# Patient Record
Sex: FEMALE | Race: WHITE | NOT HISPANIC OR LATINO | Employment: OTHER | RURAL
[De-identification: names, ages, dates, MRNs, and addresses within clinical notes are randomized per-mention and may not be internally consistent; named-entity substitution may affect disease eponyms.]

---

## 2017-06-16 ENCOUNTER — HISTORICAL (OUTPATIENT)
Dept: ADMINISTRATIVE | Facility: HOSPITAL | Age: 62
End: 2017-06-16

## 2017-06-20 LAB
LAB AP CLINICAL INFORMATION: NORMAL
LAB AP COMMENTS: NORMAL
LAB AP GENERAL CAT - HISTORICAL: NORMAL
LAB AP INTERPRETATION/RESULT - HISTORICAL: NEGATIVE
LAB AP SPECIMEN ADEQUACY - HISTORICAL: NORMAL
LAB AP SPECIMEN SUBMITTED - HISTORICAL: NORMAL

## 2020-05-19 ENCOUNTER — HISTORICAL (OUTPATIENT)
Dept: ADMINISTRATIVE | Facility: HOSPITAL | Age: 65
End: 2020-05-19

## 2020-08-17 ENCOUNTER — HISTORICAL (OUTPATIENT)
Dept: ADMINISTRATIVE | Facility: HOSPITAL | Age: 65
End: 2020-08-17

## 2020-09-02 ENCOUNTER — HISTORICAL (OUTPATIENT)
Dept: ADMINISTRATIVE | Facility: HOSPITAL | Age: 65
End: 2020-09-02

## 2020-10-01 ENCOUNTER — HISTORICAL (OUTPATIENT)
Dept: ADMINISTRATIVE | Facility: HOSPITAL | Age: 65
End: 2020-10-01

## 2020-10-04 LAB
REPORT: NORMAL

## 2020-10-05 ENCOUNTER — HISTORICAL (OUTPATIENT)
Dept: ADMINISTRATIVE | Facility: HOSPITAL | Age: 65
End: 2020-10-05

## 2020-10-05 LAB
BACTERIA #/AREA URNS HPF: ABNORMAL /HPF
BILIRUB UR QL STRIP: NEGATIVE MG/DL
CLARITY UR: CLEAR
COLOR UR: YELLOW
GLUCOSE UR STRIP-MCNC: NEGATIVE MG/DL
KETONES UR STRIP-SCNC: NEGATIVE MG/DL
LEUKOCYTE ESTERASE UR QL STRIP: ABNORMAL LEU/UL
NITRITE UR QL STRIP: NEGATIVE
PH UR STRIP: 5.5 PH UNITS (ref 5–8)
PROT UR QL STRIP: NEGATIVE MG/DL
RBC # UR STRIP: NEGATIVE ERY/UL
RBC #/AREA URNS HPF: ABNORMAL /HPF (ref 0–3)
SP GR UR STRIP: 1.02 (ref 1–1.03)
SQUAMOUS #/AREA URNS LPF: ABNORMAL /LPF
UROBILINOGEN UR STRIP-ACNC: 0.2 MG/DL
WBC #/AREA URNS HPF: ABNORMAL /HPF (ref 0–5)

## 2020-11-19 ENCOUNTER — HISTORICAL (OUTPATIENT)
Dept: ADMINISTRATIVE | Facility: HOSPITAL | Age: 65
End: 2020-11-19

## 2021-05-21 VITALS — HEIGHT: 69 IN | WEIGHT: 188 LBS | BODY MASS INDEX: 27.85 KG/M2

## 2021-05-21 RX ORDER — PROMETHAZINE HYDROCHLORIDE 25 MG/1
25 TABLET ORAL EVERY 6 HOURS PRN
COMMUNITY
End: 2021-07-07

## 2021-05-21 RX ORDER — LEVOTHYROXINE SODIUM 50 UG/1
50 TABLET ORAL
COMMUNITY
End: 2021-10-05 | Stop reason: SDUPTHER

## 2021-05-21 RX ORDER — HYDROCODONE BITARTRATE AND ACETAMINOPHEN 7.5; 325 MG/1; MG/1
1 TABLET ORAL 2 TIMES DAILY PRN
COMMUNITY

## 2021-05-21 RX ORDER — ATORVASTATIN CALCIUM 40 MG/1
40 TABLET, FILM COATED ORAL DAILY
COMMUNITY
End: 2021-05-25 | Stop reason: SDUPTHER

## 2021-05-21 RX ORDER — FLUTICASONE PROPIONATE 50 MCG
2 SPRAY, SUSPENSION (ML) NASAL DAILY
COMMUNITY
End: 2022-03-15 | Stop reason: SDUPTHER

## 2021-05-21 RX ORDER — TRIAMTERENE/HYDROCHLOROTHIAZID 37.5-25 MG
1 TABLET ORAL DAILY
COMMUNITY
End: 2021-05-25 | Stop reason: SDUPTHER

## 2021-05-21 RX ORDER — CITALOPRAM 40 MG/1
40 TABLET, FILM COATED ORAL DAILY
COMMUNITY
End: 2021-10-05

## 2021-05-21 RX ORDER — PREDNISONE 20 MG/1
20 TABLET ORAL 2 TIMES DAILY
COMMUNITY
End: 2021-07-07

## 2021-05-21 RX ORDER — GABAPENTIN 300 MG/1
600 CAPSULE ORAL 2 TIMES DAILY
COMMUNITY
End: 2021-05-25 | Stop reason: SDUPTHER

## 2021-05-21 RX ORDER — BUTALBITAL, ACETAMINOPHEN AND CAFFEINE 50; 325; 40 MG/1; MG/1; MG/1
1 TABLET ORAL EVERY 4 HOURS PRN
COMMUNITY
End: 2022-02-21

## 2021-05-21 RX ORDER — PANTOPRAZOLE SODIUM 40 MG/1
40 TABLET, DELAYED RELEASE ORAL 2 TIMES DAILY
COMMUNITY
End: 2022-06-07 | Stop reason: SDUPTHER

## 2021-05-21 RX ORDER — BUPROPION HYDROCHLORIDE 150 MG/1
150 TABLET ORAL DAILY
COMMUNITY
End: 2021-05-25 | Stop reason: SDUPTHER

## 2021-05-21 RX ORDER — METHOCARBAMOL 500 MG/1
500 TABLET, FILM COATED ORAL 3 TIMES DAILY
COMMUNITY
End: 2021-05-25

## 2021-05-21 RX ORDER — TRIAMCINOLONE ACETONIDE 1 MG/ML
LOTION TOPICAL 2 TIMES DAILY
COMMUNITY
End: 2021-10-05

## 2021-05-21 RX ORDER — CHOLECALCIFEROL (VITAMIN D3) 25 MCG
5000 TABLET ORAL 2 TIMES DAILY
COMMUNITY

## 2021-05-21 RX ORDER — METHENAMINE HIPPURATE 1000 MG/1
1 TABLET ORAL 2 TIMES DAILY
COMMUNITY
End: 2022-03-15 | Stop reason: SDUPTHER

## 2021-05-21 RX ORDER — TOPIRAMATE 25 MG/1
50 TABLET ORAL DAILY
COMMUNITY
End: 2021-05-25

## 2021-05-21 RX ORDER — HYDROXYZINE HYDROCHLORIDE 25 MG/1
25 TABLET, FILM COATED ORAL EVERY 8 HOURS PRN
COMMUNITY
End: 2021-07-07

## 2021-05-21 RX ORDER — FAMOTIDINE 40 MG/1
40 TABLET, FILM COATED ORAL DAILY
COMMUNITY
End: 2021-07-07

## 2021-05-21 RX ORDER — BUPROPION HYDROCHLORIDE 300 MG/1
300 TABLET ORAL DAILY
COMMUNITY
End: 2021-05-25 | Stop reason: SDUPTHER

## 2021-05-21 RX ORDER — RALOXIFENE HYDROCHLORIDE 60 MG/1
60 TABLET, FILM COATED ORAL DAILY
COMMUNITY
End: 2021-05-25 | Stop reason: SDUPTHER

## 2021-05-21 RX ORDER — EPINEPHRINE 0.3 MG/.3ML
INJECTION SUBCUTANEOUS ONCE
COMMUNITY
End: 2023-05-30 | Stop reason: SDUPTHER

## 2021-05-21 RX ORDER — POLYETHYLENE GLYCOL 3350 17 G/17G
POWDER, FOR SOLUTION ORAL
COMMUNITY

## 2021-05-25 ENCOUNTER — OFFICE VISIT (OUTPATIENT)
Dept: FAMILY MEDICINE | Facility: CLINIC | Age: 66
End: 2021-05-25
Payer: MEDICARE

## 2021-05-25 VITALS
OXYGEN SATURATION: 98 % | WEIGHT: 174 LBS | HEART RATE: 84 BPM | BODY MASS INDEX: 25.77 KG/M2 | HEIGHT: 69 IN | RESPIRATION RATE: 18 BRPM | TEMPERATURE: 98 F | DIASTOLIC BLOOD PRESSURE: 78 MMHG | SYSTOLIC BLOOD PRESSURE: 132 MMHG

## 2021-05-25 DIAGNOSIS — G89.4 CHRONIC PAIN SYNDROME: ICD-10-CM

## 2021-05-25 DIAGNOSIS — I10 ESSENTIAL HYPERTENSION: Primary | ICD-10-CM

## 2021-05-25 DIAGNOSIS — F32.A DEPRESSIVE DISORDER: ICD-10-CM

## 2021-05-25 DIAGNOSIS — E78.5 HYPERLIPIDEMIA, UNSPECIFIED HYPERLIPIDEMIA TYPE: ICD-10-CM

## 2021-05-25 DIAGNOSIS — M81.0 AGE-RELATED OSTEOPOROSIS WITHOUT CURRENT PATHOLOGICAL FRACTURE: ICD-10-CM

## 2021-05-25 DIAGNOSIS — L56.3 SOLAR URTICARIA: ICD-10-CM

## 2021-05-25 DIAGNOSIS — N30.20 CHRONIC CYSTITIS: ICD-10-CM

## 2021-05-25 PROCEDURE — 99213 OFFICE O/P EST LOW 20 MIN: CPT | Mod: ,,, | Performed by: NURSE PRACTITIONER

## 2021-05-25 PROCEDURE — 99213 PR OFFICE/OUTPT VISIT, EST, LEVL III, 20-29 MIN: ICD-10-PCS | Mod: ,,, | Performed by: NURSE PRACTITIONER

## 2021-05-25 RX ORDER — METHOCARBAMOL 500 MG/1
500 TABLET, FILM COATED ORAL 3 TIMES DAILY
COMMUNITY
End: 2021-05-25

## 2021-05-25 RX ORDER — MONTELUKAST SODIUM 10 MG/1
10 TABLET ORAL NIGHTLY
COMMUNITY
End: 2021-07-07 | Stop reason: SDUPTHER

## 2021-05-25 RX ORDER — METHOCARBAMOL 500 MG/1
500 TABLET, FILM COATED ORAL 3 TIMES DAILY
Qty: 90 TABLET | Refills: 2 | Status: SHIPPED | OUTPATIENT
Start: 2021-05-25 | End: 2021-10-05 | Stop reason: SDUPTHER

## 2021-05-25 RX ORDER — BUPROPION HYDROCHLORIDE 300 MG/1
300 TABLET ORAL DAILY
Qty: 30 TABLET | Refills: 2 | Status: SHIPPED | OUTPATIENT
Start: 2021-05-25 | End: 2022-02-03

## 2021-05-25 RX ORDER — RALOXIFENE HYDROCHLORIDE 60 MG/1
60 TABLET, FILM COATED ORAL DAILY
Qty: 30 TABLET | Refills: 2 | Status: SHIPPED | OUTPATIENT
Start: 2021-05-25 | End: 2022-02-03 | Stop reason: SDUPTHER

## 2021-05-25 RX ORDER — TOPIRAMATE 100 MG/1
100 TABLET, FILM COATED ORAL 2 TIMES DAILY
COMMUNITY
End: 2022-07-25 | Stop reason: SDUPTHER

## 2021-05-25 RX ORDER — ATORVASTATIN CALCIUM 40 MG/1
40 TABLET, FILM COATED ORAL DAILY
Qty: 30 TABLET | Refills: 2 | Status: SHIPPED | OUTPATIENT
Start: 2021-05-25 | End: 2021-10-05 | Stop reason: SDUPTHER

## 2021-05-25 RX ORDER — GABAPENTIN 300 MG/1
600 CAPSULE ORAL 2 TIMES DAILY
Qty: 60 CAPSULE | Refills: 2 | Status: SHIPPED | OUTPATIENT
Start: 2021-05-25 | End: 2021-07-07

## 2021-05-25 RX ORDER — TRIAMTERENE/HYDROCHLOROTHIAZID 37.5-25 MG
1 TABLET ORAL DAILY
Qty: 30 TABLET | Refills: 2 | Status: SHIPPED | OUTPATIENT
Start: 2021-05-25 | End: 2021-07-07

## 2021-05-25 RX ORDER — BUPROPION HYDROCHLORIDE 150 MG/1
150 TABLET ORAL DAILY
Qty: 30 TABLET | Refills: 2 | Status: SHIPPED | OUTPATIENT
Start: 2021-05-25 | End: 2021-11-24 | Stop reason: SDUPTHER

## 2021-05-25 RX ORDER — CETIRIZINE HYDROCHLORIDE 10 MG/1
10 TABLET ORAL DAILY
COMMUNITY
End: 2023-06-08 | Stop reason: SDUPTHER

## 2021-05-28 ENCOUNTER — IMMUNIZATION (OUTPATIENT)
Dept: FAMILY MEDICINE | Facility: CLINIC | Age: 66
End: 2021-05-28
Payer: MEDICARE

## 2021-05-28 DIAGNOSIS — Z23 NEED FOR VACCINATION: Primary | ICD-10-CM

## 2021-05-28 PROCEDURE — 0011A COVID-19, MRNA, LNP-S, PF, 100 MCG/0.5 ML DOSE VACCINE: ICD-10-PCS | Mod: ,,, | Performed by: NURSE PRACTITIONER

## 2021-05-28 PROCEDURE — 0011A COVID-19, MRNA, LNP-S, PF, 100 MCG/0.5 ML DOSE VACCINE: CPT | Mod: ,,, | Performed by: NURSE PRACTITIONER

## 2021-05-28 PROCEDURE — 91301 COVID-19, MRNA, LNP-S, PF, 100 MCG/0.5 ML DOSE VACCINE: ICD-10-PCS | Mod: ,,, | Performed by: NURSE PRACTITIONER

## 2021-05-28 PROCEDURE — 91301 COVID-19, MRNA, LNP-S, PF, 100 MCG/0.5 ML DOSE VACCINE: CPT | Mod: ,,, | Performed by: NURSE PRACTITIONER

## 2021-06-25 ENCOUNTER — IMMUNIZATION (OUTPATIENT)
Dept: FAMILY MEDICINE | Facility: CLINIC | Age: 66
End: 2021-06-25
Payer: MEDICARE

## 2021-06-25 DIAGNOSIS — Z23 NEED FOR VACCINATION: Primary | ICD-10-CM

## 2021-06-25 PROCEDURE — 0012A COVID-19, MRNA, LNP-S, PF, 100 MCG/0.5 ML DOSE VACCINE: CPT | Mod: ,,, | Performed by: NURSE PRACTITIONER

## 2021-06-25 PROCEDURE — 91301 COVID-19, MRNA, LNP-S, PF, 100 MCG/0.5 ML DOSE VACCINE: ICD-10-PCS | Mod: ,,, | Performed by: NURSE PRACTITIONER

## 2021-06-25 PROCEDURE — 0012A COVID-19, MRNA, LNP-S, PF, 100 MCG/0.5 ML DOSE VACCINE: ICD-10-PCS | Mod: ,,, | Performed by: NURSE PRACTITIONER

## 2021-06-25 PROCEDURE — 91301 COVID-19, MRNA, LNP-S, PF, 100 MCG/0.5 ML DOSE VACCINE: CPT | Mod: ,,, | Performed by: NURSE PRACTITIONER

## 2021-06-28 ENCOUNTER — PATIENT MESSAGE (OUTPATIENT)
Dept: FAMILY MEDICINE | Facility: CLINIC | Age: 66
End: 2021-06-28

## 2021-06-29 ENCOUNTER — OFFICE VISIT (OUTPATIENT)
Dept: UROLOGY | Facility: CLINIC | Age: 66
End: 2021-06-29
Payer: MEDICARE

## 2021-06-29 VITALS
BODY MASS INDEX: 25.33 KG/M2 | SYSTOLIC BLOOD PRESSURE: 118 MMHG | WEIGHT: 171 LBS | HEART RATE: 83 BPM | DIASTOLIC BLOOD PRESSURE: 71 MMHG | HEIGHT: 69 IN

## 2021-06-29 DIAGNOSIS — M54.40 CHRONIC LOW BACK PAIN WITH SCIATICA, SCIATICA LATERALITY UNSPECIFIED, UNSPECIFIED BACK PAIN LATERALITY: ICD-10-CM

## 2021-06-29 DIAGNOSIS — G89.29 CHRONIC LOW BACK PAIN WITH SCIATICA, SCIATICA LATERALITY UNSPECIFIED, UNSPECIFIED BACK PAIN LATERALITY: ICD-10-CM

## 2021-06-29 DIAGNOSIS — N20.0 NEPHROLITHIASIS: ICD-10-CM

## 2021-06-29 DIAGNOSIS — N39.0 URINARY TRACT INFECTION WITHOUT HEMATURIA, SITE UNSPECIFIED: ICD-10-CM

## 2021-06-29 DIAGNOSIS — N30.20 CHRONIC CYSTITIS: Primary | ICD-10-CM

## 2021-06-29 PROCEDURE — 87086 URINE CULTURE/COLONY COUNT: CPT | Mod: ,,, | Performed by: CLINICAL MEDICAL LABORATORY

## 2021-06-29 PROCEDURE — 87086 CULTURE, URINE: ICD-10-PCS | Mod: ,,, | Performed by: CLINICAL MEDICAL LABORATORY

## 2021-06-29 PROCEDURE — 87186 SC STD MICRODIL/AGAR DIL: CPT | Mod: ,,, | Performed by: CLINICAL MEDICAL LABORATORY

## 2021-06-29 PROCEDURE — 87186 CULTURE, URINE: ICD-10-PCS | Mod: ,,, | Performed by: CLINICAL MEDICAL LABORATORY

## 2021-06-29 PROCEDURE — 99215 OFFICE O/P EST HI 40 MIN: CPT | Mod: PBBFAC | Performed by: UROLOGY

## 2021-06-29 PROCEDURE — 99213 PR OFFICE/OUTPT VISIT, EST, LEVL III, 20-29 MIN: ICD-10-PCS | Mod: S$PBB,,, | Performed by: UROLOGY

## 2021-06-29 PROCEDURE — 99213 OFFICE O/P EST LOW 20 MIN: CPT | Mod: S$PBB,,, | Performed by: UROLOGY

## 2021-07-01 DIAGNOSIS — N39.0 URINARY TRACT INFECTION WITHOUT HEMATURIA, SITE UNSPECIFIED: Primary | ICD-10-CM

## 2021-07-01 LAB — UA COMPLETE W REFLEX CULTURE PNL UR: ABNORMAL

## 2021-07-01 RX ORDER — CIPROFLOXACIN 500 MG/1
500 TABLET ORAL 2 TIMES DAILY
Qty: 20 TABLET | Refills: 0 | Status: SHIPPED | OUTPATIENT
Start: 2021-07-01 | End: 2021-07-11

## 2021-07-07 ENCOUNTER — OFFICE VISIT (OUTPATIENT)
Dept: FAMILY MEDICINE | Facility: CLINIC | Age: 66
End: 2021-07-07
Payer: MEDICARE

## 2021-07-07 VITALS
BODY MASS INDEX: 25.18 KG/M2 | WEIGHT: 170 LBS | DIASTOLIC BLOOD PRESSURE: 70 MMHG | HEART RATE: 94 BPM | OXYGEN SATURATION: 96 % | HEIGHT: 69 IN | SYSTOLIC BLOOD PRESSURE: 100 MMHG | RESPIRATION RATE: 18 BRPM | TEMPERATURE: 98 F

## 2021-07-07 DIAGNOSIS — G89.4 CHRONIC PAIN SYNDROME: ICD-10-CM

## 2021-07-07 DIAGNOSIS — J31.0 CHRONIC RHINITIS: ICD-10-CM

## 2021-07-07 DIAGNOSIS — R42 DIZZINESS: Primary | ICD-10-CM

## 2021-07-07 DIAGNOSIS — K21.9 GASTROESOPHAGEAL REFLUX DISEASE WITHOUT ESOPHAGITIS: ICD-10-CM

## 2021-07-07 PROCEDURE — 99213 OFFICE O/P EST LOW 20 MIN: CPT | Mod: ,,, | Performed by: NURSE PRACTITIONER

## 2021-07-07 PROCEDURE — 99213 PR OFFICE/OUTPT VISIT, EST, LEVL III, 20-29 MIN: ICD-10-PCS | Mod: ,,, | Performed by: NURSE PRACTITIONER

## 2021-07-07 RX ORDER — MONTELUKAST SODIUM 10 MG/1
10 TABLET ORAL NIGHTLY
Qty: 30 TABLET | Refills: 5 | Status: SHIPPED | OUTPATIENT
Start: 2021-07-07 | End: 2021-10-05 | Stop reason: SDUPTHER

## 2021-07-07 RX ORDER — GABAPENTIN 300 MG/1
600 CAPSULE ORAL 2 TIMES DAILY
Qty: 60 CAPSULE | Refills: 2 | Status: SHIPPED | OUTPATIENT
Start: 2021-07-07 | End: 2021-09-07

## 2021-07-26 ENCOUNTER — OFFICE VISIT (OUTPATIENT)
Dept: FAMILY MEDICINE | Facility: CLINIC | Age: 66
End: 2021-07-26
Payer: MEDICARE

## 2021-07-26 VITALS
OXYGEN SATURATION: 99 % | RESPIRATION RATE: 18 BRPM | BODY MASS INDEX: 25.62 KG/M2 | HEIGHT: 69 IN | SYSTOLIC BLOOD PRESSURE: 130 MMHG | HEART RATE: 68 BPM | TEMPERATURE: 98 F | DIASTOLIC BLOOD PRESSURE: 72 MMHG | WEIGHT: 173 LBS

## 2021-07-26 DIAGNOSIS — L25.5 CONTACT DERMATITIS DUE TO PLANTS, EXCEPT FOOD, UNSPECIFIED CONTACT DERMATITIS TYPE: Primary | ICD-10-CM

## 2021-07-26 DIAGNOSIS — G89.4 CHRONIC PAIN SYNDROME: ICD-10-CM

## 2021-07-26 DIAGNOSIS — M17.12 ARTHRITIS OF LEFT KNEE: ICD-10-CM

## 2021-07-26 DIAGNOSIS — R21 RASH: ICD-10-CM

## 2021-07-26 DIAGNOSIS — M96.1 LUMBAR POST-LAMINECTOMY SYNDROME: ICD-10-CM

## 2021-07-26 LAB
ALBUMIN SERPL BCP-MCNC: 3.7 G/DL (ref 3.5–5)
ALBUMIN/GLOB SERPL: 0.9 {RATIO}
ALP SERPL-CCNC: 87 U/L (ref 55–142)
ALT SERPL W P-5'-P-CCNC: 30 U/L (ref 13–56)
ANION GAP SERPL CALCULATED.3IONS-SCNC: 11 MMOL/L (ref 7–16)
AST SERPL W P-5'-P-CCNC: 22 U/L (ref 15–37)
BASOPHILS # BLD AUTO: 0.06 K/UL (ref 0–0.2)
BASOPHILS NFR BLD AUTO: 0.9 % (ref 0–1)
BILIRUB SERPL-MCNC: 0.2 MG/DL (ref 0–1.2)
BUN SERPL-MCNC: 13 MG/DL (ref 7–18)
BUN/CREAT SERPL: 9 (ref 6–20)
CALCIUM SERPL-MCNC: 9 MG/DL (ref 8.5–10.1)
CHLORIDE SERPL-SCNC: 105 MMOL/L (ref 98–107)
CO2 SERPL-SCNC: 28 MMOL/L (ref 21–32)
CREAT SERPL-MCNC: 1.46 MG/DL (ref 0.55–1.02)
DIFFERENTIAL METHOD BLD: ABNORMAL
EOSINOPHIL # BLD AUTO: 0.23 K/UL (ref 0–0.5)
EOSINOPHIL NFR BLD AUTO: 3.3 % (ref 1–4)
ERYTHROCYTE [DISTWIDTH] IN BLOOD BY AUTOMATED COUNT: 13.2 % (ref 11.5–14.5)
GLOBULIN SER-MCNC: 4.1 G/DL (ref 2–4)
GLUCOSE SERPL-MCNC: 82 MG/DL (ref 74–106)
HCT VFR BLD AUTO: 42.5 % (ref 38–47)
HGB BLD-MCNC: 13.5 G/DL (ref 12–16)
IMM GRANULOCYTES # BLD AUTO: 0.02 K/UL (ref 0–0.04)
IMM GRANULOCYTES NFR BLD: 0.3 % (ref 0–0.4)
LYMPHOCYTES # BLD AUTO: 2.2 K/UL (ref 1–4.8)
LYMPHOCYTES NFR BLD AUTO: 32 % (ref 27–41)
MCH RBC QN AUTO: 28.7 PG (ref 27–31)
MCHC RBC AUTO-ENTMCNC: 31.8 G/DL (ref 32–36)
MCV RBC AUTO: 90.2 FL (ref 80–96)
MONOCYTES # BLD AUTO: 0.66 K/UL (ref 0–0.8)
MONOCYTES NFR BLD AUTO: 9.6 % (ref 2–6)
MPC BLD CALC-MCNC: 10.6 FL (ref 9.4–12.4)
NEUTROPHILS # BLD AUTO: 3.7 K/UL (ref 1.8–7.7)
NEUTROPHILS NFR BLD AUTO: 53.9 % (ref 53–65)
NRBC # BLD AUTO: 0 X10E3/UL
NRBC, AUTO (.00): 0 %
PLATELET # BLD AUTO: 363 K/UL (ref 150–400)
POTASSIUM SERPL-SCNC: 3.5 MMOL/L (ref 3.5–5.1)
PROT SERPL-MCNC: 7.8 G/DL (ref 6.4–8.2)
RBC # BLD AUTO: 4.71 M/UL (ref 4.2–5.4)
SODIUM SERPL-SCNC: 140 MMOL/L (ref 136–145)
URATE SERPL-MCNC: 4.6 MG/DL (ref 2.6–6)
WBC # BLD AUTO: 6.87 K/UL (ref 4.5–11)

## 2021-07-26 PROCEDURE — 84550 URIC ACID: ICD-10-PCS | Mod: ,,, | Performed by: CLINICAL MEDICAL LABORATORY

## 2021-07-26 PROCEDURE — 86038 ANA EIA W/REFLEX DSDNA/ENA: ICD-10-PCS | Mod: ,,, | Performed by: CLINICAL MEDICAL LABORATORY

## 2021-07-26 PROCEDURE — 80053 COMPREHENSIVE METABOLIC PANEL: ICD-10-PCS | Mod: ,,, | Performed by: CLINICAL MEDICAL LABORATORY

## 2021-07-26 PROCEDURE — 85025 COMPLETE CBC W/AUTO DIFF WBC: CPT | Mod: ,,, | Performed by: CLINICAL MEDICAL LABORATORY

## 2021-07-26 PROCEDURE — 80053 COMPREHEN METABOLIC PANEL: CPT | Mod: ,,, | Performed by: CLINICAL MEDICAL LABORATORY

## 2021-07-26 PROCEDURE — 84550 ASSAY OF BLOOD/URIC ACID: CPT | Mod: ,,, | Performed by: CLINICAL MEDICAL LABORATORY

## 2021-07-26 PROCEDURE — 85025 CBC WITH DIFFERENTIAL: ICD-10-PCS | Mod: ,,, | Performed by: CLINICAL MEDICAL LABORATORY

## 2021-07-26 PROCEDURE — 86038 ANTINUCLEAR ANTIBODIES: CPT | Mod: ,,, | Performed by: CLINICAL MEDICAL LABORATORY

## 2021-07-26 PROCEDURE — 99214 OFFICE O/P EST MOD 30 MIN: CPT | Mod: 25,,, | Performed by: NURSE PRACTITIONER

## 2021-07-26 PROCEDURE — 96372 PR INJECTION,THERAP/PROPH/DIAG2ST, IM OR SUBCUT: ICD-10-PCS | Mod: ,,, | Performed by: NURSE PRACTITIONER

## 2021-07-26 PROCEDURE — 99214 PR OFFICE/OUTPT VISIT, EST, LEVL IV, 30-39 MIN: ICD-10-PCS | Mod: 25,,, | Performed by: NURSE PRACTITIONER

## 2021-07-26 PROCEDURE — 96372 THER/PROPH/DIAG INJ SC/IM: CPT | Mod: ,,, | Performed by: NURSE PRACTITIONER

## 2021-07-26 RX ORDER — DEXAMETHASONE SODIUM PHOSPHATE 4 MG/ML
4 INJECTION, SOLUTION INTRA-ARTICULAR; INTRALESIONAL; INTRAMUSCULAR; INTRAVENOUS; SOFT TISSUE
Status: COMPLETED | OUTPATIENT
Start: 2021-07-26 | End: 2021-07-26

## 2021-07-26 RX ORDER — TRIAMCINOLONE ACETONIDE 1 MG/G
OINTMENT TOPICAL 2 TIMES DAILY
Qty: 30 G | Refills: 0 | Status: SHIPPED | OUTPATIENT
Start: 2021-07-26 | End: 2022-02-21

## 2021-07-26 RX ORDER — TRIAMTERENE/HYDROCHLOROTHIAZID 37.5-25 MG
1 TABLET ORAL DAILY
COMMUNITY
End: 2021-10-05 | Stop reason: SDUPTHER

## 2021-07-26 RX ADMIN — DEXAMETHASONE SODIUM PHOSPHATE 4 MG: 4 INJECTION, SOLUTION INTRA-ARTICULAR; INTRALESIONAL; INTRAMUSCULAR; INTRAVENOUS; SOFT TISSUE at 03:07

## 2021-07-27 ENCOUNTER — PATIENT MESSAGE (OUTPATIENT)
Dept: FAMILY MEDICINE | Facility: CLINIC | Age: 66
End: 2021-07-27

## 2021-07-28 LAB — ANA SER QL: NEGATIVE

## 2021-09-01 DIAGNOSIS — Z01.818 PRE-PROCEDURAL EXAMINATION: Primary | ICD-10-CM

## 2021-09-01 LAB
CTP QC/QA: YES
SARS-COV-2 AG RESP QL IA.RAPID: NEGATIVE

## 2021-09-13 ENCOUNTER — OFFICE VISIT (OUTPATIENT)
Dept: FAMILY MEDICINE | Facility: CLINIC | Age: 66
End: 2021-09-13
Payer: MEDICARE

## 2021-09-13 ENCOUNTER — APPOINTMENT (OUTPATIENT)
Dept: RADIOLOGY | Facility: CLINIC | Age: 66
End: 2021-09-13
Attending: NURSE PRACTITIONER
Payer: MEDICARE

## 2021-09-13 ENCOUNTER — OFFICE VISIT (OUTPATIENT)
Dept: SLEEP MEDICINE | Facility: CLINIC | Age: 66
End: 2021-09-13
Attending: FAMILY MEDICINE
Payer: MEDICARE

## 2021-09-13 VITALS
OXYGEN SATURATION: 97 % | DIASTOLIC BLOOD PRESSURE: 76 MMHG | HEART RATE: 74 BPM | WEIGHT: 175 LBS | SYSTOLIC BLOOD PRESSURE: 134 MMHG | BODY MASS INDEX: 25.92 KG/M2 | HEIGHT: 69 IN

## 2021-09-13 VITALS
WEIGHT: 175 LBS | DIASTOLIC BLOOD PRESSURE: 80 MMHG | RESPIRATION RATE: 18 BRPM | HEIGHT: 69 IN | BODY MASS INDEX: 25.92 KG/M2 | HEART RATE: 70 BPM | TEMPERATURE: 98 F | SYSTOLIC BLOOD PRESSURE: 118 MMHG

## 2021-09-13 DIAGNOSIS — G47.33 OSA ON CPAP: Primary | ICD-10-CM

## 2021-09-13 DIAGNOSIS — M25.552 PAIN IN JOINT OF LEFT HIP: ICD-10-CM

## 2021-09-13 DIAGNOSIS — M25.552 PAIN IN JOINT OF LEFT HIP: Primary | ICD-10-CM

## 2021-09-13 PROCEDURE — 99999 PR STA SHADOW: ICD-10-PCS | Mod: PBBFAC,,, | Performed by: FAMILY MEDICINE

## 2021-09-13 PROCEDURE — 99215 OFFICE O/P EST HI 40 MIN: CPT | Mod: PBBFAC | Performed by: FAMILY MEDICINE

## 2021-09-13 PROCEDURE — 73502 X-RAY EXAM HIP UNI 2-3 VIEWS: CPT | Mod: 26,LT,, | Performed by: RADIOLOGY

## 2021-09-13 PROCEDURE — 96372 PR INJECTION,THERAP/PROPH/DIAG2ST, IM OR SUBCUT: ICD-10-PCS | Mod: ,,, | Performed by: NURSE PRACTITIONER

## 2021-09-13 PROCEDURE — 96372 THER/PROPH/DIAG INJ SC/IM: CPT | Mod: ,,, | Performed by: NURSE PRACTITIONER

## 2021-09-13 PROCEDURE — 73502 X-RAY EXAM HIP UNI 2-3 VIEWS: CPT | Mod: TC,RHCUB,LT | Performed by: NURSE PRACTITIONER

## 2021-09-13 PROCEDURE — 99999 PR STA SHADOW: CPT | Mod: PBBFAC,,, | Performed by: FAMILY MEDICINE

## 2021-09-13 PROCEDURE — 73502 XR HIP WITH PELVIS WHEN PERFORMED, 2 OR 3 VIEWS LEFT: ICD-10-PCS | Mod: 26,LT,, | Performed by: RADIOLOGY

## 2021-09-13 PROCEDURE — 99214 PR OFFICE/OUTPT VISIT, EST, LEVL IV, 30-39 MIN: ICD-10-PCS | Mod: 25,,, | Performed by: NURSE PRACTITIONER

## 2021-09-13 PROCEDURE — 99214 OFFICE O/P EST MOD 30 MIN: CPT | Mod: 25,,, | Performed by: NURSE PRACTITIONER

## 2021-09-13 PROCEDURE — 99213 OFFICE O/P EST LOW 20 MIN: CPT | Mod: S$PBB | Performed by: FAMILY MEDICINE

## 2021-09-13 RX ORDER — PREDNISONE 10 MG/1
TABLET ORAL
COMMUNITY
Start: 2021-08-03 | End: 2021-10-05

## 2021-09-13 RX ORDER — KETOROLAC TROMETHAMINE 30 MG/ML
60 INJECTION, SOLUTION INTRAMUSCULAR; INTRAVENOUS
Status: COMPLETED | OUTPATIENT
Start: 2021-09-13 | End: 2021-09-13

## 2021-09-13 RX ORDER — DICYCLOMINE HYDROCHLORIDE 10 MG/1
10 CAPSULE ORAL
COMMUNITY
End: 2022-02-21

## 2021-09-13 RX ADMIN — KETOROLAC TROMETHAMINE 60 MG: 30 INJECTION, SOLUTION INTRAMUSCULAR; INTRAVENOUS at 03:09

## 2021-10-05 ENCOUNTER — APPOINTMENT (OUTPATIENT)
Dept: RADIOLOGY | Facility: CLINIC | Age: 66
End: 2021-10-05
Attending: NURSE PRACTITIONER
Payer: MEDICARE

## 2021-10-05 ENCOUNTER — OFFICE VISIT (OUTPATIENT)
Dept: FAMILY MEDICINE | Facility: CLINIC | Age: 66
End: 2021-10-05
Payer: MEDICARE

## 2021-10-05 VITALS
DIASTOLIC BLOOD PRESSURE: 80 MMHG | TEMPERATURE: 99 F | BODY MASS INDEX: 26.07 KG/M2 | HEIGHT: 69 IN | OXYGEN SATURATION: 98 % | SYSTOLIC BLOOD PRESSURE: 118 MMHG | WEIGHT: 176 LBS | HEART RATE: 72 BPM | RESPIRATION RATE: 18 BRPM

## 2021-10-05 DIAGNOSIS — R07.81 RIB PAIN ON RIGHT SIDE: ICD-10-CM

## 2021-10-05 DIAGNOSIS — I10 HYPERTENSION, UNSPECIFIED TYPE: ICD-10-CM

## 2021-10-05 DIAGNOSIS — E78.5 HYPERLIPIDEMIA, UNSPECIFIED HYPERLIPIDEMIA TYPE: ICD-10-CM
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
## 2021-10-05 DIAGNOSIS — G89.4 CHRONIC PAIN SYNDROME: ICD-10-CM

## 2021-10-05 DIAGNOSIS — S22.31XA CLOSED FRACTURE OF ONE RIB OF RIGHT SIDE, INITIAL ENCOUNTER: ICD-10-CM

## 2021-10-05 DIAGNOSIS — T78.40XS ALLERGY, SEQUELA: ICD-10-CM

## 2021-10-05 DIAGNOSIS — M25.551 RIGHT HIP PAIN: ICD-10-CM
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
## 2021-10-05 DIAGNOSIS — E03.9 HYPOTHYROIDISM, UNSPECIFIED TYPE: ICD-10-CM

## 2021-10-05 LAB
ALBUMIN SERPL BCP-MCNC: 3.7 G/DL (ref 3.5–5)
ALBUMIN/GLOB SERPL: 0.9 {RATIO}
ALP SERPL-CCNC: 84 U/L (ref 55–142)
ALT SERPL W P-5'-P-CCNC: 17 U/L (ref 13–56)
ANION GAP SERPL CALCULATED.3IONS-SCNC: 9 MMOL/L (ref 7–16)
AST SERPL W P-5'-P-CCNC: 11 U/L (ref 15–37)
BASOPHILS # BLD AUTO: 0.08 K/UL (ref 0–0.2)
BASOPHILS NFR BLD AUTO: 1.2 % (ref 0–1)
BILIRUB SERPL-MCNC: 0.7 MG/DL (ref 0–1.2)
BUN SERPL-MCNC: 19 MG/DL (ref 7–18)
BUN/CREAT SERPL: 12 (ref 6–20)
CALCIUM SERPL-MCNC: 9.9 MG/DL (ref 8.5–10.1)
CHLORIDE SERPL-SCNC: 106 MMOL/L (ref 98–107)
CHOLEST SERPL-MCNC: 162 MG/DL (ref 0–200)
CHOLEST/HDLC SERPL: 2.3 {RATIO}
CK SERPL-CCNC: 64 U/L (ref 26–192)
CO2 SERPL-SCNC: 27 MMOL/L (ref 21–32)
CREAT SERPL-MCNC: 1.61 MG/DL (ref 0.55–1.02)
DIFFERENTIAL METHOD BLD: ABNORMAL
EOSINOPHIL # BLD AUTO: 0.16 K/UL (ref 0–0.5)
EOSINOPHIL NFR BLD AUTO: 2.4 % (ref 1–4)
ERYTHROCYTE [DISTWIDTH] IN BLOOD BY AUTOMATED COUNT: 13.3 % (ref 11.5–14.5)
GLOBULIN SER-MCNC: 4 G/DL (ref 2–4)
GLUCOSE SERPL-MCNC: 111 MG/DL (ref 74–106)
HCT VFR BLD AUTO: 44.1 % (ref 38–47)
HDLC SERPL-MCNC: 72 MG/DL (ref 40–60)
HGB BLD-MCNC: 14.3 G/DL (ref 12–16)
IMM GRANULOCYTES # BLD AUTO: 0.03 K/UL (ref 0–0.04)
IMM GRANULOCYTES NFR BLD: 0.5 % (ref 0–0.4)
LDLC SERPL CALC-MCNC: 60 MG/DL
LDLC/HDLC SERPL: 0.8 {RATIO}
LYMPHOCYTES # BLD AUTO: 2.26 K/UL (ref 1–4.8)
LYMPHOCYTES NFR BLD AUTO: 34 % (ref 27–41)
MCH RBC QN AUTO: 29.2 PG (ref 27–31)
MCHC RBC AUTO-ENTMCNC: 32.4 G/DL (ref 32–36)
MCV RBC AUTO: 90 FL (ref 80–96)
MONOCYTES # BLD AUTO: 0.55 K/UL (ref 0–0.8)
MONOCYTES NFR BLD AUTO: 8.3 % (ref 2–6)
MPC BLD CALC-MCNC: 9.7 FL (ref 9.4–12.4)
NEUTROPHILS # BLD AUTO: 3.57 K/UL (ref 1.8–7.7)
NEUTROPHILS NFR BLD AUTO: 53.6 % (ref 53–65)
NONHDLC SERPL-MCNC: 90 MG/DL
NRBC # BLD AUTO: 0 X10E3/UL
NRBC, AUTO (.00): 0 %
PLATELET # BLD AUTO: 352 K/UL (ref 150–400)
POTASSIUM SERPL-SCNC: 3.9 MMOL/L (ref 3.5–5.1)
PROT SERPL-MCNC: 7.7 G/DL (ref 6.4–8.2)
RBC # BLD AUTO: 4.9 M/UL (ref 4.2–5.4)
SODIUM SERPL-SCNC: 138 MMOL/L (ref 136–145)
TRIGL SERPL-MCNC: 150 MG/DL (ref 35–150)
TSH SERPL DL<=0.005 MIU/L-ACNC: 2.69 UIU/ML (ref 0.36–3.74)
VLDLC SERPL-MCNC: 30 MG/DL
WBC # BLD AUTO: 6.65 K/UL (ref 4.5–11)

## 2021-10-05 PROCEDURE — 82550 CK: ICD-10-PCS | Mod: ,,, | Performed by: CLINICAL MEDICAL LABORATORY

## 2021-10-05 PROCEDURE — 71100 XR RIBS 2 VIEW RIGHT: ICD-10-PCS | Mod: 26,RT,, | Performed by: RADIOLOGY

## 2021-10-05 PROCEDURE — 85025 COMPLETE CBC W/AUTO DIFF WBC: CPT | Mod: ,,, | Performed by: CLINICAL MEDICAL LABORATORY

## 2021-10-05 PROCEDURE — 99214 PR OFFICE/OUTPT VISIT, EST, LEVL IV, 30-39 MIN: ICD-10-PCS | Mod: ,,, | Performed by: NURSE PRACTITIONER

## 2021-10-05 PROCEDURE — 80061 LIPID PANEL: CPT | Mod: ,,, | Performed by: CLINICAL MEDICAL LABORATORY

## 2021-10-05 PROCEDURE — 82550 ASSAY OF CK (CPK): CPT | Mod: ,,, | Performed by: CLINICAL MEDICAL LABORATORY

## 2021-10-05 PROCEDURE — 80053 COMPREHENSIVE METABOLIC PANEL: ICD-10-PCS | Mod: ,,, | Performed by: CLINICAL MEDICAL LABORATORY

## 2021-10-05 PROCEDURE — 71100 X-RAY EXAM RIBS UNI 2 VIEWS: CPT | Mod: TC,RHCUB,RT | Performed by: NURSE PRACTITIONER

## 2021-10-05 PROCEDURE — 84443 TSH: ICD-10-PCS | Mod: ,,, | Performed by: CLINICAL MEDICAL LABORATORY

## 2021-10-05 PROCEDURE — 85025 CBC WITH DIFFERENTIAL: ICD-10-PCS | Mod: ,,, | Performed by: CLINICAL MEDICAL LABORATORY

## 2021-10-05 PROCEDURE — 80061 LIPID PANEL: ICD-10-PCS | Mod: ,,, | Performed by: CLINICAL MEDICAL LABORATORY

## 2021-10-05 PROCEDURE — 71100 X-RAY EXAM RIBS UNI 2 VIEWS: CPT | Mod: 26,RT,, | Performed by: RADIOLOGY

## 2021-10-05 PROCEDURE — 84443 ASSAY THYROID STIM HORMONE: CPT | Mod: ,,, | Performed by: CLINICAL MEDICAL LABORATORY

## 2021-10-05 PROCEDURE — 99214 OFFICE O/P EST MOD 30 MIN: CPT | Mod: ,,, | Performed by: NURSE PRACTITIONER

## 2021-10-05 PROCEDURE — 80053 COMPREHEN METABOLIC PANEL: CPT | Mod: ,,, | Performed by: CLINICAL MEDICAL LABORATORY

## 2021-10-05 RX ORDER — LEVOTHYROXINE SODIUM 50 UG/1
50 TABLET ORAL
Qty: 90 TABLET | Refills: 1 | Status: SHIPPED | OUTPATIENT
Start: 2021-10-05 | End: 2022-03-15 | Stop reason: SDUPTHER

## 2021-10-05 RX ORDER — MONTELUKAST SODIUM 10 MG/1
10 TABLET ORAL NIGHTLY
Qty: 90 TABLET | Refills: 1 | Status: SHIPPED | OUTPATIENT
Start: 2021-10-05 | End: 2022-05-13

## 2021-10-05 RX ORDER — GABAPENTIN 300 MG/1
300 CAPSULE ORAL 3 TIMES DAILY
Qty: 90 CAPSULE | Refills: 2 | Status: SHIPPED | OUTPATIENT
Start: 2021-10-05 | End: 2021-12-01

## 2021-10-05 RX ORDER — TRIAMTERENE/HYDROCHLOROTHIAZID 37.5-25 MG
1 TABLET ORAL DAILY
Qty: 90 TABLET | Refills: 1 | Status: SHIPPED | OUTPATIENT
Start: 2021-10-05 | End: 2022-05-16

## 2021-10-05 RX ORDER — ATORVASTATIN CALCIUM 40 MG/1
40 TABLET, FILM COATED ORAL DAILY
Qty: 90 TABLET | Refills: 1 | Status: SHIPPED | OUTPATIENT
Start: 2021-10-05 | End: 2022-03-15 | Stop reason: SDUPTHER

## 2021-10-05 RX ORDER — METHOCARBAMOL 500 MG/1
500 TABLET, FILM COATED ORAL 3 TIMES DAILY
Qty: 270 TABLET | Refills: 1 | Status: SHIPPED | OUTPATIENT
Start: 2021-10-05 | End: 2022-03-15 | Stop reason: SDUPTHER

## 2021-10-05 RX ORDER — GABAPENTIN 300 MG/1
600 CAPSULE ORAL 2 TIMES DAILY
Qty: 180 CAPSULE | Refills: 1 | Status: SHIPPED | OUTPATIENT
Start: 2021-10-05 | End: 2021-10-05 | Stop reason: DRUGHIGH

## 2021-10-06 ENCOUNTER — HOSPITAL ENCOUNTER (OUTPATIENT)
Dept: RADIOLOGY | Facility: HOSPITAL | Age: 66
Discharge: HOME | End: 2021-10-06
Payer: MEDICARE

## 2021-10-06 VITALS — HEIGHT: 69 IN | BODY MASS INDEX: 26.07 KG/M2 | WEIGHT: 176 LBS

## 2021-10-06 DIAGNOSIS — Z12.31 VISIT FOR SCREENING MAMMOGRAM: ICD-10-CM

## 2021-10-06 PROCEDURE — 77067 SCR MAMMO BI INCL CAD: CPT | Mod: TC

## 2021-10-06 PROCEDURE — 77067 SCR MAMMO BI INCL CAD: CPT | Mod: 26,,, | Performed by: RADIOLOGY

## 2021-10-06 PROCEDURE — 77067 MAMMO DIGITAL SCREENING BILAT: ICD-10-PCS | Mod: 26,,, | Performed by: RADIOLOGY

## 2021-10-18 DIAGNOSIS — G89.4 CHRONIC PAIN SYNDROME: Primary | ICD-10-CM

## 2021-10-18 RX ORDER — GABAPENTIN 600 MG/1
600 TABLET ORAL 3 TIMES DAILY
Qty: 90 TABLET | Refills: 2 | Status: SHIPPED | OUTPATIENT
Start: 2021-10-18 | End: 2022-03-15 | Stop reason: SDUPTHER

## 2021-10-18 RX ORDER — GABAPENTIN 600 MG/1
600 TABLET ORAL 3 TIMES DAILY
COMMUNITY
End: 2021-10-18 | Stop reason: SDUPTHER

## 2021-11-03 DIAGNOSIS — M54.2 NECK PAIN: Primary | ICD-10-CM

## 2021-11-05 ENCOUNTER — PATIENT OUTREACH (OUTPATIENT)
Dept: FAMILY MEDICINE | Facility: CLINIC | Age: 66
End: 2021-11-05
Payer: MEDICARE

## 2021-11-09 ENCOUNTER — CLINICAL SUPPORT (OUTPATIENT)
Dept: REHABILITATION | Facility: HOSPITAL | Age: 66
End: 2021-11-09
Payer: MEDICARE

## 2021-11-09 DIAGNOSIS — M54.2 NECK PAIN: ICD-10-CM

## 2021-11-24 DIAGNOSIS — F32.A DEPRESSIVE DISORDER: ICD-10-CM

## 2021-11-24 RX ORDER — BUPROPION HYDROCHLORIDE 150 MG/1
150 TABLET ORAL DAILY
Qty: 30 TABLET | Refills: 2 | Status: SHIPPED | OUTPATIENT
Start: 2021-11-24 | End: 2022-02-03

## 2021-12-01 ENCOUNTER — OFFICE VISIT (OUTPATIENT)
Dept: FAMILY MEDICINE | Facility: CLINIC | Age: 66
End: 2021-12-01
Payer: MEDICARE

## 2021-12-01 VITALS
HEART RATE: 82 BPM | BODY MASS INDEX: 26.66 KG/M2 | TEMPERATURE: 98 F | RESPIRATION RATE: 18 BRPM | OXYGEN SATURATION: 97 % | HEIGHT: 69 IN | DIASTOLIC BLOOD PRESSURE: 78 MMHG | WEIGHT: 180 LBS | SYSTOLIC BLOOD PRESSURE: 128 MMHG

## 2021-12-01 DIAGNOSIS — M70.61 TROCHANTERIC BURSITIS OF RIGHT HIP: Primary | ICD-10-CM

## 2021-12-01 DIAGNOSIS — F41.8 ANXIOUS DEPRESSION: ICD-10-CM

## 2021-12-01 PROCEDURE — 99213 OFFICE O/P EST LOW 20 MIN: CPT | Mod: 25,,, | Performed by: NURSE PRACTITIONER

## 2021-12-01 PROCEDURE — 96372 THER/PROPH/DIAG INJ SC/IM: CPT | Mod: ,,, | Performed by: NURSE PRACTITIONER

## 2021-12-01 PROCEDURE — 96372 PR INJECTION,THERAP/PROPH/DIAG2ST, IM OR SUBCUT: ICD-10-PCS | Mod: ,,, | Performed by: NURSE PRACTITIONER

## 2021-12-01 PROCEDURE — 99213 PR OFFICE/OUTPT VISIT, EST, LEVL III, 20-29 MIN: ICD-10-PCS | Mod: 25,,, | Performed by: NURSE PRACTITIONER

## 2021-12-01 RX ORDER — DEXAMETHASONE SODIUM PHOSPHATE 4 MG/ML
4 INJECTION, SOLUTION INTRA-ARTICULAR; INTRALESIONAL; INTRAMUSCULAR; INTRAVENOUS; SOFT TISSUE
Status: DISCONTINUED | OUTPATIENT
Start: 2021-12-01 | End: 2021-12-01

## 2021-12-01 RX ORDER — DEXAMETHASONE SODIUM PHOSPHATE 4 MG/ML
8 INJECTION, SOLUTION INTRA-ARTICULAR; INTRALESIONAL; INTRAMUSCULAR; INTRAVENOUS; SOFT TISSUE
Status: COMPLETED | OUTPATIENT
Start: 2021-12-01 | End: 2021-12-01

## 2021-12-01 RX ORDER — TRAZODONE HYDROCHLORIDE 50 MG/1
50 TABLET ORAL NIGHTLY
Qty: 30 TABLET | Refills: 11 | Status: SHIPPED | OUTPATIENT
Start: 2021-12-01 | End: 2022-05-16

## 2021-12-01 RX ADMIN — DEXAMETHASONE SODIUM PHOSPHATE 8 MG: 4 INJECTION, SOLUTION INTRA-ARTICULAR; INTRALESIONAL; INTRAMUSCULAR; INTRAVENOUS; SOFT TISSUE at 03:12

## 2022-01-28 DIAGNOSIS — M70.61 TROCHANTERIC BURSITIS OF RIGHT HIP: ICD-10-CM

## 2022-01-28 DIAGNOSIS — M25.551 RIGHT HIP PAIN: Primary | ICD-10-CM

## 2022-02-03 DIAGNOSIS — F32.A DEPRESSIVE DISORDER: ICD-10-CM

## 2022-02-03 DIAGNOSIS — M81.0 AGE-RELATED OSTEOPOROSIS WITHOUT CURRENT PATHOLOGICAL FRACTURE: ICD-10-CM

## 2022-02-03 RX ORDER — BUPROPION HYDROCHLORIDE 300 MG/1
TABLET ORAL
Qty: 90 TABLET | Refills: 0 | Status: SHIPPED | OUTPATIENT
Start: 2022-02-03 | End: 2022-03-15 | Stop reason: SDUPTHER

## 2022-02-03 RX ORDER — RALOXIFENE HYDROCHLORIDE 60 MG/1
60 TABLET, FILM COATED ORAL DAILY
Qty: 90 TABLET | Refills: 0 | Status: SHIPPED | OUTPATIENT
Start: 2022-02-03 | End: 2022-05-02

## 2022-02-03 RX ORDER — BUPROPION HYDROCHLORIDE 150 MG/1
TABLET ORAL
Qty: 30 TABLET | Refills: 2 | Status: SHIPPED | OUTPATIENT
Start: 2022-02-03 | End: 2022-04-13

## 2022-02-07 ENCOUNTER — APPOINTMENT (OUTPATIENT)
Dept: RADIOLOGY | Facility: CLINIC | Age: 67
End: 2022-02-07
Attending: NURSE PRACTITIONER
Payer: MEDICARE

## 2022-02-07 ENCOUNTER — OFFICE VISIT (OUTPATIENT)
Dept: FAMILY MEDICINE | Facility: CLINIC | Age: 67
End: 2022-02-07
Payer: MEDICARE

## 2022-02-07 VITALS
OXYGEN SATURATION: 97 % | TEMPERATURE: 98 F | BODY MASS INDEX: 26.81 KG/M2 | HEIGHT: 69 IN | WEIGHT: 181 LBS | DIASTOLIC BLOOD PRESSURE: 70 MMHG | RESPIRATION RATE: 18 BRPM | SYSTOLIC BLOOD PRESSURE: 130 MMHG | HEART RATE: 105 BPM

## 2022-02-07 DIAGNOSIS — M25.521 ELBOW PAIN, RIGHT: ICD-10-CM

## 2022-02-07 DIAGNOSIS — M15.9 OSTEOARTHRITIS OF MULTIPLE JOINTS, UNSPECIFIED OSTEOARTHRITIS TYPE: ICD-10-CM

## 2022-02-07 DIAGNOSIS — M25.511 RIGHT SHOULDER PAIN, UNSPECIFIED CHRONICITY: ICD-10-CM

## 2022-02-07 DIAGNOSIS — M25.511 RIGHT SHOULDER PAIN, UNSPECIFIED CHRONICITY: Primary | ICD-10-CM

## 2022-02-07 PROCEDURE — 73030 XR SHOULDER COMPLETE 2 OR MORE VIEWS RIGHT: ICD-10-PCS | Mod: 26,RT,, | Performed by: RADIOLOGY

## 2022-02-07 PROCEDURE — 96372 PR INJECTION,THERAP/PROPH/DIAG2ST, IM OR SUBCUT: ICD-10-PCS | Mod: ,,, | Performed by: NURSE PRACTITIONER

## 2022-02-07 PROCEDURE — 96372 THER/PROPH/DIAG INJ SC/IM: CPT | Mod: ,,, | Performed by: NURSE PRACTITIONER

## 2022-02-07 PROCEDURE — 73030 X-RAY EXAM OF SHOULDER: CPT | Mod: 26,RT,, | Performed by: RADIOLOGY

## 2022-02-07 PROCEDURE — 99213 OFFICE O/P EST LOW 20 MIN: CPT | Mod: ,,, | Performed by: NURSE PRACTITIONER

## 2022-02-07 PROCEDURE — 73030 X-RAY EXAM OF SHOULDER: CPT | Mod: TC,RHCUB,RT | Performed by: NURSE PRACTITIONER

## 2022-02-07 PROCEDURE — 99213 PR OFFICE/OUTPT VISIT, EST, LEVL III, 20-29 MIN: ICD-10-PCS | Mod: ,,, | Performed by: NURSE PRACTITIONER

## 2022-02-07 RX ORDER — METHYLPREDNISOLONE ACETATE 40 MG/ML
40 INJECTION, SUSPENSION INTRA-ARTICULAR; INTRALESIONAL; INTRAMUSCULAR; SOFT TISSUE
Status: COMPLETED | OUTPATIENT
Start: 2022-02-07 | End: 2022-02-07

## 2022-02-07 RX ORDER — AMITRIPTYLINE HYDROCHLORIDE 10 MG/1
1 TABLET, FILM COATED ORAL NIGHTLY
COMMUNITY
End: 2022-07-25

## 2022-02-07 RX ORDER — DEXAMETHASONE SODIUM PHOSPHATE 4 MG/ML
4 INJECTION, SOLUTION INTRA-ARTICULAR; INTRALESIONAL; INTRAMUSCULAR; INTRAVENOUS; SOFT TISSUE
Status: COMPLETED | OUTPATIENT
Start: 2022-02-07 | End: 2022-02-07

## 2022-02-07 RX ADMIN — DEXAMETHASONE SODIUM PHOSPHATE 4 MG: 4 INJECTION, SOLUTION INTRA-ARTICULAR; INTRALESIONAL; INTRAMUSCULAR; INTRAVENOUS; SOFT TISSUE at 09:02

## 2022-02-07 RX ADMIN — METHYLPREDNISOLONE ACETATE 40 MG: 40 INJECTION, SUSPENSION INTRA-ARTICULAR; INTRALESIONAL; INTRAMUSCULAR; SOFT TISSUE at 09:02

## 2022-02-07 NOTE — PROGRESS NOTES
2022     Hanna CHLOE Ga   [facility]  [address]  [place] [address]     PATIENT NAME: Kizzy Schofield  : 1955  DATE: 22  MRN: [id number]      Billing Provider: CHLOE Boswell  Level of Service:   Patient PCP Information     Provider PCP Type    Hanna CHLOE Ga General          Reason for Visit / Chief Complaint: Shoulder Pain (C/o right shoulder pain and tennis elbow)       Update PCP  Update Chief Complaint         History of Present Illness / Problem Focused Workflow     Kizzy Schofield presents to the clinic right shoulder pain right and elbow pain hurt when repositioning spouse. Hurts to raise and tender to touch. She has been using heat. She also has pain in  right elbow and hand is weaker- she is right hand dominant. Her spouse is back at [facility].       Review of Systems     Review of Systems   Constitutional: Negative for activity change and unexpected weight change.   HENT: Negative for hearing loss, rhinorrhea and trouble swallowing.    Eyes: Negative for discharge and visual disturbance.   Respiratory: Negative for chest tightness and wheezing.    Cardiovascular: Negative for chest pain and palpitations.   Gastrointestinal: Negative for blood in stool, constipation, diarrhea and vomiting.   Endocrine: Negative for polydipsia and polyuria.   Genitourinary: Negative for difficulty urinating, dysuria, hematuria and menstrual problem.   Musculoskeletal: Positive for arthralgias, myalgias and neck pain. Negative for joint swelling.   Neurological: Negative for weakness and headaches.   Psychiatric/Behavioral: Positive for dysphoric mood. Negative for confusion.        Medical / Social / Family History     Past Medical History:   Diagnosis Date    Arthritis of left hip     Arthritis of left knee     Cervical radiculopathy     Chronic cystitis     Chronic pain syndrome     Degeneration of lumbar intervertebral disc      Depressive disorder     GERD (gastroesophageal reflux disease)     Hyperlipidemia     Hypertension     Lumbar post-laminectomy syndrome     Meniere disease     Osteoarthritis of cervical and lumbar spine     Osteoporosis        Past Surgical History:   Procedure Laterality Date    bladder tack      CHOLECYSTECTOMY      HYSTERECTOMY      OOPHORECTOMY         Social History  Ms.  reports that she has quit smoking. She has never used smokeless tobacco. She reports previous alcohol use. She reports that she does not use drugs.    Family History  Ms.'s family history includes Cancer in her father; Glaucoma in her daughter; Heart disease in her mother; Hypertension in her father and mother; Lung cancer in her father; Prostate cancer in her father; Stroke in her father and mother; Thyroid disease in her daughter and mother.    Medications and Allergies     Medications  Outpatient Medications Marked as Taking for the 2/7/22 encounter (Office Visit) with CHLOE Boswell   Medication Sig Dispense Refill    amitriptyline (ELAVIL) 10 MG tablet Take 1 tablet by mouth nightly.      atorvastatin (LIPITOR) 40 MG tablet Take 1 tablet (40 mg total) by mouth once daily. 90 tablet 1    buPROPion (WELLBUTRIN XL) 150 MG TB24 tablet TAKE ONE TABLET BY MOUTH ONCE DAILY 30 tablet 2    buPROPion (WELLBUTRIN XL) 300 MG 24 hr tablet TAKE ONE TABLET BY MOUTH EVERY DAY 90 tablet 0    butalbital-acetaminophen-caffeine -40 mg (FIORICET, ESGIC) -40 mg per tablet Take 1 tablet by mouth every 4 (four) hours as needed for Pain.      cetirizine (ZYRTEC) 10 MG tablet Take 10 mg by mouth once daily.      dicyclomine (BENTYL) 10 MG capsule Take 10 mg by mouth 4 (four) times daily before meals and nightly.      EPINEPHrine (EPIPEN) 0.3 mg/0.3 mL AtIn Inject into the muscle once.      fluticasone propionate (FLONASE) 50 mcg/actuation nasal spray 2 sprays by Each Nostril route once daily.      gabapentin  (NEURONTIN) 600 MG tablet Take 1 tablet (600 mg total) by mouth 3 (three) times daily. 90 tablet 2    HYDROcodone-acetaminophen (NORCO) 7.5-325 mg per tablet Take 1 tablet by mouth nightly as needed for Pain.      levothyroxine (SYNTHROID) 50 MCG tablet Take 1 tablet (50 mcg total) by mouth before breakfast. 90 tablet 1    methenamine (HIPREX) 1 gram Tab Take 1 g by mouth 2 (two) times daily.      methocarbamoL (ROBAXIN) 500 MG Tab Take 1 tablet (500 mg total) by mouth 3 (three) times daily. 270 tablet 1    montelukast (SINGULAIR) 10 mg tablet Take 1 tablet (10 mg total) by mouth every evening. 90 tablet 1    pantoprazole (PROTONIX) 40 MG tablet Take 40 mg by mouth 2 (two) times daily.      polyethylene glycol (GLYCOLAX) 17 gram PwPk Take by mouth. Take 17 grams by mouth 2 times daily mixed with 8 ounces of water, juice, soda, tea, or coffee      raloxifene (EVISTA) 60 mg tablet Take 1 tablet (60 mg total) by mouth once daily. 90 tablet 0    topiramate (TOPAMAX) 100 MG tablet Take 100 mg by mouth 2 (two) times a day.      traZODone (DESYREL) 50 MG tablet Take 1 tablet (50 mg total) by mouth every evening. 30 tablet 11    triamcinolone acetonide 0.1% (KENALOG) 0.1 % ointment Apply topically 2 (two) times daily. 30 g 0    triamterene-hydrochlorothiazide 37.5-25 mg (MAXZIDE-25) 37.5-25 mg per tablet Take 1 tablet by mouth once daily. 90 tablet 1    vitamin D (VITAMIN D3) 1000 units Tab Take 1,000 Units by mouth 2 (two) times a day.       Current Facility-Administered Medications for the 2/7/22 encounter (Office Visit) with CHLOE Boswell   Medication Dose Route Frequency Provider Last Rate Last Admin    [COMPLETED] dexamethasone injection 4 mg  4 mg Intramuscular 1 time in Clinic/HOD CHLOE Boswell   4 mg at 02/07/22 0933    [COMPLETED] methylPREDNISolone acetate injection 40 mg  40 mg Intramuscular 1 time in Clinic/HOD CHLOE Boswell   40 mg at 02/07/22 0948  "      Allergies  Review of patient's allergies indicates:   Allergen Reactions    Adhesive tape-silicones     Aspirin     Celebrex [celecoxib]     Eliquis [apixaban]     Opioids - morphine analogues     Pcn [penicillins]     Shrimp     Silicone Itching and Rash       Physical Examination     Vitals:    02/07/22 0826   BP: 130/70   BP Location: Left arm   Patient Position: Sitting   Pulse: 105   Resp: 18   Temp: 98.4 °F (36.9 °C)   TempSrc: Oral   SpO2: 97%   Weight: 82.1 kg (181 lb)   Height: 5' 9" (1.753 m)      Physical Exam  Vitals and nursing note reviewed.   Constitutional:       General: She is not in acute distress.     Appearance: Normal appearance. She is not ill-appearing or toxic-appearing.   HENT:      Head: Normocephalic.      Right Ear: External ear normal.      Left Ear: External ear normal.      Nose: Nose normal.      Mouth/Throat:      Mouth: Mucous membranes are moist.   Eyes:      Conjunctiva/sclera: Conjunctivae normal.      Pupils: Pupils are equal, round, and reactive to light.   Cardiovascular:      Rate and Rhythm: Normal rate and regular rhythm.      Heart sounds: Normal heart sounds.   Pulmonary:      Effort: Pulmonary effort is normal.      Breath sounds: Normal breath sounds. No wheezing, rhonchi or rales.   Musculoskeletal:      Right shoulder: Tenderness present. No bony tenderness. Decreased range of motion. Normal pulse.      Right upper arm: No tenderness.      Right elbow: Tenderness present in lateral epicondyle.      Cervical back: Neck supple. Tenderness present.      Comments: Positive Eugenio   Skin:     General: Skin is warm and dry.      Capillary Refill: Capillary refill takes less than 2 seconds.   Neurological:      Mental Status: She is alert and oriented to person, place, and time.   Psychiatric:         Behavior: Behavior normal.      X-Ray Elbow 2 Views Right  Narrative: EXAMINATION:  XR ELBOW 2 VIEWS RIGHT    CLINICAL HISTORY:  Pain in right " elbow    COMPARISON:  05/19/2020    FINDINGS:  There is osteoarthritis involving the humeral-ulnar articulation.  This has progressed since the 2020 examination.  There is no evidence of an elbow effusion or acute fracture.  Ossification is present at the insertion site of the triceps tendon.  Impression: There is osteoarthritis at the humeral-ulnar articulation.    Place of service: [facility]    Electronically signed by: Ginette Alfonso  Date:    02/07/2022  Time:    11:32  X-ray Shoulder 2 or More Views Right  Narrative: EXAMINATION:  XR SHOULDER COMPLETE 2 OR MORE VIEWS RIGHT    CLINICAL HISTORY:  Pain in right shoulder    COMPARISON:  None.    FINDINGS:  There is a 5 mm calcification at the insertion site of the infraspinatus tendon.  There is a 3 mm calcification adjacent to the inferior glenoid.  Mild osteoarthritis is present involving the AC joint.  The glenohumeral joint is normal.  Impression: There is osteoarthritis involving the AC joint and calcific tendinitis.    Place of service: [facility]    Electronically signed by: Ginette Alfonso  Date:    02/07/2022  Time:    11:30      Assessment and Plan (including Health Maintenance)      Problem List  Smart Sets  Document Outside HM   :    Plan:   Ice/cooling and try to avoid pushing pulling and heavy lifting.       Health Maintenance Due   Topic Date Due    Hepatitis C Screening  Never done    Shingles Vaccine (1 of 2) Never done    TETANUS VACCINE  10/11/2021    COVID-19 Vaccine (3 - Booster for Moderna series) 12/25/2021    Colorectal Cancer Screening  04/02/2022       Problem List Items Addressed This Visit    None     Visit Diagnoses     Right shoulder pain, unspecified chronicity    -  Primary    Relevant Medications    dexamethasone injection 4 mg (Completed)    methylPREDNISolone acetate injection 40 mg (Completed)    Other Relevant Orders    X-ray Shoulder 2 or More Views Right (Completed)    Elbow pain, right         Relevant Medications    dexamethasone injection 4 mg (Completed)    methylPREDNISolone acetate injection 40 mg (Completed)    Other Relevant Orders    X-Ray Elbow 2 Views Right (Completed)    Osteoarthritis of multiple joints, unspecified osteoarthritis type            Right shoulder pain, unspecified chronicity  -     X-ray Shoulder 2 or More Views Right; Future; Expected date: 02/07/2022  -     dexamethasone injection 4 mg  -     methylPREDNISolone acetate injection 40 mg    Elbow pain, right  -     X-Ray Elbow 2 Views Right; Future; Expected date: 02/07/2022  -     dexamethasone injection 4 mg  -     methylPREDNISolone acetate injection 40 mg    Osteoarthritis of multiple joints, unspecified osteoarthritis type       Health Maintenance Topics with due status: Not Due       Topic Last Completion Date    DEXA SCAN 09/02/2020    Lipid Panel 10/05/2021    Mammogram 10/06/2021       Procedures     Future Appointments   Date Time Provider Department Center   2/21/2022  1:30 PM Omari Holt DO [facility] SLEEP [facility]    3/15/2022  2:00 PM Evaristo Roman MD [facility]   [facility]20[facility]  [facility]        No follow-ups on file.     Signature:  CHLOE Boswell    Date of encounter: 2/7/22

## 2022-02-21 ENCOUNTER — OFFICE VISIT (OUTPATIENT)
Dept: SLEEP MEDICINE | Facility: CLINIC | Age: 67
End: 2022-02-21
Attending: FAMILY MEDICINE
Payer: MEDICARE

## 2022-02-21 VITALS
BODY MASS INDEX: 26.31 KG/M2 | HEART RATE: 75 BPM | OXYGEN SATURATION: 97 % | DIASTOLIC BLOOD PRESSURE: 60 MMHG | WEIGHT: 177.63 LBS | SYSTOLIC BLOOD PRESSURE: 129 MMHG | HEIGHT: 69 IN

## 2022-02-21 DIAGNOSIS — G47.33 OSA ON CPAP: Primary | ICD-10-CM

## 2022-02-21 PROCEDURE — 99213 OFFICE O/P EST LOW 20 MIN: CPT | Mod: PBBFAC | Performed by: FAMILY MEDICINE

## 2022-02-21 PROCEDURE — 99999 PR STA SHADOW: ICD-10-PCS | Mod: PBBFAC,,, | Performed by: FAMILY MEDICINE

## 2022-02-21 PROCEDURE — 99212 OFFICE O/P EST SF 10 MIN: CPT | Mod: S$PBB | Performed by: FAMILY MEDICINE

## 2022-02-21 PROCEDURE — 99999 PR STA SHADOW: CPT | Mod: PBBFAC,,, | Performed by: FAMILY MEDICINE

## 2022-02-21 NOTE — PROCEDURES
Procedures  Patient presents to clinic for CPAP management.   ESS is 0.  Patient has a new CPAP.  Patient uses FFM with non-heated tubing.  .

## 2022-02-21 NOTE — PROGRESS NOTES
Subjective:       Patient ID: Kizzy Schofield is a 66 y.o. female.    Chief Complaint: Sleep Apnea (CPAP management)    Patient is seen in sleep clinic for CPAP management and has recently received her new CPAP machine which she is having no problems with and also no problems with her mask.  Compliance is 73.3% with an average AHI of 2.3 at a pressure of 13 cm H2O.  [place] score is 0 and the patient is using and benefitting from CPAP.  Initial AHI was 6.2.    Review of Systems   Constitutional: Negative for fatigue.        Negative EDS   Respiratory: Negative for apnea.    Psychiatric/Behavioral: Negative for sleep disturbance.         Objective:      Physical Exam  Cardiovascular:      Rate and Rhythm: Normal rate and regular rhythm.      Heart sounds: No murmur heard.  Pulmonary:      Breath sounds: Normal breath sounds.   Skin:     General: Skin is warm and dry.   Neurological:      Mental Status: She is alert and oriented to person, place, and time.         Assessment:       Problem List Items Addressed This Visit        Other    TITO on CPAP - Primary          Plan:       1. Continue CPAP @ 13 cm H20  2. Caution while operating a motor vehicle  3. Prescription for new supplies  4. Follow up sleep clinic in 1 year.

## 2022-03-09 DIAGNOSIS — M25.551 RIGHT HIP PAIN: Primary | ICD-10-CM

## 2022-03-11 DIAGNOSIS — Z71.89 COMPLEX CARE COORDINATION: ICD-10-CM

## 2022-03-15 ENCOUNTER — OFFICE VISIT (OUTPATIENT)
Dept: ORTHOPEDICS | Facility: CLINIC | Age: 67
End: 2022-03-15
Payer: MEDICARE

## 2022-03-15 ENCOUNTER — HOSPITAL ENCOUNTER (OUTPATIENT)
Dept: RADIOLOGY | Facility: HOSPITAL | Age: 67
Discharge: HOME | End: 2022-03-15
Attending: ORTHOPAEDIC SURGERY
Payer: MEDICARE

## 2022-03-15 ENCOUNTER — OFFICE VISIT (OUTPATIENT)
Dept: FAMILY MEDICINE | Facility: CLINIC | Age: 67
End: 2022-03-15
Payer: MEDICARE

## 2022-03-15 VITALS
HEIGHT: 69 IN | DIASTOLIC BLOOD PRESSURE: 70 MMHG | RESPIRATION RATE: 18 BRPM | BODY MASS INDEX: 25.33 KG/M2 | WEIGHT: 171 LBS | HEART RATE: 81 BPM | TEMPERATURE: 99 F | OXYGEN SATURATION: 99 % | SYSTOLIC BLOOD PRESSURE: 128 MMHG

## 2022-03-15 DIAGNOSIS — M25.551 RIGHT HIP PAIN: ICD-10-CM

## 2022-03-15 DIAGNOSIS — T78.40XS ALLERGY, SEQUELA: ICD-10-CM

## 2022-03-15 DIAGNOSIS — E78.5 HYPERLIPIDEMIA, UNSPECIFIED HYPERLIPIDEMIA TYPE: ICD-10-CM

## 2022-03-15 DIAGNOSIS — F32.A DEPRESSIVE DISORDER: ICD-10-CM

## 2022-03-15 DIAGNOSIS — G89.4 CHRONIC PAIN SYNDROME: ICD-10-CM

## 2022-03-15 DIAGNOSIS — G89.29 CHRONIC RIGHT-SIDED LOW BACK PAIN WITH RIGHT-SIDED SCIATICA: Primary | ICD-10-CM

## 2022-03-15 DIAGNOSIS — M54.41 CHRONIC RIGHT-SIDED LOW BACK PAIN WITH RIGHT-SIDED SCIATICA: Primary | ICD-10-CM

## 2022-03-15 DIAGNOSIS — J06.9 URI, ACUTE: ICD-10-CM

## 2022-03-15 DIAGNOSIS — E03.9 HYPOTHYROIDISM, UNSPECIFIED TYPE: ICD-10-CM

## 2022-03-15 DIAGNOSIS — R05.9 COUGH: ICD-10-CM

## 2022-03-15 DIAGNOSIS — I10 HYPERTENSION, UNSPECIFIED TYPE: ICD-10-CM

## 2022-03-15 DIAGNOSIS — N39.0 RECURRENT UTI: Primary | ICD-10-CM

## 2022-03-15 LAB
BASOPHILS # BLD AUTO: 0.06 K/UL (ref 0–0.2)
BASOPHILS NFR BLD AUTO: 0.6 % (ref 0–1)
DIFFERENTIAL METHOD BLD: ABNORMAL
EOSINOPHIL # BLD AUTO: 0.09 K/UL (ref 0–0.5)
EOSINOPHIL NFR BLD AUTO: 0.8 % (ref 1–4)
ERYTHROCYTE [DISTWIDTH] IN BLOOD BY AUTOMATED COUNT: 13.1 % (ref 11.5–14.5)
HCT VFR BLD AUTO: 46.5 % (ref 38–47)
HGB BLD-MCNC: 14.8 G/DL (ref 12–16)
IMM GRANULOCYTES # BLD AUTO: 0.04 K/UL (ref 0–0.04)
IMM GRANULOCYTES NFR BLD: 0.4 % (ref 0–0.4)
LYMPHOCYTES # BLD AUTO: 2.44 K/UL (ref 1–4.8)
LYMPHOCYTES NFR BLD AUTO: 22.6 % (ref 27–41)
MCH RBC QN AUTO: 29.7 PG (ref 27–31)
MCHC RBC AUTO-ENTMCNC: 31.8 G/DL (ref 32–36)
MCV RBC AUTO: 93.4 FL (ref 80–96)
MONOCYTES # BLD AUTO: 0.64 K/UL (ref 0–0.8)
MONOCYTES NFR BLD AUTO: 5.9 % (ref 2–6)
MPC BLD CALC-MCNC: 10.1 FL (ref 9.4–12.4)
NEUTROPHILS # BLD AUTO: 7.51 K/UL (ref 1.8–7.7)
NEUTROPHILS NFR BLD AUTO: 69.7 % (ref 53–65)
NRBC # BLD AUTO: 0 X10E3/UL
NRBC, AUTO (.00): 0 %
PLATELET # BLD AUTO: 394 K/UL (ref 150–400)
RBC # BLD AUTO: 4.98 M/UL (ref 4.2–5.4)
WBC # BLD AUTO: 10.78 K/UL (ref 4.5–11)

## 2022-03-15 PROCEDURE — 99203 OFFICE O/P NEW LOW 30 MIN: CPT | Mod: ,,, | Performed by: ORTHOPAEDIC SURGERY

## 2022-03-15 PROCEDURE — 80061 LIPID PANEL: CPT | Mod: ,,, | Performed by: CLINICAL MEDICAL LABORATORY

## 2022-03-15 PROCEDURE — 73502 X-RAY EXAM HIP UNI 2-3 VIEWS: CPT | Mod: 26,RT,, | Performed by: ORTHOPAEDIC SURGERY

## 2022-03-15 PROCEDURE — 84443 ASSAY THYROID STIM HORMONE: CPT | Mod: ,,, | Performed by: CLINICAL MEDICAL LABORATORY

## 2022-03-15 PROCEDURE — 84443 TSH: ICD-10-PCS | Mod: ,,, | Performed by: CLINICAL MEDICAL LABORATORY

## 2022-03-15 PROCEDURE — 99214 OFFICE O/P EST MOD 30 MIN: CPT | Mod: ,,, | Performed by: NURSE PRACTITIONER

## 2022-03-15 PROCEDURE — 85025 COMPLETE CBC W/AUTO DIFF WBC: CPT | Mod: ,,, | Performed by: CLINICAL MEDICAL LABORATORY

## 2022-03-15 PROCEDURE — 73502 XR HIP WITH PELVIS WHEN PERFORMED, 2 OR 3  VIEWS RIGHT: ICD-10-PCS | Mod: 26,RT,, | Performed by: ORTHOPAEDIC SURGERY

## 2022-03-15 PROCEDURE — 80053 COMPREHENSIVE METABOLIC PANEL: ICD-10-PCS | Mod: ,,, | Performed by: CLINICAL MEDICAL LABORATORY

## 2022-03-15 PROCEDURE — 80053 COMPREHEN METABOLIC PANEL: CPT | Mod: ,,, | Performed by: CLINICAL MEDICAL LABORATORY

## 2022-03-15 PROCEDURE — 73502 X-RAY EXAM HIP UNI 2-3 VIEWS: CPT | Mod: TC,RT

## 2022-03-15 PROCEDURE — 99203 PR OFFICE/OUTPT VISIT, NEW, LEVL III, 30-44 MIN: ICD-10-PCS | Mod: ,,, | Performed by: ORTHOPAEDIC SURGERY

## 2022-03-15 PROCEDURE — 99214 PR OFFICE/OUTPT VISIT, EST, LEVL IV, 30-39 MIN: ICD-10-PCS | Mod: ,,, | Performed by: NURSE PRACTITIONER

## 2022-03-15 PROCEDURE — 85025 CBC WITH DIFFERENTIAL: ICD-10-PCS | Mod: ,,, | Performed by: CLINICAL MEDICAL LABORATORY

## 2022-03-15 PROCEDURE — 80061 LIPID PANEL: ICD-10-PCS | Mod: ,,, | Performed by: CLINICAL MEDICAL LABORATORY

## 2022-03-15 RX ORDER — ATORVASTATIN CALCIUM 40 MG/1
40 TABLET, FILM COATED ORAL DAILY
Qty: 90 TABLET | Refills: 1 | Status: SHIPPED | OUTPATIENT
Start: 2022-03-15 | End: 2022-04-14

## 2022-03-15 RX ORDER — FLUTICASONE PROPIONATE 50 MCG
2 SPRAY, SUSPENSION (ML) NASAL DAILY
Qty: 16 G | Refills: 2 | Status: SHIPPED | OUTPATIENT
Start: 2022-03-15 | End: 2022-08-29 | Stop reason: SDUPTHER

## 2022-03-15 RX ORDER — ALBUTEROL SULFATE 90 UG/1
2 AEROSOL, METERED RESPIRATORY (INHALATION) EVERY 6 HOURS PRN
Qty: 18 G | Refills: 1 | Status: SHIPPED | OUTPATIENT
Start: 2022-03-15 | End: 2022-06-07

## 2022-03-15 RX ORDER — LEVOTHYROXINE SODIUM 50 UG/1
50 TABLET ORAL
Qty: 90 TABLET | Refills: 1 | Status: SHIPPED | OUTPATIENT
Start: 2022-03-15 | End: 2022-05-16

## 2022-03-15 RX ORDER — CIPROFLOXACIN 500 MG/1
500 TABLET ORAL 2 TIMES DAILY
Qty: 20 TABLET | Refills: 0 | Status: SHIPPED | OUTPATIENT
Start: 2022-03-15 | End: 2022-04-04 | Stop reason: ALTCHOICE

## 2022-03-15 RX ORDER — DICYCLOMINE HYDROCHLORIDE 10 MG/1
10 CAPSULE ORAL
COMMUNITY
End: 2022-06-07 | Stop reason: SDUPTHER

## 2022-03-15 RX ORDER — GABAPENTIN 600 MG/1
600 TABLET ORAL 3 TIMES DAILY
Qty: 270 TABLET | Refills: 1 | Status: SHIPPED | OUTPATIENT
Start: 2022-03-15 | End: 2022-05-16

## 2022-03-15 RX ORDER — METHENAMINE HIPPURATE 1000 MG/1
1 TABLET ORAL 2 TIMES DAILY
Qty: 180 TABLET | Refills: 1 | Status: SHIPPED | OUTPATIENT
Start: 2022-03-15 | End: 2022-06-09

## 2022-03-15 RX ORDER — METHOCARBAMOL 500 MG/1
500 TABLET, FILM COATED ORAL 3 TIMES DAILY
Qty: 270 TABLET | Refills: 1 | Status: SHIPPED | OUTPATIENT
Start: 2022-03-15 | End: 2022-05-16

## 2022-03-15 RX ORDER — ACETAMINOPHEN 500 MG
TABLET ORAL
COMMUNITY
End: 2022-06-07 | Stop reason: SDUPTHER

## 2022-03-15 RX ORDER — ACETAMINOPHEN 500 MG
500 TABLET ORAL EVERY 6 HOURS PRN
COMMUNITY

## 2022-03-15 RX ORDER — BUPROPION HYDROCHLORIDE 300 MG/1
300 TABLET ORAL DAILY
Qty: 90 TABLET | Refills: 1 | Status: SHIPPED | OUTPATIENT
Start: 2022-03-15 | End: 2022-05-16

## 2022-03-15 NOTE — PROGRESS NOTES
ASSESSMENT:      ICD-10-CM ICD-9-CM   1. Chronic right-sided low back pain with right-sided sciatica  M54.41 724.2    G89.29 724.3     338.29   2. Right hip pain  M25.551 719.45       PLAN:     -Findings and treatment options were discussed with the patient  -All questions answered  Natural history and expected course discussed. Questions answered.  Educational materials distributed.  Home exercises discussed.  Orthopedics referral.  I believe most her pain is coming from her low back and degenerative disc disease and not from her right hip.  I am going to refer her to Orthopedic Spine surgery for further evaluation.  Patient Instructions   Refer to Dr Carrington Covington for LBP, SI joint pain with sciatica      IMAGING:  X-Ray Hip 2 or 3 views Right (with Pelvis when performed)    Result Date: 3/15/2022  See Procedure Notes for results. IMPRESSION: Please see Ortho procedure notes for report.  This procedure was auto-finalized by: Virtual Radiologist   Previous left hip arthroplasty demonstrated with sacroiliac screws in place on the left side.  Right hip demonstrates minimal degenerative changes.  There is evidence of lumbar degenerative disc disease present        CC: Hip pain  66 y.o. Female who presents as a new patient to me for evaluation of right hip pain.    Pain has been present for 6 months.  Mechanism of injury:   Location of the pain: buttock  Occupation: disability  Mechanical symptoms, such as catching and popping of the hip are not present.    Symptoms are worsened with activity.  Better with rest. Treatment thus far has included rest, activity modifications, and oral medications.    she has not  had formal physical therapy  she has had previous advanced imaging such as MRI.   she has  had previous hip injections.   she has  had previous hip or back surgery.   Here today to discuss diagnosis and treatment options.           REVIEW OF SYSTEMS:   Constitution: Negative. Negative for chills, fever and night  sweats.    Hematologic/Lymphatic: Negative for bleeding problem. Does not bruise/bleed easily.   Skin: Negative for dry skin, itching and rash.   Musculoskeletal: Negative for falls. Positive for knee pain and muscle weakness.     All other review of symptoms were reviewed and found to be noncontributory.     PAST MEDICAL HISTORY:   Past Medical History:   Diagnosis Date    Arthritis of left hip     Arthritis of left knee     Cervical radiculopathy     Chronic cystitis     Chronic pain syndrome     Degeneration of lumbar intervertebral disc     Depressive disorder     GERD (gastroesophageal reflux disease)     Hyperlipidemia     Hypertension     Lumbar post-laminectomy syndrome     Meniere disease     Osteoarthritis of cervical and lumbar spine     Osteoporosis        PAST SURGICAL HISTORY:   Past Surgical History:   Procedure Laterality Date    bladder tack      CHOLECYSTECTOMY      HYSTERECTOMY      OOPHORECTOMY         FAMILY HISTORY:   Family History   Problem Relation Age of Onset    Heart disease Mother     Hypertension Mother     Stroke Mother     Thyroid disease Mother     Hypertension Father     Lung cancer Father     Prostate cancer Father     Stroke Father     Cancer Father     Glaucoma Daughter     Thyroid disease Daughter        SOCIAL HISTORY:   Social History     Socioeconomic History    Marital status:    Tobacco Use    Smoking status: Former Smoker    Smokeless tobacco: Never Used   Substance and Sexual Activity    Alcohol use: Not Currently    Drug use: Never    Sexual activity: Yes       MEDICATIONS:     Current Outpatient Medications:     acetaminophen (ACETAMINOPHEN EXTRA STRENGTH) 500 MG tablet, 1 tablet as needed, Disp: , Rfl:     albuterol (VENTOLIN HFA) 90 mcg/actuation inhaler, Inhale 2 puffs into the lungs every 6 (six) hours as needed for Wheezing or Shortness of Breath. Rescue, Disp: 18 g, Rfl: 1    amitriptyline (ELAVIL) 10 MG tablet, Take 1  tablet by mouth nightly., Disp: , Rfl:     atorvastatin (LIPITOR) 40 MG tablet, Take 1 tablet (40 mg total) by mouth once daily., Disp: 90 tablet, Rfl: 1    buPROPion (WELLBUTRIN XL) 150 MG TB24 tablet, TAKE ONE TABLET BY MOUTH ONCE DAILY, Disp: 30 tablet, Rfl: 2    buPROPion (WELLBUTRIN XL) 300 MG 24 hr tablet, Take 1 tablet (300 mg total) by mouth once daily., Disp: 90 tablet, Rfl: 1    cetirizine (ZYRTEC) 10 MG tablet, Take 10 mg by mouth once daily., Disp: , Rfl:     cholecalciferol, vitamin D3, (VITAMIN D3) 50 mcg (2,000 unit) Cap, 1 capsule, Disp: , Rfl:     ciprofloxacin HCl (CIPRO) 500 MG tablet, Take 1 tablet (500 mg total) by mouth 2 (two) times daily., Disp: 20 tablet, Rfl: 0    dicyclomine (BENTYL) 10 MG capsule, Take 10 mg by mouth 4 (four) times daily before meals and nightly. As needed, Disp: , Rfl:     EPINEPHrine (EPIPEN) 0.3 mg/0.3 mL AtIn, Inject into the muscle once., Disp: , Rfl:     fluticasone propionate (FLONASE) 50 mcg/actuation nasal spray, 2 sprays (100 mcg total) by Each Nostril route once daily., Disp: 16 g, Rfl: 2    gabapentin (NEURONTIN) 600 MG tablet, Take 1 tablet (600 mg total) by mouth 3 (three) times daily., Disp: 270 tablet, Rfl: 1    HYDROcodone-acetaminophen (NORCO) 7.5-325 mg per tablet, Take 1 tablet by mouth 2 (two) times daily as needed for Pain., Disp: , Rfl:     levothyroxine (SYNTHROID) 50 MCG tablet, Take 1 tablet (50 mcg total) by mouth before breakfast., Disp: 90 tablet, Rfl: 1    methenamine (HIPREX) 1 gram Tab, Take 1 tablet (1 g total) by mouth 2 (two) times daily., Disp: 180 tablet, Rfl: 1    methocarbamoL (ROBAXIN) 500 MG Tab, Take 1 tablet (500 mg total) by mouth 3 (three) times daily., Disp: 270 tablet, Rfl: 1    montelukast (SINGULAIR) 10 mg tablet, Take 1 tablet (10 mg total) by mouth every evening., Disp: 90 tablet, Rfl: 1    pantoprazole (PROTONIX) 40 MG tablet, Take 40 mg by mouth 2 (two) times daily., Disp: , Rfl:     polyethylene glycol  (GLYCOLAX) 17 gram PwPk, Take by mouth. Take 17 grams by mouth 2 times daily mixed with 8 ounces of water, juice, soda, tea, or coffee, Disp: , Rfl:     potassium 99 mg Tab, Take by mouth once daily at 6am., Disp: , Rfl:     raloxifene (EVISTA) 60 mg tablet, Take 1 tablet (60 mg total) by mouth once daily., Disp: 90 tablet, Rfl: 0    topiramate (TOPAMAX) 100 MG tablet, Take 100 mg by mouth 2 (two) times a day., Disp: , Rfl:     traZODone (DESYREL) 50 MG tablet, Take 1 tablet (50 mg total) by mouth every evening., Disp: 30 tablet, Rfl: 11    triamterene-hydrochlorothiazide 37.5-25 mg (MAXZIDE-25) 37.5-25 mg per tablet, Take 1 tablet by mouth once daily., Disp: 90 tablet, Rfl: 1    vitamin D (VITAMIN D3) 1000 units Tab, Take 5,000 Units by mouth 2 (two) times a day., Disp: , Rfl:     ALLERGIES:   Review of patient's allergies indicates:   Allergen Reactions    Adhesive tape-silicones     Aspirin     Celebrex [celecoxib]     Eliquis [apixaban]     Opioids - morphine analogues     Pcn [penicillins]     Shrimp     Silicone Itching and Rash        PHYSICAL EXAMINATION:  There were no vitals taken for this visit.  General    Nursing note and vitals reviewed.  Constitutional: She is oriented to person, place, and time. She appears well-developed and well-nourished.   HENT:   Head: Normocephalic and atraumatic.   Nose: Nose normal.   Eyes: EOM are normal. Pupils are equal, round, and reactive to light.   Neck: Neck supple.   Cardiovascular: Normal rate and intact distal pulses.    Pulmonary/Chest: Effort normal. No respiratory distress. She exhibits no tenderness.   Abdominal: Soft. She exhibits no distension. There is no abdominal tenderness.   Neurological: She is alert and oriented to person, place, and time. She has normal reflexes.   Psychiatric: She has a normal mood and affect. Her behavior is normal. Judgment and thought content normal.         Right Ankle/Foot Exam     Tests   Tiptoe Walk: unable to  perform    Left Ankle/Foot Exam     Tests   Tiptoe Walk: unable to perform      Right Hip Exam     Inspection   Swelling: absent  Bruising: absent  No deformity of hip.  Quadriceps Atrophy:  Negative    Range of Motion   Abduction: normal   Adduction: normal   Extension: normal   Flexion: normal   External rotation: normal   Internal rotation: normal     Tests   Pain w/ forced internal rotation (DARLINE): present  Pain w/ forced external rotation (FADIR): present  Trendelenburg Test: negative  Circumduction test: positive    Other   Sensation: normal  Left Hip Exam     Inspection   Swelling: absent  No deformity of hip.  Quadriceps Atrophy:  negative  Bruising: absent      Back (L-Spine & T-Spine) / Neck (C-Spine) Exam     Tenderness Right paramedian tenderness of the Lower L-Spine. Left paramedian tenderness of the Lower L-Spine.     Back (L-Spine & T-Spine) Range of Motion   Extension: abnormal   Flexion: abnormal     Back (L-Spine & T-Spine) Tests   Right Side Tests  Straight leg raise:        Supine SLR: 30 degrees      Muscle Strength   Right Lower Extremity   Hip Abduction: 5/5   Hip Adduction: 5/5   Hip Flexion: 4/5   Hamstrin/5   Ankle Dorsiflexion:  5/5   Gastrocsoleus:  4/5   EHL:  4/5    Reflexes     Right Side   Achilles:  2+  Quadriceps:  2+    Vascular Exam     Right Pulses  Dorsalis Pedis:      2+  Posterior Tibial:      2+              Orders Placed This Encounter   Procedures    Ambulatory referral/consult to [facility]     Standing Status:   Future     Standing Expiration Date:   4/15/2023     Referral Priority:   Routine     Referral Type:   Consultation     Referral Reason:   Specialty Services Required     Number of Visits Requested:   1     Procedures

## 2022-03-15 NOTE — PROGRESS NOTES
3/15/2022     HannaCHLOE Romero   [facility]  [address]  [place] [address]     PATIENT NAME: Kizzy Schofield  : 1955  DATE: 3/15/22  MRN: [id number]      Billing Provider: CHLOE Boswell  Level of Service:   Patient PCP Information     Provider PCP Type    HannaCHLOE Hoffman [facility]          Reason for Visit / Chief Complaint: Nasal Congestion, Cough, and Urinary Tract Infection (Low back pain and increased frequency)       Update PCP  Update Chief Complaint         History of Present Illness / Problem Focused Workflow     Kizzy Schofield presents to the clinic having dysuria frequency also with nasal congestion cough feeling unwell   she is taking care of spouse now on hospice  , now running low grade fever at night. She is very emotional with discussion      Review of Systems     Review of Systems   Constitutional: Positive for chills, fatigue and fever.   HENT: Positive for nasal congestion, postnasal drip, rhinorrhea and sinus pressure/congestion. Negative for ear discharge, sore throat and trouble swallowing.    Eyes: Negative for visual disturbance.   Respiratory: Positive for cough. Negative for chest tightness, shortness of breath and wheezing.    Cardiovascular: Negative for chest pain and palpitations.   Gastrointestinal: Negative for abdominal pain, change in bowel habit, nausea, vomiting and change in bowel habit.   Genitourinary: Positive for dysuria, frequency and urgency.   Musculoskeletal: Positive for arthralgias, back pain, myalgias and neck pain. Negative for neck stiffness.   Integumentary:  Negative for pallor and rash.   Neurological: Positive for weakness and headaches.   Psychiatric/Behavioral: Positive for dysphoric mood and sleep disturbance. Negative for self-injury and suicidal ideas. The patient is nervous/anxious.         Medical / Social / Family History     Past Medical History:   Diagnosis Date    Arthritis of  left hip     Arthritis of left knee     Cervical radiculopathy     Chronic cystitis     Chronic pain syndrome     Degeneration of lumbar intervertebral disc     Depressive disorder     GERD (gastroesophageal reflux disease)     Hyperlipidemia     Hypertension     Lumbar post-laminectomy syndrome     Meniere disease     Osteoarthritis of cervical and lumbar spine     Osteoporosis        Past Surgical History:   Procedure Laterality Date    bladder tack      CHOLECYSTECTOMY      HYSTERECTOMY      OOPHORECTOMY         Social History  Ms.  reports that she has quit smoking. She has never used smokeless tobacco. She reports previous alcohol use. She reports that she does not use drugs.    Family History  Ms.'s family history includes Cancer in her father; Glaucoma in her daughter; Heart disease in her mother; Hypertension in her father and mother; Lung cancer in her father; Prostate cancer in her father; Stroke in her father and mother; Thyroid disease in her daughter and mother.    Medications and Allergies     Medications  Outpatient Medications Marked as Taking for the 3/15/22 encounter (Office Visit) with CHLOE Boswell   Medication Sig Dispense Refill    amitriptyline (ELAVIL) 10 MG tablet Take 1 tablet by mouth nightly.      buPROPion (WELLBUTRIN XL) 150 MG TB24 tablet TAKE ONE TABLET BY MOUTH ONCE DAILY 30 tablet 2    cetirizine (ZYRTEC) 10 MG tablet Take 10 mg by mouth once daily.      dicyclomine (BENTYL) 10 MG capsule Take 10 mg by mouth 4 (four) times daily before meals and nightly. As needed      EPINEPHrine (EPIPEN) 0.3 mg/0.3 mL AtIn Inject into the muscle once.      HYDROcodone-acetaminophen (NORCO) 7.5-325 mg per tablet Take 1 tablet by mouth 2 (two) times daily as needed for Pain.      montelukast (SINGULAIR) 10 mg tablet Take 1 tablet (10 mg total) by mouth every evening. 90 tablet 1    pantoprazole (PROTONIX) 40 MG tablet Take 40 mg by mouth 2 (two) times daily.       polyethylene glycol (GLYCOLAX) 17 gram PwPk Take by mouth. Take 17 grams by mouth 2 times daily mixed with 8 ounces of water, juice, soda, tea, or coffee      potassium 99 mg Tab Take by mouth once daily at 6am.      raloxifene (EVISTA) 60 mg tablet Take 1 tablet (60 mg total) by mouth once daily. 90 tablet 0    topiramate (TOPAMAX) 100 MG tablet Take 100 mg by mouth 2 (two) times a day.      traZODone (DESYREL) 50 MG tablet Take 1 tablet (50 mg total) by mouth every evening. 30 tablet 11    triamterene-hydrochlorothiazide 37.5-25 mg (MAXZIDE-25) 37.5-25 mg per tablet Take 1 tablet by mouth once daily. 90 tablet 1    vitamin D (VITAMIN D3) 1000 units Tab Take 5,000 Units by mouth 2 (two) times a day.      [DISCONTINUED] atorvastatin (LIPITOR) 40 MG tablet Take 1 tablet (40 mg total) by mouth once daily. 90 tablet 1    [DISCONTINUED] buPROPion (WELLBUTRIN XL) 300 MG 24 hr tablet TAKE ONE TABLET BY MOUTH EVERY DAY 90 tablet 0    [DISCONTINUED] fluticasone propionate (FLONASE) 50 mcg/actuation nasal spray 2 sprays by Each Nostril route once daily.      [DISCONTINUED] gabapentin (NEURONTIN) 600 MG tablet Take 1 tablet (600 mg total) by mouth 3 (three) times daily. 90 tablet 2    [DISCONTINUED] levothyroxine (SYNTHROID) 50 MCG tablet Take 1 tablet (50 mcg total) by mouth before breakfast. 90 tablet 1    [DISCONTINUED] methenamine (HIPREX) 1 gram Tab Take 1 g by mouth 2 (two) times daily.      [DISCONTINUED] methocarbamoL (ROBAXIN) 500 MG Tab Take 1 tablet (500 mg total) by mouth 3 (three) times daily. 270 tablet 1       Allergies  Review of patient's allergies indicates:   Allergen Reactions    Adhesive tape-silicones     Aspirin     Celebrex [celecoxib]     Eliquis [apixaban]     Opioids - morphine analogues     Pcn [penicillins]     Shrimp     Silicone Itching and Rash       Physical Examination     Vitals:    03/15/22 0952   BP: 128/70   BP Location: Left arm   Patient Position: Sitting   BP  "Method: Medium (Manual)   Pulse: 81   Resp: 18   Temp: 98.9 °F (37.2 °C)   TempSrc: Oral   SpO2: 99%   Weight: 77.6 kg (171 lb)   Height: 5' 9" (1.753 m)      Physical Exam  Vitals and nursing note reviewed.   Constitutional:       General: She is not in acute distress.     Appearance: Normal appearance. She is not ill-appearing or toxic-appearing.   HENT:      Head: Raccoon eyes, right periorbital erythema and left periorbital erythema present.      Right Ear: Tympanic membrane, ear canal and external ear normal.      Left Ear: Tympanic membrane, ear canal and external ear normal.      Nose: Congestion present.      Mouth/Throat:      Mouth: Mucous membranes are dry.      Pharynx: Posterior oropharyngeal erythema present.      Comments: pnd noted  Eyes:      Conjunctiva/sclera: Conjunctivae normal.      Pupils: Pupils are equal, round, and reactive to light.   Cardiovascular:      Rate and Rhythm: Normal rate and regular rhythm.   Pulmonary:      Effort: Pulmonary effort is normal.      Breath sounds: Wheezing present. No rhonchi or rales.   Musculoskeletal:      Cervical back: Neck supple. No rigidity or tenderness.   Lymphadenopathy:      Cervical: No cervical adenopathy.   Skin:     General: Skin is warm.      Capillary Refill: Capillary refill takes less than 2 seconds.   Neurological:      Mental Status: She is alert and oriented to person, place, and time.   Psychiatric:         Behavior: Behavior normal.          Assessment and Plan (including Health Maintenance)      Problem List  Smart Sets  Document Outside HM   :    Plan:     Increase fluid intake will start cipro for uti and uri, stop hipprex while in cipro encouraged her to consider respite care for spouse.      Health Maintenance Due   Topic Date Due    Hepatitis C Screening  Never done    Shingles Vaccine (1 of 2) Never done    TETANUS VACCINE  10/11/2021    COVID-19 Vaccine (3 - Booster for Moderna series) 11/25/2021    Colorectal Cancer " Screening  04/02/2022       Problem List Items Addressed This Visit        Neuro    Chronic pain syndrome    Relevant Medications    gabapentin (NEURONTIN) 600 MG tablet    methocarbamoL (ROBAXIN) 500 MG Tab      Other Visit Diagnoses     Recurrent UTI    -  Primary    Relevant Medications    methenamine (HIPREX) 1 gram Tab    ciprofloxacin HCl (CIPRO) 500 MG tablet    Hyperlipidemia, unspecified hyperlipidemia type        Relevant Medications    atorvastatin (LIPITOR) 40 MG tablet    Other Relevant Orders    Lipid Panel    Depressive disorder        Relevant Medications    buPROPion (WELLBUTRIN XL) 300 MG 24 hr tablet    Hypothyroidism, unspecified type        Relevant Medications    levothyroxine (SYNTHROID) 50 MCG tablet    Other Relevant Orders    TSH    Allergy, sequela        Relevant Medications    fluticasone propionate (FLONASE) 50 mcg/actuation nasal spray    Other Relevant Orders    POCT Urinalysis, Dipstick, Automated, W/O Scope    Hypertension, unspecified type        Relevant Orders    Comprehensive Metabolic Panel    CBC Auto Differential    Cough        Relevant Medications    albuterol (VENTOLIN HFA) 90 mcg/actuation inhaler        Recurrent UTI  -     methenamine (HIPREX) 1 gram Tab; Take 1 tablet (1 g total) by mouth 2 (two) times daily.  Dispense: 180 tablet; Refill: 1  -     ciprofloxacin HCl (CIPRO) 500 MG tablet; Take 1 tablet (500 mg total) by mouth 2 (two) times daily.  Dispense: 20 tablet; Refill: 0    Hyperlipidemia, unspecified hyperlipidemia type  -     atorvastatin (LIPITOR) 40 MG tablet; Take 1 tablet (40 mg total) by mouth once daily.  Dispense: 90 tablet; Refill: 1  -     Lipid Panel; Future; Expected date: 03/15/2022    Depressive disorder  -     buPROPion (WELLBUTRIN XL) 300 MG 24 hr tablet; Take 1 tablet (300 mg total) by mouth once daily.  Dispense: 90 tablet; Refill: 1    Chronic pain syndrome  -     gabapentin (NEURONTIN) 600 MG tablet; Take 1 tablet (600 mg total) by mouth 3  (three) times daily.  Dispense: 270 tablet; Refill: 1  -     methocarbamoL (ROBAXIN) 500 MG Tab; Take 1 tablet (500 mg total) by mouth 3 (three) times daily.  Dispense: 270 tablet; Refill: 1    Hypothyroidism, unspecified type  -     levothyroxine (SYNTHROID) 50 MCG tablet; Take 1 tablet (50 mcg total) by mouth before breakfast.  Dispense: 90 tablet; Refill: 1  -     TSH; Future; Expected date: 03/15/2022    Allergy, sequela  -     fluticasone propionate (FLONASE) 50 mcg/actuation nasal spray; 2 sprays (100 mcg total) by Each Nostril route once daily.  Dispense: 16 g; Refill: 2  -     POCT Urinalysis, Dipstick, Automated, W/O Scope    Hypertension, unspecified type  -     Comprehensive Metabolic Panel; Future; Expected date: 03/15/2022  -     CBC Auto Differential; Future; Expected date: 03/15/2022    Cough  -     albuterol (VENTOLIN HFA) 90 mcg/actuation inhaler; Inhale 2 puffs into the lungs every 6 (six) hours as needed for Wheezing or Shortness of Breath. Rescue  Dispense: 18 g; Refill: 1       Health Maintenance Topics with due status: Not Due       Topic Last Completion Date    DEXA Scan 09/02/2020    Lipid Panel 10/05/2021    Mammogram 10/06/2021       Procedures     Future Appointments   Date Time Provider Department Center   3/15/2022  2:00 PM Evaristo Roman MD [facility]   [facility]  [facility]   2/20/2023  1:00 PM Omari Holt DO [facility]        No follow-ups on file.     Signature:  CHLOE Boswell    Date of encounter: 3/15/22

## 2022-03-15 NOTE — PATIENT INSTRUCTIONS
Will start cipro stop hipprex while taking this, needs to increase fluid intake, discussed respite care for spouse  Albuterol for cough and wheezing

## 2022-03-16 LAB
ALBUMIN SERPL BCP-MCNC: 4.1 G/DL (ref 3.5–5)
ALBUMIN/GLOB SERPL: 1 {RATIO}
ALP SERPL-CCNC: 88 U/L (ref 55–142)
ALT SERPL W P-5'-P-CCNC: 19 U/L (ref 13–56)
ANION GAP SERPL CALCULATED.3IONS-SCNC: 14 MMOL/L (ref 7–16)
AST SERPL W P-5'-P-CCNC: 12 U/L (ref 15–37)
BILIRUB SERPL-MCNC: 0.5 MG/DL (ref 0–1.2)
BUN SERPL-MCNC: 23 MG/DL (ref 7–18)
BUN/CREAT SERPL: 15 (ref 6–20)
CALCIUM SERPL-MCNC: 9.3 MG/DL (ref 8.5–10.1)
CHLORIDE SERPL-SCNC: 101 MMOL/L (ref 98–107)
CHOLEST SERPL-MCNC: 147 MG/DL (ref 0–200)
CHOLEST/HDLC SERPL: 2 {RATIO}
CO2 SERPL-SCNC: 24 MMOL/L (ref 21–32)
CREAT SERPL-MCNC: 1.52 MG/DL (ref 0.55–1.02)
GLOBULIN SER-MCNC: 4.1 G/DL (ref 2–4)
GLUCOSE SERPL-MCNC: 89 MG/DL (ref 74–106)
HDLC SERPL-MCNC: 72 MG/DL (ref 40–60)
LDLC SERPL CALC-MCNC: 49 MG/DL
LDLC/HDLC SERPL: 0.7 {RATIO}
NONHDLC SERPL-MCNC: 75 MG/DL
POTASSIUM SERPL-SCNC: 3.4 MMOL/L (ref 3.5–5.1)
PROT SERPL-MCNC: 8.2 G/DL (ref 6.4–8.2)
SODIUM SERPL-SCNC: 136 MMOL/L (ref 136–145)
TRIGL SERPL-MCNC: 130 MG/DL (ref 35–150)
TSH SERPL DL<=0.005 MIU/L-ACNC: 3.21 UIU/ML (ref 0.36–3.74)
VLDLC SERPL-MCNC: 26 MG/DL

## 2022-04-04 ENCOUNTER — OFFICE VISIT (OUTPATIENT)
Dept: FAMILY MEDICINE | Facility: CLINIC | Age: 67
End: 2022-04-04
Payer: MEDICARE

## 2022-04-04 VITALS
OXYGEN SATURATION: 97 % | HEIGHT: 69 IN | HEART RATE: 77 BPM | TEMPERATURE: 98 F | RESPIRATION RATE: 18 BRPM | WEIGHT: 167 LBS | DIASTOLIC BLOOD PRESSURE: 60 MMHG | BODY MASS INDEX: 24.73 KG/M2 | SYSTOLIC BLOOD PRESSURE: 112 MMHG

## 2022-04-04 DIAGNOSIS — N28.9 RENAL FUNCTION IMPAIRMENT: ICD-10-CM

## 2022-04-04 DIAGNOSIS — R50.9 FEVER, UNSPECIFIED FEVER CAUSE: Primary | ICD-10-CM

## 2022-04-04 DIAGNOSIS — B37.9 ANTIBIOTIC-INDUCED YEAST INFECTION: Primary | ICD-10-CM

## 2022-04-04 DIAGNOSIS — N39.0 RECURRENT UTI: ICD-10-CM

## 2022-04-04 DIAGNOSIS — T36.95XA ANTIBIOTIC-INDUCED YEAST INFECTION: Primary | ICD-10-CM

## 2022-04-04 LAB
BILIRUB SERPL-MCNC: NORMAL MG/DL
BLOOD URINE, POC: NORMAL
COLOR, POC UA: YELLOW
GLUCOSE UR QL STRIP: NORMAL
KETONES UR QL STRIP: NORMAL
LEUKOCYTE ESTERASE URINE, POC: NORMAL
NITRITE, POC UA: NORMAL
PH, POC UA: 5.5
PROTEIN, POC: NORMAL
SPECIFIC GRAVITY, POC UA: 1.01
UROBILINOGEN, POC UA: 0.2

## 2022-04-04 PROCEDURE — 87086 CULTURE, URINE: ICD-10-PCS | Mod: ,,, | Performed by: CLINICAL MEDICAL LABORATORY

## 2022-04-04 PROCEDURE — 99213 PR OFFICE/OUTPT VISIT, EST, LEVL III, 20-29 MIN: ICD-10-PCS | Mod: ,,, | Performed by: NURSE PRACTITIONER

## 2022-04-04 PROCEDURE — 81003 URINALYSIS AUTO W/O SCOPE: CPT | Mod: RHCUB | Performed by: NURSE PRACTITIONER

## 2022-04-04 PROCEDURE — 99213 OFFICE O/P EST LOW 20 MIN: CPT | Mod: ,,, | Performed by: NURSE PRACTITIONER

## 2022-04-04 PROCEDURE — 87086 URINE CULTURE/COLONY COUNT: CPT | Mod: ,,, | Performed by: CLINICAL MEDICAL LABORATORY

## 2022-04-04 RX ORDER — FLUCONAZOLE 150 MG/1
150 TABLET ORAL DAILY
Qty: 3 TABLET | Refills: 0 | Status: SHIPPED | OUTPATIENT
Start: 2022-04-04 | End: 2022-04-07

## 2022-04-04 NOTE — PROGRESS NOTES
2022     CHLOE Boswell   [facility]  [address]  [place] [address]     PATIENT NAME: Kizzy Schofield  : 1955  DATE: 22  MRN: [id number]      Billing Provider: CHLOE Boswell  Level of Service:   Patient PCP Information     Provider PCP Type    CHLOE Boswell [facility]          Reason for Visit / Chief Complaint: Fever (Patient has been running fever off and on since Thursday, doesn't know if she is having another UTI or Sinus) and Vaginitis       Update PCP  Update Chief Complaint         History of Present Illness / Problem Focused Workflow     Kizzy Schofield presents to the clinic reports fever since Thursday eyes burn feels draging,  Has  Urgency worried about her renal function discussed       Review of Systems     Review of Systems   Constitutional: Positive for chills, fatigue and fever.   HENT: Positive for nasal congestion, postnasal drip, rhinorrhea and sinus pressure/congestion. Negative for ear discharge, sore throat and trouble swallowing.    Eyes: Negative for visual disturbance.   Respiratory: Positive for cough. Negative for chest tightness, shortness of breath and wheezing.    Cardiovascular: Negative for chest pain and palpitations.   Gastrointestinal: Negative for abdominal pain, change in bowel habit, nausea, vomiting and change in bowel habit.   Genitourinary: Positive for dysuria, frequency and urgency.   Musculoskeletal: Positive for arthralgias, back pain, myalgias and neck pain. Negative for neck stiffness.   Integumentary:  Negative for pallor and rash.   Neurological: Positive for weakness and headaches.   Psychiatric/Behavioral: Positive for dysphoric mood and sleep disturbance. Negative for self-injury and suicidal ideas. The patient is nervous/anxious.         Medical / Social / Family History     Past Medical History:   Diagnosis Date    Arthritis of left hip     Arthritis of left knee     Cervical  radiculopathy     Chronic cystitis     Chronic pain syndrome     Degeneration of lumbar intervertebral disc     Depressive disorder     GERD (gastroesophageal reflux disease)     Hyperlipidemia     Hypertension     Lumbar post-laminectomy syndrome     Meniere disease     Osteoarthritis of cervical and lumbar spine     Osteoporosis        Past Surgical History:   Procedure Laterality Date    bladder tack      CHOLECYSTECTOMY      HYSTERECTOMY      OOPHORECTOMY         Social History  Ms.  reports that she has quit smoking. She has never used smokeless tobacco. She reports previous alcohol use. She reports that she does not use drugs.    Family History  Ms.'s family history includes Cancer in her father; Glaucoma in her daughter; Heart disease in her mother; Hypertension in her father and mother; Lung cancer in her father; Prostate cancer in her father; Stroke in her father and mother; Thyroid disease in her daughter and mother.    Medications and Allergies     Medications  Outpatient Medications Marked as Taking for the 4/4/22 encounter (Office Visit) with CHLOE Boswell   Medication Sig Dispense Refill    acetaminophen (TYLENOL) 500 MG tablet 1 tablet as needed      albuterol (VENTOLIN HFA) 90 mcg/actuation inhaler Inhale 2 puffs into the lungs every 6 (six) hours as needed for Wheezing or Shortness of Breath. Rescue 18 g 1    amitriptyline (ELAVIL) 10 MG tablet Take 1 tablet by mouth nightly.      atorvastatin (LIPITOR) 40 MG tablet Take 1 tablet (40 mg total) by mouth once daily. 90 tablet 1    buPROPion (WELLBUTRIN XL) 150 MG TB24 tablet TAKE ONE TABLET BY MOUTH ONCE DAILY 30 tablet 2    buPROPion (WELLBUTRIN XL) 300 MG 24 hr tablet Take 1 tablet (300 mg total) by mouth once daily. 90 tablet 1    cetirizine (ZYRTEC) 10 MG tablet Take 10 mg by mouth once daily.      cholecalciferol, vitamin D3, (VITAMIN D3) 50 mcg (2,000 unit) Cap 1 capsule      dicyclomine (BENTYL) 10 MG  capsule Take 10 mg by mouth 4 (four) times daily before meals and nightly. As needed      EPINEPHrine (EPIPEN) 0.3 mg/0.3 mL AtIn Inject into the muscle once.      fluticasone propionate (FLONASE) 50 mcg/actuation nasal spray 2 sprays (100 mcg total) by Each Nostril route once daily. 16 g 2    gabapentin (NEURONTIN) 600 MG tablet Take 1 tablet (600 mg total) by mouth 3 (three) times daily. 270 tablet 1    HYDROcodone-acetaminophen (NORCO) 7.5-325 mg per tablet Take 1 tablet by mouth 2 (two) times daily as needed for Pain.      levothyroxine (SYNTHROID) 50 MCG tablet Take 1 tablet (50 mcg total) by mouth before breakfast. 90 tablet 1    methenamine (HIPREX) 1 gram Tab Take 1 tablet (1 g total) by mouth 2 (two) times daily. 180 tablet 1    methocarbamoL (ROBAXIN) 500 MG Tab Take 1 tablet (500 mg total) by mouth 3 (three) times daily. 270 tablet 1    montelukast (SINGULAIR) 10 mg tablet Take 1 tablet (10 mg total) by mouth every evening. 90 tablet 1    pantoprazole (PROTONIX) 40 MG tablet Take 40 mg by mouth 2 (two) times daily.      polyethylene glycol (GLYCOLAX) 17 gram PwPk Take by mouth. Take 17 grams by mouth 2 times daily mixed with 8 ounces of water, juice, soda, tea, or coffee      potassium 99 mg Tab Take by mouth once daily at 6am.      raloxifene (EVISTA) 60 mg tablet Take 1 tablet (60 mg total) by mouth once daily. 90 tablet 0    topiramate (TOPAMAX) 100 MG tablet Take 100 mg by mouth 2 (two) times a day.      traZODone (DESYREL) 50 MG tablet Take 1 tablet (50 mg total) by mouth every evening. 30 tablet 11    triamterene-hydrochlorothiazide 37.5-25 mg (MAXZIDE-25) 37.5-25 mg per tablet Take 1 tablet by mouth once daily. 90 tablet 1    vitamin D (VITAMIN D3) 1000 units Tab Take 5,000 Units by mouth 2 (two) times a day.         Allergies  Review of patient's allergies indicates:   Allergen Reactions    Adhesive tape-silicones     Aspirin     Celebrex [celecoxib]     Eliquis [apixaban]      "Opioids - morphine analogues     Pcn [penicillins]     Shrimp     Silicone Itching and Rash       Physical Examination     Vitals:    04/04/22 0923   BP: 112/60   BP Location: Right arm   Patient Position: Sitting   Pulse: 77   Resp: 18   Temp: 98.2 °F (36.8 °C)   TempSrc: Oral   SpO2: 97%   Weight: 75.8 kg (167 lb)   Height: 5' 9" (1.753 m)      Physical Exam  Vitals and nursing note reviewed.   Constitutional:       General: She is not in acute distress.     Appearance: Normal appearance. She is not ill-appearing or toxic-appearing.   HENT:      Head: Raccoon eyes, right periorbital erythema and left periorbital erythema present.      Right Ear: Tympanic membrane, ear canal and external ear normal.      Left Ear: Tympanic membrane, ear canal and external ear normal.      Nose: Congestion present.      Mouth/Throat:      Mouth: Mucous membranes are dry.      Pharynx: Posterior oropharyngeal erythema present.      Comments: pnd noted  Eyes:      Conjunctiva/sclera: Conjunctivae normal.      Pupils: Pupils are equal, round, and reactive to light.   Cardiovascular:      Rate and Rhythm: Normal rate and regular rhythm.   Pulmonary:      Effort: Pulmonary effort is normal.      Breath sounds: Wheezing present. No rhonchi or rales.   Abdominal:      Tenderness: There is no right CVA tenderness or left CVA tenderness.   Musculoskeletal:      Cervical back: Neck supple. No rigidity or tenderness.   Lymphadenopathy:      Cervical: No cervical adenopathy.   Skin:     General: Skin is warm.      Capillary Refill: Capillary refill takes less than 2 seconds.   Neurological:      Mental Status: She is alert and oriented to person, place, and time.   Psychiatric:         Behavior: Behavior normal.          Assessment and Plan (including Health Maintenance)      Problem List  Smart Sets  Document Outside HM   :    Plan:     Urine with wbc no nitrites will wait on culture stay well hydrated    Health Maintenance Due   Topic Date " Due    Hepatitis C Screening  Never done    Shingles Vaccine (1 of 2) Never done    TETANUS VACCINE  10/11/2021    COVID-19 Vaccine (3 - Booster for Moderna series) 11/25/2021    Colorectal Cancer Screening  04/02/2022       Problem List Items Addressed This Visit    None     Visit Diagnoses     Fever, unspecified fever cause    -  Primary    Relevant Orders    POCT URINALYSIS W/O SCOPE        Fever, unspecified fever cause  -     POCT URINALYSIS W/O SCOPE       Health Maintenance Topics with due status: Not Due       Topic Last Completion Date    DEXA Scan 09/02/2020    Mammogram 10/06/2021    Lipid Panel 03/15/2022       Procedures     Future Appointments   Date Time Provider Department Center   4/13/2022  1:30 PM Juan Luis Covington MD [facility]   11/15/2022  1:00 PM [facility]MO[facility]   2/20/2023  1:00 PM Omari Holt DO [facility]         No follow-ups on file.     Signature:  CHLOE Boswell    Date of encounter: 4/4/22

## 2022-04-04 NOTE — PATIENT INSTRUCTIONS
Renal ultrasound  and 24 hour urine nephrology refer.  Discussed medications can affect renal function such as fluid pill, NSAIDS   She has been unable to tolerate being off fluid pill for her vertigo.

## 2022-04-06 ENCOUNTER — HOSPITAL ENCOUNTER (OUTPATIENT)
Dept: RADIOLOGY | Facility: HOSPITAL | Age: 67
Discharge: HOME | End: 2022-04-06
Attending: NURSE PRACTITIONER
Payer: MEDICARE

## 2022-04-06 DIAGNOSIS — N28.9 RENAL FUNCTION IMPAIRMENT: ICD-10-CM

## 2022-04-06 LAB — UA COMPLETE W REFLEX CULTURE PNL UR: NORMAL

## 2022-04-06 PROCEDURE — 76770 US EXAM ABDO BACK WALL COMP: CPT | Mod: TC

## 2022-04-11 DIAGNOSIS — M54.16 LUMBAR RADICULOPATHY: Primary | ICD-10-CM

## 2022-04-12 ENCOUNTER — PATIENT MESSAGE (OUTPATIENT)
Dept: ADMINISTRATIVE | Facility: OTHER | Age: 67
End: 2022-04-12

## 2022-04-13 ENCOUNTER — OFFICE VISIT (OUTPATIENT)
Dept: SPINE | Facility: CLINIC | Age: 67
End: 2022-04-13
Payer: MEDICARE

## 2022-04-13 ENCOUNTER — HOSPITAL ENCOUNTER (OUTPATIENT)
Dept: RADIOLOGY | Facility: HOSPITAL | Age: 67
Discharge: HOME | End: 2022-04-13
Attending: ORTHOPAEDIC SURGERY
Payer: MEDICARE

## 2022-04-13 DIAGNOSIS — G89.29 CHRONIC RIGHT-SIDED LOW BACK PAIN WITH RIGHT-SIDED SCIATICA: ICD-10-CM

## 2022-04-13 DIAGNOSIS — M54.41 CHRONIC RIGHT-SIDED LOW BACK PAIN WITH RIGHT-SIDED SCIATICA: ICD-10-CM

## 2022-04-13 DIAGNOSIS — M54.16 LUMBAR RADICULOPATHY: ICD-10-CM

## 2022-04-13 PROCEDURE — 72110 X-RAY EXAM L-2 SPINE 4/>VWS: CPT | Mod: 26,,, | Performed by: ORTHOPAEDIC SURGERY

## 2022-04-13 PROCEDURE — 72110 X-RAY EXAM L-2 SPINE 4/>VWS: CPT | Mod: TC

## 2022-04-13 PROCEDURE — 72110 XR LUMBAR SPINE 4-5 VIEW WITH BENDING VIEWS: ICD-10-PCS | Mod: 26,,, | Performed by: ORTHOPAEDIC SURGERY

## 2022-04-13 PROCEDURE — 99214 OFFICE O/P EST MOD 30 MIN: CPT | Mod: S$PBB,,, | Performed by: ORTHOPAEDIC SURGERY

## 2022-04-13 PROCEDURE — 99212 OFFICE O/P EST SF 10 MIN: CPT | Mod: PBBFAC | Performed by: ORTHOPAEDIC SURGERY

## 2022-04-13 PROCEDURE — 99214 PR OFFICE/OUTPT VISIT, EST, LEVL IV, 30-39 MIN: ICD-10-PCS | Mod: S$PBB,,, | Performed by: ORTHOPAEDIC SURGERY

## 2022-04-13 NOTE — PROGRESS NOTES
MDM/time:  Greater than 45 minutes spent on this encounter including 15 minutes reviewing imaging and notes, 20 minutes with the patient, 10 minutes documentation    ASSESSMENT:  66 y.o. female with degenerative lumbar scoliosis and lumbar spondylosis with radiculopathy    PLAN:  Continue current medications  Follow-up with Dr. Rivera for possible lumbar epidural injections at L4-5  Physical therapy lumbar spine and right hip-patient would like to use stay home for home physical therapy  Follow-up in 4 months    HPI:  66 y.o. female here for evaluation of low back pain that radiates into right hip down the side of the right leg.  Patient reports a longstanding history of back pain with multiple over 10+ surgeries on her lumbar spine.  Has been seen at [facility] with Dr. Alexandra who has retired at this time.  Patient reports 2021 she fell on 2 separate occasions onto her right hip soon after these 2 falls she drove many hours to [place] and noted increased pain in the right hip.  Patient reports decreased  strength in bilateral hands.  Currently uses a roller walker for ambulation due to balance issues and multiple falls.  Long history of bladder incontinence and relates this to her many lumbar spine surgeries.  Difficulty walking more than 25-50 feet due to back pain and weakness.  Currently takes Norco, Robaxin, Neurontin, and Tylenol for pain.  No recent physical therapy.  Currently sees Dr. Rivera pain management has had proximally 3 injections last year in her lumbar spine with little to no pain relief.  Recent MRI 2022.  Multiple surgeries as discussed above.  Patient is not a smoker.    IMAGIN2022 lumbar spine x-ray reviewed showed:  On the AP there is lumbar curvature to the left.  There are 5 non-rib-bearing lumbar vertebrae.  On the lateral there is decreased lumbar lordosis.  There is spondylotic disease with decreased disc height and osteophyte formation noted.  There has been  prior left-sided sacroiliac fixation.    3/7/2022 MRI lumbar [facility] at [facility] reviewed showed:  L4-5 severe left greater than right lateral recess stenosis and moderate right foraminal stenosis  Past Medical History:   Diagnosis Date    Arthritis of left hip     Arthritis of left knee     Cervical radiculopathy     Chronic cystitis     Chronic pain syndrome     Degeneration of lumbar intervertebral disc     Depressive disorder     GERD (gastroesophageal reflux disease)     Hyperlipidemia     Hypertension     Lumbar post-laminectomy syndrome     Meniere disease     Osteoarthritis of cervical and lumbar spine     Osteoporosis      Past Surgical History:   Procedure Laterality Date    bladder tack      CHOLECYSTECTOMY      HYSTERECTOMY      OOPHORECTOMY       Social History     Tobacco Use    Smoking status: Former Smoker    Smokeless tobacco: Never Used   Substance Use Topics    Alcohol use: Not Currently    Drug use: Never      Current Outpatient Medications   Medication Instructions    acetaminophen (TYLENOL) 500 MG tablet 1 tablet as needed    albuterol (VENTOLIN HFA) 90 mcg/actuation inhaler 2 puffs, Inhalation, Every 6 hours PRN, Rescue    amitriptyline (ELAVIL) 10 MG tablet 1 tablet, Oral, Nightly    atorvastatin (LIPITOR) 40 mg, Oral, Daily    buPROPion (WELLBUTRIN XL) 150 MG TB24 tablet TAKE ONE TABLET BY MOUTH ONCE DAILY    buPROPion (WELLBUTRIN XL) 300 mg, Oral, Daily    cetirizine (ZYRTEC) 10 mg, Oral, Daily    cholecalciferol, vitamin D3, (VITAMIN D3) 50 mcg (2,000 unit) Cap 1 capsule    dicyclomine (BENTYL) 10 mg, Oral, Before meals & nightly, As needed    EPINEPHrine (EPIPEN) 0.3 mg/0.3 mL AtIn Intramuscular, Once    fluticasone propionate (FLONASE) 100 mcg, Each Nostril, Daily    gabapentin (NEURONTIN) 600 mg, Oral, 3 times daily    HYDROcodone-acetaminophen (NORCO) 7.5-325 mg per tablet 1 tablet, Oral, 2 times daily PRN    levothyroxine (SYNTHROID) 50 mcg, Oral, Before  breakfast    methenamine (HIPREX) 1 g, Oral, 2 times daily    methocarbamoL (ROBAXIN) 500 mg, Oral, 3 times daily    montelukast (SINGULAIR) 10 mg, Oral, Nightly    pantoprazole (PROTONIX) 40 mg, Oral, 2 times daily    polyethylene glycol (GLYCOLAX) 17 gram PwPk Oral, Take 17 grams by mouth 2 times daily mixed with 8 ounces of water, juice, soda, tea, or coffee     potassium 99 mg Tab Oral, Daily    raloxifene (EVISTA) 60 mg, Oral, Daily    topiramate (TOPAMAX) 100 mg, Oral, 2 times daily    traZODone (DESYREL) 50 mg, Oral, Nightly    triamterene-hydrochlorothiazide 37.5-25 mg (MAXZIDE-25) 37.5-25 mg per tablet 1 tablet, Oral, Daily    vitamin D (VITAMIN D3) 5,000 Units, Oral, 2 times daily        EXAM:  Constitutional  General Appearance:  There is no height or weight on file to calculate BMI., NAD  Psychiatric   Orientation: Oriented to time, oriented to place, oriented to person  Mood and Affect: Active and alert, normal mood, normal affect  Gait and Station   Appearance:  Antalgic gait to the right walks with a roller walker, unable to tandem gait, unable to walk on toes, unable to walk on heels    LUMBAR  Musculoskeletal System   Hips: Normal appearance, no leg length discrepancy, decreased motion; left, decreased motion; right    Lumbar Spine                   Inspection:  Normal alignment, normal sagittal balance                  Range of motion:  Decreased flexion, extension, lateral bending, rotation.Pain with range of motion                  Bony Palpation of the Lumbar Spine:  No tenderness of the spinous process, no tenderness of the sacrum, no tenderness of the coccyx                  Bony Palpation of the Right Hip:  No tenderness of the iliac crest, no tenderness of the sciatic notch, no tenderness of the SI joint                  Bony Palpation of the Left Hip:  No tenderness of the iliac crest, no tenderness of the sciatic notch, no tenderness of the SI joint                  Soft Tissue  Palpation on the Right:  No tenderness of the paraspinal region, no tenderness of the iliolumbar region                  Soft Tissue Palpation on the Left:  No tenderness of the paraspinal region, no tenderness of the iliolumbar region    Motor Strength   L1 Right:  Hip flexion iliopsoas 4/5    L1 Left:  Hip flexion iliopsoas 4/5              L2-L4 Right:  Knee extension quadriceps 4/5, tibialis anterior 5/5              L2-L4 Left:  Knee extension quadriceps 4/5, tibialis anterior 5/5   L5 Right:  Extensor hallucis llongus 5/5,    L5 Left:  Extensor hallucis longus 5/5,    S1 Right:  Plantar flexion gastrocnemius 5/5   S1 Left:  Plantar flexion gastrocnemius 5/5    Neurological System   Ankle Reflex Right:  normal   Ankle Reflex Left: normal   Knee Reflex Right:  normal   Knee Reflex Left:  normal   Sensation on the Right:  L2 normal, L3 normal, L4 normal, L5 normal, S1 normal   Sensation on the Left:  L2 normal, L3 normal, L4 normal, L5 normal, S1 normal              Special Test on the Right:  Seated straight leg raising test negative, no clonus of the ankle              Special Test on the Left:  Seated straight leg raising test negative, no clonus of the ankle    Skin   Lumbosacral Spine:  Normal skin    Cardiovascular System   Arterial Pulses Right:  Posterior tibialis normal, dorsalis pedis normal   Arterial Pulses Left:  Posterior tibialis normal, dorsalis pedis normal   Edema Right: None   Edema Left:  None

## 2022-04-13 NOTE — PROGRESS NOTES
AP, lateral, flexion/extension views of the lumbar spine reviewed    On the AP there is lumbar curvature to the left.  There are 5 non-rib-bearing lumbar vertebrae.  On the lateral there is decreased lumbar lordosis.  There is spondylotic disease with decreased disc height and osteophyte formation noted.  There has been prior left-sided sacroiliac fixation.    Impression:  Spondylotic changes of the lumbar spine as noted above

## 2022-05-31 ENCOUNTER — EXTERNAL CHRONIC CARE MANAGEMENT (OUTPATIENT)
Dept: FAMILY MEDICINE | Facility: CLINIC | Age: 67
End: 2022-05-31
Payer: MEDICARE

## 2022-05-31 PROCEDURE — G0511 CCM/BHI BY RHC/FQHC 20MIN MO: HCPCS | Mod: ,,, | Performed by: NURSE PRACTITIONER

## 2022-05-31 PROCEDURE — G0511 PR CHRONIC CARE MGMT, RHC OR FQHC ONLY, 20 MINS OR MORE: ICD-10-PCS | Mod: ,,, | Performed by: NURSE PRACTITIONER

## 2022-06-01 ENCOUNTER — OFFICE VISIT (OUTPATIENT)
Dept: SPINE | Facility: CLINIC | Age: 67
End: 2022-06-01
Payer: MEDICARE

## 2022-06-01 DIAGNOSIS — Z79.899 LONG TERM USE OF DRUG: ICD-10-CM

## 2022-06-01 DIAGNOSIS — Z79.82 ENCOUNTER FOR MONITORING OF CHRONIC ASPIRIN THERAPY: ICD-10-CM

## 2022-06-01 DIAGNOSIS — M54.16 LUMBAR RADICULOPATHY: Primary | ICD-10-CM

## 2022-06-01 DIAGNOSIS — Z51.81 ENCOUNTER FOR MONITORING OF CHRONIC ASPIRIN THERAPY: ICD-10-CM

## 2022-06-01 DIAGNOSIS — Z01.818 PRE-OPERATIVE EXAMINATION: ICD-10-CM

## 2022-06-01 PROCEDURE — 99214 OFFICE O/P EST MOD 30 MIN: CPT | Mod: S$PBB,,, | Performed by: ORTHOPAEDIC SURGERY

## 2022-06-01 PROCEDURE — 99213 OFFICE O/P EST LOW 20 MIN: CPT | Mod: PBBFAC | Performed by: ORTHOPAEDIC SURGERY

## 2022-06-01 PROCEDURE — 99214 PR OFFICE/OUTPT VISIT, EST, LEVL IV, 30-39 MIN: ICD-10-PCS | Mod: S$PBB,,, | Performed by: ORTHOPAEDIC SURGERY

## 2022-06-02 RX ORDER — SODIUM CHLORIDE 9 MG/ML
INJECTION, SOLUTION INTRAVENOUS CONTINUOUS
Status: CANCELLED | OUTPATIENT
Start: 2022-06-02

## 2022-06-02 RX ORDER — MUPIROCIN 20 MG/G
OINTMENT TOPICAL
Status: CANCELLED | OUTPATIENT
Start: 2022-06-02

## 2022-06-03 NOTE — PROGRESS NOTES
MDM/time:  Greater than 30 minutes spent on this encounter including 10 minutes reviewing imaging and notes, 15 minutes with the patient, 5 minutes documentation    ASSESSMENT:  67 y.o. female with degenerative lumbar scoliosis and lumbar spondylosis with radiculopathy    PLAN:  She has exhausted nonoperative measures and elected to proceed with surgery.  This will be an L4-5 laminectomy    HPI:  67 y.o. female here for evaluation of low back pain that radiates into right hip down the side of the right leg.  Patient reports a longstanding history of back pain with multiple over 10+ surgeries on her lumbar spine.  Has been seen at [facility] with Dr. Alexandra who has retired at this time.  Patient reports 2021 she fell on 2 separate occasions onto her right hip soon after these 2 falls she drove many hours to [place] and noted increased pain in the right hip.  Patient reports decreased  strength in bilateral hands.  Currently uses a roller walker for ambulation due to balance issues and multiple falls.  Long history of bladder incontinence and relates this to her many lumbar spine surgeries.  Difficulty walking more than 25-50 feet due to back pain and weakness.  Currently takes Norco, Robaxin, Neurontin, and Tylenol for pain.  No recent physical therapy.  Currently sees Dr. Rivera pain management has had proximally 3 injections last year in her lumbar spine with little to no pain relief.  Recent MRI 2022.  Multiple surgeries as discussed above.  Patient is not a smoker.    IMAGIN2022 lumbar spine x-ray reviewed showed:  On the AP there is lumbar curvature to the left.  There are 5 non-rib-bearing lumbar vertebrae.  On the lateral there is decreased lumbar lordosis.  There is spondylotic disease with decreased disc height and osteophyte formation noted.  There has been prior left-sided sacroiliac fixation.    3/7/2022 MRI lumbar spine at [facility] reviewed showed:  L4-5 severe left greater than  right lateral recess stenosis and moderate right foraminal stenosis  Past Medical History:   Diagnosis Date    Arthritis of left hip     Arthritis of left knee     Cervical radiculopathy     Chronic cystitis     Chronic pain syndrome     Degeneration of lumbar intervertebral disc     Depressive disorder     GERD (gastroesophageal reflux disease)     Hyperlipidemia     Hypertension     Lumbar post-laminectomy syndrome     Meniere disease     Osteoarthritis of cervical and lumbar spine     Osteoporosis      Past Surgical History:   Procedure Laterality Date    bladder tack      CHOLECYSTECTOMY      HYSTERECTOMY      OOPHORECTOMY       Social History     Tobacco Use    Smoking status: Former Smoker    Smokeless tobacco: Never Used   Substance Use Topics    Alcohol use: Not Currently    Drug use: Never      Current Outpatient Medications   Medication Instructions    acetaminophen (TYLENOL) 500 MG tablet 1 tablet as needed    albuterol (VENTOLIN HFA) 90 mcg/actuation inhaler 2 puffs, Inhalation, Every 6 hours PRN, Rescue    amitriptyline (ELAVIL) 10 MG tablet 1 tablet, Oral, Nightly    atorvastatin (LIPITOR) 40 mg, Oral, Daily    buPROPion (WELLBUTRIN XL) 150 mg, Oral, Daily    buPROPion (WELLBUTRIN XL) 300 mg, Oral, Daily    cetirizine (ZYRTEC) 10 mg, Oral, Daily    cholecalciferol, vitamin D3, (VITAMIN D3) 50 mcg (2,000 unit) Cap 1 capsule    dicyclomine (BENTYL) 10 mg, Oral, Before meals & nightly, As needed    EPINEPHrine (EPIPEN) 0.3 mg/0.3 mL AtIn Intramuscular, Once    fluticasone propionate (FLONASE) 100 mcg, Each Nostril, Daily    gabapentin (NEURONTIN) 600 mg, Oral, 3 times daily    HYDROcodone-acetaminophen (NORCO) 7.5-325 mg per tablet 1 tablet, Oral, 2 times daily PRN    levothyroxine (SYNTHROID) 50 mcg, Oral, Before breakfast    methenamine (HIPREX) 1 g, Oral, 2 times daily    methocarbamoL (ROBAXIN) 500 mg, Oral, 3 times daily    montelukast (SINGULAIR) 10 mg, Oral,  Daily    pantoprazole (PROTONIX) 40 mg, Oral, 2 times daily    polyethylene glycol (GLYCOLAX) 17 gram PwPk Oral, Take 17 grams by mouth 2 times daily mixed with 8 ounces of water, juice, soda, tea, or coffee     raloxifene (EVISTA) 60 mg, Oral, Daily    topiramate (TOPAMAX) 100 mg, Oral, 2 times daily    traZODone (DESYREL) 50 mg, Oral, Nightly PRN    triamterene-hydrochlorothiazide 37.5-25 mg (MAXZIDE-25) 37.5-25 mg per tablet 1 tablet, Oral, Daily    vitamin D (VITAMIN D3) 5,000 Units, Oral, 2 times daily        EXAM:  Constitutional  General Appearance:  There is no height or weight on file to calculate BMI., NAD  Psychiatric   Orientation: Oriented to time, oriented to place, oriented to person  Mood and Affect: Active and alert, normal mood, normal affect  Gait and Station   Appearance:  Antalgic gait to the right walks with a roller walker, unable to tandem gait, unable to walk on toes, unable to walk on heels    LUMBAR  Musculoskeletal System   Hips: Normal appearance, no leg length discrepancy, decreased motion; left, decreased motion; right    Lumbar Spine                   Inspection:  Normal alignment, normal sagittal balance                  Range of motion:  Decreased flexion, extension, lateral bending, rotation.Pain with range of motion                  Bony Palpation of the Lumbar Spine:  No tenderness of the spinous process, no tenderness of the sacrum, no tenderness of the coccyx                  Bony Palpation of the Right Hip:  No tenderness of the iliac crest, no tenderness of the sciatic notch, no tenderness of the SI joint                  Bony Palpation of the Left Hip:  No tenderness of the iliac crest, no tenderness of the sciatic notch, no tenderness of the SI joint                  Soft Tissue Palpation on the Right:  No tenderness of the paraspinal region, no tenderness of the iliolumbar region                  Soft Tissue Palpation on the Left:  No tenderness of the paraspinal  region, no tenderness of the iliolumbar region    Motor Strength   L1 Right:  Hip flexion iliopsoas 4/5    L1 Left:  Hip flexion iliopsoas 4/5              L2-L4 Right:  Knee extension quadriceps 4/5, tibialis anterior 5/5              L2-L4 Left:  Knee extension quadriceps 4/5, tibialis anterior 5/5   L5 Right:  Extensor hallucis llongus 5/5,    L5 Left:  Extensor hallucis longus 5/5,    S1 Right:  Plantar flexion gastrocnemius 5/5   S1 Left:  Plantar flexion gastrocnemius 5/5    Neurological System   Ankle Reflex Right:  normal   Ankle Reflex Left: normal   Knee Reflex Right:  normal   Knee Reflex Left:  normal   Sensation on the Right:  L2 normal, L3 normal, L4 normal, L5 normal, S1 normal   Sensation on the Left:  L2 normal, L3 normal, L4 normal, L5 normal, S1 normal              Special Test on the Right:  Seated straight leg raising test negative, no clonus of the ankle              Special Test on the Left:  Seated straight leg raising test negative, no clonus of the ankle    Skin   Lumbosacral Spine:  Normal skin    Cardiovascular System   Arterial Pulses Right:  Posterior tibialis normal, dorsalis pedis normal   Arterial Pulses Left:  Posterior tibialis normal, dorsalis pedis normal   Edema Right: None   Edema Left:  None

## 2022-06-06 ENCOUNTER — TELEPHONE (OUTPATIENT)
Dept: FAMILY MEDICINE | Facility: CLINIC | Age: 67
End: 2022-06-06
Payer: MEDICARE

## 2022-06-06 NOTE — TELEPHONE ENCOUNTER
"Patient called stating she had stopped taking her nerve medication "Wellbutrin" because she figured it was causing some side effects. Patient is scheduled for pre-op on 6/7/2022, will discuss at visit.  "

## 2022-06-07 ENCOUNTER — APPOINTMENT (OUTPATIENT)
Dept: RADIOLOGY | Facility: CLINIC | Age: 67
End: 2022-06-07
Attending: NURSE PRACTITIONER
Payer: MEDICARE

## 2022-06-07 ENCOUNTER — OFFICE VISIT (OUTPATIENT)
Dept: FAMILY MEDICINE | Facility: CLINIC | Age: 67
End: 2022-06-07
Payer: MEDICARE

## 2022-06-07 VITALS
WEIGHT: 167 LBS | HEIGHT: 69 IN | BODY MASS INDEX: 24.73 KG/M2 | SYSTOLIC BLOOD PRESSURE: 124 MMHG | TEMPERATURE: 98 F | DIASTOLIC BLOOD PRESSURE: 72 MMHG | OXYGEN SATURATION: 97 % | HEART RATE: 87 BPM | RESPIRATION RATE: 18 BRPM

## 2022-06-07 DIAGNOSIS — N39.0 URINARY TRACT INFECTION WITHOUT HEMATURIA, SITE UNSPECIFIED: ICD-10-CM

## 2022-06-07 DIAGNOSIS — Z01.818 PRE-OPERATIVE EXAMINATION: ICD-10-CM

## 2022-06-07 DIAGNOSIS — K58.9 IRRITABLE BOWEL SYNDROME, UNSPECIFIED TYPE: ICD-10-CM

## 2022-06-07 DIAGNOSIS — Z79.899 LONG TERM USE OF DRUG: ICD-10-CM

## 2022-06-07 DIAGNOSIS — Z01.810 PRE-OPERATIVE CARDIOVASCULAR EXAMINATION: Primary | ICD-10-CM

## 2022-06-07 DIAGNOSIS — Z01.812 PRE-OPERATIVE LABORATORY EXAMINATION: ICD-10-CM

## 2022-06-07 DIAGNOSIS — Z79.82 ENCOUNTER FOR MONITORING OF CHRONIC ASPIRIN THERAPY: ICD-10-CM

## 2022-06-07 DIAGNOSIS — Z51.81 ENCOUNTER FOR MONITORING OF CHRONIC ASPIRIN THERAPY: ICD-10-CM

## 2022-06-07 DIAGNOSIS — K21.9 GASTROESOPHAGEAL REFLUX DISEASE WITHOUT ESOPHAGITIS: ICD-10-CM

## 2022-06-07 LAB
BACTERIA #/AREA URNS HPF: ABNORMAL /HPF
BASOPHILS # BLD AUTO: 0.05 K/UL (ref 0–0.2)
BASOPHILS NFR BLD AUTO: 0.6 % (ref 0–1)
BILIRUB UR QL STRIP: NEGATIVE
CLARITY UR: ABNORMAL
COLOR UR: YELLOW
DIFFERENTIAL METHOD BLD: ABNORMAL
EKG 12-LEAD: NORMAL
EOSINOPHIL # BLD AUTO: 0.07 K/UL (ref 0–0.5)
EOSINOPHIL NFR BLD AUTO: 0.9 % (ref 1–4)
ERYTHROCYTE [DISTWIDTH] IN BLOOD BY AUTOMATED COUNT: 13.2 % (ref 11.5–14.5)
EST. AVERAGE GLUCOSE BLD GHB EST-MCNC: 94 MG/DL
GLUCOSE UR STRIP-MCNC: NEGATIVE MG/DL
HBA1C MFR BLD HPLC: 5.4 % (ref 4.5–6.6)
HCT VFR BLD AUTO: 39.7 % (ref 38–47)
HGB BLD-MCNC: 13 G/DL (ref 12–16)
IMM GRANULOCYTES # BLD AUTO: 0.03 K/UL (ref 0–0.04)
IMM GRANULOCYTES NFR BLD: 0.4 % (ref 0–0.4)
KETONES UR STRIP-SCNC: NEGATIVE MG/DL
LEUKOCYTE ESTERASE UR QL STRIP: ABNORMAL
LYMPHOCYTES # BLD AUTO: 1.91 K/UL (ref 1–4.8)
LYMPHOCYTES NFR BLD AUTO: 23.7 % (ref 27–41)
MCH RBC QN AUTO: 30.4 PG (ref 27–31)
MCHC RBC AUTO-ENTMCNC: 32.7 G/DL (ref 32–36)
MCV RBC AUTO: 92.8 FL (ref 80–96)
MONOCYTES # BLD AUTO: 0.48 K/UL (ref 0–0.8)
MONOCYTES NFR BLD AUTO: 6 % (ref 2–6)
MPC BLD CALC-MCNC: 10.8 FL (ref 9.4–12.4)
NEUTROPHILS # BLD AUTO: 5.51 K/UL (ref 1.8–7.7)
NEUTROPHILS NFR BLD AUTO: 68.4 % (ref 53–65)
NITRITE UR QL STRIP: NEGATIVE
NRBC # BLD AUTO: 0 X10E3/UL
NRBC, AUTO (.00): 0 %
PH UR STRIP: 6 PH UNITS
PLATELET # BLD AUTO: 330 K/UL (ref 150–400)
PR INTERVAL: NORMAL
PROT UR QL STRIP: NEGATIVE
PRT AXES: NORMAL
QRS DURATION: NORMAL
QT/QTC: NORMAL
RBC # BLD AUTO: 4.28 M/UL (ref 4.2–5.4)
RBC # UR STRIP: NEGATIVE /UL
RBC #/AREA URNS HPF: ABNORMAL /HPF
SP GR UR STRIP: 1.01
SQUAMOUS #/AREA URNS LPF: ABNORMAL /LPF
UROBILINOGEN UR STRIP-ACNC: 0.2 MG/DL
VENTRICULAR RATE: NORMAL
WBC # BLD AUTO: 8.05 K/UL (ref 4.5–11)
WBC #/AREA URNS HPF: ABNORMAL /HPF
WBC CLUMPS, UA: ABNORMAL /HPF

## 2022-06-07 PROCEDURE — 93010 ELECTROCARDIOGRAM REPORT: CPT | Mod: ,,, | Performed by: NURSE PRACTITIONER

## 2022-06-07 PROCEDURE — 99214 OFFICE O/P EST MOD 30 MIN: CPT | Mod: ,,, | Performed by: NURSE PRACTITIONER

## 2022-06-07 PROCEDURE — 87086 CULTURE, URINE: ICD-10-PCS | Mod: ,,, | Performed by: CLINICAL MEDICAL LABORATORY

## 2022-06-07 PROCEDURE — 87077 CULTURE, URINE: ICD-10-PCS | Mod: ,,, | Performed by: CLINICAL MEDICAL LABORATORY

## 2022-06-07 PROCEDURE — 85025 COMPLETE CBC W/AUTO DIFF WBC: CPT | Mod: ,,, | Performed by: CLINICAL MEDICAL LABORATORY

## 2022-06-07 PROCEDURE — 87086 URINE CULTURE/COLONY COUNT: CPT | Mod: ,,, | Performed by: CLINICAL MEDICAL LABORATORY

## 2022-06-07 PROCEDURE — 81001 URINALYSIS, REFLEX TO URINE CULTURE: ICD-10-PCS | Mod: ,,, | Performed by: CLINICAL MEDICAL LABORATORY

## 2022-06-07 PROCEDURE — 83036 HEMOGLOBIN GLYCOSYLATED A1C: CPT | Mod: ,,, | Performed by: CLINICAL MEDICAL LABORATORY

## 2022-06-07 PROCEDURE — 86803 HEPATITIS C ANTIBODY: ICD-10-PCS | Mod: ,,, | Performed by: CLINICAL MEDICAL LABORATORY

## 2022-06-07 PROCEDURE — 71046 XR CHEST PA AND LATERAL: ICD-10-PCS | Mod: 26,,, | Performed by: RADIOLOGY

## 2022-06-07 PROCEDURE — 87389 HIV 1 / 2 ANTIBODY: ICD-10-PCS | Mod: ,,, | Performed by: CLINICAL MEDICAL LABORATORY

## 2022-06-07 PROCEDURE — 83036 HEMOGLOBIN A1C: ICD-10-PCS | Mod: ,,, | Performed by: CLINICAL MEDICAL LABORATORY

## 2022-06-07 PROCEDURE — 99214 PR OFFICE/OUTPT VISIT, EST, LEVL IV, 30-39 MIN: ICD-10-PCS | Mod: ,,, | Performed by: NURSE PRACTITIONER

## 2022-06-07 PROCEDURE — 87077 CULTURE AEROBIC IDENTIFY: CPT | Mod: ,,, | Performed by: CLINICAL MEDICAL LABORATORY

## 2022-06-07 PROCEDURE — 93005 ELECTROCARDIOGRAM TRACING: CPT | Mod: RHCUB | Performed by: NURSE PRACTITIONER

## 2022-06-07 PROCEDURE — 71046 X-RAY EXAM CHEST 2 VIEWS: CPT | Mod: 26,,, | Performed by: RADIOLOGY

## 2022-06-07 PROCEDURE — 81001 URINALYSIS AUTO W/SCOPE: CPT | Mod: ,,, | Performed by: CLINICAL MEDICAL LABORATORY

## 2022-06-07 PROCEDURE — 71046 X-RAY EXAM CHEST 2 VIEWS: CPT | Mod: TC,RHCUB | Performed by: NURSE PRACTITIONER

## 2022-06-07 PROCEDURE — 80053 COMPREHENSIVE METABOLIC PANEL: ICD-10-PCS | Mod: ,,, | Performed by: CLINICAL MEDICAL LABORATORY

## 2022-06-07 PROCEDURE — 87389 HIV-1 AG W/HIV-1&-2 AB AG IA: CPT | Mod: ,,, | Performed by: CLINICAL MEDICAL LABORATORY

## 2022-06-07 PROCEDURE — 86803 HEPATITIS C AB TEST: CPT | Mod: ,,, | Performed by: CLINICAL MEDICAL LABORATORY

## 2022-06-07 PROCEDURE — 87186 CULTURE, URINE: ICD-10-PCS | Mod: ,,, | Performed by: CLINICAL MEDICAL LABORATORY

## 2022-06-07 PROCEDURE — 80053 COMPREHEN METABOLIC PANEL: CPT | Mod: ,,, | Performed by: CLINICAL MEDICAL LABORATORY

## 2022-06-07 PROCEDURE — 93010 PR ELECTROCARDIOGRAM REPORT: ICD-10-PCS | Mod: ,,, | Performed by: NURSE PRACTITIONER

## 2022-06-07 PROCEDURE — 85025 CBC WITH DIFFERENTIAL: ICD-10-PCS | Mod: ,,, | Performed by: CLINICAL MEDICAL LABORATORY

## 2022-06-07 PROCEDURE — 87186 SC STD MICRODIL/AGAR DIL: CPT | Mod: ,,, | Performed by: CLINICAL MEDICAL LABORATORY

## 2022-06-07 RX ORDER — DICYCLOMINE HYDROCHLORIDE 10 MG/1
10 CAPSULE ORAL
Qty: 120 CAPSULE | Refills: 0 | Status: SHIPPED | OUTPATIENT
Start: 2022-06-07 | End: 2022-07-26

## 2022-06-07 RX ORDER — PROMETHAZINE HYDROCHLORIDE 25 MG/1
25 TABLET ORAL EVERY 6 HOURS PRN
Status: ON HOLD | COMMUNITY
Start: 2022-03-18 | End: 2022-06-16 | Stop reason: SDUPTHER

## 2022-06-07 RX ORDER — PANTOPRAZOLE SODIUM 40 MG/1
40 TABLET, DELAYED RELEASE ORAL 2 TIMES DAILY
Qty: 180 TABLET | Refills: 1 | Status: SHIPPED | OUTPATIENT
Start: 2022-06-07 | End: 2022-10-31 | Stop reason: SDUPTHER

## 2022-06-07 NOTE — PROGRESS NOTES
2022     Hanna CHLOE Ga   [facility]  [address]  [place] [address]     PATIENT NAME: Kizzy Schofield  : 1955  DATE: 22  MRN: [id number]      Billing Provider: CHLOE Boswell  Level of Service:   Patient PCP Information     Provider PCP Type    [facility]LO[facility]          Reason for Visit / Chief Complaint: surgery clearance (Clearance for surgery with Dr. Juan Luis Covington on 2022.)       Update PCP  Update Chief Complaint         History of Present Illness / Problem Focused Workflow     Kizzy Schofield presents to the clinic here for preop 6/15/2022 to have nerve pain relieved, she is trying to get help to care for her  with moving and repositioning, she decided wellbutrin was making her dizzy she stopped about a week prior and reports she feels better with her dizziness.      Review of Systems     Review of Systems   Constitutional: Negative for appetite change, chills and fatigue.   HENT: Negative for ear pain, mouth sores and trouble swallowing.    Eyes: Negative for visual disturbance.   Respiratory: Negative for apnea, cough, shortness of breath and wheezing.    Cardiovascular: Negative for chest pain, palpitations and claudication.   Gastrointestinal: Negative for abdominal pain, change in bowel habit and change in bowel habit.   Endocrine: Negative for polydipsia, polyphagia and polyuria.   Genitourinary: Negative for difficulty urinating.   Musculoskeletal: Positive for arthralgias, myalgias and neck pain.   Integumentary:  Positive for rash.   Neurological: Negative for dizziness, syncope, speech difficulty, weakness, disturbances in coordination, memory loss and coordination difficulties.        Medical / Social / Family History     Past Medical History:   Diagnosis Date    Arthritis of left hip     Arthritis of left knee     Cervical radiculopathy     Chronic cystitis     Chronic pain syndrome      Degeneration of lumbar intervertebral disc     Depressive disorder     GERD (gastroesophageal reflux disease)     Hyperlipidemia     Hypertension     Lumbar post-laminectomy syndrome     Meniere disease     Osteoarthritis of cervical and lumbar spine     Osteoporosis        Past Surgical History:   Procedure Laterality Date    bladder tack      CHOLECYSTECTOMY      HYSTERECTOMY      OOPHORECTOMY         Social History  Ms.  reports that she has quit smoking. She has never used smokeless tobacco. She reports previous alcohol use. She reports that she does not use drugs.    Family History  Ms.'s family history includes Cancer in her father; Glaucoma in her daughter; Heart disease in her mother; Hypertension in her father and mother; Lung cancer in her father; Prostate cancer in her father; Stroke in her father and mother; Thyroid disease in her daughter and mother.    Medications and Allergies     Medications  Outpatient Medications Marked as Taking for the 6/7/22 encounter (Office Visit) with CHLOE Boswell   Medication Sig Dispense Refill    acetaminophen (TYLENOL) 500 MG tablet 1 tablet as needed      amitriptyline (ELAVIL) 10 MG tablet Take 1 tablet by mouth nightly.      atorvastatin (LIPITOR) 40 MG tablet Take 1 tablet (40 mg total) by mouth once daily. 90 tablet 0    cetirizine (ZYRTEC) 10 MG tablet Take 10 mg by mouth once daily.      EPINEPHrine (EPIPEN) 0.3 mg/0.3 mL AtIn Inject into the muscle once.      fluticasone propionate (FLONASE) 50 mcg/actuation nasal spray 2 sprays (100 mcg total) by Each Nostril route once daily. 16 g 2    gabapentin (NEURONTIN) 600 MG tablet Take 1 tablet (600 mg total) by mouth 3 (three) times daily. 270 tablet 0    HYDROcodone-acetaminophen (NORCO) 7.5-325 mg per tablet Take 1 tablet by mouth 2 (two) times daily as needed for Pain.      levothyroxine (SYNTHROID) 50 MCG tablet Take 1 tablet (50 mcg total) by mouth before breakfast. 90 tablet 0  "   methocarbamoL (ROBAXIN) 500 MG Tab Take 1 tablet (500 mg total) by mouth 3 (three) times daily. 270 tablet 0    montelukast (SINGULAIR) 10 mg tablet Take 1 tablet (10 mg total) by mouth once daily. 90 tablet 0    polyethylene glycol (GLYCOLAX) 17 gram PwPk Take by mouth. Take 17 grams by mouth 2 times daily mixed with 8 ounces of water, juice, soda, tea, or coffee      promethazine (PHENERGAN) 25 MG tablet Take 25 mg by mouth every 6 (six) hours as needed.      raloxifene (EVISTA) 60 mg tablet Take 1 tablet (60 mg total) by mouth once daily. 90 tablet 0    topiramate (TOPAMAX) 100 MG tablet Take 100 mg by mouth 2 (two) times a day.      traZODone (DESYREL) 50 MG tablet Take 1 tablet (50 mg total) by mouth nightly as needed for Insomnia. 90 tablet 0    triamterene-hydrochlorothiazide 37.5-25 mg (MAXZIDE-25) 37.5-25 mg per tablet Take 1 tablet by mouth once daily. (Patient taking differently: Take 0.5 tablets by mouth once daily.) 90 tablet 0    [DISCONTINUED] cholecalciferol, vitamin D3, (VITAMIN D3) 50 mcg (2,000 unit) Cap 1 capsule      [DISCONTINUED] dicyclomine (BENTYL) 10 MG capsule Take 10 mg by mouth 4 (four) times daily before meals and nightly. As needed      [DISCONTINUED] methenamine (HIPREX) 1 gram Tab Take 1 tablet (1 g total) by mouth 2 (two) times daily. 180 tablet 1    [DISCONTINUED] pantoprazole (PROTONIX) 40 MG tablet Take 40 mg by mouth 2 (two) times daily.         Allergies  Review of patient's allergies indicates:   Allergen Reactions    Adhesive tape-silicones     Aspirin     Celebrex [celecoxib]     Eliquis [apixaban]     Opioids - morphine analogues     Pcn [penicillins]     Shrimp     Silicone Itching and Rash       Physical Examination     Vitals:    06/07/22 1332   BP: 124/72   BP Location: Right arm   Patient Position: Sitting   Pulse: 87   Resp: 18   Temp: 98.2 °F (36.8 °C)   TempSrc: Oral   SpO2: 97%   Weight: 75.8 kg (167 lb)   Height: 5' 9" (1.753 m)    "   Physical Exam  Vitals and nursing note reviewed.   Constitutional:       General: She is not in acute distress.     Appearance: Normal appearance. She is not ill-appearing or toxic-appearing.   HENT:      Right Ear: External ear normal.      Left Ear: External ear normal.      Nose: Nose normal.      Mouth/Throat:      Mouth: Mucous membranes are moist.      Pharynx: Oropharynx is clear.   Eyes:      Conjunctiva/sclera: Conjunctivae normal.      Pupils: Pupils are equal, round, and reactive to light.   Cardiovascular:      Rate and Rhythm: Normal rate and regular rhythm.      Heart sounds: Normal heart sounds.   Pulmonary:      Effort: Pulmonary effort is normal.      Breath sounds: Normal breath sounds.   Musculoskeletal:      Cervical back: Normal range of motion and neck supple.      Comments: Using walker   Skin:     General: Skin is warm.      Capillary Refill: Capillary refill takes less than 2 seconds.   Neurological:      Mental Status: She is alert.      X-Ray Chest PA And Lateral  Narrative: EXAMINATION:  XR CHEST PA AND LATERAL    CLINICAL HISTORY:  Encounter for other preprocedural examination    COMPARISON:  12/18/2017, 04/05/2018, and 10/05/2021.    FINDINGS:  The cardiac size is normal.  No infiltrates or effusions are seen.  There is dextroscoliosis with degenerative changes in the thoracic spine.  Postsurgical changes are partially visualized in the cervical spine.  Impression: No evidence of acute disease.    [facility]    Electronically signed by: Ginette Alfonso  Date:    06/07/2022  Time:    14:21    CMP  Sodium   Date Value Ref Range Status   06/07/2022 140 136 - 145 mmol/L Final     Potassium   Date Value Ref Range Status   06/07/2022 3.6 3.5 - 5.1 mmol/L Final     Chloride   Date Value Ref Range Status   06/07/2022 108 (H) 98 - 107 mmol/L Final     CO2   Date Value Ref Range Status   06/07/2022 25 21 - 32 mmol/L Final     Glucose   Date Value Ref Range Status    06/07/2022 104 74 - 106 mg/dL Final     BUN   Date Value Ref Range Status   06/07/2022 16 7 - 18 mg/dL Final     Creatinine   Date Value Ref Range Status   06/07/2022 1.35 (H) 0.55 - 1.02 mg/dL Final     Calcium   Date Value Ref Range Status   06/07/2022 9.0 8.5 - 10.1 mg/dL Final     Total Protein   Date Value Ref Range Status   06/07/2022 6.8 6.4 - 8.2 g/dL Final     Albumin   Date Value Ref Range Status   06/07/2022 3.6 3.5 - 5.0 g/dL Final     Bilirubin, Total   Date Value Ref Range Status   06/07/2022 0.4 0.0 - 1.2 mg/dL Final     Alk Phos   Date Value Ref Range Status   06/07/2022 70 55 - 142 U/L Final     AST   Date Value Ref Range Status   06/07/2022 11 (L) 15 - 37 U/L Final     ALT   Date Value Ref Range Status   06/07/2022 14 13 - 56 U/L Final     Anion Gap   Date Value Ref Range Status   06/07/2022 11 7 - 16 mmol/L Final     eGFR   Date Value Ref Range Status   06/07/2022 41 (L) >=60 mL/min/1.73m² Final     Lab Results   Component Value Date    WBC 8.05 06/07/2022    RBC 4.28 06/07/2022    HGB 13.0 06/07/2022    HCT 39.7 06/07/2022    MCV 92.8 06/07/2022    MCH 30.4 06/07/2022    MCHC 32.7 06/07/2022    RDW 13.2 06/07/2022     06/07/2022    MPV 10.8 06/07/2022    LYMPH 23.7 (L) 06/07/2022    LYMPH 1.91 06/07/2022    MONO 6.0 06/07/2022    EOS 0.07 06/07/2022    BASO 0.05 06/07/2022    EOSINOPHIL 0.9 (L) 06/07/2022    BASOPHIL 0.6 06/07/2022     Lab Results   Component Value Date    INR 0.93 06/07/2022   urine culture with ecoli    Assessment and Plan (including Health Maintenance)      Problem List  Smart Sets  Document Outside HM   :    Plan:   Labs pending urine pending cxr normal ekg nsr  Will send macrobid for urine culture,  Labs EKG, CXR wnl except uti start macrobid twice daily for 7 days  Kizzy Jodeonfrancois is cleared for surgery  Forward to Dr Covington      Health Maintenance Due   Topic Date Due    Shingles Vaccine (1 of 2) Never done    TETANUS VACCINE  10/11/2021    COVID-19 Vaccine (3  - Booster for Moderna series) 11/25/2021    Colorectal Cancer Screening  04/02/2022       Problem List Items Addressed This Visit        Renal/    Urinary tract infection without hematuria    Relevant Medications    nitrofurantoin, macrocrystal-monohydrate, (MACROBID) 100 MG capsule       GI    GERD (gastroesophageal reflux disease)    Relevant Medications    pantoprazole (PROTONIX) 40 MG tablet      Other Visit Diagnoses     Pre-operative cardiovascular examination    -  Primary    Relevant Orders    POCT EKG 12-LEAD (NOT FOR [facility] USE) (Completed)    Pre-operative laboratory examination        Relevant Orders    Comprehensive Metabolic Panel (Completed)    Pre-operative examination        Relevant Orders    X-Ray Chest PA And Lateral (Completed)    Urinalysis, Microscopic (Completed)    Urine culture (Completed)    Irritable bowel syndrome, unspecified type        Relevant Medications    dicyclomine (BENTYL) 10 MG capsule    Encounter for monitoring of chronic aspirin therapy         Long term use of drug             Pre-operative cardiovascular examination  -     POCT EKG 12-LEAD (NOT FOR [facility] USE)    Pre-operative laboratory examination  -     Comprehensive Metabolic Panel; Future; Expected date: 06/07/2022    Pre-operative examination  -     X-Ray Chest PA And Lateral; Future; Expected date: 06/07/2022  -     CBC Auto Differential  -     PT and PTT  -     HIV 1/2 Ag/Ab (4th Gen)  -     Hepatitis C Antibody  -     Hemoglobin A1C  -     Urinalysis, Reflex to Urine Culture  -     Urinalysis, Microscopic  -     Urine culture    Gastroesophageal reflux disease without esophagitis  -     pantoprazole (PROTONIX) 40 MG tablet; Take 1 tablet (40 mg total) by mouth 2 (two) times daily.  Dispense: 180 tablet; Refill: 1    Irritable bowel syndrome, unspecified type  -     dicyclomine (BENTYL) 10 MG capsule; Take 1 capsule (10 mg total) by mouth 4 (four) times daily before meals and nightly. As needed  Dispense: 120  capsule; Refill: 0    Encounter for monitoring of chronic aspirin therapy   -     PT and PTT    Long term use of drug   -     Hemoglobin A1C    Urinary tract infection without hematuria, site unspecified  -     nitrofurantoin, macrocrystal-monohydrate, (MACROBID) 100 MG capsule; Take 1 capsule (100 mg total) by mouth 2 (two) times daily. for 7 days  Dispense: 14 capsule; Refill: 0       Health Maintenance Topics with due status: Not Due       Topic Last Completion Date    DEXA Scan 09/02/2020    Mammogram 10/06/2021    Lipid Panel 03/15/2022       Procedures     Future Appointments   Date Time Provider Department Center   6/15/2022  9:45 AM Juan Luis Covington MD [facility]   8/15/2022  1:00 PM Juan Luis Covington MD [facility]   11/15/2022  1:00 PM [facility]   2/20/2023  1:00 PM Omari Holt DO [facility]        No follow-ups on file.     Signature:  CHLOE Boswell    Date of encounter: 6/7/22

## 2022-06-08 LAB
ALBUMIN SERPL BCP-MCNC: 3.6 G/DL (ref 3.5–5)
ALBUMIN/GLOB SERPL: 1.1 {RATIO}
ALP SERPL-CCNC: 70 U/L (ref 55–142)
ALT SERPL W P-5'-P-CCNC: 14 U/L (ref 13–56)
ANION GAP SERPL CALCULATED.3IONS-SCNC: 11 MMOL/L (ref 7–16)
APTT PPP: 26.4 SECONDS (ref 25.2–37.3)
AST SERPL W P-5'-P-CCNC: 11 U/L (ref 15–37)
BILIRUB SERPL-MCNC: 0.4 MG/DL (ref 0–1.2)
BUN SERPL-MCNC: 16 MG/DL (ref 7–18)
BUN/CREAT SERPL: 12 (ref 6–20)
CALCIUM SERPL-MCNC: 9 MG/DL (ref 8.5–10.1)
CHLORIDE SERPL-SCNC: 108 MMOL/L (ref 98–107)
CO2 SERPL-SCNC: 25 MMOL/L (ref 21–32)
CREAT SERPL-MCNC: 1.35 MG/DL (ref 0.55–1.02)
GLOBULIN SER-MCNC: 3.2 G/DL (ref 2–4)
GLUCOSE SERPL-MCNC: 104 MG/DL (ref 74–106)
HCV AB SER QL: NORMAL
HIV 1+O+2 AB SERPL QL: NORMAL
INR BLD: 0.93 (ref 0.9–1.1)
POTASSIUM SERPL-SCNC: 3.6 MMOL/L (ref 3.5–5.1)
PROT SERPL-MCNC: 6.8 G/DL (ref 6.4–8.2)
PROTHROMBIN TIME: 12.1 SECONDS (ref 11.7–14.7)
SODIUM SERPL-SCNC: 140 MMOL/L (ref 136–145)

## 2022-06-09 LAB — UA COMPLETE W REFLEX CULTURE PNL UR: ABNORMAL

## 2022-06-09 RX ORDER — NITROFURANTOIN 25; 75 MG/1; MG/1
100 CAPSULE ORAL 2 TIMES DAILY
Qty: 14 CAPSULE | Refills: 0 | Status: SHIPPED | OUTPATIENT
Start: 2022-06-09 | End: 2022-06-16

## 2022-06-15 ENCOUNTER — OFFICE VISIT (OUTPATIENT)
Dept: SPINE | Facility: CLINIC | Age: 67
End: 2022-06-15
Payer: MEDICARE

## 2022-06-15 DIAGNOSIS — M54.16 LUMBAR RADICULOPATHY: Primary | ICD-10-CM

## 2022-06-15 DIAGNOSIS — M51.36 DDD (DEGENERATIVE DISC DISEASE), LUMBAR: ICD-10-CM

## 2022-06-15 DIAGNOSIS — Z01.818 PRE-OPERATIVE EXAMINATION: ICD-10-CM

## 2022-06-15 PROCEDURE — 99214 OFFICE O/P EST MOD 30 MIN: CPT | Mod: PBBFAC | Performed by: ORTHOPAEDIC SURGERY

## 2022-06-15 PROCEDURE — 99215 PR OFFICE/OUTPT VISIT, EST, LEVL V, 40-54 MIN: ICD-10-PCS | Mod: 57,S$PBB,, | Performed by: ORTHOPAEDIC SURGERY

## 2022-06-15 PROCEDURE — 99215 OFFICE O/P EST HI 40 MIN: CPT | Mod: 57,S$PBB,, | Performed by: ORTHOPAEDIC SURGERY

## 2022-06-15 RX ORDER — CYCLOBENZAPRINE HCL 5 MG
5 TABLET ORAL 3 TIMES DAILY PRN
Qty: 45 TABLET | Refills: 0 | Status: SHIPPED | OUTPATIENT
Start: 2022-06-15 | End: 2022-06-25

## 2022-06-15 RX ORDER — OXYCODONE AND ACETAMINOPHEN 5; 325 MG/1; MG/1
1 TABLET ORAL EVERY 4 HOURS PRN
Qty: 45 TABLET | Refills: 0 | Status: SHIPPED | OUTPATIENT
Start: 2022-06-15 | End: 2022-10-31 | Stop reason: ALTCHOICE

## 2022-06-15 RX ORDER — PROMETHAZINE HYDROCHLORIDE 25 MG/1
25 TABLET ORAL EVERY 4 HOURS
Qty: 45 TABLET | Refills: 0 | Status: SHIPPED | OUTPATIENT
Start: 2022-06-15

## 2022-06-15 NOTE — PROGRESS NOTES
MDM/time:  Greater than 40 minutes spent on this encounter including 10 minutes reviewing imaging and notes, 25 minutes with the patient, 5 minutes documentation    ASSESSMENT:  67 y.o. female with degenerative lumbar scoliosis and lumbar spondylosis with radiculopathy    PLAN:  She has exhausted nonoperative measures and elected to proceed with surgery.  This will be an L4-5 laminectomy    The final decision for surgery was made today during the office visit. The rationale, technique, recovery process, non-operative alternatives and potential complications associated with posterior lumbar decompression (e.g. laminectomy or discectomy) were discussed with the patient in detail. The specific risks discussed included: medical/anesthetic complications, positioning palsy, infection, dural tear, epidural hematoma, wound hematoma, major vascular injury with life threatening hemorrhage, thromboembolism, paraplegia due to spinal cord or cauda equina injury, bowel/bladder/sexual dysfunction, lower extremity weakness/sensory loss due to nerve root injury, recurrent stenosis or disc herniation, progressive spondylosis/disc degeneration, chronic back pain/stiffness, chronic lower extremity pain, incomplete relief of preoperative symptoms and possible need for further surgery. All questions posed by the patient were answered. The surgical consent form was signed.     HPI:  67 y.o. female here for evaluation of low back pain that radiates into right hip down the side of the right leg.  Patient reports a longstanding history of back pain with multiple over 10+ surgeries on her lumbar spine.  Has been seen at [facility] with Dr. Alexandra who has retired at this time.  Patient reports September 2021 she fell on 2 separate occasions onto her right hip soon after these 2 falls she drove many hours to [place] and noted increased pain in the right hip.  Patient reports decreased  strength in bilateral hands.  Currently uses a roller  walker for ambulation due to balance issues and multiple falls.  Long history of bladder incontinence and relates this to her many lumbar spine surgeries.  Difficulty walking more than 25-50 feet due to back pain and weakness.  Currently takes Norco, Robaxin, Neurontin, and Tylenol for pain.  No recent physical therapy.  Currently sees Dr. Rivera pain management has had proximally 3 injections last year in her lumbar spine with little to no pain relief.  Recent MRI 2022.  Multiple surgeries as discussed above.  Patient is not a smoker.    IMAGIN2022 lumbar spine x-ray reviewed showed:  On the AP there is lumbar curvature to the left.  There are 5 non-rib-bearing lumbar vertebrae.  On the lateral there is decreased lumbar lordosis.  There is spondylotic disease with decreased disc height and osteophyte formation noted.  There has been prior left-sided sacroiliac fixation.    3/7/2022 MRI lumbar spine at [facility] reviewed showed:  L4-5 severe left greater than right lateral recess stenosis and moderate right foraminal stenosis  Past Medical History:   Diagnosis Date    Arthritis of left hip     Arthritis of left knee     Cervical radiculopathy     Chronic cystitis     Chronic pain syndrome     Degeneration of lumbar intervertebral disc     Depressive disorder     GERD (gastroesophageal reflux disease)     Hyperlipidemia     Hypertension     Lumbar post-laminectomy syndrome     Meniere disease     Osteoarthritis of cervical and lumbar spine     Osteoporosis      Past Surgical History:   Procedure Laterality Date    bladder tack      CHOLECYSTECTOMY      HYSTERECTOMY      OOPHORECTOMY       Social History     Tobacco Use    Smoking status: Former Smoker    Smokeless tobacco: Never Used   Substance Use Topics    Alcohol use: Not Currently    Drug use: Never      Current Outpatient Medications   Medication Instructions    acetaminophen (TYLENOL) 500 MG tablet 1 tablet as needed     amitriptyline (ELAVIL) 10 MG tablet 1 tablet, Oral, Nightly    atorvastatin (LIPITOR) 40 mg, Oral, Daily    cetirizine (ZYRTEC) 10 mg, Oral, Daily    cyclobenzaprine (FLEXERIL) 5 mg, Oral, 3 times daily PRN    dicyclomine (BENTYL) 10 mg, Oral, Before meals & nightly, As needed    EPINEPHrine (EPIPEN) 0.3 mg/0.3 mL AtIn Intramuscular, Once    fluticasone propionate (FLONASE) 100 mcg, Each Nostril, Daily    gabapentin (NEURONTIN) 600 mg, Oral, 3 times daily    HYDROcodone-acetaminophen (NORCO) 7.5-325 mg per tablet 1 tablet, Oral, 2 times daily PRN    levothyroxine (SYNTHROID) 50 mcg, Oral, Before breakfast    methocarbamoL (ROBAXIN) 500 mg, Oral, 3 times daily    montelukast (SINGULAIR) 10 mg, Oral, Daily    nitrofurantoin, macrocrystal-monohydrate, (MACROBID) 100 MG capsule 100 mg, Oral, 2 times daily    oxyCODONE-acetaminophen (PERCOCET) 5-325 mg per tablet 1 tablet, Oral, Every 4 hours PRN    pantoprazole (PROTONIX) 40 mg, Oral, 2 times daily    polyethylene glycol (GLYCOLAX) 17 gram PwPk Oral, Take 17 grams by mouth 2 times daily mixed with 8 ounces of water, juice, soda, tea, or coffee     promethazine (PHENERGAN) 25 mg, Oral, Every 6 hours PRN    promethazine (PHENERGAN) 25 mg, Oral, Every 4 hours    raloxifene (EVISTA) 60 mg, Oral, Daily    topiramate (TOPAMAX) 100 mg, Oral, 2 times daily    traZODone (DESYREL) 50 mg, Oral, Nightly PRN    triamterene-hydrochlorothiazide 37.5-25 mg (MAXZIDE-25) 37.5-25 mg per tablet 1 tablet, Oral, Daily    vitamin D (VITAMIN D3) 5,000 Units, Oral, 2 times daily        EXAM:  Constitutional  General Appearance:  There is no height or weight on file to calculate BMI., NAD  Psychiatric   Orientation: Oriented to time, oriented to place, oriented to person  Mood and Affect: Active and alert, normal mood, normal affect  Gait and Station   Appearance:  Antalgic gait to the right walks with a roller walker, unable to tandem gait, unable to walk on toes, unable  to walk on heels    LUMBAR  Musculoskeletal System   Hips: Normal appearance, no leg length discrepancy, decreased motion; left, decreased motion; right    Lumbar Spine                   Inspection:  Normal alignment, normal sagittal balance                  Range of motion:  Decreased flexion, extension, lateral bending, rotation.Pain with range of motion                  Bony Palpation of the Lumbar Spine:  No tenderness of the spinous process, no tenderness of the sacrum, no tenderness of the coccyx                  Bony Palpation of the Right Hip:  No tenderness of the iliac crest, no tenderness of the sciatic notch, no tenderness of the SI joint                  Bony Palpation of the Left Hip:  No tenderness of the iliac crest, no tenderness of the sciatic notch, no tenderness of the SI joint                  Soft Tissue Palpation on the Right:  No tenderness of the paraspinal region, no tenderness of the iliolumbar region                  Soft Tissue Palpation on the Left:  No tenderness of the paraspinal region, no tenderness of the iliolumbar region    Motor Strength   L1 Right:  Hip flexion iliopsoas 4/5    L1 Left:  Hip flexion iliopsoas 4/5              L2-L4 Right:  Knee extension quadriceps 4/5, tibialis anterior 5/5              L2-L4 Left:  Knee extension quadriceps 4/5, tibialis anterior 5/5   L5 Right:  Extensor hallucis llongus 5/5,    L5 Left:  Extensor hallucis longus 5/5,    S1 Right:  Plantar flexion gastrocnemius 5/5   S1 Left:  Plantar flexion gastrocnemius 5/5    Neurological System   Ankle Reflex Right:  normal   Ankle Reflex Left: normal   Knee Reflex Right:  normal   Knee Reflex Left:  normal   Sensation on the Right:  L2 normal, L3 normal, L4 normal, L5 normal, S1 normal   Sensation on the Left:  L2 normal, L3 normal, L4 normal, L5 normal, S1 normal              Special Test on the Right:  Seated straight leg raising test negative, no clonus of the ankle              Special Test on the  Left:  Seated straight leg raising test negative, no clonus of the ankle    Skin   Lumbosacral Spine:  Normal skin    Cardiovascular System   Arterial Pulses Right:  Posterior tibialis normal, dorsalis pedis normal   Arterial Pulses Left:  Posterior tibialis normal, dorsalis pedis normal   Edema Right: None   Edema Left:  None

## 2022-06-15 NOTE — PATIENT INSTRUCTIONS
Arrive to the ground floor of the ambulatory surgery building at 5:30  Do not eat or drink after midnight (this includes, gum, candy, chewing tobacco etc.)  Bring all medication in the original bottles.  Bring anything you may need for an overnight stay.  Bathe with Hibiclens the night or morning before your surgery.  The morning of you surgery only take blood pressure medications and thyroid medications (if you are on any, that you take in the AM).  Be sure that you have stopped blood thinners at appropriate time, as instructed.      [email or web address]

## 2022-06-15 NOTE — H&P (VIEW-ONLY)
MDM/time:  Greater than 40 minutes spent on this encounter including 10 minutes reviewing imaging and notes, 25 minutes with the patient, 5 minutes documentation    ASSESSMENT:  67 y.o. female with degenerative lumbar scoliosis and lumbar spondylosis with radiculopathy    PLAN:  She has exhausted nonoperative measures and elected to proceed with surgery.  This will be an L4-5 laminectomy    The final decision for surgery was made today during the office visit. The rationale, technique, recovery process, non-operative alternatives and potential complications associated with posterior lumbar decompression (e.g. laminectomy or discectomy) were discussed with the patient in detail. The specific risks discussed included: medical/anesthetic complications, positioning palsy, infection, dural tear, epidural hematoma, wound hematoma, major vascular injury with life threatening hemorrhage, thromboembolism, paraplegia due to spinal cord or cauda equina injury, bowel/bladder/sexual dysfunction, lower extremity weakness/sensory loss due to nerve root injury, recurrent stenosis or disc herniation, progressive spondylosis/disc degeneration, chronic back pain/stiffness, chronic lower extremity pain, incomplete relief of preoperative symptoms and possible need for further surgery. All questions posed by the patient were answered. The surgical consent form was signed.     HPI:  67 y.o. female here for evaluation of low back pain that radiates into right hip down the side of the right leg.  Patient reports a longstanding history of back pain with multiple over 10+ surgeries on her lumbar spine.  Has been seen at [facility] with Dr. Alexandra who has retired at this time.  Patient reports September 2021 she fell on 2 separate occasions onto her right hip soon after these 2 falls she drove many hours to [place] and noted increased pain in the right hip.  Patient reports decreased  strength in bilateral hands.  Currently uses a roller  walker for ambulation due to balance issues and multiple falls.  Long history of bladder incontinence and relates this to her many lumbar spine surgeries.  Difficulty walking more than 25-50 feet due to back pain and weakness.  Currently takes Norco, Robaxin, Neurontin, and Tylenol for pain.  No recent physical therapy.  Currently sees Dr. Rivera pain management has had proximally 3 injections last year in her lumbar spine with little to no pain relief.  Recent MRI 2022.  Multiple surgeries as discussed above.  Patient is not a smoker.    IMAGIN2022 lumbar spine x-ray reviewed showed:  On the AP there is lumbar curvature to the left.  There are 5 non-rib-bearing lumbar vertebrae.  On the lateral there is decreased lumbar lordosis.  There is spondylotic disease with decreased disc height and osteophyte formation noted.  There has been prior left-sided sacroiliac fixation.    3/7/2022 MRI lumbar spine at [facility] reviewed showed:  L4-5 severe left greater than right lateral recess stenosis and moderate right foraminal stenosis  Past Medical History:   Diagnosis Date    Arthritis of left hip     Arthritis of left knee     Cervical radiculopathy     Chronic cystitis     Chronic pain syndrome     Degeneration of lumbar intervertebral disc     Depressive disorder     GERD (gastroesophageal reflux disease)     Hyperlipidemia     Hypertension     Lumbar post-laminectomy syndrome     Meniere disease     Osteoarthritis of cervical and lumbar spine     Osteoporosis      Past Surgical History:   Procedure Laterality Date    bladder tack      CHOLECYSTECTOMY      HYSTERECTOMY      OOPHORECTOMY       Social History     Tobacco Use    Smoking status: Former Smoker    Smokeless tobacco: Never Used   Substance Use Topics    Alcohol use: Not Currently    Drug use: Never      Current Outpatient Medications   Medication Instructions    acetaminophen (TYLENOL) 500 MG tablet 1 tablet as needed     amitriptyline (ELAVIL) 10 MG tablet 1 tablet, Oral, Nightly    atorvastatin (LIPITOR) 40 mg, Oral, Daily    cetirizine (ZYRTEC) 10 mg, Oral, Daily    cyclobenzaprine (FLEXERIL) 5 mg, Oral, 3 times daily PRN    dicyclomine (BENTYL) 10 mg, Oral, Before meals & nightly, As needed    EPINEPHrine (EPIPEN) 0.3 mg/0.3 mL AtIn Intramuscular, Once    fluticasone propionate (FLONASE) 100 mcg, Each Nostril, Daily    gabapentin (NEURONTIN) 600 mg, Oral, 3 times daily    HYDROcodone-acetaminophen (NORCO) 7.5-325 mg per tablet 1 tablet, Oral, 2 times daily PRN    levothyroxine (SYNTHROID) 50 mcg, Oral, Before breakfast    methocarbamoL (ROBAXIN) 500 mg, Oral, 3 times daily    montelukast (SINGULAIR) 10 mg, Oral, Daily    nitrofurantoin, macrocrystal-monohydrate, (MACROBID) 100 MG capsule 100 mg, Oral, 2 times daily    oxyCODONE-acetaminophen (PERCOCET) 5-325 mg per tablet 1 tablet, Oral, Every 4 hours PRN    pantoprazole (PROTONIX) 40 mg, Oral, 2 times daily    polyethylene glycol (GLYCOLAX) 17 gram PwPk Oral, Take 17 grams by mouth 2 times daily mixed with 8 ounces of water, juice, soda, tea, or coffee     promethazine (PHENERGAN) 25 mg, Oral, Every 6 hours PRN    promethazine (PHENERGAN) 25 mg, Oral, Every 4 hours    raloxifene (EVISTA) 60 mg, Oral, Daily    topiramate (TOPAMAX) 100 mg, Oral, 2 times daily    traZODone (DESYREL) 50 mg, Oral, Nightly PRN    triamterene-hydrochlorothiazide 37.5-25 mg (MAXZIDE-25) 37.5-25 mg per tablet 1 tablet, Oral, Daily    vitamin D (VITAMIN D3) 5,000 Units, Oral, 2 times daily        EXAM:  Constitutional  General Appearance:  There is no height or weight on file to calculate BMI., NAD  Psychiatric   Orientation: Oriented to time, oriented to place, oriented to person  Mood and Affect: Active and alert, normal mood, normal affect  Gait and Station   Appearance:  Antalgic gait to the right walks with a roller walker, unable to tandem gait, unable to walk on toes, unable  to walk on heels    LUMBAR  Musculoskeletal System   Hips: Normal appearance, no leg length discrepancy, decreased motion; left, decreased motion; right    Lumbar Spine                   Inspection:  Normal alignment, normal sagittal balance                  Range of motion:  Decreased flexion, extension, lateral bending, rotation.Pain with range of motion                  Bony Palpation of the Lumbar Spine:  No tenderness of the spinous process, no tenderness of the sacrum, no tenderness of the coccyx                  Bony Palpation of the Right Hip:  No tenderness of the iliac crest, no tenderness of the sciatic notch, no tenderness of the SI joint                  Bony Palpation of the Left Hip:  No tenderness of the iliac crest, no tenderness of the sciatic notch, no tenderness of the SI joint                  Soft Tissue Palpation on the Right:  No tenderness of the paraspinal region, no tenderness of the iliolumbar region                  Soft Tissue Palpation on the Left:  No tenderness of the paraspinal region, no tenderness of the iliolumbar region    Motor Strength   L1 Right:  Hip flexion iliopsoas 4/5    L1 Left:  Hip flexion iliopsoas 4/5              L2-L4 Right:  Knee extension quadriceps 4/5, tibialis anterior 5/5              L2-L4 Left:  Knee extension quadriceps 4/5, tibialis anterior 5/5   L5 Right:  Extensor hallucis llongus 5/5,    L5 Left:  Extensor hallucis longus 5/5,    S1 Right:  Plantar flexion gastrocnemius 5/5   S1 Left:  Plantar flexion gastrocnemius 5/5    Neurological System   Ankle Reflex Right:  normal   Ankle Reflex Left: normal   Knee Reflex Right:  normal   Knee Reflex Left:  normal   Sensation on the Right:  L2 normal, L3 normal, L4 normal, L5 normal, S1 normal   Sensation on the Left:  L2 normal, L3 normal, L4 normal, L5 normal, S1 normal              Special Test on the Right:  Seated straight leg raising test negative, no clonus of the ankle              Special Test on the  Left:  Seated straight leg raising test negative, no clonus of the ankle    Skin   Lumbosacral Spine:  Normal skin    Cardiovascular System   Arterial Pulses Right:  Posterior tibialis normal, dorsalis pedis normal   Arterial Pulses Left:  Posterior tibialis normal, dorsalis pedis normal   Edema Right: None   Edema Left:  None

## 2022-06-16 ENCOUNTER — HOSPITAL ENCOUNTER (OUTPATIENT)
Facility: HOSPITAL | Age: 67
Discharge: HOME | End: 2022-06-16
Attending: ORTHOPAEDIC SURGERY | Admitting: ORTHOPAEDIC SURGERY
Payer: MEDICARE

## 2022-06-16 ENCOUNTER — ANESTHESIA (OUTPATIENT)
Dept: SURGERY | Facility: HOSPITAL | Age: 67
End: 2022-06-16
Payer: MEDICARE

## 2022-06-16 ENCOUNTER — ANESTHESIA EVENT (OUTPATIENT)
Dept: SURGERY | Facility: HOSPITAL | Age: 67
End: 2022-06-16
Payer: MEDICARE

## 2022-06-16 VITALS
HEIGHT: 69 IN | HEART RATE: 66 BPM | OXYGEN SATURATION: 98 % | BODY MASS INDEX: 24.44 KG/M2 | DIASTOLIC BLOOD PRESSURE: 52 MMHG | RESPIRATION RATE: 16 BRPM | WEIGHT: 165 LBS | TEMPERATURE: 98 F | SYSTOLIC BLOOD PRESSURE: 112 MMHG

## 2022-06-16 DIAGNOSIS — M54.16 LUMBAR RADICULOPATHY: ICD-10-CM

## 2022-06-16 PROCEDURE — 36000710: Performed by: ORTHOPAEDIC SURGERY

## 2022-06-16 PROCEDURE — 25000003 PHARM REV CODE 250: Performed by: ORTHOPAEDIC SURGERY

## 2022-06-16 PROCEDURE — 71000033 HC RECOVERY, INTIAL HOUR: Performed by: ORTHOPAEDIC SURGERY

## 2022-06-16 PROCEDURE — D9220A PRA ANESTHESIA: Mod: CRNA,,, | Performed by: NURSE ANESTHETIST, CERTIFIED REGISTERED

## 2022-06-16 PROCEDURE — 37000008 HC ANESTHESIA 1ST 15 MINUTES: Performed by: ORTHOPAEDIC SURGERY

## 2022-06-16 PROCEDURE — 63048 LAM FACETEC &FORAMOT EA ADDL: CPT | Mod: ,,, | Performed by: ORTHOPAEDIC SURGERY

## 2022-06-16 PROCEDURE — 71000015 HC POSTOP RECOV 1ST HR: Performed by: ORTHOPAEDIC SURGERY

## 2022-06-16 PROCEDURE — D9220A PRA ANESTHESIA: ICD-10-PCS | Mod: ANES,,, | Performed by: ANESTHESIOLOGY

## 2022-06-16 PROCEDURE — 37000009 HC ANESTHESIA EA ADD 15 MINS: Performed by: ORTHOPAEDIC SURGERY

## 2022-06-16 PROCEDURE — 25000003 PHARM REV CODE 250: Performed by: NURSE ANESTHETIST, CERTIFIED REGISTERED

## 2022-06-16 PROCEDURE — D9220A PRA ANESTHESIA: Mod: ANES,,, | Performed by: ANESTHESIOLOGY

## 2022-06-16 PROCEDURE — 36000711: Performed by: ORTHOPAEDIC SURGERY

## 2022-06-16 PROCEDURE — D9220A PRA ANESTHESIA: ICD-10-PCS | Mod: CRNA,,, | Performed by: NURSE ANESTHETIST, CERTIFIED REGISTERED

## 2022-06-16 PROCEDURE — 97161 PT EVAL LOW COMPLEX 20 MIN: CPT

## 2022-06-16 PROCEDURE — 71000016 HC POSTOP RECOV ADDL HR: Performed by: ORTHOPAEDIC SURGERY

## 2022-06-16 PROCEDURE — 63600175 PHARM REV CODE 636 W HCPCS: Performed by: ORTHOPAEDIC SURGERY

## 2022-06-16 PROCEDURE — 27201423 OPTIME MED/SURG SUP & DEVICES STERILE SUPPLY: Performed by: ORTHOPAEDIC SURGERY

## 2022-06-16 PROCEDURE — 63047 PR LAMINEC/FACETECT/FORAMIN,LUMBAR 1 SEG: ICD-10-PCS | Mod: ,,, | Performed by: ORTHOPAEDIC SURGERY

## 2022-06-16 PROCEDURE — 63047 LAM FACETEC & FORAMOT LUMBAR: CPT | Mod: ,,, | Performed by: ORTHOPAEDIC SURGERY

## 2022-06-16 PROCEDURE — 63048 PR LAMINECT/FACETECT/FORAMINOT, EA ADDTL VERTEBRAL SEGM: ICD-10-PCS | Mod: ,,, | Performed by: ORTHOPAEDIC SURGERY

## 2022-06-16 PROCEDURE — 63600175 PHARM REV CODE 636 W HCPCS: Performed by: NURSE ANESTHETIST, CERTIFIED REGISTERED

## 2022-06-16 RX ORDER — ONDANSETRON 2 MG/ML
4 INJECTION INTRAMUSCULAR; INTRAVENOUS DAILY PRN
Status: DISCONTINUED | OUTPATIENT
Start: 2022-06-16 | End: 2022-06-16 | Stop reason: HOSPADM

## 2022-06-16 RX ORDER — FAMOTIDINE 20 MG/1
20 TABLET, FILM COATED ORAL 2 TIMES DAILY
Status: DISCONTINUED | OUTPATIENT
Start: 2022-06-16 | End: 2022-06-16 | Stop reason: HOSPADM

## 2022-06-16 RX ORDER — MUPIROCIN 20 MG/G
OINTMENT TOPICAL
Status: DISCONTINUED | OUTPATIENT
Start: 2022-06-16 | End: 2022-06-16 | Stop reason: HOSPADM

## 2022-06-16 RX ORDER — HYDROMORPHONE HYDROCHLORIDE 2 MG/ML
0.5 INJECTION, SOLUTION INTRAMUSCULAR; INTRAVENOUS; SUBCUTANEOUS EVERY 5 MIN PRN
Status: DISCONTINUED | OUTPATIENT
Start: 2022-06-16 | End: 2022-06-16 | Stop reason: HOSPADM

## 2022-06-16 RX ORDER — SODIUM CHLORIDE 9 MG/ML
INJECTION, SOLUTION INTRAVENOUS CONTINUOUS
Status: DISCONTINUED | OUTPATIENT
Start: 2022-06-16 | End: 2022-06-16 | Stop reason: HOSPADM

## 2022-06-16 RX ORDER — ROCURONIUM BROMIDE 10 MG/ML
INJECTION, SOLUTION INTRAVENOUS
Status: DISCONTINUED | OUTPATIENT
Start: 2022-06-16 | End: 2022-06-16

## 2022-06-16 RX ORDER — OXYCODONE HYDROCHLORIDE 5 MG/1
10 TABLET ORAL EVERY 4 HOURS PRN
Status: DISCONTINUED | OUTPATIENT
Start: 2022-06-16 | End: 2022-06-16 | Stop reason: HOSPADM

## 2022-06-16 RX ORDER — ACETAMINOPHEN 325 MG/1
650 TABLET ORAL EVERY 4 HOURS PRN
Status: DISCONTINUED | OUTPATIENT
Start: 2022-06-16 | End: 2022-06-16 | Stop reason: HOSPADM

## 2022-06-16 RX ORDER — DIPHENHYDRAMINE HYDROCHLORIDE 50 MG/ML
25 INJECTION INTRAMUSCULAR; INTRAVENOUS EVERY 6 HOURS PRN
Status: DISCONTINUED | OUTPATIENT
Start: 2022-06-16 | End: 2022-06-16 | Stop reason: HOSPADM

## 2022-06-16 RX ORDER — LIDOCAINE HYDROCHLORIDE 20 MG/ML
INJECTION, SOLUTION EPIDURAL; INFILTRATION; INTRACAUDAL; PERINEURAL
Status: DISCONTINUED | OUTPATIENT
Start: 2022-06-16 | End: 2022-06-16

## 2022-06-16 RX ORDER — PROPOFOL 10 MG/ML
VIAL (ML) INTRAVENOUS
Status: DISCONTINUED | OUTPATIENT
Start: 2022-06-16 | End: 2022-06-16

## 2022-06-16 RX ORDER — VANCOMYCIN HYDROCHLORIDE 1 G/20ML
INJECTION, POWDER, LYOPHILIZED, FOR SOLUTION INTRAVENOUS
Status: DISCONTINUED | OUTPATIENT
Start: 2022-06-16 | End: 2022-06-16 | Stop reason: HOSPADM

## 2022-06-16 RX ORDER — EPINEPHRINE CONVENIENCE KIT 1 MG/ML(1)
KIT INTRAMUSCULAR; SUBCUTANEOUS
Status: DISCONTINUED | OUTPATIENT
Start: 2022-06-16 | End: 2022-06-16 | Stop reason: HOSPADM

## 2022-06-16 RX ORDER — MEPERIDINE HYDROCHLORIDE 25 MG/ML
25 INJECTION INTRAMUSCULAR; INTRAVENOUS; SUBCUTANEOUS EVERY 10 MIN PRN
Status: DISCONTINUED | OUTPATIENT
Start: 2022-06-16 | End: 2022-06-16 | Stop reason: HOSPADM

## 2022-06-16 RX ORDER — OXYCODONE HCL 10 MG/1
10 TABLET, FILM COATED, EXTENDED RELEASE ORAL ONCE
Status: COMPLETED | OUTPATIENT
Start: 2022-06-16 | End: 2022-06-16

## 2022-06-16 RX ORDER — ONDANSETRON 4 MG/1
8 TABLET, ORALLY DISINTEGRATING ORAL EVERY 8 HOURS PRN
Status: DISCONTINUED | OUTPATIENT
Start: 2022-06-16 | End: 2022-06-16 | Stop reason: HOSPADM

## 2022-06-16 RX ORDER — DEXAMETHASONE SODIUM PHOSPHATE 4 MG/ML
INJECTION, SOLUTION INTRA-ARTICULAR; INTRALESIONAL; INTRAMUSCULAR; INTRAVENOUS; SOFT TISSUE
Status: DISCONTINUED | OUTPATIENT
Start: 2022-06-16 | End: 2022-06-16

## 2022-06-16 RX ORDER — FENTANYL CITRATE 50 UG/ML
INJECTION, SOLUTION INTRAMUSCULAR; INTRAVENOUS
Status: DISCONTINUED | OUTPATIENT
Start: 2022-06-16 | End: 2022-06-16

## 2022-06-16 RX ORDER — CEFAZOLIN SODIUM 1 G/3ML
INJECTION, POWDER, FOR SOLUTION INTRAMUSCULAR; INTRAVENOUS
Status: DISCONTINUED | OUTPATIENT
Start: 2022-06-16 | End: 2022-06-16

## 2022-06-16 RX ORDER — SODIUM CHLORIDE 0.9 % (FLUSH) 0.9 %
10 SYRINGE (ML) INJECTION
Status: DISCONTINUED | OUTPATIENT
Start: 2022-06-16 | End: 2022-06-16 | Stop reason: HOSPADM

## 2022-06-16 RX ORDER — ONDANSETRON 2 MG/ML
INJECTION INTRAMUSCULAR; INTRAVENOUS
Status: DISCONTINUED | OUTPATIENT
Start: 2022-06-16 | End: 2022-06-16

## 2022-06-16 RX ORDER — MEPERIDINE HYDROCHLORIDE 25 MG/ML
INJECTION INTRAMUSCULAR; INTRAVENOUS; SUBCUTANEOUS
Status: DISCONTINUED | OUTPATIENT
Start: 2022-06-16 | End: 2022-06-16

## 2022-06-16 RX ORDER — EPHEDRINE SULFATE 50 MG/ML
INJECTION, SOLUTION INTRAVENOUS
Status: DISCONTINUED | OUTPATIENT
Start: 2022-06-16 | End: 2022-06-16

## 2022-06-16 RX ORDER — GABAPENTIN 300 MG/1
600 CAPSULE ORAL ONCE
Status: COMPLETED | OUTPATIENT
Start: 2022-06-16 | End: 2022-06-16

## 2022-06-16 RX ORDER — CEFAZOLIN SODIUM 2 G/50ML
2 SOLUTION INTRAVENOUS
Status: DISCONTINUED | OUTPATIENT
Start: 2022-06-16 | End: 2022-06-16 | Stop reason: HOSPADM

## 2022-06-16 RX ORDER — ONDANSETRON 2 MG/ML
4 INJECTION INTRAMUSCULAR; INTRAVENOUS EVERY 12 HOURS PRN
Status: DISCONTINUED | OUTPATIENT
Start: 2022-06-16 | End: 2022-06-16 | Stop reason: HOSPADM

## 2022-06-16 RX ADMIN — OXYCODONE HYDROCHLORIDE 10 MG: 10 TABLET, FILM COATED, EXTENDED RELEASE ORAL at 06:06

## 2022-06-16 RX ADMIN — ROCURONIUM BROMIDE 50 MG: 10 INJECTION, SOLUTION INTRAVENOUS at 07:06

## 2022-06-16 RX ADMIN — CEFAZOLIN 50 MG: 1 INJECTION, POWDER, FOR SOLUTION INTRAMUSCULAR; INTRAVENOUS; PARENTERAL at 07:06

## 2022-06-16 RX ADMIN — EPHEDRINE SULFATE 10 MG: 50 INJECTION INTRAVENOUS at 08:06

## 2022-06-16 RX ADMIN — SUGAMMADEX 200 MG: 100 INJECTION, SOLUTION INTRAVENOUS at 09:06

## 2022-06-16 RX ADMIN — SODIUM CHLORIDE, POTASSIUM CHLORIDE, SODIUM LACTATE AND CALCIUM CHLORIDE: 600; 310; 30; 20 INJECTION, SOLUTION INTRAVENOUS at 07:06

## 2022-06-16 RX ADMIN — FENTANYL CITRATE 100 MCG: 50 INJECTION INTRAMUSCULAR; INTRAVENOUS at 07:06

## 2022-06-16 RX ADMIN — DEXAMETHASONE SODIUM PHOSPHATE 10 MG: 4 INJECTION, SOLUTION INTRA-ARTICULAR; INTRALESIONAL; INTRAMUSCULAR; INTRAVENOUS; SOFT TISSUE at 08:06

## 2022-06-16 RX ADMIN — DEXMEDETOMIDINE: 100 INJECTION, SOLUTION, CONCENTRATE INTRAVENOUS at 07:06

## 2022-06-16 RX ADMIN — MEPERIDINE HYDROCHLORIDE 25 MG: 25 INJECTION INTRAMUSCULAR; INTRAVENOUS; SUBCUTANEOUS at 09:06

## 2022-06-16 RX ADMIN — TRANEXAMIC ACID 1000 MG: 100 INJECTION, SOLUTION INTRAVENOUS at 07:06

## 2022-06-16 RX ADMIN — LIDOCAINE HYDROCHLORIDE 50 MG: 20 INJECTION, SOLUTION INTRAVENOUS at 07:06

## 2022-06-16 RX ADMIN — EPHEDRINE SULFATE 15 MG: 50 INJECTION INTRAVENOUS at 08:06

## 2022-06-16 RX ADMIN — GABAPENTIN 600 MG: 300 CAPSULE ORAL at 06:06

## 2022-06-16 RX ADMIN — FAMOTIDINE 20 MG: 20 TABLET, FILM COATED ORAL at 11:06

## 2022-06-16 RX ADMIN — PROPOFOL 150 MG: 10 INJECTION, EMULSION INTRAVENOUS at 07:06

## 2022-06-16 RX ADMIN — ONDANSETRON 4 MG: 2 INJECTION INTRAMUSCULAR; INTRAVENOUS at 08:06

## 2022-06-16 RX ADMIN — CEFAZOLIN 1950 MG: 1 INJECTION, POWDER, FOR SOLUTION INTRAMUSCULAR; INTRAVENOUS; PARENTERAL at 07:06

## 2022-06-16 NOTE — PT/OT/SLP EVAL
"Physical Therapy Evaluation and Discharge Note    Patient Name:  Kizzy Schofield   MRN:  [id number]    Recommendations:     Discharge Recommendations:  home with home health, home health PT   Discharge Equipment Recommendations: none   Barriers to discharge: None    Assessment:     Kizzy Schofield is a 67 y.o. female admitted with a medical diagnosis of Lumbar radiculopathy.  At this time, patient demonstrates safe mobility with rollator walker and expresses satisfaction with resolution of prior LE radiculopathy. Patient c/o mild incisional pain but states this pain is a huge improvement from pre-op pain levels. Patient does not require further acute PT services.     Recent Surgery: Procedure(s) (LRB):  L4-L5 Laminectomy (N/A) Day of Surgery    Plan:     During this hospitalization, patient does not require further acute PT services.  Please re-consult if situation changes.      Subjective     Chief Complaint: lumbar radiculopathy  Patient/Family Comments/goals: "I will see how I do when I get up out of the bed"  Pain/Comfort:  · Pain Rating 1: 4/10 (up to 6/10 with certain movements)  · Location 1: back  · Pain Addressed 1: Pre-medicate for activity, Reposition, Distraction, Cessation of Activity  · Pain Rating Post-Intervention 1: 4/10    Patients cultural, spiritual, Baptism conflicts given the current situation: no    Living Environment:  Patient lives with spouse (end stage Parkinson's) in a single level home with ramp to enter. Hospice and friends/family will be available to assist patient through recovery as she is unable to physically assist her  at this time.  Prior to admission, patients level of function was independent with rollator or SPC but severe unrelenting pain and tendency to LE buckling due to pain.  Equipment used at home: rollator, raised toilet, cane, straight.  DME owned (not currently used): none.  Upon discharge, patient will have assistance from friends/family, hospice care " for spouse.    Objective:     Communicated with DES Duffy prior to session.  Patient found left sidelying with blood pressure cuff, pulse ox (continuous), peripheral IV upon PT entry to room.    General Precautions: Standard, fall   Orthopedic Precautions:spinal precautions   Braces: N/A   Respiratory Status: Room air    Exams:  · Cognitive Exam:  Patient is oriented to Person, Place, Time and Situation  · Sensation:    · -       Intact  · RLE ROM: WFL  · RLE Strength: WFL  · LLE ROM: WFL  · LLE Strength: WFL    Functional Mobility:  · Bed Mobility:     · Supine to Sit: stand by assistance and increased time/effort  · Transfers:     · Sit to Stand:  stand by assistance with rollator walker  · Bed to Chair: stand by assistance with  rollator walker  using  Step Transfer  · Toilet Transfer: stand by assistance with  rollator walker  using  Step Transfer  · Gait: 20' x 2 trials with rollator walker SBA, slow aristeo, no signs of LE buckling; patient c/o mild pain but reports this as much reduced from pre-op levels    AM-PAC 6 CLICK MOBILITY  Total Score:23       Therapeutic Activities and Exercises:   Patient educated in:  -PT role and POC  -spinal precautions (no bending, lifting, or twisting)  -log rolling during bed mobility  -safety with transfers including hand placement  -gait sequence and RW management  -OOB activity to maximize recovery including getting on a daily walking program at home       AM-PAC 6 CLICK MOBILITY  Total Score:23     Patient left up in chair with all lines intact, call button in reach, DES Duffy notified and friend present.    GOALS:   Multidisciplinary Problems     Physical Therapy Goals     Not on file          Multidisciplinary Problems (Resolved)        Problem: Physical Therapy    Goal Priority Disciplines Outcome Goal Variances Interventions   Physical Therapy Goal   (Resolved)     PT, PT/OT Met     Description: Short term goals to be met by 6/6/2022    In order to  facilitate independent functional mobility patient will:    Roll right/left with modified independence using log roll technique  Demonstrated supine to sit with modified independence using log roll technique  Demonstrate sit to stand with rollator walker, independently  Ambulate 20' x 2 with rollator walker, independently      Long term goals to be met by 7/16/2022    Patient will regain independent functional mobility using lowest level of assistive device to return to home environment and prior ADLs.                    History:     Past Medical History:   Diagnosis Date    Arthritis of left hip     Arthritis of left knee     Cervical radiculopathy     Chronic cystitis     Chronic pain syndrome     Degeneration of lumbar intervertebral disc     Depressive disorder     GERD (gastroesophageal reflux disease)     Hyperlipidemia     Hypertension     Lumbar post-laminectomy syndrome     Meniere disease     Osteoarthritis of cervical and lumbar spine     Osteoporosis        Past Surgical History:   Procedure Laterality Date    bladder tack      CHOLECYSTECTOMY      HYSTERECTOMY      OOPHORECTOMY         Time Tracking:     PT Received On: 06/16/22  PT Start Time: 1036     PT Stop Time: 1102  PT Total Time (min): 26 min     Billable Minutes: Evaluation Low complexity      06/16/2022

## 2022-06-16 NOTE — OP NOTE
[facility] - Periop Services  Surgery Department  Operative Note    SUMMARY     Date of Procedure: 6/16/2022     Procedure: Procedure(s) (LRB):  L4-L5 Laminectomy (N/A)     Surgeon(s) and Role:     * Juan Luis Covington MD - Primary    Assisting Surgeon: None    Pre-Operative Diagnosis: Lumbar radiculopathy [M54.16]    Post-Operative Diagnosis: Post-Op Diagnosis Codes:     * Lumbar radiculopathy [M54.16]    Anesthesia: General    Technical Procedures Used:   1. Laminectomy for decompression of central canal, lateral recess, neural foramen L4-L5  2. Repair of incidental durotomy     Description of the Findings of the Procedure:   Indications:  This is a 67 y.o. female with multiple prior lumbar surgeries with lumbar stenosis and radiculopathy.  They failed attempted conservative treatement.  Risks, benefits, and alternatives were discussed with the patient and they elected to proceed with surgery.    Procedure in detail:  The patient was identified in the preoperative holding area and the surgical site was marked. They were then taken back to the operating room where general endotracheal anesthesia was induced. They were then transitioned to the prone position on the Ross frame with the arms and legs in the physiologic position and all bony prominences well-padded. The posterior lumbar spine was prepped and draped in the normal sterile fashion. A timeout was performed. The incision site was localized with intraoperative fluoroscopy. After the incision was marked out a solution of epinephrine was injected in the skin and subcutaneous tissues.  An erector spinae block was performed bilaterally at L5. A posterior midline incision was made. This was carried down to the fascia which was divided in line with the incision. A subperiosteal dissection was performed exposing the posterior elements of L4-5.    The lamina of L4 and superior aspect of L5 were removed with the use of Leksell rongeur and high-speed bur and  Kerrison punch. The lateral recesses were decompressed with the use of the Kerrison punch. The nerve roots were followed out into the foramen and decompressed in the foramen with the Kerrison. The nerve roots were palpated and felt to be free of any compression.  An incidental durotomy was encountered on the right at the shoulder of the L5 nerve root.  This was repaired with 5-0 Prolene.  The wound was copiously irrigated with normal saline.  Tisseel fibrin sealant was applied to the laminectomy bed.    The fascial layer was closed with interrupted figure-of-eight #1 Vicryl suture and a running #1 Stratafix symmetric suture. The subcutaneous layer was closed with a running Stratafix #0 suture. A running subcuticular layer was performed with 2-0 Stratafix suture. Dermabond prineo was applied to cover the incision. A sterile dressing of gauze and Medipore tape was then applied.    The patient was then transitioned back to supine position, extubated and awakened and taken to recovery in good condition having suffered no untoward event.      Complications: No    Estimated Blood Loss (EBL): * No values recorded between 6/16/2022  7:43 AM and 6/16/2022  9:06 AM *           Implants: * No implants in log *    Specimens:   Specimen (24h ago, onward)            None                  Condition: Good    Disposition: PACU - hemodynamically stable.    Attestation: I was present and scrubbed for the entire procedure.

## 2022-06-16 NOTE — DISCHARGE SUMMARY
[facility] - Periop Services  Discharge Note  Short Stay    Procedure(s) (LRB):  L4-L5 Laminectomy (N/A)    OUTCOME: Patient tolerated treatment/procedure well without complication and is now ready for discharge.    DISPOSITION: Home or Self Care    FINAL DIAGNOSIS:  Lumbar radiculopathy    FOLLOWUP: In clinic    DISCHARGE INSTRUCTIONS:    Discharge Procedure Orders   Diet general     Other restrictions (specify):   Order Comments: No heavy lifting or strenuous activity     Call MD for:  temperature >100.4     Call MD for:  persistent nausea and vomiting     Call MD for:  severe uncontrolled pain     Call MD for:  difficulty breathing, headache or visual disturbances     Call MD for:  redness, tenderness, or signs of infection (pain, swelling, redness, odor or green/yellow discharge around incision site)     Call MD for:  hives     Call MD for:  persistent dizziness or light-headedness     Call MD for:  extreme fatigue

## 2022-06-16 NOTE — PLAN OF CARE
Problem: Physical Therapy  Goal: Physical Therapy Goal  Description: Short term goals to be met by 6/6/2022    In order to facilitate independent functional mobility patient will:    Roll right/left with modified independence using log roll technique  Demonstrated supine to sit with modified independence using log roll technique  Demonstrate sit to stand with rollator walker, independently  Ambulate 20' x 2 with rollator walker, independently      Long term goals to be met by 7/16/2022    Patient will regain independent functional mobility using lowest level of assistive device to return to home environment and prior ADLs.   Outcome: Met     Patient demonstrated safe mobility for discharge home today with assist of family/friends.

## 2022-06-16 NOTE — ANESTHESIA PROCEDURE NOTES
Intubation    Date/Time: 6/16/2022 7:31 AM  Performed by: Renae Yeh CRNA  Authorized by: Rolando Person MD     Intubation:     Induction:  Intravenous    Intubated:  Postinduction    Mask Ventilation:  Easy mask    Attempts:  1    Attempted By:  CRNA    Method of Intubation:  Direct    Blade:  Mark 3    Laryngeal View Grade: Grade I - full view of cords      Difficult Airway Encountered?: No      Complications:  None    Airway Device:  Oral endotracheal tube    Airway Device Size:  7.0    Style/Cuff Inflation:  Cuffed    Inflation Amount (mL):  6    Tube secured:  21    Secured at:  The lips    Placement Verified By:  Capnometry    Complicating Factors:  None    Findings Post-Intubation:  BS equal bilateral and atraumatic/condition of teeth unchanged

## 2022-06-16 NOTE — OR NURSING
0925 Rec'd pt to PACU asleep with no distress noted. VSS. Respirations even and unlabored. Lower back dressing c/d/i. Will continue to monitor.     0935 Pt awake and alert. C/o hip pain - tilted pt to side. Slightly hypotensive, no IV narcotics given at this time. Pt drifting in and out of sleep with no complaints of pain at this time.    1000 Out of PACU. VSS. No signs of bleeding/distress noted. Monitor leads removed from back - no redness noted.     1005 Pt to ASC 6 awake and alert. Visitor at bedside. Bedside report given to MARITZA Woodard RN. Lower back dressing c/d/i. Pt denies needs at this time. /59, P 76, R 16, O2 96% room air.

## 2022-06-16 NOTE — TRANSFER OF CARE
"Anesthesia Transfer of Care Note    Patient: Kizzy Schofield    Procedure(s) Performed: Procedure(s) (LRB):  L4-L5 Laminectomy (N/A)    Patient location: PACU    Anesthesia Type: general    Transport from OR: Transported from OR on room air with adequate spontaneous ventilation    Post pain: adequate analgesia    Post assessment: no apparent anesthetic complications    Post vital signs: stable    Level of consciousness: responds to stimulation    Nausea/Vomiting: no nausea/vomiting    Complications: none    Transfer of care protocol was followed      Last vitals:   Visit Vitals  BP (!) 104/52 (BP Location: Left arm, Patient Position: Lying)   Pulse 86   Temp 36.6 °C (97.8 °F) (Oral)   Resp 12   Ht 5' 9" (1.753 m)   Wt 74.8 kg (165 lb)   SpO2 97%   Breastfeeding No   BMI 24.37 kg/m²     "

## 2022-06-16 NOTE — ANESTHESIA PREPROCEDURE EVALUATION
06/16/2022  Kizzy Schofield is a 67 y.o., female.      Pre-op Assessment    I have reviewed the Patient Summary Reports.    I have reviewed the NPO Status.   I have reviewed the Medications.     Review of Systems         Anesthesia Plan  Type of Anesthesia, risks & benefits discussed:    Anesthesia Type: Gen ETT  Intra-op Monitoring Plan: Standard ASA Monitors  Post Op Pain Control Plan: IV/PO Opioids PRN  Induction:  IV  Informed Consent: Informed consent signed with the Patient and all parties understand the risks and agree with anesthesia plan.  All questions answered.   ASA Score: 3    Ready For Surgery From Anesthesia Perspective.     .  NPO greater than 8 hours  NAC  Allergies   Adhesive Tape-silicones  Not Specified  5/21/2021    Celebrex [Celecoxib]  Not Specified  5/21/2021    Opioids - Morphine Analogues  Not Specified  5/21/2021    Pcn [Penicillins]  Not Specified  5/21/2021    Shrimp  Not Specified  5/21/2021    Silicone Itching, Rash Low  6/9/2017      Hct 40  Cr 1.4    Arthritis of left hip Arthritis of left knee   Cervical radiculopathy Chronic cystitis   Chronic pain syndrome Degeneration of lumbar intervertebral disc   Depressive disorder Hypertension   GERD (gastroesophageal reflux disease) Hyperlipidemia   Lumbar post-laminectomy syndrome Meniere disease   Osteoarthritis of cervical and lumbar spine Osteoporosis   H/o TIA's  Sleep apnea    Airway exam deferred (COVID precautions); adequate ROM at neck.

## 2022-06-16 NOTE — ANESTHESIA POSTPROCEDURE EVALUATION
Anesthesia Post Evaluation    Patient: Kizzy Schofield    Procedure(s) Performed: Procedure(s) (LRB):  L4-L5 Laminectomy (N/A)    Final Anesthesia Type: general      Patient location during evaluation: PACU  Post-procedure vital signs: reviewed and stable  Pain management: adequate  Airway patency: patent  TITO mitigation strategies: Verification of full reversal of neuromuscular block  PONV status at discharge: No PONV  Anesthetic complications: no      Cardiovascular status: hemodynamically stable  Respiratory status: unassisted  Hydration status: euvolemic  Follow-up not needed.          Vitals Value Taken Time   /52 06/16/22 1146   Temp 36.6 °C (97.8 °F) 06/16/22 0934   Pulse 66 06/16/22 1146   Resp 16 06/16/22 1145   SpO2 98 % 06/16/22 1145         Event Time   Out of Recovery 10:00:00         Pain/Elmer Score: Pain Rating Prior to Med Admin: 8 (6/16/2022  6:38 AM)  Elmer Score: 10 (6/16/2022  9:55 AM)

## 2022-06-28 DIAGNOSIS — M54.16 LUMBAR RADICULOPATHY: Primary | ICD-10-CM

## 2022-06-29 ENCOUNTER — HOSPITAL ENCOUNTER (OUTPATIENT)
Dept: RADIOLOGY | Facility: HOSPITAL | Age: 67
Discharge: HOME | End: 2022-06-29
Attending: ORTHOPAEDIC SURGERY
Payer: MEDICARE

## 2022-06-29 ENCOUNTER — CLINICAL SUPPORT (OUTPATIENT)
Dept: UROLOGY | Facility: CLINIC | Age: 67
End: 2022-06-29
Payer: MEDICARE

## 2022-06-29 ENCOUNTER — OFFICE VISIT (OUTPATIENT)
Dept: SPINE | Facility: CLINIC | Age: 67
End: 2022-06-29
Payer: MEDICARE

## 2022-06-29 DIAGNOSIS — M51.36 DDD (DEGENERATIVE DISC DISEASE), LUMBAR: ICD-10-CM

## 2022-06-29 DIAGNOSIS — M54.16 LUMBAR RADICULOPATHY: Primary | ICD-10-CM

## 2022-06-29 DIAGNOSIS — N39.0 URINARY TRACT INFECTION WITHOUT HEMATURIA, SITE UNSPECIFIED: Primary | ICD-10-CM

## 2022-06-29 DIAGNOSIS — M54.16 LUMBAR RADICULOPATHY: ICD-10-CM

## 2022-06-29 PROCEDURE — 99212 OFFICE O/P EST SF 10 MIN: CPT | Mod: PBBFAC | Performed by: UROLOGY

## 2022-06-29 PROCEDURE — 99024 PR POST-OP FOLLOW-UP VISIT: ICD-10-PCS | Mod: ,,, | Performed by: ORTHOPAEDIC SURGERY

## 2022-06-29 PROCEDURE — 87086 URINE CULTURE/COLONY COUNT: CPT | Mod: ,,, | Performed by: CLINICAL MEDICAL LABORATORY

## 2022-06-29 PROCEDURE — 87077 CULTURE AEROBIC IDENTIFY: CPT | Mod: ,,, | Performed by: CLINICAL MEDICAL LABORATORY

## 2022-06-29 PROCEDURE — 99213 OFFICE O/P EST LOW 20 MIN: CPT | Mod: PBBFAC | Performed by: ORTHOPAEDIC SURGERY

## 2022-06-29 PROCEDURE — 99024 POSTOP FOLLOW-UP VISIT: CPT | Mod: ,,, | Performed by: ORTHOPAEDIC SURGERY

## 2022-06-29 PROCEDURE — 72100 X-RAY EXAM L-S SPINE 2/3 VWS: CPT | Mod: 26,,, | Performed by: ORTHOPAEDIC SURGERY

## 2022-06-29 PROCEDURE — 72100 XR LUMBAR SPINE AP AND LATERAL: ICD-10-PCS | Mod: 26,,, | Performed by: ORTHOPAEDIC SURGERY

## 2022-06-29 PROCEDURE — 87086 CULTURE, URINE: ICD-10-PCS | Mod: ,,, | Performed by: CLINICAL MEDICAL LABORATORY

## 2022-06-29 PROCEDURE — 87077 CULTURE, URINE: ICD-10-PCS | Mod: ,,, | Performed by: CLINICAL MEDICAL LABORATORY

## 2022-06-29 PROCEDURE — 87186 CULTURE, URINE: ICD-10-PCS | Mod: ,,, | Performed by: CLINICAL MEDICAL LABORATORY

## 2022-06-29 PROCEDURE — 72100 X-RAY EXAM L-S SPINE 2/3 VWS: CPT | Mod: TC

## 2022-06-29 PROCEDURE — 87186 SC STD MICRODIL/AGAR DIL: CPT | Mod: ,,, | Performed by: CLINICAL MEDICAL LABORATORY

## 2022-06-29 NOTE — PROGRESS NOTES
Patient had a urine specimen dropped off to be sent for urine culture due to extreme burning. Urine sent for culture, no meds given pending results.

## 2022-06-29 NOTE — PROGRESS NOTES
MDM/time:  postop    ASSESSMENT:  67 y.o. female with lumbar degenerative scoliosis, spondylosis and radiculopathy now status post L4-5 laminectomy 06/16/2022    PLAN:  Follow-up in 3 months    HPI:  67 y.o. female here for evaluation of lumbar degenerative scoliosis, spondylosis and radiculopathy now status post L4-5 laminectomy 06/16/2022.  Reports her back and legs are doing well.  She is complaining of bad vertigo since 6/26.  She has Meniere's disease    IMAGING:  X-rays lumbar spine reviewed show:  On the AP there is lumbar curvature to the left.  There are 5 non-rib-bearing lumbar vertebrae.  On the lateral there is decreased lumbar lordosis.  There is spondylotic disease with decreased disc height and osteophyte formation noted.  There has been prior left-sided sacroiliac fixation.  Prior L4-5 laminectomy.  Slight grade 1 anterolisthesis at L4-5    Past Medical History:   Diagnosis Date    Arthritis of left hip     Arthritis of left knee     Cervical radiculopathy     Chronic cystitis     Chronic pain syndrome     Degeneration of lumbar intervertebral disc     Depressive disorder     GERD (gastroesophageal reflux disease)     Hyperlipidemia     Hypertension     Lumbar post-laminectomy syndrome     Meniere disease     Osteoarthritis of cervical and lumbar spine     Osteoporosis      Past Surgical History:   Procedure Laterality Date    bladder tack      CHOLECYSTECTOMY      HYSTERECTOMY      LUMBAR LAMINECTOMY N/A 6/16/2022    Procedure: L4-L5 Laminectomy;  Surgeon: Juan Luis Covington MD;  Location: [facility];  Service: Neurosurgery;  Laterality: N/A;    OOPHORECTOMY       Social History     Tobacco Use    Smoking status: Former Smoker    Smokeless tobacco: Never Used   Substance Use Topics    Alcohol use: Not Currently    Drug use: Never      Current Outpatient Medications   Medication Instructions    acetaminophen (TYLENOL) 500 MG tablet 1 tablet as needed    amitriptyline  (ELAVIL) 10 MG tablet 1 tablet, Oral, Nightly    atorvastatin (LIPITOR) 40 mg, Oral, Daily    cetirizine (ZYRTEC) 10 mg, Oral, Daily    dicyclomine (BENTYL) 10 mg, Oral, Before meals & nightly, As needed    EPINEPHrine (EPIPEN) 0.3 mg/0.3 mL AtIn Intramuscular, Once    fluticasone propionate (FLONASE) 100 mcg, Each Nostril, Daily    gabapentin (NEURONTIN) 600 mg, Oral, 3 times daily    HYDROcodone-acetaminophen (NORCO) 7.5-325 mg per tablet 1 tablet, Oral, 2 times daily PRN    levothyroxine (SYNTHROID) 50 mcg, Oral, Before breakfast    methocarbamoL (ROBAXIN) 500 mg, Oral, 3 times daily    montelukast (SINGULAIR) 10 mg, Oral, Daily    oxyCODONE-acetaminophen (PERCOCET) 5-325 mg per tablet 1 tablet, Oral, Every 4 hours PRN    pantoprazole (PROTONIX) 40 mg, Oral, 2 times daily    polyethylene glycol (GLYCOLAX) 17 gram PwPk Oral, Take 17 grams by mouth 2 times daily mixed with 8 ounces of water, juice, soda, tea, or coffee     promethazine (PHENERGAN) 25 mg, Oral, Every 4 hours    raloxifene (EVISTA) 60 mg, Oral, Daily    topiramate (TOPAMAX) 100 mg, Oral, 2 times daily    traZODone (DESYREL) 50 mg, Oral, Nightly PRN    triamterene-hydrochlorothiazide 37.5-25 mg (MAXZIDE-25) 37.5-25 mg per tablet 1 tablet, Oral, Daily    vitamin D (VITAMIN D3) 5,000 Units, Oral, 2 times daily        EXAM:  Constitutional  General Appearance:  There is no height or weight on file to calculate BMI., NAD  Psychiatric   Orientation: Oriented to time, oriented to place, oriented to person  Mood and Affect: Active and alert, normal mood, normal affect  Gait and Station   Appearance:  Normal gait, normal tandem gait, able to walk on toes, able to walk on heels  Healed incision  5/5 strength  Sensation intact  2+ pulses

## 2022-06-29 NOTE — PROGRESS NOTES
AP, lateral views of the lumbar spine reviewed    On the AP there is lumbar curvature to the left.  There are 5 non-rib-bearing lumbar vertebrae.  On the lateral there is decreased lumbar lordosis.  There is spondylotic disease with decreased disc height and osteophyte formation noted.  There has been prior left-sided sacroiliac fixation.  Prior L4-5 laminectomy.  Slight grade 1 anterolisthesis at L4-5    Impression:  Spondylotic changes of the lumbar spine as noted above

## 2022-07-01 LAB — UA COMPLETE W REFLEX CULTURE PNL UR: ABNORMAL

## 2022-07-05 DIAGNOSIS — N39.0 URINARY TRACT INFECTION WITHOUT HEMATURIA, SITE UNSPECIFIED: Primary | ICD-10-CM

## 2022-07-05 RX ORDER — CEFUROXIME AXETIL 250 MG/1
250 TABLET ORAL 2 TIMES DAILY
Qty: 20 TABLET | Refills: 0 | Status: SHIPPED | OUTPATIENT
Start: 2022-07-05 | End: 2022-07-08 | Stop reason: SINTOL

## 2022-07-05 NOTE — TELEPHONE ENCOUNTER
"Reported to Pt urine culture results  >100,000 E-Coli . Allergies was reviewed and denies any problem taking Ceftin or any know cephalosporin. She said that the only antibiotic that she had was to a "PCN shot". Pt said she believes that she has taken Keflex without any problems. Pt aware that she is to take the Ceftin twice a day with Probiotics. She requested order for med to be sent to [facility] and ask them to deliver meds to her.                "

## 2022-07-08 ENCOUNTER — OFFICE VISIT (OUTPATIENT)
Dept: FAMILY MEDICINE | Facility: CLINIC | Age: 67
End: 2022-07-08
Payer: MEDICARE

## 2022-07-08 VITALS
TEMPERATURE: 99 F | BODY MASS INDEX: 24.29 KG/M2 | HEIGHT: 69 IN | OXYGEN SATURATION: 98 % | RESPIRATION RATE: 18 BRPM | DIASTOLIC BLOOD PRESSURE: 72 MMHG | WEIGHT: 164 LBS | HEART RATE: 65 BPM | SYSTOLIC BLOOD PRESSURE: 134 MMHG

## 2022-07-08 DIAGNOSIS — N39.0 RECURRENT UTI: ICD-10-CM

## 2022-07-08 DIAGNOSIS — R42 VERTIGO: ICD-10-CM

## 2022-07-08 DIAGNOSIS — R21 RASH AND NONSPECIFIC SKIN ERUPTION: Primary | ICD-10-CM

## 2022-07-08 PROCEDURE — 99213 OFFICE O/P EST LOW 20 MIN: CPT | Mod: ,,, | Performed by: NURSE PRACTITIONER

## 2022-07-08 PROCEDURE — 99213 PR OFFICE/OUTPT VISIT, EST, LEVL III, 20-29 MIN: ICD-10-PCS | Mod: ,,, | Performed by: NURSE PRACTITIONER

## 2022-07-08 PROCEDURE — 96372 PR INJECTION,THERAP/PROPH/DIAG2ST, IM OR SUBCUT: ICD-10-PCS | Mod: ,,, | Performed by: NURSE PRACTITIONER

## 2022-07-08 PROCEDURE — 96372 THER/PROPH/DIAG INJ SC/IM: CPT | Mod: ,,, | Performed by: NURSE PRACTITIONER

## 2022-07-08 RX ORDER — DEXAMETHASONE SODIUM PHOSPHATE 4 MG/ML
8 INJECTION, SOLUTION INTRA-ARTICULAR; INTRALESIONAL; INTRAMUSCULAR; INTRAVENOUS; SOFT TISSUE
Status: COMPLETED | OUTPATIENT
Start: 2022-07-08 | End: 2022-07-08

## 2022-07-08 RX ORDER — NITROFURANTOIN 25; 75 MG/1; MG/1
100 CAPSULE ORAL 2 TIMES DAILY
Qty: 20 CAPSULE | Refills: 0 | Status: SHIPPED | OUTPATIENT
Start: 2022-07-08 | End: 2022-07-18

## 2022-07-08 RX ADMIN — DEXAMETHASONE SODIUM PHOSPHATE 8 MG: 4 INJECTION, SOLUTION INTRA-ARTICULAR; INTRALESIONAL; INTRAMUSCULAR; INTRAVENOUS; SOFT TISSUE at 11:07

## 2022-07-08 NOTE — PATIENT INSTRUCTIONS
Will give decadron today stop ceftin and start macrobid for uti for concern of reaction. Home health PT eval for dizziness

## 2022-07-08 NOTE — PROGRESS NOTES
2022     CHLOE Boswell   [facility]  [address]  [place] [address]     PATIENT NAME: Kizzy Schofield  : 1955  DATE: 22  MRN: [id number]      Billing Provider: CHLOE Boswell  Level of Service:   Patient PCP Information     Provider PCP Type    CHLOE Boswell General          Reason for Visit / Chief Complaint: Dizziness and Allergic Reaction (Dr Coello started on antibiotics that is a form of PCN and concerned she is having a reaction)       Update PCP  Update Chief Complaint         History of Present Illness / Problem Focused Workflow     Kizzy Schofield presents to the clinic here with report of being dizzy for 2 weeks, she also started having a rash and itching the past 2 days- recently started ceftin for urine infection. She has no weakness or nausea or vomitting with her dizziness   She denies any shortness of breath wheezing or edema.      Review of Systems     Review of Systems   Constitutional: Negative for appetite change, chills, fatigue and fever.   HENT: Negative for dental problem, ear pain, mouth dryness, mouth sores, nosebleeds, sneezing, sore throat, tinnitus, trouble swallowing and voice change.    Eyes: Negative for visual disturbance.   Respiratory: Negative for cough, chest tightness, shortness of breath and wheezing.    Cardiovascular: Negative for chest pain and palpitations.   Gastrointestinal: Negative for abdominal pain, change in bowel habit, nausea, vomiting and change in bowel habit.   Genitourinary: Negative for difficulty urinating.   Integumentary:  Positive for rash. Negative for pallor and wound.   Neurological: Positive for dizziness. Negative for syncope, facial asymmetry, speech difficulty, weakness and headaches.   Hematological: Negative for adenopathy.        Medical / Social / Family History     Past Medical History:   Diagnosis Date    Arthritis of left hip     Arthritis of left knee      Cervical radiculopathy     Chronic cystitis     Chronic pain syndrome     Degeneration of lumbar intervertebral disc     Depressive disorder     GERD (gastroesophageal reflux disease)     Hyperlipidemia     Hypertension     Lumbar post-laminectomy syndrome     Meniere disease     Osteoarthritis of cervical and lumbar spine     Osteoporosis        Past Surgical History:   Procedure Laterality Date    bladder tack      CHOLECYSTECTOMY      HYSTERECTOMY      LUMBAR LAMINECTOMY N/A 6/16/2022    Procedure: L4-L5 Laminectomy;  Surgeon: Juan Luis Covington MD;  Location: [facility];  Service: Neurosurgery;  Laterality: N/A;    OOPHORECTOMY         Social History  Ms.  reports that she has quit smoking. She has never used smokeless tobacco. She reports previous alcohol use. She reports that she does not use drugs.    Family History  Ms.'s family history includes Cancer in her father; Glaucoma in her daughter; Heart disease in her mother; Hypertension in her father and mother; Lung cancer in her father; Prostate cancer in her father; Stroke in her father and mother; Thyroid disease in her daughter and mother.    Medications and Allergies     Medications  Outpatient Medications Marked as Taking for the 7/8/22 encounter (Office Visit) with CHLOE Boswell   Medication Sig Dispense Refill    acetaminophen (TYLENOL) 500 MG tablet 1 tablet as needed      amitriptyline (ELAVIL) 10 MG tablet Take 1 tablet by mouth nightly.      atorvastatin (LIPITOR) 40 MG tablet Take 1 tablet (40 mg total) by mouth once daily. 90 tablet 0    cetirizine (ZYRTEC) 10 MG tablet Take 10 mg by mouth once daily.      dicyclomine (BENTYL) 10 MG capsule Take 1 capsule (10 mg total) by mouth 4 (four) times daily before meals and nightly. As needed 120 capsule 0    EPINEPHrine (EPIPEN) 0.3 mg/0.3 mL AtIn Inject into the muscle once.      fluticasone propionate (FLONASE) 50 mcg/actuation nasal spray 2 sprays (100 mcg total)  by Each Nostril route once daily. 16 g 2    gabapentin (NEURONTIN) 600 MG tablet Take 1 tablet (600 mg total) by mouth 3 (three) times daily. 270 tablet 0    HYDROcodone-acetaminophen (NORCO) 7.5-325 mg per tablet Take 1 tablet by mouth 2 (two) times daily as needed for Pain.      levothyroxine (SYNTHROID) 50 MCG tablet Take 1 tablet (50 mcg total) by mouth before breakfast. 90 tablet 0    methocarbamoL (ROBAXIN) 500 MG Tab Take 1 tablet (500 mg total) by mouth 3 (three) times daily. 270 tablet 0    montelukast (SINGULAIR) 10 mg tablet Take 1 tablet (10 mg total) by mouth once daily. 90 tablet 0    oxyCODONE-acetaminophen (PERCOCET) 5-325 mg per tablet Take 1 tablet by mouth every 4 (four) hours as needed for Pain. 45 tablet 0    pantoprazole (PROTONIX) 40 MG tablet Take 1 tablet (40 mg total) by mouth 2 (two) times daily. 180 tablet 1    polyethylene glycol (GLYCOLAX) 17 gram PwPk Take by mouth. Take 17 grams by mouth 2 times daily mixed with 8 ounces of water, juice, soda, tea, or coffee      promethazine (PHENERGAN) 25 MG tablet Take 1 tablet (25 mg total) by mouth every 4 (four) hours. 45 tablet 0    raloxifene (EVISTA) 60 mg tablet Take 1 tablet (60 mg total) by mouth once daily. 90 tablet 0    topiramate (TOPAMAX) 100 MG tablet Take 100 mg by mouth 2 (two) times a day.      traZODone (DESYREL) 50 MG tablet Take 1 tablet (50 mg total) by mouth nightly as needed for Insomnia. 90 tablet 0    triamterene-hydrochlorothiazide 37.5-25 mg (MAXZIDE-25) 37.5-25 mg per tablet Take 1 tablet by mouth once daily. (Patient taking differently: Take 0.5 tablets by mouth once daily.) 90 tablet 0    vitamin D (VITAMIN D3) 1000 units Tab Take 5,000 Units by mouth 2 (two) times a day.      [DISCONTINUED] cefUROXime (CEFTIN) 250 MG tablet Take 1 tablet (250 mg total) by mouth 2 (two) times daily. With probiotics for 10 days 20 tablet 0     Current Facility-Administered Medications for the 7/8/22 encounter (Office  "Visit) with CHLOE Boswell   Medication Dose Route Frequency Provider Last Rate Last Admin    dexamethasone injection 8 mg  8 mg Intramuscular 1 time in Clinic/HOD Kizzy Candelaria CHLOE Ga           Allergies  Review of patient's allergies indicates:   Allergen Reactions    Adhesive tape-silicones     Aspirin     Celebrex [celecoxib]     Opioids - morphine analogues     Pcn [penicillins]     Shrimp     Silicone Itching and Rash       Physical Examination     Vitals:    07/08/22 1021   BP: 134/72   BP Location: Left arm   Patient Position: Sitting   Pulse: 65   Resp: 18   Temp: 98.9 °F (37.2 °C)   TempSrc: Oral   SpO2: 98%   Weight: 74.4 kg (164 lb)   Height: 5' 9" (1.753 m)      Physical Exam  Vitals and nursing note reviewed.   Constitutional:       General: She is not in acute distress.     Appearance: Normal appearance. She is not ill-appearing, toxic-appearing or diaphoretic.   HENT:      Head: Normocephalic.      Right Ear: Tympanic membrane, ear canal and external ear normal.      Left Ear: Tympanic membrane, ear canal and external ear normal.      Nose: Nose normal.      Mouth/Throat:      Mouth: Mucous membranes are moist.      Pharynx: Oropharynx is clear.   Eyes:      Extraocular Movements: Extraocular movements intact.      Conjunctiva/sclera: Conjunctivae normal.      Pupils: Pupils are equal, round, and reactive to light.   Cardiovascular:      Rate and Rhythm: Normal rate and regular rhythm.      Pulses: Normal pulses.      Heart sounds: Normal heart sounds.   Pulmonary:      Effort: Pulmonary effort is normal.      Breath sounds: Normal breath sounds.   Musculoskeletal:      Cervical back: Normal range of motion and neck supple.   Skin:     General: Skin is warm and dry.      Capillary Refill: Capillary refill takes less than 2 seconds.      Findings: Erythema and rash present. Rash is urticarial.      Comments: Scattered wheals noted to upper arms hives   Neurological:     "  General: No focal deficit present.      Mental Status: She is alert and oriented to person, place, and time.   Psychiatric:         Mood and Affect: Mood normal.         Behavior: Behavior normal.          Assessment and Plan (including Health Maintenance)      Problem List  Smart Sets  Document Outside HM   :    Plan:     Stop ceftin start macrobid will give decadron today, also consult [facility] and resume physical therapy for dizzy    Health Maintenance Due   Topic Date Due    Shingles Vaccine (1 of 2) Never done    TETANUS VACCINE  10/11/2021    COVID-19 Vaccine (3 - Booster for Moderna series) 11/25/2021    Colorectal Cancer Screening  04/02/2022       Problem List Items Addressed This Visit    None     Visit Diagnoses     Rash and nonspecific skin eruption    -  Primary    Relevant Medications    dexamethasone injection 8 mg    Recurrent UTI        Relevant Medications    nitrofurantoin, macrocrystal-monohydrate, (MACROBID) 100 MG capsule    Vertigo            Rash and nonspecific skin eruption  -     dexamethasone injection 8 mg    Recurrent UTI  -     nitrofurantoin, macrocrystal-monohydrate, (MACROBID) 100 MG capsule; Take 1 capsule (100 mg total) by mouth 2 (two) times daily. for 10 days  Dispense: 20 capsule; Refill: 0    Vertigo       Health Maintenance Topics with due status: Not Due       Topic Last Completion Date    DEXA Scan 09/02/2020    Influenza Vaccine 09/25/2021    Mammogram 10/06/2021    Lipid Panel 03/15/2022       Procedures     Future Appointments   Date Time Provider Department Center   8/15/2022  1:00 PM Juan Luis Covington MD [facility] SPINE [facility]   9/28/2022  2:30 PM Juan Luis Covington MD [facility] SPINE [facility]   [facility]15[facility]  [facility]   2/20/2023  1:00 PM Omari Holt DO [facility] SLEEP [facility]        No follow-ups on file.     Signature:  CHLOE Boswell    Date of encounter: 7/8/22

## 2022-07-14 ENCOUNTER — TELEPHONE (OUTPATIENT)
Dept: SPINE | Facility: CLINIC | Age: 67
End: 2022-07-14
Payer: MEDICARE

## 2022-07-14 DIAGNOSIS — R42 DIZZINESS: Primary | ICD-10-CM

## 2022-07-14 NOTE — TELEPHONE ENCOUNTER
Patient reports that she started having dizziness about 4 days after her surgery. She has been unable to identify a cause of it. She went to her PCP and they were unbale to to find the cause of it. She wants to know if it could be from her surgery. I discussed with Dr. Covington her recommends referral to ENT to be evaluated as he does not believe that is a result of her surgery.    ----- Message from Martina Brewster sent at 7/13/2022 12:41 PM CDT -----  Regarding: DIZZY AFTER SURGERY..  PATIENT CALLED THIS MORNING AND SAID SHE HAS BEEN VERY DIZZY AFTER HER SURGERY AND SHE WAS WANTING TO NOW WAS THIS FROM HER SURGERY AND IS THIS NORMAL?    HER NUMBER [phone]

## 2022-07-15 ENCOUNTER — OFFICE VISIT (OUTPATIENT)
Dept: SPINE | Facility: CLINIC | Age: 67
End: 2022-07-15
Payer: MEDICARE

## 2022-07-15 DIAGNOSIS — M51.36 DDD (DEGENERATIVE DISC DISEASE), LUMBAR: Primary | ICD-10-CM

## 2022-07-15 DIAGNOSIS — Z94.9: Primary | ICD-10-CM

## 2022-07-15 DIAGNOSIS — M54.16 LUMBAR RADICULOPATHY: ICD-10-CM

## 2022-07-15 PROCEDURE — 99024 PR POST-OP FOLLOW-UP VISIT: ICD-10-PCS | Mod: ,,, | Performed by: ORTHOPAEDIC SURGERY

## 2022-07-15 PROCEDURE — 99024 POSTOP FOLLOW-UP VISIT: CPT | Mod: ,,, | Performed by: ORTHOPAEDIC SURGERY

## 2022-07-15 PROCEDURE — 99211 OFF/OP EST MAY X REQ PHY/QHP: CPT | Mod: PBBFAC | Performed by: ORTHOPAEDIC SURGERY

## 2022-07-15 NOTE — PROGRESS NOTES
MDM/time:  postop    ASSESSMENT:  67 y.o. female with lumbar degenerative scoliosis, spondylosis and radiculopathy now status post L4-5 laminectomy 06/16/2022    PLAN:  Will check labs just to rule out infection.  Will call if abnormal, otherwise keep her in 3 month follow-up    HPI:  67 y.o. female here for evaluation of lumbar degenerative scoliosis, spondylosis and radiculopathy now status post L4-5 laminectomy 06/16/2022.  Reporting some incisional pain and says that the home health nurse noted some drainage from the superior aspect of her incision recently.    IMAGING:  X-rays lumbar spine reviewed show:  On the AP there is lumbar curvature to the left.  There are 5 non-rib-bearing lumbar vertebrae.  On the lateral there is decreased lumbar lordosis.  There is spondylotic disease with decreased disc height and osteophyte formation noted.  There has been prior left-sided sacroiliac fixation.  Prior L4-5 laminectomy.  Slight grade 1 anterolisthesis at L4-5    Past Medical History:   Diagnosis Date    Arthritis of left hip     Arthritis of left knee     Cervical radiculopathy     Chronic cystitis     Chronic pain syndrome     Degeneration of lumbar intervertebral disc     Depressive disorder     GERD (gastroesophageal reflux disease)     Hyperlipidemia     Hypertension     Lumbar post-laminectomy syndrome     Meniere disease     Osteoarthritis of cervical and lumbar spine     Osteoporosis      Past Surgical History:   Procedure Laterality Date    bladder tack      CHOLECYSTECTOMY      HYSTERECTOMY      LUMBAR LAMINECTOMY N/A 6/16/2022    Procedure: L4-L5 Laminectomy;  Surgeon: Juan Luis Covington MD;  Location: [facility];  Service: Neurosurgery;  Laterality: N/A;    OOPHORECTOMY       Social History     Tobacco Use    Smoking status: Former Smoker    Smokeless tobacco: Never Used   Substance Use Topics    Alcohol use: Not Currently    Drug use: Never      Current Outpatient Medications    Medication Instructions    acetaminophen (TYLENOL) 500 MG tablet 1 tablet as needed    amitriptyline (ELAVIL) 10 MG tablet 1 tablet, Oral, Nightly    atorvastatin (LIPITOR) 40 mg, Oral, Daily    cetirizine (ZYRTEC) 10 mg, Oral, Daily    dicyclomine (BENTYL) 10 mg, Oral, Before meals & nightly, As needed    EPINEPHrine (EPIPEN) 0.3 mg/0.3 mL AtIn Intramuscular, Once    fluticasone propionate (FLONASE) 100 mcg, Each Nostril, Daily    gabapentin (NEURONTIN) 600 mg, Oral, 3 times daily    HYDROcodone-acetaminophen (NORCO) 7.5-325 mg per tablet 1 tablet, Oral, 2 times daily PRN    levothyroxine (SYNTHROID) 50 mcg, Oral, Before breakfast    methocarbamoL (ROBAXIN) 500 mg, Oral, 3 times daily    montelukast (SINGULAIR) 10 mg, Oral, Daily    nitrofurantoin, macrocrystal-monohydrate, (MACROBID) 100 MG capsule 100 mg, Oral, 2 times daily    oxyCODONE-acetaminophen (PERCOCET) 5-325 mg per tablet 1 tablet, Oral, Every 4 hours PRN    pantoprazole (PROTONIX) 40 mg, Oral, 2 times daily    polyethylene glycol (GLYCOLAX) 17 gram PwPk Oral, Take 17 grams by mouth 2 times daily mixed with 8 ounces of water, juice, soda, tea, or coffee     promethazine (PHENERGAN) 25 mg, Oral, Every 4 hours    raloxifene (EVISTA) 60 mg, Oral, Daily    topiramate (TOPAMAX) 100 mg, Oral, 2 times daily    traZODone (DESYREL) 50 mg, Oral, Nightly PRN    triamterene-hydrochlorothiazide 37.5-25 mg (MAXZIDE-25) 37.5-25 mg per tablet 1 tablet, Oral, Daily    vitamin D (VITAMIN D3) 5,000 Units, Oral, 2 times daily        EXAM:  Constitutional  General Appearance:  There is no height or weight on file to calculate BMI., NAD  Psychiatric   Orientation: Oriented to time, oriented to place, oriented to person  Mood and Affect: Active and alert, normal mood, normal affect  Gait and Station   Appearance:  Normal gait, normal tandem gait, able to walk on toes, able to walk on heels  Small area of granulation at the superior aspect of the  incision, no expressible drainage.  No fluctuance.  No erythema  5/5 strength  Sensation intact  2+ pulses

## 2022-07-25 DIAGNOSIS — G89.4 CHRONIC PAIN SYNDROME: Primary | ICD-10-CM

## 2022-07-25 RX ORDER — AMITRIPTYLINE HYDROCHLORIDE 10 MG/1
10 TABLET, FILM COATED ORAL 2 TIMES DAILY
Qty: 180 TABLET | Refills: 1 | OUTPATIENT
Start: 2022-07-25

## 2022-07-25 RX ORDER — AMITRIPTYLINE HYDROCHLORIDE 10 MG/1
10 TABLET, FILM COATED ORAL 2 TIMES DAILY
Qty: 180 TABLET | Refills: 1 | Status: SHIPPED | OUTPATIENT
Start: 2022-07-25 | End: 2022-10-31 | Stop reason: SDUPTHER

## 2022-07-25 RX ORDER — TOPIRAMATE 100 MG/1
100 TABLET, FILM COATED ORAL 2 TIMES DAILY
Qty: 180 TABLET | Refills: 1 | Status: SHIPPED | OUTPATIENT
Start: 2022-07-25 | End: 2022-10-31 | Stop reason: SDUPTHER

## 2022-07-25 RX ORDER — TOPIRAMATE 100 MG/1
TABLET, FILM COATED ORAL
Qty: 90 TABLET | Refills: 0 | OUTPATIENT
Start: 2022-07-25

## 2022-08-11 ENCOUNTER — OFFICE VISIT (OUTPATIENT)
Dept: OTOLARYNGOLOGY | Facility: CLINIC | Age: 67
End: 2022-08-11
Payer: MEDICARE

## 2022-08-11 ENCOUNTER — CLINICAL SUPPORT (OUTPATIENT)
Dept: AUDIOLOGY | Facility: CLINIC | Age: 67
End: 2022-08-11
Payer: MEDICARE

## 2022-08-11 VITALS — BODY MASS INDEX: 24.29 KG/M2 | WEIGHT: 164 LBS | HEIGHT: 69 IN

## 2022-08-11 DIAGNOSIS — R42 DIZZINESS: ICD-10-CM

## 2022-08-11 DIAGNOSIS — H90.3 SENSORINEURAL HEARING LOSS (SNHL) OF BOTH EARS: ICD-10-CM

## 2022-08-11 DIAGNOSIS — R42 DIZZINESS: Primary | ICD-10-CM

## 2022-08-11 DIAGNOSIS — H90.3 SENSORINEURAL HEARING LOSS, BILATERAL: Primary | ICD-10-CM

## 2022-08-11 DIAGNOSIS — H81.03 MENIERE'S DISEASE (COCHLEAR HYDROPS), BILATERAL: ICD-10-CM

## 2022-08-11 DIAGNOSIS — R42 VERTIGO: ICD-10-CM

## 2022-08-11 PROCEDURE — 99204 OFFICE O/P NEW MOD 45 MIN: CPT | Mod: S$PBB,,, | Performed by: OTOLARYNGOLOGY

## 2022-08-11 PROCEDURE — 99214 OFFICE O/P EST MOD 30 MIN: CPT | Mod: PBBFAC | Performed by: OTOLARYNGOLOGY

## 2022-08-11 PROCEDURE — 99212 OFFICE O/P EST SF 10 MIN: CPT | Mod: PBBFAC | Performed by: AUDIOLOGIST

## 2022-08-11 PROCEDURE — 92557 COMPREHENSIVE HEARING TEST: CPT | Mod: PBBFAC | Performed by: AUDIOLOGIST

## 2022-08-11 PROCEDURE — 99204 PR OFFICE/OUTPT VISIT, NEW, LEVL IV, 45-59 MIN: ICD-10-PCS | Mod: S$PBB,,, | Performed by: OTOLARYNGOLOGY

## 2022-08-11 RX ORDER — DIAZEPAM 2 MG/1
2 TABLET ORAL EVERY 12 HOURS PRN
Qty: 60 TABLET | Refills: 2 | Status: SHIPPED | OUTPATIENT
Start: 2022-08-11 | End: 2022-12-05 | Stop reason: SDUPTHER

## 2022-08-11 NOTE — PROGRESS NOTES
Subjective:       Patient ID: Kizzy Schofield is a 67 y.o. female.    Chief Complaint: Dizziness (Patient is here for dizzy spells. She states it has been every day for the last 7 weeks. )    HPI  Review of Systems   HENT: Positive for hearing loss.    Neurological: Positive for dizziness.   All other systems reviewed and are negative.      Objective:      Physical Exam  General: NAD  Head: Normocephalic, atraumatic, no facial asymmetry/normal strength,  Ears: Both auricules normal in appearance, w/o deformities tympanic membranes normal external auditory canals normal  Nose: External nose w/o deformities normal turbinates no drainage or inflammation  Oral Cavity: Lips, gums, floor of mouth, tongue hard palate, and buccal mucosa without mass/lesion  Oropharynx: Mucosa pink and moist, soft palate, posterior pharynx and oropharyngeal wall without mass/lesion  Neck: Supple, symmetric, trachea midline, no palpable mass/lesion, no palpable cervical lymphadenopathy  Skin: Warm and dry, no concerning lesions  Respiratory: Respirations even, unlabored    Assessment:       1. Dizziness    2. Meniere's disease (cochlear hydrops), bilateral    3. Sensorineural hearing loss (SNHL) of both ears        Plan:       Audio reviewed will work on more hearing aids   Valium for dizziness

## 2022-08-12 DIAGNOSIS — Z47.89 ENCOUNTER FOR ORTHOPEDIC FOLLOW-UP CARE: Primary | ICD-10-CM

## 2022-08-15 ENCOUNTER — HOSPITAL ENCOUNTER (OUTPATIENT)
Dept: RADIOLOGY | Facility: HOSPITAL | Age: 67
Discharge: HOME | End: 2022-08-15
Attending: ORTHOPAEDIC SURGERY
Payer: MEDICARE

## 2022-08-15 ENCOUNTER — OFFICE VISIT (OUTPATIENT)
Dept: SPINE | Facility: CLINIC | Age: 67
End: 2022-08-15
Payer: MEDICARE

## 2022-08-15 DIAGNOSIS — M54.16 LUMBAR RADICULOPATHY: Primary | ICD-10-CM

## 2022-08-15 DIAGNOSIS — M51.36 DDD (DEGENERATIVE DISC DISEASE), LUMBAR: ICD-10-CM

## 2022-08-15 DIAGNOSIS — Z47.89 ENCOUNTER FOR ORTHOPEDIC FOLLOW-UP CARE: ICD-10-CM

## 2022-08-15 PROCEDURE — 99213 OFFICE O/P EST LOW 20 MIN: CPT | Mod: PBBFAC | Performed by: ORTHOPAEDIC SURGERY

## 2022-08-15 PROCEDURE — 99024 POSTOP FOLLOW-UP VISIT: CPT | Mod: ,,, | Performed by: ORTHOPAEDIC SURGERY

## 2022-08-15 PROCEDURE — 72100 X-RAY EXAM L-S SPINE 2/3 VWS: CPT | Mod: TC

## 2022-08-15 PROCEDURE — 72100 X-RAY EXAM L-S SPINE 2/3 VWS: CPT | Mod: 26,,, | Performed by: ORTHOPAEDIC SURGERY

## 2022-08-15 PROCEDURE — 72100 XR LUMBAR SPINE AP AND LATERAL: ICD-10-PCS | Mod: 26,,, | Performed by: ORTHOPAEDIC SURGERY

## 2022-08-15 PROCEDURE — 99024 PR POST-OP FOLLOW-UP VISIT: ICD-10-PCS | Mod: ,,, | Performed by: ORTHOPAEDIC SURGERY

## 2022-08-16 NOTE — PROGRESS NOTES
MDM/time:  postop    ASSESSMENT:  67 y.o. female with lumbar degenerative scoliosis, spondylosis and radiculopathy now status post L4-5 laminectomy 06/16/2022    PLAN:  Follow-up in 3 months    HPI:  67 y.o. female here for evaluation of lumbar degenerative scoliosis, spondylosis and radiculopathy now status post L4-5 laminectomy 06/16/2022.  She fell Friday 3 times.  She landed on her left knee.  Reports her back pain is flared up.  Her incisional pain is improved    IMAGING:  X-rays lumbar spine reviewed show:  On the AP there is lumbar curvature to the left.  There are 5 non-rib-bearing lumbar vertebrae.  On the lateral there is decreased lumbar lordosis.  There is spondylotic disease with decreased disc height and osteophyte formation noted.  There has been prior left-sided sacroiliac fixation.  Prior L4-5 laminectomy.  Slight grade 1 anterolisthesis at L4-5    Past Medical History:   Diagnosis Date    Arthritis of left hip     Arthritis of left knee     Cervical radiculopathy     Chronic cystitis     Chronic pain syndrome     Degeneration of lumbar intervertebral disc     Depressive disorder     GERD (gastroesophageal reflux disease)     Hyperlipidemia     Hypertension     Lumbar post-laminectomy syndrome     Meniere disease     Osteoarthritis of cervical and lumbar spine     Osteoporosis      Past Surgical History:   Procedure Laterality Date    bladder tack      CHOLECYSTECTOMY      HYSTERECTOMY      LUMBAR LAMINECTOMY N/A 6/16/2022    Procedure: L4-L5 Laminectomy;  Surgeon: Juan Luis Covington MD;  Location: [facility];  Service: Neurosurgery;  Laterality: N/A;    OOPHORECTOMY       Social History     Tobacco Use    Smoking status: Former Smoker    Smokeless tobacco: Never Used   Substance Use Topics    Alcohol use: Not Currently    Drug use: Never      Current Outpatient Medications   Medication Instructions    acetaminophen (TYLENOL) 500 MG tablet 1 tablet as needed     amitriptyline (ELAVIL) 10 mg, Oral, 2 times daily    atorvastatin (LIPITOR) 40 mg, Oral, Daily    cetirizine (ZYRTEC) 10 mg, Oral, Daily    diazePAM (VALIUM) 2 mg, Oral, Every 12 hours PRN    dicyclomine (BENTYL) 10 MG capsule TAKE 1 CAPSULE BY MOUTH FOUR TIMES DAILY BEFORE MEALS AND NIGHTLY AS NEEDED    EPINEPHrine (EPIPEN) 0.3 mg/0.3 mL AtIn Intramuscular, Once    fluticasone propionate (FLONASE) 100 mcg, Each Nostril, Daily    gabapentin (NEURONTIN) 600 MG tablet TAKE 1 TABLET THREE TIMES DAILY    HYDROcodone-acetaminophen (NORCO) 7.5-325 mg per tablet 1 tablet, Oral, 2 times daily PRN    levothyroxine (SYNTHROID) 50 mcg, Oral, Before breakfast    methocarbamoL (ROBAXIN) 500 mg, Oral, 3 times daily    montelukast (SINGULAIR) 10 mg, Oral, Daily    oxyCODONE-acetaminophen (PERCOCET) 5-325 mg per tablet 1 tablet, Oral, Every 4 hours PRN    pantoprazole (PROTONIX) 40 mg, Oral, 2 times daily    polyethylene glycol (GLYCOLAX) 17 gram PwPk Oral, Take 17 grams by mouth 2 times daily mixed with 8 ounces of water, juice, soda, tea, or coffee     promethazine (PHENERGAN) 25 mg, Oral, Every 4 hours    raloxifene (EVISTA) 60 mg, Oral, Daily    topiramate (TOPAMAX) 100 mg, Oral, 2 times daily    traZODone (DESYREL) 50 mg, Oral, Nightly PRN    triamterene-hydrochlorothiazide 37.5-25 mg (MAXZIDE-25) 37.5-25 mg per tablet 0.5 tablets, Oral, Daily    vitamin D (VITAMIN D3) 5,000 Units, Oral, 2 times daily        EXAM:  Constitutional  General Appearance:  There is no height or weight on file to calculate BMI., NAD  Psychiatric   Orientation: Oriented to time, oriented to place, oriented to person  Mood and Affect: Active and alert, normal mood, normal affect  Gait and Station   Appearance:  Normal gait, normal tandem gait, able to walk on toes, able to walk on heels  Small area of granulation at the superior aspect of the incision, no expressible drainage.  No fluctuance.  No erythema  5/5 strength  Sensation  intact  2+ pulses

## 2022-08-29 DIAGNOSIS — T78.40XS ALLERGY, SEQUELA: ICD-10-CM

## 2022-08-29 RX ORDER — FLUTICASONE PROPIONATE 50 MCG
2 SPRAY, SUSPENSION (ML) NASAL DAILY
Qty: 16 G | Refills: 2 | Status: SHIPPED | OUTPATIENT
Start: 2022-08-29 | End: 2022-12-01

## 2022-09-14 DIAGNOSIS — T78.40XS ALLERGY, SEQUELA: ICD-10-CM

## 2022-09-14 DIAGNOSIS — L29.9 ITCHING: Primary | ICD-10-CM

## 2022-09-14 NOTE — TELEPHONE ENCOUNTER
Requests refill on Hydroxyzine 25mg for itching. Requesting week supply to [facility] and then to [facility]

## 2022-09-15 RX ORDER — HYDROXYZINE HYDROCHLORIDE 25 MG/1
25 TABLET, FILM COATED ORAL 3 TIMES DAILY PRN
Qty: 21 TABLET | Refills: 0 | OUTPATIENT
Start: 2022-09-15

## 2022-09-15 RX ORDER — HYDROXYZINE HYDROCHLORIDE 25 MG/1
25 TABLET, FILM COATED ORAL 3 TIMES DAILY PRN
Qty: 90 TABLET | Refills: 1 | OUTPATIENT
Start: 2022-09-15

## 2022-10-31 ENCOUNTER — OFFICE VISIT (OUTPATIENT)
Dept: FAMILY MEDICINE | Facility: CLINIC | Age: 67
End: 2022-10-31
Payer: MEDICARE

## 2022-10-31 VITALS
DIASTOLIC BLOOD PRESSURE: 70 MMHG | WEIGHT: 160 LBS | OXYGEN SATURATION: 99 % | HEART RATE: 75 BPM | HEIGHT: 69 IN | BODY MASS INDEX: 23.7 KG/M2 | SYSTOLIC BLOOD PRESSURE: 110 MMHG | TEMPERATURE: 99 F | RESPIRATION RATE: 18 BRPM

## 2022-10-31 DIAGNOSIS — F41.8 ANXIOUS DEPRESSION: ICD-10-CM

## 2022-10-31 DIAGNOSIS — R82.998 LEUKOCYTES IN URINE: Primary | ICD-10-CM

## 2022-10-31 DIAGNOSIS — R05.8 COUGH WITH EXPOSURE TO COVID-19 VIRUS: ICD-10-CM

## 2022-10-31 DIAGNOSIS — Z20.822 COUGH WITH EXPOSURE TO COVID-19 VIRUS: ICD-10-CM

## 2022-10-31 DIAGNOSIS — M81.0 AGE-RELATED OSTEOPOROSIS WITHOUT CURRENT PATHOLOGICAL FRACTURE: ICD-10-CM

## 2022-10-31 DIAGNOSIS — T78.40XS ALLERGY, SEQUELA: ICD-10-CM

## 2022-10-31 DIAGNOSIS — K21.9 GASTROESOPHAGEAL REFLUX DISEASE WITHOUT ESOPHAGITIS: ICD-10-CM

## 2022-10-31 DIAGNOSIS — R68.83 CHILLS: ICD-10-CM

## 2022-10-31 DIAGNOSIS — E78.5 HYPERLIPIDEMIA, UNSPECIFIED HYPERLIPIDEMIA TYPE: ICD-10-CM

## 2022-10-31 DIAGNOSIS — I10 HYPERTENSION, UNSPECIFIED TYPE: ICD-10-CM

## 2022-10-31 DIAGNOSIS — G89.4 CHRONIC PAIN SYNDROME: ICD-10-CM

## 2022-10-31 DIAGNOSIS — E03.9 HYPOTHYROIDISM, UNSPECIFIED TYPE: ICD-10-CM

## 2022-10-31 LAB
ALBUMIN SERPL BCP-MCNC: 4 G/DL (ref 3.5–5)
ALBUMIN/GLOB SERPL: 1.1 {RATIO}
ALP SERPL-CCNC: 86 U/L (ref 55–142)
ALT SERPL W P-5'-P-CCNC: 20 U/L (ref 13–56)
ANION GAP SERPL CALCULATED.3IONS-SCNC: 11 MMOL/L (ref 7–16)
AST SERPL W P-5'-P-CCNC: 13 U/L (ref 15–37)
BASOPHILS # BLD AUTO: 0.07 K/UL (ref 0–0.2)
BASOPHILS NFR BLD AUTO: 0.8 % (ref 0–1)
BILIRUB SERPL-MCNC: 0.3 MG/DL (ref ?–1.2)
BILIRUB UR QL STRIP: NEGATIVE
BUN SERPL-MCNC: 15 MG/DL (ref 7–18)
BUN/CREAT SERPL: 13 (ref 6–20)
CALCIUM SERPL-MCNC: 9.3 MG/DL (ref 8.5–10.1)
CHLORIDE SERPL-SCNC: 104 MMOL/L (ref 98–107)
CHOLEST SERPL-MCNC: 126 MG/DL (ref 0–200)
CHOLEST/HDLC SERPL: 1.9 {RATIO}
CK SERPL-CCNC: 65 U/L (ref 26–192)
CO2 SERPL-SCNC: 24 MMOL/L (ref 21–32)
CREAT SERPL-MCNC: 1.17 MG/DL (ref 0.55–1.02)
CTP QC/QA: YES
DIFFERENTIAL METHOD BLD: ABNORMAL
EGFR (NO RACE VARIABLE) (RUSH/TITUS): 51 ML/MIN/1.73M²
EOSINOPHIL # BLD AUTO: 0.11 K/UL (ref 0–0.5)
EOSINOPHIL NFR BLD AUTO: 1.3 % (ref 1–4)
ERYTHROCYTE [DISTWIDTH] IN BLOOD BY AUTOMATED COUNT: 13.2 % (ref 11.5–14.5)
FLUAV AG NPH QL: NEGATIVE
FLUBV AG NPH QL: NEGATIVE
GLOBULIN SER-MCNC: 3.7 G/DL (ref 2–4)
GLUCOSE SERPL-MCNC: 122 MG/DL (ref 74–106)
GLUCOSE UR QL STRIP: NEGATIVE
HCT VFR BLD AUTO: 45 % (ref 38–47)
HDLC SERPL-MCNC: 67 MG/DL (ref 40–60)
HGB BLD-MCNC: 14.6 G/DL (ref 12–16)
IMM GRANULOCYTES # BLD AUTO: 0.02 K/UL (ref 0–0.04)
IMM GRANULOCYTES NFR BLD: 0.2 % (ref 0–0.4)
KETONES UR QL STRIP: NEGATIVE
LDLC SERPL CALC-MCNC: 39 MG/DL
LEUKOCYTE ESTERASE UR QL STRIP: POSITIVE
LYMPHOCYTES # BLD AUTO: 2.04 K/UL (ref 1–4.8)
LYMPHOCYTES NFR BLD AUTO: 24.3 % (ref 27–41)
MCH RBC QN AUTO: 29.4 PG (ref 27–31)
MCHC RBC AUTO-ENTMCNC: 32.4 G/DL (ref 32–36)
MCV RBC AUTO: 90.5 FL (ref 80–96)
MONOCYTES # BLD AUTO: 0.55 K/UL (ref 0–0.8)
MONOCYTES NFR BLD AUTO: 6.5 % (ref 2–6)
MPC BLD CALC-MCNC: 10.6 FL (ref 9.4–12.4)
NEUTROPHILS # BLD AUTO: 5.61 K/UL (ref 1.8–7.7)
NEUTROPHILS NFR BLD AUTO: 66.9 % (ref 53–65)
NONHDLC SERPL-MCNC: 59 MG/DL
NRBC # BLD AUTO: 0 X10E3/UL
NRBC, AUTO (.00): 0 %
PH, POC UA: 6
PLATELET # BLD AUTO: 389 K/UL (ref 150–400)
POC BLOOD, URINE: POSITIVE
POC NITRATES, URINE: NEGATIVE
POTASSIUM SERPL-SCNC: 3 MMOL/L (ref 3.5–5.1)
PROT SERPL-MCNC: 7.7 G/DL (ref 6.4–8.2)
PROT UR QL STRIP: NEGATIVE
RBC # BLD AUTO: 4.97 M/UL (ref 4.2–5.4)
SARS-COV-2 AG RESP QL IA.RAPID: NEGATIVE
SODIUM SERPL-SCNC: 136 MMOL/L (ref 136–145)
SP GR UR STRIP: 1.01 (ref 1–1.03)
TRIGL SERPL-MCNC: 98 MG/DL (ref 35–150)
TSH SERPL DL<=0.005 MIU/L-ACNC: 2.15 UIU/ML (ref 0.36–3.74)
UROBILINOGEN UR STRIP-ACNC: 0.2 (ref 0.1–1.1)
VLDLC SERPL-MCNC: 20 MG/DL
WBC # BLD AUTO: 8.4 K/UL (ref 4.5–11)

## 2022-10-31 PROCEDURE — 81003 URINALYSIS AUTO W/O SCOPE: CPT | Mod: RHCUB | Performed by: NURSE PRACTITIONER

## 2022-10-31 PROCEDURE — 85025 COMPLETE CBC W/AUTO DIFF WBC: CPT | Mod: ,,, | Performed by: CLINICAL MEDICAL LABORATORY

## 2022-10-31 PROCEDURE — 87077 CULTURE AEROBIC IDENTIFY: CPT | Mod: ,,, | Performed by: CLINICAL MEDICAL LABORATORY

## 2022-10-31 PROCEDURE — 82550 ASSAY OF CK (CPK): CPT | Mod: ,,, | Performed by: CLINICAL MEDICAL LABORATORY

## 2022-10-31 PROCEDURE — 87428 SARSCOV & INF VIR A&B AG IA: CPT | Mod: RHCUB | Performed by: NURSE PRACTITIONER

## 2022-10-31 PROCEDURE — 99214 PR OFFICE/OUTPT VISIT, EST, LEVL IV, 30-39 MIN: ICD-10-PCS | Mod: ,,, | Performed by: NURSE PRACTITIONER

## 2022-10-31 PROCEDURE — 87086 URINE CULTURE/COLONY COUNT: CPT | Mod: ,,, | Performed by: CLINICAL MEDICAL LABORATORY

## 2022-10-31 PROCEDURE — 84443 ASSAY THYROID STIM HORMONE: CPT | Mod: ,,, | Performed by: CLINICAL MEDICAL LABORATORY

## 2022-10-31 PROCEDURE — 80061 LIPID PANEL: ICD-10-PCS | Mod: ,,, | Performed by: CLINICAL MEDICAL LABORATORY

## 2022-10-31 PROCEDURE — 80053 COMPREHENSIVE METABOLIC PANEL: ICD-10-PCS | Mod: ,,, | Performed by: CLINICAL MEDICAL LABORATORY

## 2022-10-31 PROCEDURE — 80061 LIPID PANEL: CPT | Mod: ,,, | Performed by: CLINICAL MEDICAL LABORATORY

## 2022-10-31 PROCEDURE — 87077 CULTURE, URINE: ICD-10-PCS | Mod: ,,, | Performed by: CLINICAL MEDICAL LABORATORY

## 2022-10-31 PROCEDURE — 87186 SC STD MICRODIL/AGAR DIL: CPT | Mod: ,,, | Performed by: CLINICAL MEDICAL LABORATORY

## 2022-10-31 PROCEDURE — 87086 CULTURE, URINE: ICD-10-PCS | Mod: ,,, | Performed by: CLINICAL MEDICAL LABORATORY

## 2022-10-31 PROCEDURE — 85025 CBC WITH DIFFERENTIAL: ICD-10-PCS | Mod: ,,, | Performed by: CLINICAL MEDICAL LABORATORY

## 2022-10-31 PROCEDURE — 82550 CK: ICD-10-PCS | Mod: ,,, | Performed by: CLINICAL MEDICAL LABORATORY

## 2022-10-31 PROCEDURE — 99214 OFFICE O/P EST MOD 30 MIN: CPT | Mod: ,,, | Performed by: NURSE PRACTITIONER

## 2022-10-31 PROCEDURE — 80053 COMPREHEN METABOLIC PANEL: CPT | Mod: ,,, | Performed by: CLINICAL MEDICAL LABORATORY

## 2022-10-31 PROCEDURE — 84443 TSH: ICD-10-PCS | Mod: ,,, | Performed by: CLINICAL MEDICAL LABORATORY

## 2022-10-31 PROCEDURE — 87186 CULTURE, URINE: ICD-10-PCS | Mod: ,,, | Performed by: CLINICAL MEDICAL LABORATORY

## 2022-10-31 RX ORDER — GABAPENTIN 600 MG/1
600 TABLET ORAL 3 TIMES DAILY
Qty: 270 TABLET | Refills: 1 | Status: SHIPPED | OUTPATIENT
Start: 2022-10-31

## 2022-10-31 RX ORDER — METHOCARBAMOL 500 MG/1
500 TABLET, FILM COATED ORAL 3 TIMES DAILY
Qty: 270 TABLET | Refills: 1 | Status: SHIPPED | OUTPATIENT
Start: 2022-10-31 | End: 2023-08-31

## 2022-10-31 RX ORDER — RALOXIFENE HYDROCHLORIDE 60 MG/1
60 TABLET, FILM COATED ORAL DAILY
Qty: 90 TABLET | Refills: 1 | Status: SHIPPED | OUTPATIENT
Start: 2022-10-31

## 2022-10-31 RX ORDER — MONTELUKAST SODIUM 10 MG/1
10 TABLET ORAL DAILY
Qty: 90 TABLET | Refills: 1 | Status: SHIPPED | OUTPATIENT
Start: 2022-10-31 | End: 2023-05-11

## 2022-10-31 RX ORDER — ATORVASTATIN CALCIUM 40 MG/1
40 TABLET, FILM COATED ORAL DAILY
Qty: 90 TABLET | Refills: 1 | Status: SHIPPED | OUTPATIENT
Start: 2022-10-31 | End: 2023-06-05

## 2022-10-31 RX ORDER — PANTOPRAZOLE SODIUM 40 MG/1
40 TABLET, DELAYED RELEASE ORAL 2 TIMES DAILY
Qty: 180 TABLET | Refills: 1 | Status: SHIPPED | OUTPATIENT
Start: 2022-10-31 | End: 2023-03-10

## 2022-10-31 RX ORDER — TRIAMTERENE/HYDROCHLOROTHIAZID 37.5-25 MG
0.5 TABLET ORAL DAILY
Qty: 45 TABLET | Refills: 1 | Status: SHIPPED | OUTPATIENT
Start: 2022-10-31 | End: 2022-12-22

## 2022-10-31 RX ORDER — TOPIRAMATE 100 MG/1
100 TABLET, FILM COATED ORAL 2 TIMES DAILY
Qty: 180 TABLET | Refills: 1 | Status: SHIPPED | OUTPATIENT
Start: 2022-10-31 | End: 2023-04-27 | Stop reason: SDUPTHER

## 2022-10-31 RX ORDER — NITROFURANTOIN 25; 75 MG/1; MG/1
100 CAPSULE ORAL 2 TIMES DAILY
Qty: 10 CAPSULE | Refills: 0 | Status: SHIPPED | OUTPATIENT
Start: 2022-10-31 | End: 2022-11-05

## 2022-10-31 RX ORDER — AMITRIPTYLINE HYDROCHLORIDE 10 MG/1
10 TABLET, FILM COATED ORAL 2 TIMES DAILY
Qty: 180 TABLET | Refills: 1 | Status: SHIPPED | OUTPATIENT
Start: 2022-10-31 | End: 2023-02-10 | Stop reason: SDUPTHER

## 2022-10-31 RX ORDER — TRAZODONE HYDROCHLORIDE 50 MG/1
50 TABLET ORAL NIGHTLY PRN
Qty: 90 TABLET | Refills: 1 | Status: SHIPPED | OUTPATIENT
Start: 2022-10-31 | End: 2023-03-10 | Stop reason: DRUGHIGH

## 2022-10-31 NOTE — PROGRESS NOTES
10/31/2022     CHLOE Boswell   [facility]  [address]  [place] [address]     PATIENT NAME: Kzizy Schofield  : 1955  DATE: 10/31/22  MRN: [id number]      Billing Provider: CHLOE Boswell  Level of Service:   Patient PCP Information       Provider PCP Type    CHLOE Boswell General            Reason for Visit / Chief Complaint: No chief complaint on file.       Update PCP  Update Chief Complaint         History of Present Illness / Problem Focused Workflow     Kizzy Schofield presents to the clinic sudden onset Saturday took home covid test since she felt so bad. Had nausea dizzy headache throat sore very fatigued. She has been staying in bed due to fatigue.       Review of Systems     Review of Systems   Constitutional:  Positive for chills and fatigue. Negative for appetite change and fever.   HENT:  Positive for sore throat. Negative for nasal congestion, ear pain and trouble swallowing.    Respiratory:  Positive for cough and shortness of breath. Negative for wheezing.    Gastrointestinal:  Positive for nausea. Negative for abdominal pain and diarrhea.   Genitourinary:  Negative for difficulty urinating and dysuria.   Integumentary:  Negative for rash.   Neurological:  Positive for dizziness, weakness and headaches.      Medical / Social / Family History     Past Medical History:   Diagnosis Date    Arthritis of left hip     Arthritis of left knee     Cervical radiculopathy     Chronic cystitis     Chronic pain syndrome     Degeneration of lumbar intervertebral disc     Depressive disorder     GERD (gastroesophageal reflux disease)     Hyperlipidemia     Hypertension     Lumbar post-laminectomy syndrome     Meniere disease     Osteoarthritis of cervical and lumbar spine     Osteoporosis        Past Surgical History:   Procedure Laterality Date    bladder tack      CHOLECYSTECTOMY      HYSTERECTOMY      LUMBAR LAMINECTOMY N/A 2022     Procedure: L4-L5 Laminectomy;  Surgeon: Juan Luis Covington MD;  Location: [facility];  Service: Neurosurgery;  Laterality: N/A;    OOPHORECTOMY         Social History  Ms.  reports that she has quit smoking. She has never used smokeless tobacco. She reports that she does not currently use alcohol. She reports that she does not use drugs.    Family History  Ms.'s family history includes Cancer in her father; Glaucoma in her daughter; Heart disease in her mother; Hypertension in her father and mother; Lung cancer in her father; Prostate cancer in her father; Stroke in her father and mother; Thyroid disease in her daughter and mother.    Medications and Allergies     Medications  Outpatient Medications Marked as Taking for the 10/31/22 encounter (Office Visit) with CHLOE Boswell   Medication Sig Dispense Refill    acetaminophen (TYLENOL) 500 MG tablet 1 tablet as needed      cetirizine (ZYRTEC) 10 MG tablet Take 10 mg by mouth once daily.      dicyclomine (BENTYL) 10 MG capsule TAKE 1 CAPSULE FOUR TIMES DAILY BEFORE MEALS AND NIGHTLY AS NEEDED 120 capsule 0    EPINEPHrine (EPIPEN) 0.3 mg/0.3 mL AtIn Inject into the muscle once.      fluticasone propionate (FLONASE) 50 mcg/actuation nasal spray 2 sprays (100 mcg total) by Each Nostril route once daily. 16 g 2    HYDROcodone-acetaminophen (NORCO) 7.5-325 mg per tablet Take 1 tablet by mouth 2 (two) times daily as needed for Pain.      levothyroxine (SYNTHROID) 50 MCG tablet TAKE 1 TABLET (50 MCG TOTAL) BY MOUTH BEFORE BREAKFAST. 90 tablet 0    polyethylene glycol (GLYCOLAX) 17 gram PwPk Take by mouth. Take 17 grams by mouth 2 times daily mixed with 8 ounces of water, juice, soda, tea, or coffee      promethazine (PHENERGAN) 25 MG tablet Take 1 tablet (25 mg total) by mouth every 4 (four) hours. 45 tablet 0    vitamin D (VITAMIN D3) 1000 units Tab Take 5,000 Units by mouth 2 (two) times a day.      [DISCONTINUED] amitriptyline (ELAVIL) 10 MG tablet Take 1  "tablet (10 mg total) by mouth 2 (two) times a day. 180 tablet 1    [DISCONTINUED] atorvastatin (LIPITOR) 40 MG tablet TAKE 1 TABLET (40 MG TOTAL) BY MOUTH ONCE DAILY. 90 tablet 0    [DISCONTINUED] gabapentin (NEURONTIN) 600 MG tablet TAKE 1 TABLET THREE TIMES DAILY 270 tablet 0    [DISCONTINUED] methocarbamoL (ROBAXIN) 500 MG Tab Take 1 tablet (500 mg total) by mouth 3 (three) times daily. 270 tablet 0    [DISCONTINUED] montelukast (SINGULAIR) 10 mg tablet TAKE 1 TABLET (10 MG TOTAL) BY MOUTH ONCE DAILY. 90 tablet 0    [DISCONTINUED] oxyCODONE-acetaminophen (PERCOCET) 5-325 mg per tablet Take 1 tablet by mouth every 4 (four) hours as needed for Pain. 45 tablet 0    [DISCONTINUED] pantoprazole (PROTONIX) 40 MG tablet Take 1 tablet (40 mg total) by mouth 2 (two) times daily. 180 tablet 1    [DISCONTINUED] raloxifene (EVISTA) 60 mg tablet TAKE 1 TABLET (60 MG TOTAL) BY MOUTH ONCE DAILY. 90 tablet 0    [DISCONTINUED] topiramate (TOPAMAX) 100 MG tablet Take 1 tablet (100 mg total) by mouth 2 (two) times a day. 180 tablet 1    [DISCONTINUED] traZODone (DESYREL) 50 MG tablet TAKE 1 TABLET (50 MG TOTAL) BY MOUTH NIGHTLY AS NEEDED FOR INSOMNIA. 90 tablet 0    [DISCONTINUED] triamterene-hydrochlorothiazide 37.5-25 mg (MAXZIDE-25) 37.5-25 mg per tablet Take 0.5 tablets by mouth once daily. 90 tablet 0       Allergies  Review of patient's allergies indicates:   Allergen Reactions    Adhesive tape-silicones     Aspirin     Celebrex [celecoxib]     Opioids - morphine analogues     Pcn [penicillins]     Shrimp     Silicone Itching and Rash       Physical Examination     Vitals:    10/31/22 1324   BP: 110/70   BP Location: Right arm   Patient Position: Sitting   Pulse: 75   Resp: 18   Temp: 99 °F (37.2 °C)   TempSrc: Oral   SpO2: 99%   Weight: 72.6 kg (160 lb)   Height: 5' 9" (1.753 m)      Physical Exam  Vitals and nursing note reviewed.   Constitutional:       General: She is not in acute distress.     Appearance: Normal " appearance. She is not ill-appearing, toxic-appearing or diaphoretic.   HENT:      Head: Normocephalic.      Right Ear: Tympanic membrane, ear canal and external ear normal.      Left Ear: Tympanic membrane, ear canal and external ear normal.      Nose: Nose normal.      Mouth/Throat:      Mouth: Mucous membranes are moist.      Pharynx: No oropharyngeal exudate or posterior oropharyngeal erythema.   Eyes:      Conjunctiva/sclera: Conjunctivae normal.      Pupils: Pupils are equal, round, and reactive to light.   Cardiovascular:      Rate and Rhythm: Normal rate and regular rhythm.      Heart sounds: Normal heart sounds.   Pulmonary:      Effort: Pulmonary effort is normal.      Breath sounds: Normal breath sounds. No wheezing, rhonchi or rales.   Musculoskeletal:      Cervical back: Normal range of motion and neck supple. No rigidity or tenderness.   Lymphadenopathy:      Cervical: No cervical adenopathy.   Skin:     General: Skin is warm and dry.      Capillary Refill: Capillary refill takes less than 2 seconds.   Neurological:      General: No focal deficit present.      Mental Status: She is alert.   Psychiatric:         Behavior: Behavior normal.        Assessment and Plan (including Health Maintenance)      Problem List  Smart Sets  Document Outside HM   :    Plan:     Flu covid negative urine with leukocytes start macrobid pending culture increase fluids  Labs today refill rtn medications    Health Maintenance Due   Topic Date Due    Shingles Vaccine (1 of 2) Never done    COVID-19 Vaccine (3 - Booster for Moderna series) 08/20/2021    TETANUS VACCINE  10/11/2021    Colorectal Cancer Screening  04/02/2022    Influenza Vaccine (1) 09/01/2022    Mammogram  10/06/2022       Problem List Items Addressed This Visit          Neuro    Chronic pain syndrome    Relevant Medications    amitriptyline (ELAVIL) 10 MG tablet    gabapentin (NEURONTIN) 600 MG tablet    methocarbamoL (ROBAXIN) 500 MG Tab    topiramate  (TOPAMAX) 100 MG tablet       GI    GERD (gastroesophageal reflux disease)    Relevant Medications    pantoprazole (PROTONIX) 40 MG tablet     Other Visit Diagnoses       Leukocytes in urine    -  Primary    Relevant Medications    nitrofurantoin, macrocrystal-monohydrate, (MACROBID) 100 MG capsule    Other Relevant Orders    Urine culture    Chills        Cough with exposure to COVID-19 virus        Hyperlipidemia, unspecified hyperlipidemia type        Relevant Orders    Comprehensive Metabolic Panel    CBC Auto Differential    CK    Hypothyroidism, unspecified type        Relevant Orders    TSH    Hypertension, unspecified type        Relevant Medications    triamterene-hydrochlorothiazide 37.5-25 mg (MAXZIDE-25) 37.5-25 mg per tablet    Other Relevant Orders    Lipid Panel    Allergy, sequela        Relevant Medications    montelukast (SINGULAIR) 10 mg tablet    Age-related osteoporosis without current pathological fracture        Relevant Medications    raloxifene (EVISTA) 60 mg tablet    Anxious depression        Relevant Medications    traZODone (DESYREL) 50 MG tablet          Leukocytes in urine  -     Urine culture  -     nitrofurantoin, macrocrystal-monohydrate, (MACROBID) 100 MG capsule; Take 1 capsule (100 mg total) by mouth 2 (two) times daily. for 5 days  Dispense: 10 capsule; Refill: 0    Chills  -     POCT Urinalysis, Dipstick, Automated, W/O Scope    Cough with exposure to COVID-19 virus  -     POCT SARS-COV2 (COVID) with Flu Antigen    Hyperlipidemia, unspecified hyperlipidemia type  -     Comprehensive Metabolic Panel; Future; Expected date: 10/31/2022  -     CBC Auto Differential; Future; Expected date: 10/31/2022  -     CK; Future; Expected date: 10/31/2022    Hypothyroidism, unspecified type  -     TSH; Future; Expected date: 10/31/2022    Hypertension, unspecified type  -     Lipid Panel; Future; Expected date: 10/31/2022  -     triamterene-hydrochlorothiazide 37.5-25 mg (MAXZIDE-25) 37.5-25  mg per tablet; Take 0.5 tablets by mouth once daily.  Dispense: 45 tablet; Refill: 1    Chronic pain syndrome  -     amitriptyline (ELAVIL) 10 MG tablet; Take 1 tablet (10 mg total) by mouth 2 (two) times a day.  Dispense: 180 tablet; Refill: 1  -     gabapentin (NEURONTIN) 600 MG tablet; Take 1 tablet (600 mg total) by mouth 3 (three) times daily.  Dispense: 270 tablet; Refill: 1  -     methocarbamoL (ROBAXIN) 500 MG Tab; Take 1 tablet (500 mg total) by mouth 3 (three) times daily.  Dispense: 270 tablet; Refill: 1  -     topiramate (TOPAMAX) 100 MG tablet; Take 1 tablet (100 mg total) by mouth 2 (two) times a day.  Dispense: 180 tablet; Refill: 1    Allergy, sequela  -     montelukast (SINGULAIR) 10 mg tablet; Take 1 tablet (10 mg total) by mouth once daily.  Dispense: 90 tablet; Refill: 1    Gastroesophageal reflux disease without esophagitis  -     pantoprazole (PROTONIX) 40 MG tablet; Take 1 tablet (40 mg total) by mouth 2 (two) times daily.  Dispense: 180 tablet; Refill: 1    Age-related osteoporosis without current pathological fracture  -     raloxifene (EVISTA) 60 mg tablet; Take 1 tablet (60 mg total) by mouth once daily.  Dispense: 90 tablet; Refill: 1    Anxious depression  -     traZODone (DESYREL) 50 MG tablet; Take 1 tablet (50 mg total) by mouth nightly as needed for Insomnia.  Dispense: 90 tablet; Refill: 1    Other orders  -     atorvastatin (LIPITOR) 40 MG tablet; Take 1 tablet (40 mg total) by mouth once daily.  Dispense: 90 tablet; Refill: 1     Health Maintenance Topics with due status: Not Due       Topic Last Completion Date    DEXA Scan 09/02/2020    Lipid Panel 03/15/2022       Procedures     Future Appointments   Date Time Provider Department Center   11/14/2022 10:45 AM Juan Luis Covington MD [facility]   11[facility]15/2022  [facility]   2/20/2023  1:00 PM Omari Holt DO [facility] SLEEP [facility]        No follow-ups on file.     Signature:   CHLOE Boswell    Date of encounter: 10/31/22

## 2022-10-31 NOTE — PATIENT INSTRUCTIONS
Flu and Covid negative leukocytes in urine will start Macrobid pending urine culture- increase fluids

## 2022-11-03 DIAGNOSIS — E03.9 HYPOTHYROIDISM, UNSPECIFIED TYPE: ICD-10-CM

## 2022-11-03 LAB — UA COMPLETE W REFLEX CULTURE PNL UR: ABNORMAL

## 2022-11-03 RX ORDER — LEVOTHYROXINE SODIUM 50 UG/1
50 TABLET ORAL
Qty: 90 TABLET | Refills: 1 | Status: SHIPPED | OUTPATIENT
Start: 2022-11-03 | End: 2023-06-09 | Stop reason: SDUPTHER

## 2022-11-03 NOTE — PROGRESS NOTES
Urine culture sensitive to Macrobid other labs ok however potassium a little low increase potassium in diet repeat BMP next week along with urine

## 2022-11-04 ENCOUNTER — OFFICE VISIT (OUTPATIENT)
Dept: FAMILY MEDICINE | Facility: CLINIC | Age: 67
End: 2022-11-04
Payer: MEDICARE

## 2022-11-04 VITALS
TEMPERATURE: 98 F | SYSTOLIC BLOOD PRESSURE: 130 MMHG | DIASTOLIC BLOOD PRESSURE: 70 MMHG | OXYGEN SATURATION: 97 % | BODY MASS INDEX: 23.85 KG/M2 | HEIGHT: 69 IN | HEART RATE: 63 BPM | RESPIRATION RATE: 18 BRPM | WEIGHT: 161 LBS

## 2022-11-04 DIAGNOSIS — N39.0 UTI (URINARY TRACT INFECTION), BACTERIAL: Primary | ICD-10-CM

## 2022-11-04 DIAGNOSIS — R11.0 NAUSEA: ICD-10-CM

## 2022-11-04 DIAGNOSIS — A49.9 UTI (URINARY TRACT INFECTION), BACTERIAL: Primary | ICD-10-CM

## 2022-11-04 DIAGNOSIS — E87.6 HYPOKALEMIA: ICD-10-CM

## 2022-11-04 DIAGNOSIS — R05.8 COUGH WITH EXPOSURE TO COVID-19 VIRUS: ICD-10-CM

## 2022-11-04 DIAGNOSIS — Z20.822 COUGH WITH EXPOSURE TO COVID-19 VIRUS: ICD-10-CM

## 2022-11-04 LAB
ANION GAP SERPL CALCULATED.3IONS-SCNC: 7 MMOL/L (ref 7–16)
BUN SERPL-MCNC: 12 MG/DL (ref 7–18)
BUN/CREAT SERPL: 11 (ref 6–20)
CALCIUM SERPL-MCNC: 9.2 MG/DL (ref 8.5–10.1)
CHLORIDE SERPL-SCNC: 103 MMOL/L (ref 98–107)
CO2 SERPL-SCNC: 31 MMOL/L (ref 21–32)
CREAT SERPL-MCNC: 1.1 MG/DL (ref 0.55–1.02)
CTP QC/QA: YES
EGFR (NO RACE VARIABLE) (RUSH/TITUS): 55 ML/MIN/1.73M²
FLUAV AG NPH QL: NEGATIVE
FLUBV AG NPH QL: NEGATIVE
GLUCOSE SERPL-MCNC: 102 MG/DL (ref 74–106)
POTASSIUM SERPL-SCNC: 3.4 MMOL/L (ref 3.5–5.1)
SARS-COV-2 AG RESP QL IA.RAPID: NEGATIVE
SODIUM SERPL-SCNC: 138 MMOL/L (ref 136–145)

## 2022-11-04 PROCEDURE — 87428 SARSCOV & INF VIR A&B AG IA: CPT | Mod: RHCUB | Performed by: NURSE PRACTITIONER

## 2022-11-04 PROCEDURE — 80048 BASIC METABOLIC PANEL: ICD-10-PCS | Mod: ,,, | Performed by: CLINICAL MEDICAL LABORATORY

## 2022-11-04 PROCEDURE — 99213 OFFICE O/P EST LOW 20 MIN: CPT | Mod: ,,, | Performed by: NURSE PRACTITIONER

## 2022-11-04 PROCEDURE — 80048 BASIC METABOLIC PNL TOTAL CA: CPT | Mod: ,,, | Performed by: CLINICAL MEDICAL LABORATORY

## 2022-11-04 PROCEDURE — 99213 PR OFFICE/OUTPT VISIT, EST, LEVL III, 20-29 MIN: ICD-10-PCS | Mod: ,,, | Performed by: NURSE PRACTITIONER

## 2022-11-04 RX ORDER — ONDANSETRON 4 MG/1
4 TABLET, FILM COATED ORAL EVERY 8 HOURS PRN
Qty: 30 TABLET | Refills: 0 | Status: SHIPPED | OUTPATIENT
Start: 2022-11-04 | End: 2023-06-08 | Stop reason: SDUPTHER

## 2022-11-04 NOTE — PATIENT INSTRUCTIONS
Will try zofran for nausea. Finish antibiotic return with urine specimen Monday to make sure it has resolved.

## 2022-11-04 NOTE — PROGRESS NOTES
2022     CHLOE Boswell   [facility]  [address]  [place] [address]     PATIENT NAME: Kizzy Schofield  : 1955  DATE: 22  MRN: [id number]      Billing Provider: CHLOE Boswell  Level of Service:   Patient PCP Information       Provider PCP Type    CHLOE Boswell [facility]            Reason for Visit / Chief Complaint: Follow-up (Patient states she isn't feeling any better, weak, nauseated), Fever, Fatigue, and Sore Throat       Update PCP  Update Chief Complaint         History of Present Illness / Problem Focused Workflow     Kizzy Schofield presents to the clinic feels unwell  has a sore throat dizzy and fatigued. She is having lose stool with antibiotic. She has increased potassium in diet and trying to drink more fluids She is negative for covid and flu.       Review of Systems     Review of Systems   Constitutional:  Positive for fatigue. Negative for appetite change, chills and fever.   HENT:  Positive for sore throat. Negative for ear pain and voice change.    Eyes:  Negative for visual disturbance.   Respiratory:  Negative for cough, chest tightness, shortness of breath and wheezing.    Cardiovascular:  Negative for chest pain and leg swelling.   Gastrointestinal:  Positive for change in bowel habit, nausea and change in bowel habit. Negative for abdominal pain and vomiting.   Genitourinary:  Negative for difficulty urinating.   Integumentary:  Negative for rash.   Neurological:  Positive for weakness and light-headedness. Negative for syncope.      Medical / Social / Family History     Past Medical History:   Diagnosis Date    Arthritis of left hip     Arthritis of left knee     Cervical radiculopathy     Chronic cystitis     Chronic pain syndrome     Degeneration of lumbar intervertebral disc     Depressive disorder     GERD (gastroesophageal reflux disease)     Hyperlipidemia     Hypertension     Lumbar post-laminectomy  syndrome     Meniere disease     Osteoarthritis of cervical and lumbar spine     Osteoporosis        Past Surgical History:   Procedure Laterality Date    bladder tack      CHOLECYSTECTOMY      HYSTERECTOMY      LUMBAR LAMINECTOMY N/A 6/16/2022    Procedure: L4-L5 Laminectomy;  Surgeon: Juan Luis Covington MD;  Location: [facility] [place];  Service: Neurosurgery;  Laterality: N/A;    OOPHORECTOMY         Social History  Ms.  reports that she has quit smoking. She has never used smokeless tobacco. She reports that she does not currently use alcohol. She reports that she does not use drugs.    Family History  Ms.'s family history includes Cancer in her father; Glaucoma in her daughter; Heart disease in her mother; Hypertension in her father and mother; Lung cancer in her father; Prostate cancer in her father; Stroke in her father and mother; Thyroid disease in her daughter and mother.    Medications and Allergies     Medications  Outpatient Medications Marked as Taking for the 11/4/22 encounter (Office Visit) with CHLOE Boswell   Medication Sig Dispense Refill    acetaminophen (TYLENOL) 500 MG tablet 1 tablet as needed      amitriptyline (ELAVIL) 10 MG tablet Take 1 tablet (10 mg total) by mouth 2 (two) times a day. 180 tablet 1    atorvastatin (LIPITOR) 40 MG tablet Take 1 tablet (40 mg total) by mouth once daily. 90 tablet 1    cetirizine (ZYRTEC) 10 MG tablet Take 10 mg by mouth once daily.      diazePAM (VALIUM) 2 MG tablet Take 1 tablet (2 mg total) by mouth every 12 (twelve) hours as needed for Anxiety. 60 tablet 2    dicyclomine (BENTYL) 10 MG capsule TAKE 1 CAPSULE FOUR TIMES DAILY BEFORE MEALS AND NIGHTLY AS NEEDED 120 capsule 0    EPINEPHrine (EPIPEN) 0.3 mg/0.3 mL AtIn Inject into the muscle once.      fluticasone propionate (FLONASE) 50 mcg/actuation nasal spray 2 sprays (100 mcg total) by Each Nostril route once daily. 16 g 2    gabapentin (NEURONTIN) 600 MG tablet Take 1 tablet (600 mg total) by  mouth 3 (three) times daily. 270 tablet 1    HYDROcodone-acetaminophen (NORCO) 7.5-325 mg per tablet Take 1 tablet by mouth 2 (two) times daily as needed for Pain.      levothyroxine (SYNTHROID) 50 MCG tablet Take 1 tablet (50 mcg total) by mouth before breakfast. 90 tablet 1    methocarbamoL (ROBAXIN) 500 MG Tab Take 1 tablet (500 mg total) by mouth 3 (three) times daily. 270 tablet 1    montelukast (SINGULAIR) 10 mg tablet Take 1 tablet (10 mg total) by mouth once daily. 90 tablet 1    nitrofurantoin, macrocrystal-monohydrate, (MACROBID) 100 MG capsule Take 1 capsule (100 mg total) by mouth 2 (two) times daily. for 5 days 10 capsule 0    pantoprazole (PROTONIX) 40 MG tablet Take 1 tablet (40 mg total) by mouth 2 (two) times daily. 180 tablet 1    polyethylene glycol (GLYCOLAX) 17 gram PwPk Take by mouth. Take 17 grams by mouth 2 times daily mixed with 8 ounces of water, juice, soda, tea, or coffee      promethazine (PHENERGAN) 25 MG tablet Take 1 tablet (25 mg total) by mouth every 4 (four) hours. 45 tablet 0    raloxifene (EVISTA) 60 mg tablet Take 1 tablet (60 mg total) by mouth once daily. 90 tablet 1    topiramate (TOPAMAX) 100 MG tablet Take 1 tablet (100 mg total) by mouth 2 (two) times a day. 180 tablet 1    traZODone (DESYREL) 50 MG tablet Take 1 tablet (50 mg total) by mouth nightly as needed for Insomnia. 90 tablet 1    triamterene-hydrochlorothiazide 37.5-25 mg (MAXZIDE-25) 37.5-25 mg per tablet Take 0.5 tablets by mouth once daily. 45 tablet 1    vitamin D (VITAMIN D3) 1000 units Tab Take 5,000 Units by mouth 2 (two) times a day.         Allergies  Review of patient's allergies indicates:   Allergen Reactions    Adhesive tape-silicones     Aspirin     Celebrex [celecoxib]     Opioids - morphine analogues     Pcn [penicillins]     Shrimp     Silicone Itching and Rash       Physical Examination     Vitals:    11/04/22 1010   BP: 130/70   BP Location: Right arm   Patient Position: Sitting   Pulse: 63  "  Resp: 18   Temp: 98.4 °F (36.9 °C)   TempSrc: Oral   SpO2: 97%   Weight: 73 kg (161 lb)   Height: 5' 9" (1.753 m)      Physical Exam  Vitals and nursing note reviewed.   Constitutional:       General: She is not in acute distress.     Appearance: Normal appearance. She is not ill-appearing, toxic-appearing or diaphoretic.   HENT:      Head: Normocephalic.      Right Ear: Tympanic membrane, ear canal and external ear normal.      Left Ear: Tympanic membrane, ear canal and external ear normal.      Nose: Nose normal.      Mouth/Throat:      Mouth: Mucous membranes are moist.      Pharynx: No oropharyngeal exudate or posterior oropharyngeal erythema.   Eyes:      Conjunctiva/sclera: Conjunctivae normal.      Pupils: Pupils are equal, round, and reactive to light.   Cardiovascular:      Rate and Rhythm: Normal rate and regular rhythm.      Heart sounds: Normal heart sounds.   Pulmonary:      Effort: Pulmonary effort is normal.      Breath sounds: Normal breath sounds. No wheezing, rhonchi or rales.   Musculoskeletal:      Cervical back: Normal range of motion and neck supple. No rigidity or tenderness.   Lymphadenopathy:      Cervical: No cervical adenopathy.   Skin:     General: Skin is warm and dry.      Capillary Refill: Capillary refill takes less than 2 seconds.   Neurological:      General: No focal deficit present.      Mental Status: She is alert.   Psychiatric:         Behavior: Behavior normal.        Assessment and Plan (including Health Maintenance)      Problem List  Smart Sets  Document Outside HM   :    Plan:   Stay well hydrated finish antibiotic return Monday repeat ua, will add zofran for nausea and recheck BMP      Health Maintenance Due   Topic Date Due    Shingles Vaccine (1 of 2) Never done    COVID-19 Vaccine (3 - Booster for Moderna series) 08/20/2021    TETANUS VACCINE  10/11/2021    Colorectal Cancer Screening  04/02/2022    Influenza Vaccine (1) 09/01/2022    Mammogram  10/06/2022 "       Problem List Items Addressed This Visit    None  Visit Diagnoses       UTI (urinary tract infection), bacterial    -  Primary    Nausea        Relevant Medications    ondansetron (ZOFRAN) 4 MG tablet    Hypokalemia        Relevant Orders    Basic Metabolic Panel    Cough with exposure to COVID-19 virus              UTI (urinary tract infection), bacterial    Nausea  -     ondansetron (ZOFRAN) 4 MG tablet; Take 1 tablet (4 mg total) by mouth every 8 (eight) hours as needed for Nausea.  Dispense: 30 tablet; Refill: 0    Hypokalemia  -     Basic Metabolic Panel; Future; Expected date: 11/04/2022    Cough with exposure to COVID-19 virus  -     POCT SARS-COV2 (COVID) with Flu Antigen     Health Maintenance Topics with due status: Not Due       Topic Last Completion Date    DEXA Scan 09/02/2020    Lipid Panel 10/31/2022       Procedures     Future Appointments   Date Time Provider Department Center   11/7/2022  9:00 AM [facility] [place]   11/14/2022 10:45 AM Juan Luis Covington MD OB SPINE [place] [facility]   11/15/2022  1:00 PM [facility]   2/20/2023  1:00 PM Omari Holt DO [facility] [place] [facility]        Follow up if symptoms worsen or fail to improve.     Signature:  CHLOE Boswell    Date of encounter: 11/4/22

## 2022-11-07 ENCOUNTER — LAB VISIT (OUTPATIENT)
Dept: LAB | Facility: CLINIC | Age: 67
End: 2022-11-07
Payer: MEDICARE

## 2022-11-07 DIAGNOSIS — N30.20 CHRONIC CYSTITIS: ICD-10-CM

## 2022-11-07 DIAGNOSIS — N30.00 ACUTE CYSTITIS WITHOUT HEMATURIA: ICD-10-CM

## 2022-11-07 DIAGNOSIS — N39.0 FREQUENT UTI: Primary | ICD-10-CM

## 2022-11-07 LAB
BILIRUB SERPL-MCNC: NEGATIVE MG/DL
BLOOD URINE, POC: ABNORMAL
COLOR, POC UA: YELLOW
GLUCOSE UR QL STRIP: NEGATIVE
KETONES UR QL STRIP: NEGATIVE
LEUKOCYTE ESTERASE URINE, POC: ABNORMAL
NITRITE, POC UA: POSITIVE
PH, POC UA: 6
PROTEIN, POC: ABNORMAL
SPECIFIC GRAVITY, POC UA: 1.01
UROBILINOGEN, POC UA: 0.2

## 2022-11-07 PROCEDURE — 87186 CULTURE, URINE: ICD-10-PCS | Mod: ,,, | Performed by: CLINICAL MEDICAL LABORATORY

## 2022-11-07 PROCEDURE — 87086 URINE CULTURE/COLONY COUNT: CPT | Mod: ,,, | Performed by: CLINICAL MEDICAL LABORATORY

## 2022-11-07 PROCEDURE — 81003 URINALYSIS AUTO W/O SCOPE: CPT | Mod: RHCUB | Performed by: NURSE PRACTITIONER

## 2022-11-07 PROCEDURE — 87186 SC STD MICRODIL/AGAR DIL: CPT | Mod: ,,, | Performed by: CLINICAL MEDICAL LABORATORY

## 2022-11-07 PROCEDURE — 87086 CULTURE, URINE: ICD-10-PCS | Mod: ,,, | Performed by: CLINICAL MEDICAL LABORATORY

## 2022-11-07 PROCEDURE — 87077 CULTURE AEROBIC IDENTIFY: CPT | Mod: ,,, | Performed by: CLINICAL MEDICAL LABORATORY

## 2022-11-07 PROCEDURE — 87077 CULTURE, URINE: ICD-10-PCS | Mod: ,,, | Performed by: CLINICAL MEDICAL LABORATORY

## 2022-11-09 DIAGNOSIS — R31.9 URINARY TRACT INFECTION WITH HEMATURIA, SITE UNSPECIFIED: Primary | ICD-10-CM

## 2022-11-09 DIAGNOSIS — N39.0 URINARY TRACT INFECTION WITHOUT HEMATURIA, SITE UNSPECIFIED: ICD-10-CM

## 2022-11-09 DIAGNOSIS — M54.16 LUMBAR RADICULOPATHY: Primary | ICD-10-CM

## 2022-11-09 DIAGNOSIS — N39.0 URINARY TRACT INFECTION WITH HEMATURIA, SITE UNSPECIFIED: Primary | ICD-10-CM

## 2022-11-09 DIAGNOSIS — Z09 FOLLOW-UP EXAMINATION AFTER ORTHOPEDIC SURGERY: ICD-10-CM

## 2022-11-09 LAB — UA COMPLETE W REFLEX CULTURE PNL UR: ABNORMAL

## 2022-11-09 RX ORDER — SULFAMETHOXAZOLE AND TRIMETHOPRIM 800; 160 MG/1; MG/1
1 TABLET ORAL 2 TIMES DAILY
Qty: 20 TABLET | Refills: 0 | Status: SHIPPED | OUTPATIENT
Start: 2022-11-09 | End: 2022-11-19

## 2022-11-09 NOTE — TELEPHONE ENCOUNTER
Pt called and reported that she had Been on Macrobid 100 mg BID for 5 days and Urine culture was done afterward and wants Dr Coello to look at it because her provider CHLOE Obregon is out of town and will be back Monday. Pt reports temp of 100.1 and feels bad and wants something done. Dr Coello ordered Bactrim DS 1 tablet twice daily for 10 days. Pt voiced understanding and will call back if she has any problems. Pt aware that she is to go to the ED if she gets any worst.

## 2022-11-14 ENCOUNTER — OFFICE VISIT (OUTPATIENT)
Dept: SPINE | Facility: CLINIC | Age: 67
End: 2022-11-14
Payer: MEDICARE

## 2022-11-14 ENCOUNTER — HOSPITAL ENCOUNTER (OUTPATIENT)
Dept: RADIOLOGY | Facility: HOSPITAL | Age: 67
Discharge: HOME | End: 2022-11-14
Attending: ORTHOPAEDIC SURGERY
Payer: MEDICARE

## 2022-11-14 DIAGNOSIS — Z09 FOLLOW-UP EXAMINATION AFTER ORTHOPEDIC SURGERY: ICD-10-CM

## 2022-11-14 DIAGNOSIS — M54.16 LUMBAR RADICULOPATHY: ICD-10-CM

## 2022-11-14 DIAGNOSIS — M51.36 DDD (DEGENERATIVE DISC DISEASE), LUMBAR: Primary | ICD-10-CM

## 2022-11-14 PROCEDURE — 99214 OFFICE O/P EST MOD 30 MIN: CPT | Mod: S$PBB,,, | Performed by: ORTHOPAEDIC SURGERY

## 2022-11-14 PROCEDURE — 72100 X-RAY EXAM L-S SPINE 2/3 VWS: CPT | Mod: 26,,, | Performed by: ORTHOPAEDIC SURGERY

## 2022-11-14 PROCEDURE — 72100 X-RAY EXAM L-S SPINE 2/3 VWS: CPT | Mod: TC

## 2022-11-14 PROCEDURE — 99212 OFFICE O/P EST SF 10 MIN: CPT | Mod: PBBFAC | Performed by: ORTHOPAEDIC SURGERY

## 2022-11-14 PROCEDURE — 99214 PR OFFICE/OUTPT VISIT, EST, LEVL IV, 30-39 MIN: ICD-10-PCS | Mod: S$PBB,,, | Performed by: ORTHOPAEDIC SURGERY

## 2022-11-14 PROCEDURE — 72100 XR LUMBAR SPINE AP AND LATERAL: ICD-10-PCS | Mod: 26,,, | Performed by: ORTHOPAEDIC SURGERY

## 2022-11-14 NOTE — PROGRESS NOTES
AP, lateral views of the lumbar spine reviewed    On the AP there is lumbar curvature to the left.  There are 5 non-rib-bearing lumbar vertebrae.  On the lateral there is decreased lumbar lordosis.  There is spondylotic disease with decreased disc height and osteophyte formation noted.  There has been prior left-sided sacroiliac fixation.  Prior L4-5 laminectomy.  Grade 1 anterolisthesis at L4-5    Impression:  Spondylotic changes of the lumbar spine as noted above

## 2022-11-15 ENCOUNTER — HOSPITAL ENCOUNTER (OUTPATIENT)
Dept: RADIOLOGY | Facility: HOSPITAL | Age: 67
Discharge: HOME | End: 2022-11-15
Payer: MEDICARE

## 2022-11-15 DIAGNOSIS — Z12.31 VISIT FOR SCREENING MAMMOGRAM: ICD-10-CM

## 2022-11-15 PROCEDURE — 77067 SCR MAMMO BI INCL CAD: CPT | Mod: TC

## 2022-11-15 PROCEDURE — 77067 SCR MAMMO BI INCL CAD: CPT | Mod: 26,,, | Performed by: RADIOLOGY

## 2022-11-15 PROCEDURE — 77067 MAMMO DIGITAL SCREENING BILAT: ICD-10-PCS | Mod: 26,,, | Performed by: RADIOLOGY

## 2022-11-17 NOTE — PROGRESS NOTES
MDM/time:  Greater than 30 minutes spent on this encounter including 10 minutes reviewing imaging and notes, 15 minutes with the patient, 5 minutes documentation    ASSESSMENT:  67 y.o. female with lumbar degenerative scoliosis, spondylosis and radiculopathy now status post L4-5 laminectomy 06/16/2022    PLAN:  Home exercise program.  Follow-up in 3 months    HPI:  67 y.o. female here for evaluation of lumbar degenerative scoliosis, spondylosis and radiculopathy now status post L4-5 laminectomy 06/16/2022.  Not having much pain but does complain of bilateral lower extremity fatigue    IMAGING:  X-rays lumbar spine reviewed show:  On the AP there is lumbar curvature to the left.  There are 5 non-rib-bearing lumbar vertebrae.  On the lateral there is decreased lumbar lordosis.  There is spondylotic disease with decreased disc height and osteophyte formation noted.  There has been prior left-sided sacroiliac fixation.  Prior L4-5 laminectomy.  Slight grade 1 anterolisthesis at L4-5    Past Medical History:   Diagnosis Date    Arthritis of left hip     Arthritis of left knee     Cervical radiculopathy     Chronic cystitis     Chronic pain syndrome     Degeneration of lumbar intervertebral disc     Depressive disorder     GERD (gastroesophageal reflux disease)     Hyperlipidemia     Hypertension     Lumbar post-laminectomy syndrome     Meniere disease     Osteoarthritis of cervical and lumbar spine     Osteoporosis      Past Surgical History:   Procedure Laterality Date    bladder tack      CHOLECYSTECTOMY      HYSTERECTOMY      LUMBAR LAMINECTOMY N/A 6/16/2022    Procedure: L4-L5 Laminectomy;  Surgeon: Juan Luis Covington MD;  Location: [facility];  Service: Neurosurgery;  Laterality: N/A;    OOPHORECTOMY       Social History     Tobacco Use    Smoking status: Former    Smokeless tobacco: Never   Substance Use Topics    Alcohol use: Not Currently    Drug use: Never      Current Outpatient Medications   Medication  Instructions    acetaminophen (TYLENOL) 500 MG tablet 1 tablet as needed    amitriptyline (ELAVIL) 10 mg, Oral, 2 times daily    atorvastatin (LIPITOR) 40 mg, Oral, Daily    cetirizine (ZYRTEC) 10 mg, Oral, Daily    diazePAM (VALIUM) 2 mg, Oral, Every 12 hours PRN    dicyclomine (BENTYL) 10 MG capsule TAKE 1 CAPSULE FOUR TIMES DAILY BEFORE MEALS AND NIGHTLY AS NEEDED    EPINEPHrine (EPIPEN) 0.3 mg/0.3 mL AtIn Intramuscular, Once    fluticasone propionate (FLONASE) 100 mcg, Each Nostril, Daily    gabapentin (NEURONTIN) 600 mg, Oral, 3 times daily    HYDROcodone-acetaminophen (NORCO) 7.5-325 mg per tablet 1 tablet, Oral, 2 times daily PRN    levothyroxine (SYNTHROID) 50 mcg, Oral, Before breakfast    methocarbamoL (ROBAXIN) 500 mg, Oral, 3 times daily    montelukast (SINGULAIR) 10 mg, Oral, Daily    ondansetron (ZOFRAN) 4 mg, Oral, Every 8 hours PRN    pantoprazole (PROTONIX) 40 mg, Oral, 2 times daily    polyethylene glycol (GLYCOLAX) 17 gram PwPk Oral, Take 17 grams by mouth 2 times daily mixed with 8 ounces of water, juice, soda, tea, or coffee     promethazine (PHENERGAN) 25 mg, Oral, Every 4 hours    raloxifene (EVISTA) 60 mg, Oral, Daily    sulfamethoxazole-trimethoprim 800-160mg (BACTRIM DS) 800-160 mg Tab 1 tablet, Oral, 2 times daily    topiramate (TOPAMAX) 100 mg, Oral, 2 times daily    traZODone (DESYREL) 50 mg, Oral, Nightly PRN    triamterene-hydrochlorothiazide 37.5-25 mg (MAXZIDE-25) 37.5-25 mg per tablet 0.5 tablets, Oral, Daily    vitamin D (VITAMIN D3) 5,000 Units, Oral, 2 times daily        EXAM:  Constitutional  General Appearance:  There is no height or weight on file to calculate BMI., NAD  Psychiatric   Orientation: Oriented to time, oriented to place, oriented to person  Mood and Affect: Active and alert, normal mood, normal affect  Gait and Station   Appearance:  Normal gait, normal tandem gait, able to walk on toes, able to walk on heels  Small area of granulation at the superior aspect of  the incision, no expressible drainage.  No fluctuance.  No erythema  5/5 strength  Sensation intact  2+ pulses

## 2022-12-05 DIAGNOSIS — H81.03 MENIERE'S DISEASE (COCHLEAR HYDROPS), BILATERAL: Primary | ICD-10-CM

## 2022-12-05 RX ORDER — DIAZEPAM 2 MG/1
2 TABLET ORAL EVERY 12 HOURS PRN
Qty: 60 TABLET | Refills: 2 | Status: SHIPPED | OUTPATIENT
Start: 2022-12-05 | End: 2023-03-13

## 2022-12-09 DIAGNOSIS — Z71.89 COMPLEX CARE COORDINATION: ICD-10-CM

## 2022-12-15 ENCOUNTER — OFFICE VISIT (OUTPATIENT)
Dept: FAMILY MEDICINE | Facility: CLINIC | Age: 67
End: 2022-12-15
Payer: MEDICARE

## 2022-12-15 VITALS
SYSTOLIC BLOOD PRESSURE: 128 MMHG | RESPIRATION RATE: 18 BRPM | BODY MASS INDEX: 25.03 KG/M2 | DIASTOLIC BLOOD PRESSURE: 74 MMHG | TEMPERATURE: 98 F | OXYGEN SATURATION: 99 % | HEIGHT: 69 IN | WEIGHT: 169 LBS | HEART RATE: 79 BPM

## 2022-12-15 DIAGNOSIS — R50.9 FEVER, UNSPECIFIED FEVER CAUSE: ICD-10-CM

## 2022-12-15 DIAGNOSIS — R52 GENERALIZED BODY ACHES: ICD-10-CM

## 2022-12-15 DIAGNOSIS — J40 BRONCHITIS: Primary | ICD-10-CM

## 2022-12-15 LAB
BILIRUB SERPL-MCNC: NEGATIVE MG/DL
BLOOD URINE, POC: NEGATIVE
COLOR, POC UA: YELLOW
CTP QC/QA: YES
FLUAV AG NPH QL: NEGATIVE
FLUBV AG NPH QL: NEGATIVE
GLUCOSE UR QL STRIP: NEGATIVE
KETONES UR QL STRIP: NEGATIVE
LEUKOCYTE ESTERASE URINE, POC: ABNORMAL
NITRITE, POC UA: NEGATIVE
PH, POC UA: 6.5
PROTEIN, POC: NEGATIVE
SPECIFIC GRAVITY, POC UA: 1.01
UROBILINOGEN, POC UA: 0.2

## 2022-12-15 PROCEDURE — 87186 CULTURE, URINE: ICD-10-PCS | Mod: ,,, | Performed by: CLINICAL MEDICAL LABORATORY

## 2022-12-15 PROCEDURE — 87086 URINE CULTURE/COLONY COUNT: CPT | Mod: ,,, | Performed by: CLINICAL MEDICAL LABORATORY

## 2022-12-15 PROCEDURE — 87077 CULTURE, URINE: ICD-10-PCS | Mod: ,,, | Performed by: CLINICAL MEDICAL LABORATORY

## 2022-12-15 PROCEDURE — 87804 INFLUENZA ASSAY W/OPTIC: CPT | Mod: RHCUB | Performed by: NURSE PRACTITIONER

## 2022-12-15 PROCEDURE — 87186 SC STD MICRODIL/AGAR DIL: CPT | Mod: ,,, | Performed by: CLINICAL MEDICAL LABORATORY

## 2022-12-15 PROCEDURE — 99214 OFFICE O/P EST MOD 30 MIN: CPT | Mod: ,,, | Performed by: NURSE PRACTITIONER

## 2022-12-15 PROCEDURE — 87086 CULTURE, URINE: ICD-10-PCS | Mod: ,,, | Performed by: CLINICAL MEDICAL LABORATORY

## 2022-12-15 PROCEDURE — 81003 URINALYSIS AUTO W/O SCOPE: CPT | Mod: RHCUB | Performed by: NURSE PRACTITIONER

## 2022-12-15 PROCEDURE — 87077 CULTURE AEROBIC IDENTIFY: CPT | Mod: ,,, | Performed by: CLINICAL MEDICAL LABORATORY

## 2022-12-15 PROCEDURE — 99214 PR OFFICE/OUTPT VISIT, EST, LEVL IV, 30-39 MIN: ICD-10-PCS | Mod: ,,, | Performed by: NURSE PRACTITIONER

## 2022-12-15 RX ORDER — DEXTROMETHORPHAN HYDROBROMIDE, GUAIFENESIN 20; 200 MG/20ML; MG/20ML
5 SOLUTION ORAL 3 TIMES DAILY PRN
Qty: 236 ML | Refills: 0 | Status: SHIPPED | OUTPATIENT
Start: 2022-12-15 | End: 2023-01-23

## 2022-12-15 RX ORDER — HYDROXYZINE HYDROCHLORIDE 25 MG/1
25 TABLET, FILM COATED ORAL NIGHTLY
COMMUNITY
Start: 2022-10-27 | End: 2023-03-28

## 2022-12-15 RX ORDER — TRIAMCINOLONE ACETONIDE 1 MG/G
OINTMENT TOPICAL
COMMUNITY
Start: 2022-10-27

## 2022-12-15 RX ORDER — RABEPRAZOLE SODIUM 20 MG/1
TABLET, DELAYED RELEASE ORAL 2 TIMES DAILY
COMMUNITY
Start: 2022-10-18

## 2022-12-15 RX ORDER — AZITHROMYCIN 250 MG/1
TABLET, FILM COATED ORAL
Qty: 6 TABLET | Refills: 0 | Status: SHIPPED | OUTPATIENT
Start: 2022-12-15 | End: 2022-12-20

## 2022-12-15 RX ORDER — CHOLESTYRAMINE LIGHT 4 G/5.7G
POWDER, FOR SUSPENSION ORAL 2 TIMES DAILY
COMMUNITY
Start: 2022-09-07 | End: 2023-03-28 | Stop reason: SDUPTHER

## 2022-12-15 NOTE — PATIENT INSTRUCTIONS
Rapid flu negative will add culture to urine today suspect viral resp illness stay well hydrated rest  Will start azithromycin and tussin dm to help expectorate

## 2022-12-15 NOTE — PROGRESS NOTES
12/15/2022     CHLOE Boswell   [facility]  [address]  [place] [address]     PATIENT NAME: Kizzy Schofield  : 1955  DATE: 12/15/22  MRN: [id number]      Billing Provider: CHLOE Boswell  Level of Service:   Patient PCP Information       Provider PCP Type    CHLOE Boswell [facility]            Reason for Visit / Chief Complaint: Fever (101.1 today before coming up here), Cough (Started coughing last night at bedtime), Generalized Body Aches, Headache, and Sore Throat       Update PCP  Update Chief Complaint         History of Present Illness / Problem Focused Workflow     Kizzy Schofield presents to the clinic reports fever of 101 today cough body aches headache started today just fatigued not feeling good. Fatigue with minimal activity      Review of Systems     Review of Systems   Constitutional:  Positive for chills, fatigue and fever.   HENT:  Positive for rhinorrhea, sinus pressure/congestion, sore throat and voice change.    Eyes:  Negative for visual disturbance.   Respiratory:  Positive for cough.    Gastrointestinal:  Negative for change in bowel habit, nausea, vomiting and change in bowel habit.   Genitourinary:  Negative for difficulty urinating.   Musculoskeletal:  Positive for myalgias.   Integumentary:  Negative for rash.   Neurological:  Positive for dizziness and weakness.      Medical / Social / Family History     Past Medical History:   Diagnosis Date    Arthritis of left hip     Arthritis of left knee     Cervical radiculopathy     Chronic cystitis     Chronic pain syndrome     Degeneration of lumbar intervertebral disc     Depressive disorder     GERD (gastroesophageal reflux disease)     Hyperlipidemia     Hypertension     Lumbar post-laminectomy syndrome     Meniere disease     Osteoarthritis of cervical and lumbar spine     Osteoporosis        Past Surgical History:   Procedure Laterality Date    bladder tack       CHOLECYSTECTOMY      HYSTERECTOMY      LUMBAR LAMINECTOMY N/A 6/16/2022    Procedure: L4-L5 Laminectomy;  Surgeon: Juan Luis Covington MD;  Location[facility];  Service: Neurosurgery;  Laterality: N/A;    OOPHORECTOMY         Social History  Ms.  reports that she has quit smoking. She has never used smokeless tobacco. She reports that she does not currently use alcohol. She reports that she does not use drugs.    Family History  Ms.'s family history includes Cancer in her father; Glaucoma in her daughter; Heart disease in her mother; Hypertension in her father and mother; Lung cancer in her father; Prostate cancer in her father; Stroke in her father and mother; Thyroid disease in her daughter and mother.    Medications and Allergies     Medications  Outpatient Medications Marked as Taking for the 12/15/22 encounter (Office Visit) with CHLOE Boswell   Medication Sig Dispense Refill    acetaminophen (TYLENOL) 500 MG tablet 1 tablet as needed      amitriptyline (ELAVIL) 10 MG tablet Take 1 tablet (10 mg total) by mouth 2 (two) times a day. 180 tablet 1    atorvastatin (LIPITOR) 40 MG tablet Take 1 tablet (40 mg total) by mouth once daily. 90 tablet 1    cetirizine (ZYRTEC) 10 MG tablet Take 10 mg by mouth once daily.      CHOLESTYRAMINE LIGHT 4 gram Powd 2 (two) times a day.      diazePAM (VALIUM) 2 MG tablet Take 1 tablet (2 mg total) by mouth every 12 (twelve) hours as needed for Anxiety. 60 tablet 2    dicyclomine (BENTYL) 10 MG capsule TAKE 1 CAPSULE FOUR TIMES DAILY BEFORE MEALS AND NIGHTLY AS NEEDED 120 capsule 0    EPINEPHrine (EPIPEN) 0.3 mg/0.3 mL AtIn Inject into the muscle once.      fluticasone propionate (FLONASE) 50 mcg/actuation nasal spray USE 2 SPRAYS IN EACH NOSTRIL ONE TIME DAILY 48 g 2    gabapentin (NEURONTIN) 600 MG tablet Take 1 tablet (600 mg total) by mouth 3 (three) times daily. 270 tablet 1    HYDROcodone-acetaminophen (NORCO) 7.5-325 mg per tablet Take 1 tablet by mouth 2  (two) times daily as needed for Pain.      hydrOXYzine HCL (ATARAX) 25 MG tablet Take 25 mg by mouth every evening.      levothyroxine (SYNTHROID) 50 MCG tablet Take 1 tablet (50 mcg total) by mouth before breakfast. 90 tablet 1    methocarbamoL (ROBAXIN) 500 MG Tab Take 1 tablet (500 mg total) by mouth 3 (three) times daily. 270 tablet 1    montelukast (SINGULAIR) 10 mg tablet Take 1 tablet (10 mg total) by mouth once daily. 90 tablet 1    ondansetron (ZOFRAN) 4 MG tablet Take 1 tablet (4 mg total) by mouth every 8 (eight) hours as needed for Nausea. 30 tablet 0    pantoprazole (PROTONIX) 40 MG tablet Take 1 tablet (40 mg total) by mouth 2 (two) times daily. 180 tablet 1    polyethylene glycol (GLYCOLAX) 17 gram PwPk Take by mouth. Take 17 grams by mouth 2 times daily mixed with 8 ounces of water, juice, soda, tea, or coffee      promethazine (PHENERGAN) 25 MG tablet Take 1 tablet (25 mg total) by mouth every 4 (four) hours. 45 tablet 0    RABEprazole (ACIPHEX) 20 mg tablet Take by mouth 2 (two) times daily.      raloxifene (EVISTA) 60 mg tablet Take 1 tablet (60 mg total) by mouth once daily. 90 tablet 1    topiramate (TOPAMAX) 100 MG tablet Take 1 tablet (100 mg total) by mouth 2 (two) times a day. 180 tablet 1    traZODone (DESYREL) 50 MG tablet Take 1 tablet (50 mg total) by mouth nightly as needed for Insomnia. 90 tablet 1    triamcinolone acetonide 0.1% (KENALOG) 0.1 % ointment APPLY TO THE AFFECTED AREA(S) ON ARMS, LEGS AND trunk TWICE DAILY FOR ITCHING      triamterene-hydrochlorothiazide 37.5-25 mg (MAXZIDE-25) 37.5-25 mg per tablet Take 0.5 tablets by mouth once daily. 45 tablet 1    vitamin D (VITAMIN D3) 1000 units Tab Take 5,000 Units by mouth 2 (two) times a day.         Allergies  Review of patient's allergies indicates:   Allergen Reactions    Adhesive tape-silicones     Aspirin     Celebrex [celecoxib]     Opioids - morphine analogues     Pcn [penicillins]     Shrimp     Ciprofloxacin      Pt  "reported that "it made her feel real bad".    Silicone Itching and Rash       Physical Examination     Vitals:    12/15/22 1241   BP: 128/74   BP Location: Right arm   Patient Position: Sitting   Pulse: 79   Resp: 18   Temp: 98.4 °F (36.9 °C)   TempSrc: Oral   SpO2: 99%   Weight: 76.7 kg (169 lb)   Height: 5' 9" (1.753 m)      Physical Exam  Vitals and nursing note reviewed.   Constitutional:       General: She is not in acute distress.     Appearance: She is not ill-appearing or toxic-appearing.   HENT:      Head: Normocephalic.      Right Ear: Tympanic membrane, ear canal and external ear normal.      Left Ear: Tympanic membrane, ear canal and external ear normal.      Nose: Rhinorrhea present.      Mouth/Throat:      Mouth: Mucous membranes are moist.      Pharynx: Posterior oropharyngeal erythema present.   Eyes:      Conjunctiva/sclera: Conjunctivae normal.      Pupils: Pupils are equal, round, and reactive to light.   Cardiovascular:      Rate and Rhythm: Normal rate and regular rhythm.      Heart sounds: Normal heart sounds.   Pulmonary:      Effort: Pulmonary effort is normal.      Breath sounds: No wheezing, rhonchi or rales.      Comments: Croupy cough  Musculoskeletal:      Cervical back: Normal range of motion and neck supple. No rigidity.   Lymphadenopathy:      Cervical: No cervical adenopathy.   Skin:     General: Skin is warm.      Capillary Refill: Capillary refill takes less than 2 seconds.   Neurological:      Mental Status: She is alert and oriented to person, place, and time.   Psychiatric:         Behavior: Behavior normal.        Assessment and Plan (including Health Maintenance)      Problem List  Smart Sets  Document Outside HM   :    Plan:   Flu negative culture added will start azithomycin and tussin dm      Health Maintenance Due   Topic Date Due    Shingles Vaccine (1 of 2) Never done    COVID-19 Vaccine (3 - Booster for Moderna series) 08/20/2021    TETANUS VACCINE  10/11/2021    " Colorectal Cancer Screening  04/02/2022    Influenza Vaccine (1) 09/01/2022       Problem List Items Addressed This Visit    None  Visit Diagnoses       Generalized body aches    -  Primary    Relevant Orders    POCT Influenza A/B    Fever, unspecified fever cause        Relevant Orders    POCT URINALYSIS W/O SCOPE          Generalized body aches  -     POCT Influenza A/B    Fever, unspecified fever cause  -     POCT URINALYSIS W/O SCOPE       Health Maintenance Topics with due status: Not Due       Topic Last Completion Date    DEXA Scan 09/02/2020    Lipid Panel 10/31/2022    Mammogram 11/15/2022       Procedures     Future Appointments   Date Time Provider Department Center   12/15/2022  2:00 PM CHLOE Boswell [facility] [place]   2/13/2023 11:00 AM Juan Luis Covington MD [facility]   2/20/2023  1:00 PM Omari Holt DO [facility]    1/24/2024  1:00 PM [facility] MAMMO1 [facility]        No follow-ups on file.     Signature:  CHLOE Boswell    Date of encounter: 12/15/22

## 2022-12-16 ENCOUNTER — PATIENT MESSAGE (OUTPATIENT)
Dept: FAMILY MEDICINE | Facility: CLINIC | Age: 67
End: 2022-12-16
Payer: MEDICARE

## 2022-12-17 LAB — UA COMPLETE W REFLEX CULTURE PNL UR: ABNORMAL

## 2022-12-19 DIAGNOSIS — N39.0 UTI (URINARY TRACT INFECTION), BACTERIAL: Primary | ICD-10-CM

## 2022-12-19 DIAGNOSIS — A49.9 UTI (URINARY TRACT INFECTION), BACTERIAL: Primary | ICD-10-CM

## 2022-12-19 RX ORDER — NITROFURANTOIN 25; 75 MG/1; MG/1
100 CAPSULE ORAL 2 TIMES DAILY
Qty: 14 CAPSULE | Refills: 0 | Status: SHIPPED | OUTPATIENT
Start: 2022-12-19 | End: 2022-12-26

## 2023-01-23 ENCOUNTER — OFFICE VISIT (OUTPATIENT)
Dept: FAMILY MEDICINE | Facility: CLINIC | Age: 68
End: 2023-01-23
Payer: MEDICARE

## 2023-01-23 ENCOUNTER — APPOINTMENT (OUTPATIENT)
Dept: RADIOLOGY | Facility: CLINIC | Age: 68
End: 2023-01-23
Attending: FAMILY MEDICINE
Payer: MEDICARE

## 2023-01-23 VITALS
TEMPERATURE: 98 F | WEIGHT: 170 LBS | RESPIRATION RATE: 18 BRPM | SYSTOLIC BLOOD PRESSURE: 140 MMHG | OXYGEN SATURATION: 97 % | HEART RATE: 74 BPM | DIASTOLIC BLOOD PRESSURE: 70 MMHG | BODY MASS INDEX: 25.18 KG/M2 | HEIGHT: 69 IN

## 2023-01-23 DIAGNOSIS — M79.642 LEFT HAND PAIN: Primary | ICD-10-CM

## 2023-01-23 DIAGNOSIS — M79.642 LEFT HAND PAIN: ICD-10-CM

## 2023-01-23 PROBLEM — M99.9 NONALLOPATHIC LESION OF SACRAL REGION: Status: ACTIVE | Noted: 2023-01-23

## 2023-01-23 PROBLEM — G58.7 MONONEURITIS MULTIPLEX: Status: ACTIVE | Noted: 2023-01-23

## 2023-01-23 PROBLEM — G43.909 MIGRAINE: Status: ACTIVE | Noted: 2023-01-23

## 2023-01-23 PROBLEM — E03.9 HYPOTHYROIDISM: Status: ACTIVE | Noted: 2023-01-23

## 2023-01-23 PROBLEM — Z96.642 STATUS POST TOTAL REPLACEMENT OF LEFT HIP: Status: ACTIVE | Noted: 2019-01-30

## 2023-01-23 PROBLEM — M47.812 SPONDYLOSIS OF CERVICAL REGION WITHOUT MYELOPATHY OR RADICULOPATHY: Status: ACTIVE | Noted: 2019-01-18

## 2023-01-23 PROBLEM — R07.9 CHEST PAIN: Status: ACTIVE | Noted: 2017-05-09

## 2023-01-23 PROBLEM — R20.2 NUMBNESS AND TINGLING: Status: ACTIVE | Noted: 2023-01-23

## 2023-01-23 PROBLEM — H81.09 MENIERE'S DISEASE: Status: ACTIVE | Noted: 2017-04-27

## 2023-01-23 PROBLEM — F41.1 ANXIETY STATE: Status: ACTIVE | Noted: 2023-01-23

## 2023-01-23 PROBLEM — E78.2 MIXED HYPERLIPIDEMIA: Status: ACTIVE | Noted: 2023-01-23

## 2023-01-23 PROBLEM — R20.0 NUMBNESS AND TINGLING: Status: ACTIVE | Noted: 2023-01-23

## 2023-01-23 PROBLEM — M16.9 OA (OSTEOARTHRITIS) OF HIP: Status: ACTIVE | Noted: 2018-12-18

## 2023-01-23 PROCEDURE — 73120 XR HAND 2 VIEW LEFT: ICD-10-PCS | Mod: 26,LT,, | Performed by: RADIOLOGY

## 2023-01-23 PROCEDURE — 73120 X-RAY EXAM OF HAND: CPT | Mod: TC,RHCUB,LT | Performed by: FAMILY MEDICINE

## 2023-01-23 PROCEDURE — 73120 X-RAY EXAM OF HAND: CPT | Mod: 26,LT,, | Performed by: RADIOLOGY

## 2023-01-23 PROCEDURE — 99213 OFFICE O/P EST LOW 20 MIN: CPT | Mod: ,,, | Performed by: FAMILY MEDICINE

## 2023-01-23 PROCEDURE — 99213 PR OFFICE/OUTPT VISIT, EST, LEVL III, 20-29 MIN: ICD-10-PCS | Mod: ,,, | Performed by: FAMILY MEDICINE

## 2023-01-23 RX ORDER — DICLOFENAC SODIUM 10 MG/G
2 GEL TOPICAL DAILY
Qty: 100 G | Refills: 2 | Status: SHIPPED | OUTPATIENT
Start: 2023-01-23 | End: 2023-03-28 | Stop reason: ALTCHOICE

## 2023-01-23 RX ORDER — DICLOFENAC SODIUM 10 MG/G
2 GEL TOPICAL ONCE
Status: COMPLETED | OUTPATIENT
Start: 2023-01-23 | End: 2023-01-23

## 2023-01-23 RX ORDER — METHYLPREDNISOLONE 4 MG/1
TABLET ORAL
Qty: 21 EACH | Refills: 0 | Status: SHIPPED | OUTPATIENT
Start: 2023-01-23 | End: 2023-02-13

## 2023-01-23 RX ADMIN — DICLOFENAC SODIUM 2 G: 10 GEL TOPICAL at 08:01

## 2023-01-23 NOTE — PROGRESS NOTES
Nelly Lara MD        PATIENT NAME: Kizzy Schofield  : 1955  DATE: 23  MRN: [id number]      Billing Provider: Nelly Lara MD  Level of Service:   Patient PCP Information       Provider PCP Type    Nelly Lara MD General            Reason for Visit / Chief Complaint: Hand Pain (C/o pain in left hand for about a week.)       History of Present Illness      Kizzy Schofield presents to the clinic with Hand Pain (C/o pain in left hand for about a week.)     Hand Pain   There was no injury mechanism. The pain is present in the left hand and left shoulder. The pain is at a severity of 5/10. The pain is mild.     Review of Systems     Review of Systems   Constitutional:  Negative for activity change, appetite change, fatigue and fever.   Respiratory:  Negative for shortness of breath.    Allergic/Immunologic: Positive for environmental allergies.   Psychiatric/Behavioral:  Negative for agitation, behavioral problems and suicidal ideas.      Medical / Social / Family History     Past Medical History:   Diagnosis Date    Arthritis of left hip     Arthritis of left knee     Cervical radiculopathy     Chronic cystitis     Chronic pain syndrome     Degeneration of lumbar intervertebral disc     Depressive disorder     GERD (gastroesophageal reflux disease)     Hyperlipidemia     Hypertension     Lumbar post-laminectomy syndrome     Meniere disease     Osteoarthritis of cervical and lumbar spine     Osteoporosis        Past Surgical History:   Procedure Laterality Date    bladder tack      CHOLECYSTECTOMY      HYSTERECTOMY      LUMBAR LAMINECTOMY N/A 2022    Procedure: L4-L5 Laminectomy;  Surgeon: Juan Luis Covington MD;  Location: [facility];  Service: Neurosurgery;  Laterality: N/A;    OOPHORECTOMY         Social History  Ms.  reports that she has quit smoking. She has never used smokeless tobacco. She reports that she does not currently use alcohol. She reports that she does not use  drugs.    Family History  MsKristine's family history includes Cancer in her father; Glaucoma in her daughter; Heart disease in her mother; Hypertension in her father and mother; Lung cancer in her father; Prostate cancer in her father; Stroke in her father and mother; Thyroid disease in her daughter and mother.    Medications and Allergies     Medications  Outpatient Medications Marked as Taking for the 1/23/23 encounter (Office Visit) with Nelly Lara MD   Medication Sig Dispense Refill    acetaminophen (TYLENOL) 500 MG tablet 1 tablet as needed      amitriptyline (ELAVIL) 10 MG tablet Take 1 tablet (10 mg total) by mouth 2 (two) times a day. 180 tablet 1    atorvastatin (LIPITOR) 40 MG tablet Take 1 tablet (40 mg total) by mouth once daily. 90 tablet 1    cetirizine (ZYRTEC) 10 MG tablet Take 10 mg by mouth once daily.      CHOLESTYRAMINE LIGHT 4 gram Powd 2 (two) times a day.      diazePAM (VALIUM) 2 MG tablet Take 1 tablet (2 mg total) by mouth every 12 (twelve) hours as needed for Anxiety. 60 tablet 2    EPINEPHrine (EPIPEN) 0.3 mg/0.3 mL AtIn Inject into the muscle once.      fluticasone propionate (FLONASE) 50 mcg/actuation nasal spray USE 2 SPRAYS IN EACH NOSTRIL ONE TIME DAILY 48 g 2    gabapentin (NEURONTIN) 600 MG tablet Take 1 tablet (600 mg total) by mouth 3 (three) times daily. 270 tablet 1    HYDROcodone-acetaminophen (NORCO) 7.5-325 mg per tablet Take 1 tablet by mouth 2 (two) times daily as needed for Pain.      hydrOXYzine HCL (ATARAX) 25 MG tablet Take 25 mg by mouth every evening.      levothyroxine (SYNTHROID) 50 MCG tablet Take 1 tablet (50 mcg total) by mouth before breakfast. 90 tablet 1    methocarbamoL (ROBAXIN) 500 MG Tab Take 1 tablet (500 mg total) by mouth 3 (three) times daily. 270 tablet 1    montelukast (SINGULAIR) 10 mg tablet Take 1 tablet (10 mg total) by mouth once daily. 90 tablet 1    ondansetron (ZOFRAN) 4 MG tablet Take 1 tablet (4 mg total) by mouth every 8 (eight) hours as  "needed for Nausea. 30 tablet 0    pantoprazole (PROTONIX) 40 MG tablet Take 1 tablet (40 mg total) by mouth 2 (two) times daily. 180 tablet 1    polyethylene glycol (GLYCOLAX) 17 gram PwPk Take by mouth. Take 17 grams by mouth 2 times daily mixed with 8 ounces of water, juice, soda, tea, or coffee      promethazine (PHENERGAN) 25 MG tablet Take 1 tablet (25 mg total) by mouth every 4 (four) hours. 45 tablet 0    RABEprazole (ACIPHEX) 20 mg tablet Take by mouth 2 (two) times daily.      raloxifene (EVISTA) 60 mg tablet Take 1 tablet (60 mg total) by mouth once daily. 90 tablet 1    topiramate (TOPAMAX) 100 MG tablet Take 1 tablet (100 mg total) by mouth 2 (two) times a day. 180 tablet 1    traZODone (DESYREL) 50 MG tablet Take 1 tablet (50 mg total) by mouth nightly as needed for Insomnia. 90 tablet 1    triamcinolone acetonide 0.1% (KENALOG) 0.1 % ointment APPLY TO THE AFFECTED AREA(S) ON ARMS, LEGS AND trunk TWICE DAILY FOR ITCHING      triamterene-hydrochlorothiazide 37.5-25 mg (MAXZIDE-25) 37.5-25 mg per tablet Take 1 tablet by mouth once daily. 90 tablet 1    vitamin D (VITAMIN D3) 1000 units Tab Take 5,000 Units by mouth 2 (two) times a day.       Current Facility-Administered Medications for the 1/23/23 encounter (Office Visit) with Nelly Lara MD   Medication Dose Route Frequency Provider Last Rate Last Admin    [COMPLETED] diclofenac sodium 1 % gel 2 g  2 g Topical (Top) Once Nelly Lara MD   2 g at 01/23/23 0807       Allergies  Review of patient's allergies indicates:   Allergen Reactions    Adhesive tape-silicones     Aspirin     Celebrex [celecoxib]     Opioids - morphine analogues     Pcn [penicillins]     Shrimp     Ciprofloxacin      Pt reported that "it made her feel real bad".    Silicone Itching and Rash       Physical Examination   BP (!) 140/70 (BP Location: Right arm, Patient Position: Sitting)   Pulse 74   Temp 97.8 °F (36.6 °C) (Oral)   Resp 18   Ht 5' 9" (1.753 m)   Wt 77.1 kg (170 " lb)   SpO2 97%   BMI 25.10 kg/m²     Physical Exam  Constitutional:       Appearance: Normal appearance. She is normal weight.   Cardiovascular:      Rate and Rhythm: Normal rate and regular rhythm.      Pulses: Normal pulses.      Heart sounds: Normal heart sounds.   Pulmonary:      Effort: Pulmonary effort is normal.      Breath sounds: Normal breath sounds.   Musculoskeletal:         General: Normal range of motion.   Skin:     General: Skin is warm.   Neurological:      Mental Status: She is alert.      Motor: Weakness present.      Coordination: Coordination abnormal.      Gait: Gait abnormal.   Psychiatric:         Mood and Affect: Mood normal.         Behavior: Behavior normal.         Judgment: Judgment normal.     EXAMINATION:  XR HAND 2 VIEW LEFT     CLINICAL HISTORY:  Pain in left hand     COMPARISON:  None.     FINDINGS:  Left hand, two views:     Mild osteoarthritis is present involving the 1st MTP joint as well as the D IP and PIP joints.  No fracture or dislocation is seen.     There is a 2 mm calcification at the base of the proximal phalanx of the ring finger adjacent to the joint space that may represent HADD (calcific tendinitis.)     Impression:     Osteoarthritis is present involving multiple joints in there is possible HADD in the region of the 4th MCP joint.     Place of service: [facility]        Electronically signed by: Ginette Alfonso  Date:                                            01/23/2023  Time:                                           08:32           Exam Ended: 01/23/23 08:16             Assessment and Plan (including Health Maintenance)     Plan:         Problem List Items Addressed This Visit    None  Visit Diagnoses       Left hand pain    -  Primary    Relevant Medications    diclofenac sodium 1 % gel 2 g (Completed)    diclofenac sodium (VOLTAREN) 1 % Gel    methylPREDNISolone (MEDROL DOSEPACK) 4 mg tablet    Other Relevant Orders    X-Ray Hand 2 View Left  (Completed)    WRIST BRACE FOR HOME USE              Follow up in about 2 weeks (around 2/6/2023).        Signature:  Nelly Lara MD      Date of encounter: 1/23/23

## 2023-02-08 DIAGNOSIS — Z09 FOLLOW-UP EXAMINATION AFTER ORTHOPEDIC SURGERY: Primary | ICD-10-CM

## 2023-02-10 DIAGNOSIS — G89.4 CHRONIC PAIN SYNDROME: ICD-10-CM

## 2023-02-10 RX ORDER — AMITRIPTYLINE HYDROCHLORIDE 10 MG/1
10 TABLET, FILM COATED ORAL 2 TIMES DAILY
Qty: 180 TABLET | Refills: 1 | Status: SHIPPED | OUTPATIENT
Start: 2023-02-10 | End: 2023-06-08 | Stop reason: SDUPTHER

## 2023-02-17 ENCOUNTER — TELEPHONE (OUTPATIENT)
Dept: SLEEP MEDICINE | Facility: CLINIC | Age: 68
End: 2023-02-17
Payer: MEDICARE

## 2023-02-20 ENCOUNTER — OFFICE VISIT (OUTPATIENT)
Dept: SLEEP MEDICINE | Facility: CLINIC | Age: 68
End: 2023-02-20
Attending: FAMILY MEDICINE
Payer: MEDICARE

## 2023-02-20 VITALS
HEART RATE: 74 BPM | BODY MASS INDEX: 25.3 KG/M2 | SYSTOLIC BLOOD PRESSURE: 138 MMHG | DIASTOLIC BLOOD PRESSURE: 49 MMHG | WEIGHT: 170.81 LBS | OXYGEN SATURATION: 98 % | HEIGHT: 69 IN

## 2023-02-20 DIAGNOSIS — G89.4 CHRONIC PAIN SYNDROME: ICD-10-CM

## 2023-02-20 DIAGNOSIS — G47.33 OSA ON CPAP: Primary | ICD-10-CM

## 2023-02-20 PROCEDURE — 99999 PR STA SHADOW: ICD-10-PCS | Mod: PBBFAC,,, | Performed by: FAMILY MEDICINE

## 2023-02-20 PROCEDURE — 99999 PR STA SHADOW: CPT | Mod: PBBFAC,,, | Performed by: FAMILY MEDICINE

## 2023-02-20 PROCEDURE — 99213 OFFICE O/P EST LOW 20 MIN: CPT | Mod: S$PBB | Performed by: FAMILY MEDICINE

## 2023-02-20 PROCEDURE — 99215 OFFICE O/P EST HI 40 MIN: CPT | Mod: PBBFAC | Performed by: FAMILY MEDICINE

## 2023-02-20 NOTE — PROGRESS NOTES
Patient presents to clinic for CPAP F/U. ESS is 1. Patient gets supplies from [facility] in [place]. Patient wears a full face mask. Patient has not been able to use the machine much due to not sleeping after her  passed away.

## 2023-02-20 NOTE — PROGRESS NOTES
Subjective:       Patient ID: Kizzy Schofield is a 67 y.o. female.    Chief Complaint: Sleep Apnea (CPAP management)    Patient follows up in sleep clinic for CPAP management and is currently using a loaner CPAP that she states is turning on and off at inopportune times.  Download from her recalled CPAP shows a 60% compliance with an average AHI of 1.2 at a pressure of 13 cm H2O.  The patient states her recalled machine is with the [facility] who is either replacing it or repairing it and will notify her when it is ready for her to pick.  I recommended patient take the Loner CPAP back to the [facility] and have them examined the machine.  Data from the loaner CPAP shows 80% compliance with an average AHI of 0.5 at a pressure of 13 cm H2O but patient has only used it for 5 days.  Windsor score is 1 and the patient uses a fullface mask.  The patient is having trouble sleeping and difficulty remembering things due to the fact that her  had recently .    Review of Systems   Constitutional:  Negative for fatigue.        Negative EDS   Respiratory:  Negative for apnea.    Psychiatric/Behavioral:  Negative for sleep disturbance.        Objective:      Physical Exam  Cardiovascular:      Rate and Rhythm: Normal rate and regular rhythm.      Heart sounds: No murmur heard.  Pulmonary:      Breath sounds: Normal breath sounds.   Skin:     General: Skin is warm and dry.   Neurological:      Mental Status: She is alert and oriented to person, place, and time.       Assessment:       Problem List Items Addressed This Visit          Neuro    Chronic pain syndrome       Other    TITO on CPAP - Primary         Plan:       Continue CPAP @ 13 cm H20  Caution while operating a motor vehicle  Prescription for new supplies  Follow up sleep clinic in 1 month.

## 2023-02-23 DIAGNOSIS — Z09 FOLLOW-UP EXAMINATION AFTER ORTHOPEDIC SURGERY: Primary | ICD-10-CM

## 2023-02-28 ENCOUNTER — OFFICE VISIT (OUTPATIENT)
Dept: SPINE | Facility: CLINIC | Age: 68
End: 2023-02-28
Payer: MEDICARE

## 2023-02-28 ENCOUNTER — HOSPITAL ENCOUNTER (OUTPATIENT)
Dept: RADIOLOGY | Facility: HOSPITAL | Age: 68
Discharge: HOME | End: 2023-02-28
Attending: ORTHOPAEDIC SURGERY
Payer: MEDICARE

## 2023-02-28 DIAGNOSIS — G89.29 CHRONIC LEFT SHOULDER PAIN: ICD-10-CM

## 2023-02-28 DIAGNOSIS — Z09 FOLLOW-UP EXAMINATION AFTER ORTHOPEDIC SURGERY: ICD-10-CM

## 2023-02-28 DIAGNOSIS — M54.16 LUMBAR RADICULOPATHY: Primary | ICD-10-CM

## 2023-02-28 DIAGNOSIS — M25.512 CHRONIC LEFT SHOULDER PAIN: ICD-10-CM

## 2023-02-28 DIAGNOSIS — M25.552 LEFT HIP PAIN: ICD-10-CM

## 2023-02-28 PROCEDURE — 99214 OFFICE O/P EST MOD 30 MIN: CPT | Mod: PBBFAC | Performed by: ORTHOPAEDIC SURGERY

## 2023-02-28 PROCEDURE — 99214 OFFICE O/P EST MOD 30 MIN: CPT | Mod: S$PBB,,, | Performed by: ORTHOPAEDIC SURGERY

## 2023-02-28 PROCEDURE — 72100 X-RAY EXAM L-S SPINE 2/3 VWS: CPT | Mod: 26,,, | Performed by: ORTHOPAEDIC SURGERY

## 2023-02-28 PROCEDURE — 72100 X-RAY EXAM L-S SPINE 2/3 VWS: CPT | Mod: TC

## 2023-02-28 PROCEDURE — 72100 XR LUMBAR SPINE AP AND LATERAL: ICD-10-PCS | Mod: 26,,, | Performed by: ORTHOPAEDIC SURGERY

## 2023-02-28 PROCEDURE — 99214 PR OFFICE/OUTPT VISIT, EST, LEVL IV, 30-39 MIN: ICD-10-PCS | Mod: S$PBB,,, | Performed by: ORTHOPAEDIC SURGERY

## 2023-03-03 ENCOUNTER — CLINICAL SUPPORT (OUTPATIENT)
Dept: REHABILITATION | Facility: HOSPITAL | Age: 68
End: 2023-03-03
Payer: MEDICARE

## 2023-03-03 DIAGNOSIS — M54.16 LUMBAR RADICULOPATHY: ICD-10-CM

## 2023-03-03 DIAGNOSIS — G89.29 CHRONIC LEFT SHOULDER PAIN: ICD-10-CM

## 2023-03-03 DIAGNOSIS — M25.552 LEFT HIP PAIN: ICD-10-CM

## 2023-03-03 DIAGNOSIS — M25.512 CHRONIC LEFT SHOULDER PAIN: ICD-10-CM

## 2023-03-03 PROCEDURE — 97161 PT EVAL LOW COMPLEX 20 MIN: CPT | Mod: PN

## 2023-03-03 PROCEDURE — 97110 THERAPEUTIC EXERCISES: CPT | Mod: PN

## 2023-03-03 NOTE — PLAN OF CARE
[facility]   Physical Therapy Initial Evaluation    Name: Kizzy Schofield  Clinic Number: [id number]    Therapy Diagnosis:   Encounter Diagnoses   Name Primary?    Lumbar radiculopathy     Left hip pain     Chronic left shoulder pain      Physician: Juan Luis Covington MD    Physician Orders: PT Eval and Treat    Medical Diagnosis from Referral: lumbar radiculopathy, left hip pain, left shoulder pain   Evaluation Date: 3/3/2023  Authorization Period Expiration: 4/2/2023  Plan of Care Expiration: medicare   Visit # / Visits authorized: 1/ 20    Time In: 930  Time Out: 1030  Total Appointment Time (timed & untimed codes): 45 minutes    Precautions: Standard    Subjective   Date of onset: pt states she has been having pain in her left hip , low back pain, left shoulder and neck pain. Pt voices increased low back pain with sitting or standing too long and with walking. Pt voices she has been having left shoulder pain.  Pt voices she just lost her .  Pt voices her back and and shoulder has been aggravated by having to lift and assist with her husbands care.  Pt voices her shoulder pain increases with movement and hurts at night. Pt is right hand dominant   History of current condition - Kizzy reports: see below      Medical History:   Past Medical History:   Diagnosis Date    Arthritis of left hip     Arthritis of left knee     Cervical radiculopathy     Chronic cystitis     Chronic pain syndrome     Degeneration of lumbar intervertebral disc     Depressive disorder     GERD (gastroesophageal reflux disease)     Hyperlipidemia     Hypertension     Lumbar post-laminectomy syndrome     Meniere disease     Osteoarthritis of cervical and lumbar spine     Osteoporosis        Surgical History:   Kizzy Schofield  has a past surgical history that includes bladder tack; Cholecystectomy; Hysterectomy; Oophorectomy; and Lumbar laminectomy (N/A, 6/16/2022).    Medications:   Kizzy has a current medication list  "which includes the following prescription(s): acetaminophen, amitriptyline, atorvastatin, cetirizine, cholestyramine light, diazepam, diclofenac sodium, epinephrine, fluticasone propionate, gabapentin, hydrocodone-acetaminophen, hydroxyzine hcl, levothyroxine, methocarbamol, montelukast, ondansetron, pantoprazole, polyethylene glycol, promethazine, rabeprazole, raloxifene, topiramate, trazodone, triamcinolone acetonide 0.1%, triamterene-hydrochlorothiazide 37.5-25 mg, and vitamin d.    Allergies:   Review of patient's allergies indicates:   Allergen Reactions    Adhesive tape-silicones     Aspirin     Celebrex [celecoxib]     Opioids - morphine analogues     Pcn [penicillins]     Shrimp     Ciprofloxacin      Pt reported that "it made her feel real bad".    Silicone Itching and Rash        Imaging, bone scan films:      Prior Therapy: none   Social History:   lives alone  Occupation: retired   Prior Level of Function: independent with chronic pain   Current Level of Function: low back pain, left hip pain , left shoulder pain     Pain:  Current 8/10, worst 10/10, best 4/10   Location: bilateral shoulder   Description: Aching, Dull, Burning, Grabbing, and Deep  Aggravating Factors: Sitting, Standing, Walking, Morning, Extension, Flexing, and Lifting  Easing Factors: nothing    Pts goals: be able to do yardwork and house work pain free.     Objective     Posture:  Standing lordosis: Decreased  Sitting lordosis: Decreased  Iliac crest height: right increased  PSIS height: right increased  Pelvic rotation/torsion: yes  Scoliosis: no        MANUAL MUSCLE TEST  Right left   Hip flexion       L1-2 MMT number: 3+/5 MMT strength: 3+/5   Hip abduction  L4,5,  S1 MMT strength: 3+/5 MMT strength: 3+/5   Knee extension  L3 MMT strength: 3+/5 MMT strength: 3+/5   Knee flexion       L3,4 MMT strength: 3+/5 MMT strength: 3+/5   Ankle dorsiflexion   L4 MMT strength: 3+/5 MMT strength: 3+/5   Ext hallucis longus L5 MMT strength: 3+/5 " MMT strength: 3+/5   Ankle plantar flexion S1 MMT strength: 3+/5 MMT strength: 3+/5    Shoulder flexion                            4                                                  3+   Shoulder abduciton                       4                                                  3+      ROM/flexibility right left   Hip flexion (120) 115 110   Internal rotation (45) 25 35   External rotation (45) 25 35   Hamstring 90/90 (-10) -15 -20   Shoulder flexion 170 160   Shoulder abductin  150  80   Other        Special test Right  Left    SLR test < 60 degrees Negative Negative   SLR test > 60 degrees Negative Negative   Sitting slump test Negative Negative   Piriformis test Negative Negative   DARLINE test Negative Negative   SI forward bend Positive Positive   SI distraction Positive Positive   SI compression Positive Positive     Gait assessment:   Antalgic gait: yes  Assistive device: straight cane    Spinal mobility:  Segment:     Other test/information:    Palpation:     Dermatome:      Limitation/Restriction for FOTO lumbar  Survey    Therapist reviewed FOTO scores for Kizzy Schofield on 3/3/2023.   FOTO documents entered into [employer] - see Media section.    Limitation Score: 34%         TREATMENT       Kizzy received the treatments listed below:  THERAPEUTIC EXERCISES to develop strength, endurance, ROM, flexibility, and posture for 30 minutes including home ex program     Home Exercises and Patient Education Provided    Education provided:   - home ex program     Written Home Exercises Provided: yes.  Exercises were reviewed and Kizzy was able to demonstrate them prior to the end of the session.  Kizzy demonstrated good  understanding of the education provided.     See EMR under Patient Instructions for exercises provided 3/3/2023.    Assessment   Kizzy is a 67 y.o. female referred to outpatient Physical Therapy with a medical diagnosis of lumbar radiculopathy , left shoulder   Decreased range of motion and strength,    left hip pain.      Pt prognosis is Excellent.   Pt will benefit from skilled outpatient Physical Therapy to address the deficits stated above and in the chart below, provide pt/family education, and to maximize pt's level of independence.     Plan of care discussed with patient: Yes  Pt's spiritual, cultural and educational needs considered and patient is agreeable to the plan of care and goals as stated below:     Anticipated Barriers for therapy:        Goals:  Short Term Goals: 4 weeks   Pt will be independent with home ex program.   Pt will increase bilateral hip flexion to 120 degrees   Pt will decrease back pain from 8/10  to 4/10 to improve quality of life   Increase lower extremity from 3+/5 to 4/5 to increase functional mobility and endurance.   Increase left shoulder arom to 155 degrees flexion and abduction.     Long Term Goals: 6 weeks   Pt will be able to dress pain free  Pt will be able to return to yard work.   Pt will be able to perform all housework pain free     Plan   Plan of care Certification: 3/3/2023 to 4/2/2023.    Outpatient Physical Therapy 2 times weekly for 4 weeks to include the following interventions: Manual Therapy, Neuromuscular Re-ed, Patient Education, Therapeutic Exercise, and modalities as needed .     Plan of care has been reestablished with Yoselyn HALL and Margy HALL.       Anthony Gonsales, PT

## 2023-03-07 ENCOUNTER — CLINICAL SUPPORT (OUTPATIENT)
Dept: REHABILITATION | Facility: HOSPITAL | Age: 68
End: 2023-03-07
Payer: MEDICARE

## 2023-03-07 DIAGNOSIS — G89.29 CHRONIC LEFT SHOULDER PAIN: ICD-10-CM

## 2023-03-07 DIAGNOSIS — M54.16 LUMBAR RADICULOPATHY: Primary | ICD-10-CM

## 2023-03-07 DIAGNOSIS — M25.552 PAIN IN JOINT OF LEFT HIP: ICD-10-CM

## 2023-03-07 DIAGNOSIS — M25.512 CHRONIC LEFT SHOULDER PAIN: ICD-10-CM

## 2023-03-07 PROCEDURE — 97110 THERAPEUTIC EXERCISES: CPT | Mod: PN,CQ

## 2023-03-07 PROCEDURE — 97112 NEUROMUSCULAR REEDUCATION: CPT | Mod: PN,CQ

## 2023-03-07 PROCEDURE — 97014 ELECTRIC STIMULATION THERAPY: CPT | Mod: PN,CQ

## 2023-03-07 NOTE — PROGRESS NOTES
Physical Therapy Treatment Note     Name: Kizzy Schofield  Clinic Number: [id number]    Therapy Diagnosis: No diagnosis found.  Physician: Juan Luis Covington MD    Visit Date: 3/7/2023  Physician Orders: PT Eval and Treat    Medical Diagnosis from Referral: lumbar radiculopathy, left hip pain, left shoulder pain   Evaluation Date: 3/3/2023  Authorization Period Expiration: 4/2/2023  Plan of Care Expiration: medicare   Visit # / Visits authorized: 2/ 20  PTA Visit #: 1    Time In: 1256  Time Out: 200  Total Billable Time: 60 minutes    Precautions: Standard  Plan of care reviewed with Anthony Gonsales PT.      Subjective     Pt reports: I've been really sore from exercises at home.  She was compliant with home exercise program.  Response to previous treatment: sore  Functional change: none noted    Pain: 6/10  Location: left shoulder /hip/ low back     Objective     Kizzy received therapeutic exercises to develop strength, endurance, ROM, flexibility, and posture for 32 minutes including:     Swissball knee flexion x 10  Swissball trunk rotation x 10  Bridging x 10   bilateral single knee to chest and piriformis x 5   Hip adduction with bolster x 10  Bike x 3'  pulleys     Range of motion   Hip flexion    right 121        left  112   Left shoulder flexion 160      Kizzy participated in neuromuscular re-education activities to improve: Posture for 8 minutes. The following activities were included:    Upper body ergometer 5'  Scapular retraction blue x 10      Kizzy received the following supervised modalities after being cleared for contradictions: IFC Electrical Stimulation:  Kizzy received IFC Electrical Stimulation for pain control applied to the low back. Pt received stimulation at 100 % scan at a frequency of 17 for 20 minutes. Kizzy tolderated treatment well without any adverse effects.      Kizzy received hot pack for 20 minutes to low back.      Home Exercises Provided and Patient Education Provided     Education  provided: home exercise program     Written Home Exercises Provided: Patient instructed to cont prior HEP.  Exercises were reviewed and Kizzy was able to demonstrate them prior to the end of the session.  Kizzy demonstrated good  understanding of the education provided.     See EMR under Patient Instructions for exercises provided prior visit.    Assessment     Patient voicing decreased pain 3/10 after treatment   Kizzy Is progressing well towards her goals.   Pt prognosis is Good.     Pt will continue to benefit from skilled outpatient physical therapy to address the deficits listed in the problem list box on initial evaluation, provide pt/family education and to maximize pt's level of independence in the home and community environment.     Pt's spiritual, cultural and educational needs considered and pt agreeable to plan of care and goals.     Anticipated barriers to physical therapy: compliance with home exercise program     Goals:  Short Term Goals: 4 weeks   Pt will be independent with home ex program.   Pt will increase bilateral hip flexion to 120 degrees   Pt will decrease back pain from 8/10  to 4/10 to improve quality of life   Increase lower extremity from 3+/5 to 4/5 to increase functional mobility and endurance.   Increase left shoulder arom to 155 degrees flexion and abduction.      Long Term Goals: 6 weeks   Pt will be able to dress pain free  Pt will be able to return to yard work.   Pt will be able to perform all housework pain free     Plan     Plan of care Certification: 3/3/2023 to 4/2/2023.     Outpatient Physical Therapy 2 times weekly for 4 weeks to include the following interventions: Manual Therapy, Neuromuscular Re-ed, Patient Education, Therapeutic Exercise, and modalities as needed .   Sandra Covington, PTA  3/7/2023

## 2023-03-09 NOTE — PROGRESS NOTES
MDM/time:  Greater than 30 minutes spent on this encounter including 10 minutes reviewing imaging and notes, 15 minutes with the patient, 5 minutes documentation    ASSESSMENT:  67 y.o. female with lumbar degenerative scoliosis, spondylosis and radiculopathy now status post L4-5 laminectomy 06/16/2022    PLAN:  Physical therapy lumbar spine, left hip, left shoulder.  Follow-up in 6 months    HPI:  67 y.o. female here for repeat evaluation of lumbar degenerative scoliosis, spondylosis and radiculopathy now status post L4-5 laminectomy 06/16/2022.  Lately she has been complaining more of upper back pain and some posterior left hip pain.  Denies any new injuries    IMAGING:  X-rays lumbar spine reviewed show:  On the AP there is lumbar curvature to the left.  There are 5 non-rib-bearing lumbar vertebrae.  On the lateral there is decreased lumbar lordosis.  There is spondylotic disease with decreased disc height and osteophyte formation noted.  There has been prior left-sided sacroiliac fixation.  Prior L4-5 laminectomy.  Slight grade 1 anterolisthesis at L4-5    Past Medical History:   Diagnosis Date    Arthritis of left hip     Arthritis of left knee     Cervical radiculopathy     Chronic cystitis     Chronic pain syndrome     Degeneration of lumbar intervertebral disc     Depressive disorder     GERD (gastroesophageal reflux disease)     Hyperlipidemia     Hypertension     Lumbar post-laminectomy syndrome     Meniere disease     Osteoarthritis of cervical and lumbar spine     Osteoporosis      Past Surgical History:   Procedure Laterality Date    bladder tack      CHOLECYSTECTOMY      HYSTERECTOMY      LUMBAR LAMINECTOMY N/A 6/16/2022    Procedure: L4-L5 Laminectomy;  Surgeon: Juan Lusi Covington MD;  Location: [facility];  Service: Neurosurgery;  Laterality: N/A;    OOPHORECTOMY       Social History     Tobacco Use    Smoking status: Former    Smokeless tobacco: Never   Substance Use Topics    Alcohol use: Not  Currently    Drug use: Never      Current Outpatient Medications   Medication Instructions    acetaminophen (TYLENOL) 500 MG tablet 1 tablet as needed    amitriptyline (ELAVIL) 10 mg, Oral, 2 times daily    atorvastatin (LIPITOR) 40 mg, Oral, Daily    cetirizine (ZYRTEC) 10 mg, Oral, Daily    CHOLESTYRAMINE LIGHT 4 gram Powd 2 times daily    diazePAM (VALIUM) 2 mg, Oral, Every 12 hours PRN    diclofenac sodium (VOLTAREN) 2 g, Topical (Top), Daily    EPINEPHrine (EPIPEN) 0.3 mg/0.3 mL AtIn Intramuscular, Once    fluticasone propionate (FLONASE) 50 mcg/actuation nasal spray USE 2 SPRAYS IN EACH NOSTRIL ONE TIME DAILY    gabapentin (NEURONTIN) 600 mg, Oral, 3 times daily    HYDROcodone-acetaminophen (NORCO) 7.5-325 mg per tablet 1 tablet, Oral, 2 times daily PRN    hydrOXYzine HCL (ATARAX) 25 mg, Oral, Nightly    levothyroxine (SYNTHROID) 50 mcg, Oral, Before breakfast    methocarbamoL (ROBAXIN) 500 mg, Oral, 3 times daily    montelukast (SINGULAIR) 10 mg, Oral, Daily    ondansetron (ZOFRAN) 4 mg, Oral, Every 8 hours PRN    pantoprazole (PROTONIX) 40 mg, Oral, 2 times daily    polyethylene glycol (GLYCOLAX) 17 gram PwPk Oral, Take 17 grams by mouth 2 times daily mixed with 8 ounces of water, juice, soda, tea, or coffee     promethazine (PHENERGAN) 25 mg, Oral, Every 4 hours    RABEprazole (ACIPHEX) 20 mg tablet Oral, 2 times daily    raloxifene (EVISTA) 60 mg, Oral, Daily    topiramate (TOPAMAX) 100 mg, Oral, 2 times daily    traZODone (DESYREL) 50 mg, Oral, Nightly PRN    triamcinolone acetonide 0.1% (KENALOG) 0.1 % ointment APPLY TO THE AFFECTED AREA(S) ON ARMS, LEGS AND trunk TWICE DAILY FOR ITCHING    triamterene-hydrochlorothiazide 37.5-25 mg (MAXZIDE-25) 37.5-25 mg per tablet 1 tablet, Oral, Daily    vitamin D (VITAMIN D3) 5,000 Units, Oral, 2 times daily        EXAM:  Constitutional  General Appearance:  There is no height or weight on file to calculate BMI., NAD  Psychiatric   Orientation: Oriented to time,  oriented to place, oriented to person  Mood and Affect: Active and alert, normal mood, normal affect  Gait and Station   Appearance:  Normal gait, normal tandem gait, able to walk on toes, able to walk on heels  Small area of granulation at the superior aspect of the incision, no expressible drainage.  No fluctuance.  No erythema  5/5 strength  Sensation intact  2+ pulses

## 2023-03-10 ENCOUNTER — OFFICE VISIT (OUTPATIENT)
Dept: FAMILY MEDICINE | Facility: CLINIC | Age: 68
End: 2023-03-10
Payer: MEDICARE

## 2023-03-10 ENCOUNTER — CLINICAL SUPPORT (OUTPATIENT)
Dept: REHABILITATION | Facility: HOSPITAL | Age: 68
End: 2023-03-10
Payer: MEDICARE

## 2023-03-10 VITALS
SYSTOLIC BLOOD PRESSURE: 122 MMHG | HEART RATE: 74 BPM | OXYGEN SATURATION: 98 % | RESPIRATION RATE: 18 BRPM | BODY MASS INDEX: 24.73 KG/M2 | TEMPERATURE: 99 F | DIASTOLIC BLOOD PRESSURE: 84 MMHG | HEIGHT: 69 IN | WEIGHT: 167 LBS

## 2023-03-10 DIAGNOSIS — M25.552 PAIN IN JOINT OF LEFT HIP: ICD-10-CM

## 2023-03-10 DIAGNOSIS — M54.16 LUMBAR RADICULOPATHY: Primary | ICD-10-CM

## 2023-03-10 DIAGNOSIS — F43.21 GRIEF: Primary | ICD-10-CM

## 2023-03-10 DIAGNOSIS — M25.512 CHRONIC LEFT SHOULDER PAIN: ICD-10-CM

## 2023-03-10 DIAGNOSIS — G89.29 CHRONIC LEFT SHOULDER PAIN: ICD-10-CM

## 2023-03-10 PROCEDURE — 99213 PR OFFICE/OUTPT VISIT, EST, LEVL III, 20-29 MIN: ICD-10-PCS | Mod: ,,, | Performed by: NURSE PRACTITIONER

## 2023-03-10 PROCEDURE — 99213 OFFICE O/P EST LOW 20 MIN: CPT | Mod: ,,, | Performed by: NURSE PRACTITIONER

## 2023-03-10 PROCEDURE — 97014 ELECTRIC STIMULATION THERAPY: CPT | Mod: PN

## 2023-03-10 RX ORDER — METHOTREXATE 2.5 MG/1
6 TABLET ORAL
COMMUNITY
Start: 2023-02-28 | End: 2023-08-18

## 2023-03-10 RX ORDER — FOLIC ACID 1 MG/1
1000 TABLET ORAL DAILY
COMMUNITY
Start: 2023-02-28

## 2023-03-10 RX ORDER — TRAZODONE HYDROCHLORIDE 100 MG/1
100 TABLET ORAL NIGHTLY
Qty: 30 TABLET | Refills: 2 | Status: SHIPPED | OUTPATIENT
Start: 2023-03-10 | End: 2023-06-09

## 2023-03-10 NOTE — PATIENT INSTRUCTIONS
Will increase trazadone to 100 mg nightly as needed, encouraged support group and stay active daily.

## 2023-03-10 NOTE — PROGRESS NOTES
CHLOE Boswell   [facility]  [address]  [place] [address]     PATIENT NAME: Kizzy Schofield  : 1955  DATE: 3/10/23  MRN: [id number]      Billing Provider: CHLOE Boswell  Level of Service:   Patient PCP Information       Provider PCP Type    CHLOE Boswell General            Reason for Visit / Chief Complaint: Dizziness (Forgot to take medications yesterday morning, woke up extremely dizzy), Depression (Dealing with grief, lost her  a month ago), and Insomnia       Update PCP  Update Chief Complaint         History of Present Illness / Problem Focused Workflow     Kizzy Schofield presents to the clinic dizziness yesterday forgot to take medications no fever or chills no vomit. She is having trouble sleeping, she is crying a lot. Discussed grief encouraged routine and support group     Hemoglobin A1C   Date Value Ref Range Status   2022 5.4 4.5 - 6.6 % Final     Comment:       Normal:               <5.7%  Pre-Diabetic:       5.7% to 6.4%  Diabetic:             >6.4%  Diabetic Goal:     <7%        CMP  Sodium   Date Value Ref Range Status   2022 138 136 - 145 mmol/L Final     Potassium   Date Value Ref Range Status   2022 3.4 (L) 3.5 - 5.1 mmol/L Final     Chloride   Date Value Ref Range Status   2022 103 98 - 107 mmol/L Final     CO2   Date Value Ref Range Status   2022 31 21 - 32 mmol/L Final     Glucose   Date Value Ref Range Status   2022 102 74 - 106 mg/dL Final     BUN   Date Value Ref Range Status   2022 12 7 - 18 mg/dL Final     Creatinine   Date Value Ref Range Status   2022 1.10 (H) 0.55 - 1.02 mg/dL Final     Calcium   Date Value Ref Range Status   2022 9.2 8.5 - 10.1 mg/dL Final     Total Protein   Date Value Ref Range Status   10/31/2022 7.7 6.4 - 8.2 g/dL Final     Albumin   Date Value Ref Range Status   10/31/2022 4.0 3.5 - 5.0 g/dL Final     Bilirubin, Total    Date Value Ref Range Status   10/31/2022 0.3 >0.0 - 1.2 mg/dL Final     Alk Phos   Date Value Ref Range Status   10/31/2022 86 55 - 142 U/L Final     AST   Date Value Ref Range Status   10/31/2022 13 (L) 15 - 37 U/L Final     ALT   Date Value Ref Range Status   10/31/2022 20 13 - 56 U/L Final     Anion Gap   Date Value Ref Range Status   11/04/2022 7 7 - 16 mmol/L Final     eGFR   Date Value Ref Range Status   11/04/2022 55 (L) >=60 mL/min/1.73m² Final        Lab Results   Component Value Date    WBC 8.40 10/31/2022    RBC 4.97 10/31/2022    HGB 14.6 10/31/2022    HCT 45.0 10/31/2022    MCV 90.5 10/31/2022    MCH 29.4 10/31/2022    MCHC 32.4 10/31/2022    RDW 13.2 10/31/2022     10/31/2022    MPV 10.6 10/31/2022    LYMPH 24.3 (L) 10/31/2022    LYMPH 2.04 10/31/2022    MONO 6.5 (H) 10/31/2022    EOS 0.11 10/31/2022    BASO 0.07 10/31/2022    EOSINOPHIL 1.3 10/31/2022    BASOPHIL 0.8 10/31/2022        Lab Results   Component Value Date    CHOL 126 10/31/2022    CHOL 147 03/15/2022    CHOL 162 10/05/2021     Lab Results   Component Value Date    HDL 67 (H) 10/31/2022    HDL 72 (H) 03/15/2022    HDL 72 (H) 10/05/2021     Lab Results   Component Value Date    LDLCALC 39 10/31/2022    LDLCALC 49 03/15/2022    LDLCALC 60 10/05/2021     Lab Results   Component Value Date    TRIG 98 10/31/2022    TRIG 130 03/15/2022    TRIG 150 10/05/2021     Lab Results   Component Value Date    CHOLHDL 1.9 10/31/2022    CHOLHDL 2.0 03/15/2022    CHOLHDL 2.3 10/05/2021        Wt Readings from Last 3 Encounters:   03/10/23 1025 75.8 kg (167 lb)   02/20/23 1318 77.5 kg (170 lb 12.8 oz)   01/23/23 0748 77.1 kg (170 lb)        BP Readings from Last 3 Encounters:   03/10/23 122/84   02/20/23 (!) 138/49   01/23/23 (!) 140/70        Review of Systems     Review of Systems   Constitutional:  Positive for appetite change. Negative for fatigue, fever and unexpected weight change.   HENT:  Negative for trouble swallowing.    Eyes:  Negative  for visual disturbance.   Respiratory:  Negative for cough, chest tightness and shortness of breath.    Cardiovascular:  Negative for chest pain.   Gastrointestinal:  Negative for abdominal pain.   Integumentary:  Negative for rash.   Neurological:  Positive for dizziness. Negative for syncope, weakness and headaches.   Psychiatric/Behavioral:  Positive for decreased concentration, dysphoric mood and sleep disturbance. Negative for agitation, confusion and suicidal ideas. The patient is not nervous/anxious.       Medical / Social / Family History     Past Medical History:   Diagnosis Date    Arthritis of left hip     Arthritis of left knee     Cervical radiculopathy     Chronic cystitis     Chronic pain syndrome     Degeneration of lumbar intervertebral disc     Depressive disorder     GERD (gastroesophageal reflux disease)     Hyperlipidemia     Hypertension     Lumbar post-laminectomy syndrome     Meniere disease     Osteoarthritis of cervical and lumbar spine     Osteoporosis        Past Surgical History:   Procedure Laterality Date    bladder tack      CHOLECYSTECTOMY      HYSTERECTOMY      LUMBAR LAMINECTOMY N/A 6/16/2022    Procedure: L4-L5 Laminectomy;  Surgeon: Juan Luis Covington MD;  Location: [facility];  Service: Neurosurgery;  Laterality: N/A;    OOPHORECTOMY         Social History  Ms.  reports that she has quit smoking. She has been exposed to tobacco smoke. She has never used smokeless tobacco. She reports that she does not currently use alcohol. She reports that she does not use drugs.    Family History  Ms.'s family history includes Cancer in her father; Glaucoma in her daughter; Heart disease in her mother; Hypertension in her father and mother; Lung cancer in her father; Prostate cancer in her father; Stroke in her father and mother; Thyroid disease in her daughter and mother.    Medications and Allergies     Medications  Outpatient Medications Marked as Taking for the 3/10/23 encounter (Office  Visit) with CHLOE Boswell   Medication Sig Dispense Refill    acetaminophen (TYLENOL) 500 MG tablet 1 tablet as needed      amitriptyline (ELAVIL) 10 MG tablet Take 1 tablet (10 mg total) by mouth 2 (two) times a day. 180 tablet 1    atorvastatin (LIPITOR) 40 MG tablet Take 1 tablet (40 mg total) by mouth once daily. 90 tablet 1    cetirizine (ZYRTEC) 10 MG tablet Take 10 mg by mouth once daily.      CHOLESTYRAMINE LIGHT 4 gram Powd 2 (two) times a day.      diazePAM (VALIUM) 2 MG tablet Take 1 tablet (2 mg total) by mouth every 12 (twelve) hours as needed for Anxiety. 60 tablet 2    diclofenac sodium (VOLTAREN) 1 % Gel Apply 2 g topically once daily. 100 g 2    EPINEPHrine (EPIPEN) 0.3 mg/0.3 mL AtIn Inject into the muscle once.      fluticasone propionate (FLONASE) 50 mcg/actuation nasal spray USE 2 SPRAYS IN EACH NOSTRIL ONE TIME DAILY 48 g 2    folic acid (FOLVITE) 1 MG tablet Take 1,000 mcg by mouth once daily.      gabapentin (NEURONTIN) 600 MG tablet Take 1 tablet (600 mg total) by mouth 3 (three) times daily. 270 tablet 1    HYDROcodone-acetaminophen (NORCO) 7.5-325 mg per tablet Take 1 tablet by mouth 2 (two) times daily as needed for Pain.      hydrOXYzine HCL (ATARAX) 25 MG tablet Take 25 mg by mouth every evening.      levothyroxine (SYNTHROID) 50 MCG tablet Take 1 tablet (50 mcg total) by mouth before breakfast. 90 tablet 1    methocarbamoL (ROBAXIN) 500 MG Tab Take 1 tablet (500 mg total) by mouth 3 (three) times daily. 270 tablet 1    methotrexate 2.5 MG Tab Take 6 tablets by mouth every 7 days.      montelukast (SINGULAIR) 10 mg tablet Take 1 tablet (10 mg total) by mouth once daily. 90 tablet 1    ondansetron (ZOFRAN) 4 MG tablet Take 1 tablet (4 mg total) by mouth every 8 (eight) hours as needed for Nausea. 30 tablet 0    polyethylene glycol (GLYCOLAX) 17 gram PwPk Take by mouth. Take 17 grams by mouth 2 times daily mixed with 8 ounces of water, juice, soda, tea, or coffee       "promethazine (PHENERGAN) 25 MG tablet Take 1 tablet (25 mg total) by mouth every 4 (four) hours. 45 tablet 0    RABEprazole (ACIPHEX) 20 mg tablet Take by mouth 2 (two) times daily.      raloxifene (EVISTA) 60 mg tablet Take 1 tablet (60 mg total) by mouth once daily. 90 tablet 1    topiramate (TOPAMAX) 100 MG tablet Take 1 tablet (100 mg total) by mouth 2 (two) times a day. 180 tablet 1    triamcinolone acetonide 0.1% (KENALOG) 0.1 % ointment APPLY TO THE AFFECTED AREA(S) ON ARMS, LEGS AND trunk TWICE DAILY FOR ITCHING      triamterene-hydrochlorothiazide 37.5-25 mg (MAXZIDE-25) 37.5-25 mg per tablet Take 1 tablet by mouth once daily. 90 tablet 1    vitamin D (VITAMIN D3) 1000 units Tab Take 5,000 Units by mouth 2 (two) times a day.      [DISCONTINUED] traZODone (DESYREL) 50 MG tablet Take 1 tablet (50 mg total) by mouth nightly as needed for Insomnia. 90 tablet 1       Allergies  Review of patient's allergies indicates:   Allergen Reactions    Adhesive tape-silicones     Aspirin     Celebrex [celecoxib]     Opioids - morphine analogues     Pcn [penicillins]     Shrimp     Ciprofloxacin      Pt reported that "it made her feel real bad".    Silicone Itching and Rash       Physical Examination     Vitals:    03/10/23 1025   BP: 122/84   BP Location: Right arm   Patient Position: Sitting   Pulse: 74   Resp: 18   Temp: 98.7 °F (37.1 °C)   TempSrc: Oral   SpO2: 98%   Weight: 75.8 kg (167 lb)   Height: 5' 9" (1.753 m)      Physical Exam  Vitals and nursing note reviewed.   HENT:      Right Ear: External ear normal.      Left Ear: External ear normal.      Nose: Nose normal.   Cardiovascular:      Rate and Rhythm: Normal rate and regular rhythm.      Heart sounds: Normal heart sounds.   Pulmonary:      Effort: Pulmonary effort is normal.      Breath sounds: Normal breath sounds.   Skin:     Capillary Refill: Capillary refill takes less than 2 seconds.   Neurological:      Mental Status: She is alert and oriented to " person, place, and time.   Psychiatric:         Attention and Perception: Attention normal.         Mood and Affect: Affect is tearful.         Speech: Speech normal.         Behavior: Behavior is cooperative.         Cognition and Memory: Cognition normal.        Assessment and Plan (including Health Maintenance)      Problem List  Smart Sets  Document Outside HM   :    Plan:     Patient Instructions   Will increase trazadone to 100 mg nightly as needed, encouraged support group and stay active daily.      Health Maintenance Due   Topic Date Due    Shingles Vaccine (1 of 2) Never done    COVID-19 Vaccine (3 - Booster for Moderna series) 08/20/2021    TETANUS VACCINE  10/11/2021    Influenza Vaccine (1) 09/01/2022       Problem List Items Addressed This Visit    None  Visit Diagnoses       Grief    -  Primary          Grief    Other orders  -     traZODone (DESYREL) 100 MG tablet; Take 1 tablet (100 mg total) by mouth every evening.  Dispense: 30 tablet; Refill: 2       Health Maintenance Topics with due status: Not Due       Topic Last Completion Date    DEXA Scan 09/02/2020    Colorectal Cancer Screening 08/17/2021    Lipid Panel 10/31/2022    Mammogram 11/15/2022       Procedures     Future Appointments   Date Time Provider Department Center   3/10/2023  1:15 PM Anthony Gonsales, PT [facility]   3/14/2023  1:15 PM Anthony Gonsales, PT [facility]   3/17/2023  1:15 PM Sandra Covington, PTA [facility]   4/3/2023  3:45 PM Omari Holt DO [facility] SLEEP [place]    4/25/2023  9:00 AM Nelly Lara MD [facility] FAMMED [place]   9/1/2023 10:15 AM Juan Luis Covington MD [facility] SPINE [facility]   1/24/2024  1:00 PM [facility] MAMMO1 [facility] MAMMO [facility]        No follow-ups on file.     Signature:  CHLOE Boswell    Date of encounter: 3/10/233/10/2023

## 2023-03-10 NOTE — PROGRESS NOTES
Physical Therapy Treatment Note     Name: Kizzy Schofield  Clinic Number: [id number]    Therapy Diagnosis:   Encounter Diagnoses   Name Primary?    Lumbar radiculopathy Yes    Pain in joint of left hip     Chronic left shoulder pain      Physician: Juan Luis Covington MD    Visit Date: 3/10/2023  Physician Orders: PT Eval and Treat    Medical Diagnosis from Referral: lumbar radiculopathy, left hip pain, left shoulder pain   Evaluation Date: 3/3/2023  Authorization Period Expiration: 4/2/2023  Plan of Care Expiration: medicare   Visit # / Visits authorized: 2/ 20  PTA Visit #: 1    Time In: 1256  Time Out: 200  Total Billable Time: 60 minutes    Precautions: Standard  Plan of care reviewed with Anthony Gonsales PT.      Subjective     Pt reports: having severe vertigo today, having pain down the neck into her back today   She was compliant with home exercise program.  Response to previous treatment: sore  Functional change: none noted    Pain: 6/10  Location: left shoulder /hip/ low back     Objective     Kizzy received therapeutic exercises to develop strength, endurance, ROM, flexibility, and posture for 32 minutes including:        Range of motion   Hip flexion    right 121        left  112   Left shoulder flexion 160      Kizzy participated in neuromuscular re-education activities to improve: Posture for 8 minutes. The following activities were included:         Kizzy received the following supervised modalities after being cleared for contradictions: IFC Electrical Stimulation:  Kizzy received IFC Electrical Stimulation for pain control applied to the low back. Pt received stimulation at 100 % scan at a frequency of 17 for 20 minutes. Kizzy tolderated treatment well without any adverse effects.      Kizzy received hot pack for 20 minutes to low back.      Home Exercises Provided and Patient Education Provided     Education provided: home exercise program     Written Home Exercises Provided: Patient instructed to cont  prior HEP.  Exercises were reviewed and Kizzy was able to demonstrate them prior to the end of the session.  Kizzy demonstrated good  understanding of the education provided.     See EMR under Patient Instructions for exercises provided prior visit.    Assessment     Patient voicing decreased pain 3/10 after treatment   Kizzy Is progressing well towards her goals.   Pt prognosis is Good.     Pt will continue to benefit from skilled outpatient physical therapy to address the deficits listed in the problem list box on initial evaluation, provide pt/family education and to maximize pt's level of independence in the home and community environment.     Pt's spiritual, cultural and educational needs considered and pt agreeable to plan of care and goals.     Anticipated barriers to physical therapy: compliance with home exercise program     Goals:  Short Term Goals: 4 weeks   Pt will be independent with home ex program.   Pt will increase bilateral hip flexion to 120 degrees   Pt will decrease back pain from 8/10  to 4/10 to improve quality of life   Increase lower extremity from 3+/5 to 4/5 to increase functional mobility and endurance.   Increase left shoulder arom to 155 degrees flexion and abduction.      Long Term Goals: 6 weeks   Pt will be able to dress pain free  Pt will be able to return to yard work.   Pt will be able to perform all housework pain free     Plan     Plan of care Certification: 3/3/2023 to 4/2/2023.     Outpatient Physical Therapy 2 times weekly for 4 weeks to include the following interventions: Manual Therapy, Neuromuscular Re-ed, Patient Education, Therapeutic Exercise, and modalities as needed .   Anthony Gonsales, PT  3/10/2023

## 2023-03-13 ENCOUNTER — OFFICE VISIT (OUTPATIENT)
Dept: OTOLARYNGOLOGY | Facility: CLINIC | Age: 68
End: 2023-03-13
Payer: MEDICARE

## 2023-03-13 VITALS — HEIGHT: 69 IN | BODY MASS INDEX: 24.73 KG/M2 | WEIGHT: 167 LBS

## 2023-03-13 DIAGNOSIS — R42 VERTIGO: ICD-10-CM

## 2023-03-13 DIAGNOSIS — H81.03 MENIERE'S DISEASE (COCHLEAR HYDROPS), BILATERAL: Primary | ICD-10-CM

## 2023-03-13 PROCEDURE — 99213 OFFICE O/P EST LOW 20 MIN: CPT | Mod: PBBFAC | Performed by: OTOLARYNGOLOGY

## 2023-03-13 PROCEDURE — 99214 OFFICE O/P EST MOD 30 MIN: CPT | Mod: S$PBB,,, | Performed by: OTOLARYNGOLOGY

## 2023-03-13 PROCEDURE — 99214 PR OFFICE/OUTPT VISIT, EST, LEVL IV, 30-39 MIN: ICD-10-PCS | Mod: S$PBB,,, | Performed by: OTOLARYNGOLOGY

## 2023-03-13 NOTE — PROGRESS NOTES
Subjective:       Patient ID: Kizzy Schofield is a 67 y.o. female.    Chief Complaint: Dizziness (Patient presents for dizziness. )  Has Meniere's disease  HPI  Review of Systems   Neurological:  Positive for dizziness.   All other systems reviewed and are negative.    Objective:      Physical Exam  General: NAD  Head: Normocephalic, atraumatic, no facial asymmetry/normal strength,  Ears: Both auricules normal in appearance, w/o deformities tympanic membranes normal external auditory canals normal  Nose: External nose w/o deformities normal turbinates no drainage or inflammation  Oral Cavity: Lips, gums, floor of mouth, tongue hard palate, and buccal mucosa without mass/lesion  Oropharynx: Mucosa pink and moist, soft palate, posterior pharynx and oropharyngeal wall without mass/lesion  Neck: Supple, symmetric, trachea midline, no palpable mass/lesion, no palpable cervical lymphadenopathy  Skin: Warm and dry, no concerning lesions  Respiratory: Respirations even, unlabored   Assessment:       1. Meniere's disease (cochlear hydrops), bilateral    2. Vertigo        Plan:       Valium   F/u 2 months

## 2023-03-15 ENCOUNTER — CLINICAL SUPPORT (OUTPATIENT)
Dept: REHABILITATION | Facility: HOSPITAL | Age: 68
End: 2023-03-15
Payer: MEDICARE

## 2023-03-15 DIAGNOSIS — G89.29 CHRONIC LEFT SHOULDER PAIN: ICD-10-CM

## 2023-03-15 DIAGNOSIS — M25.552 PAIN IN JOINT OF LEFT HIP: ICD-10-CM

## 2023-03-15 DIAGNOSIS — M25.512 CHRONIC LEFT SHOULDER PAIN: ICD-10-CM

## 2023-03-15 DIAGNOSIS — M54.16 LUMBAR RADICULOPATHY: Primary | ICD-10-CM

## 2023-03-15 PROCEDURE — 97014 ELECTRIC STIMULATION THERAPY: CPT | Mod: PN,CQ

## 2023-03-15 PROCEDURE — 97112 NEUROMUSCULAR REEDUCATION: CPT | Mod: PN,CQ

## 2023-03-15 PROCEDURE — 97110 THERAPEUTIC EXERCISES: CPT | Mod: PN,CQ

## 2023-03-15 NOTE — PROGRESS NOTES
Physical Therapy Treatment Note     Name: Kizzy Schofield  Clinic Number: [id number]    Therapy Diagnosis:   Encounter Diagnoses   Name Primary?    Lumbar radiculopathy Yes    Pain in joint of left hip     Chronic left shoulder pain      Physician: Juan Luis Covington MD    Visit Date: 3/15/2023  Physician Orders: PT Eval and Treat    Medical Diagnosis from Referral: lumbar radiculopathy, left hip pain, left shoulder pain   Evaluation Date: 3/3/2023  Authorization Period Expiration: 4/2/2023  Plan of Care Expiration: medicare   Visit # / Visits authorized: 4/ 20  PTA Visit #: 1    Time In: 1230  Time Out: 120  Total Billable Time: 50 minutes    Precautions: Standard    Subjective     Pt reports: vertigo a little better since I started back on meds, my left shoulder blade is killing me today.  She was compliant with home exercise program.  Response to previous treatment: sore  Functional change: none noted    Pain: 4/10 back, 8/10 shoulder  Location: left shoulder /hip/ low back     Objective     Kizzy received therapeutic exercises to develop strength, endurance, ROM, flexibility, and posture for 22 minutes including:    Bike x 5'  Slant board x 2'  Bilateral hamstrings stretch on step  x 5  Pulleys x 3'  Swissball knee flexion x 10  Swissball trunk rotation x 10  Bilateral single knee to chest x 5  Bilateral piriformis x 5     Range of motion   Hip flexion    right 121        left  112   Left shoulder flexion 160      Kizzy participated in neuromuscular re-education activities to improve: Posture for 8 minutes. The following activities were included:  Upper body ergometer x 5'  Scapular retraction x 10     Kizzy received the following supervised modalities after being cleared for contradictions: IFC Electrical Stimulation:  Kizzy received IFC Electrical Stimulation for pain control applied to the low back. Pt received stimulation at 100 % scan at a frequency of 17 for 20 minutes. Kizzy tolderated treatment well without  any adverse effects.      Kizzy received hot pack for 20 minutes to low back.      Home Exercises Provided and Patient Education Provided     Education provided: home exercise program     Written Home Exercises Provided: Patient instructed to cont prior HEP.  Exercises were reviewed and Kizzy was able to demonstrate them prior to the end of the session.  Kizzy demonstrated good  understanding of the education provided.     See EMR under Patient Instructions for exercises provided prior visit.    Assessment     Patient voicing decreased pain 1/10 after treatment. Patient ambulating with rolling walker this treatment   Kizzy Is progressing well towards her goals.   Pt prognosis is Good.     Pt will continue to benefit from skilled outpatient physical therapy to address the deficits listed in the problem list box on initial evaluation, provide pt/family education and to maximize pt's level of independence in the home and community environment.     Pt's spiritual, cultural and educational needs considered and pt agreeable to plan of care and goals.     Anticipated barriers to physical therapy: compliance with home exercise program     Goals:  Short Term Goals: 4 weeks   Pt will be independent with home ex program.   Pt will increase bilateral hip flexion to 120 degrees   Pt will decrease back pain from 8/10  to 4/10 to improve quality of life   Increase lower extremity from 3+/5 to 4/5 to increase functional mobility and endurance.   Increase left shoulder arom to 155 degrees flexion and abduction.      Long Term Goals: 6 weeks   Pt will be able to dress pain free  Pt will be able to return to yard work.   Pt will be able to perform all housework pain free     Plan     Plan of care Certification: 3/3/2023 to 4/2/2023.     Outpatient Physical Therapy 2 times weekly for 4 weeks to include the following interventions: Manual Therapy, Neuromuscular Re-ed, Patient Education, Therapeutic Exercise, and modalities as needed .    Sandra Covington, PTA  3/15/2023

## 2023-03-20 ENCOUNTER — CLINICAL SUPPORT (OUTPATIENT)
Dept: REHABILITATION | Facility: HOSPITAL | Age: 68
End: 2023-03-20
Payer: MEDICARE

## 2023-03-20 DIAGNOSIS — G89.29 CHRONIC LEFT SHOULDER PAIN: ICD-10-CM

## 2023-03-20 DIAGNOSIS — M54.16 LUMBAR RADICULOPATHY: Primary | ICD-10-CM

## 2023-03-20 DIAGNOSIS — M25.512 CHRONIC LEFT SHOULDER PAIN: ICD-10-CM

## 2023-03-20 DIAGNOSIS — M25.552 PAIN IN JOINT OF LEFT HIP: ICD-10-CM

## 2023-03-20 PROCEDURE — 97112 NEUROMUSCULAR REEDUCATION: CPT | Mod: PN,CQ

## 2023-03-20 PROCEDURE — 97110 THERAPEUTIC EXERCISES: CPT | Mod: PN,CQ

## 2023-03-20 PROCEDURE — 97014 ELECTRIC STIMULATION THERAPY: CPT | Mod: PN,CQ

## 2023-03-20 NOTE — PROGRESS NOTES
Physical Therapy Treatment Note     Name: Kizzy Schofield  Clinic Number: [id number]    Therapy Diagnosis:   Encounter Diagnoses   Name Primary?    Lumbar radiculopathy Yes    Pain in joint of left hip     Chronic left shoulder pain      Physician: Juan Luis Covington MD    Visit Date: 3/20/2023  Physician Orders: PT Eval and Treat    Medical Diagnosis from Referral: lumbar radiculopathy, left hip pain, left shoulder pain   Evaluation Date: 3/3/2023  Authorization Period Expiration: 4/2/2023  Plan of Care Expiration: medicare   Visit # / Visits authorized: 5/ 20  PTA Visit #: 2    Time In: 1230  Time Out: 120  Total Billable Time: 50 minutes    Precautions: Standard    Subjective     Pt reports: I'm really sore and worn out from traveling all weekend.  She was compliant with home exercise program.  Response to previous treatment: sore  Functional change: improved shoulder motion    Pain: 6/10 back, 5/10 shoulder  Location: left shoulder /hip/ low back     Objective     Kizzy received therapeutic exercises to develop strength, endurance, ROM, flexibility, and posture for 22 minutes including:    Bike x 5'  Slant board x 2'  Bilateral hamstrings stretch on step  x 5  Pulleys x 3'  Supine flexion with dowel x10  Swissball knee flexion x 10  Swissball trunk rotation x 10  Bilateral single knee to chest x 5  Bilateral piriformis x 5  Hip adduction with bolster and bridging x 10     Range of motion   Hip flexion    right 121        left  112   Left shoulder flexion 160   abduction 150    Kizzy participated in neuromuscular re-education activities to improve: Posture for 8 minutes. The following activities were included:  Upper body ergometer x 5'  Scapular retraction x 10     Kizzy received the following supervised modalities after being cleared for contradictions: IFC Electrical Stimulation:  Kizzy received IFC Electrical Stimulation for pain control applied to the low back. Pt received stimulation at 100 % scan at a  frequency of 23 for 20 minutes. Kizzy tolderated treatment well without any adverse effects.      Kizzy received hot pack for 20 minutes to low back.    Home Exercises Provided and Patient Education Provided     Education provided: home exercise program     Written Home Exercises Provided: Patient instructed to cont prior HEP.  Exercises were reviewed and Kizzy was able to demonstrate them prior to the end of the session.  Kizzy demonstrated good  understanding of the education provided.     See EMR under Patient Instructions for exercises provided prior visit.    Assessment     Patient voicing decreased pain 0/10 after treatment. Patient using cane today but unsteady gait.  Kizzy Is progressing well towards her goals.   Pt prognosis is Good.     Pt will continue to benefit from skilled outpatient physical therapy to address the deficits listed in the problem list box on initial evaluation, provide pt/family education and to maximize pt's level of independence in the home and community environment.     Pt's spiritual, cultural and educational needs considered and pt agreeable to plan of care and goals.     Anticipated barriers to physical therapy: compliance with home exercise program     Goals:  Short Term Goals: 4 weeks   Pt will be independent with home ex program. -met  Pt will increase bilateral hip flexion to 120 degrees   Pt will decrease back pain from 8/10  to 4/10 to improve quality of life   Increase lower extremity from 3+/5 to 4/5 to increase functional mobility and endurance.   Increase left shoulder arom to 155 degrees flexion and abduction.      Long Term Goals: 6 weeks   Pt will be able to dress pain free  Pt will be able to return to yard work.   Pt will be able to perform all housework pain free     Plan     Plan of care Certification: 3/3/2023 to 4/2/2023.     Outpatient Physical Therapy 2 times weekly for 4 weeks to include the following interventions: Manual Therapy, Neuromuscular Re-ed, Patient  Education, Therapeutic Exercise, and modalities as needed .   Sandra Covington, PTA  3/20/2023

## 2023-03-22 ENCOUNTER — CLINICAL SUPPORT (OUTPATIENT)
Dept: REHABILITATION | Facility: HOSPITAL | Age: 68
End: 2023-03-22
Payer: MEDICARE

## 2023-03-22 DIAGNOSIS — M25.552 PAIN IN JOINT OF LEFT HIP: ICD-10-CM

## 2023-03-22 DIAGNOSIS — G89.29 CHRONIC LEFT SHOULDER PAIN: ICD-10-CM

## 2023-03-22 DIAGNOSIS — M25.512 CHRONIC LEFT SHOULDER PAIN: ICD-10-CM

## 2023-03-22 DIAGNOSIS — M54.16 LUMBAR RADICULOPATHY: Primary | ICD-10-CM

## 2023-03-22 PROCEDURE — 97014 ELECTRIC STIMULATION THERAPY: CPT | Mod: PN

## 2023-03-22 PROCEDURE — 97110 THERAPEUTIC EXERCISES: CPT | Mod: PN

## 2023-03-22 NOTE — PROGRESS NOTES
Physical Therapy Treatment Note     Name: Kizzy Schofield  Clinic Number: [id number]    Therapy Diagnosis:   Encounter Diagnoses   Name Primary?    Lumbar radiculopathy Yes    Pain in joint of left hip     Chronic left shoulder pain      Physician: Juan Luis Covington MD    Visit Date: 3/22/2023  Physician Orders: PT Eval and Treat    Medical Diagnosis from Referral: lumbar radiculopathy, left hip pain, left shoulder pain   Evaluation Date: 3/3/2023  Authorization Period Expiration: 4/2/2023  Plan of Care Expiration: medicare   Visit # / Visits authorized: 5/ 20  PTA Visit #:     Time In: 1231  Time Out: 120  Total Billable Time: 50 minutes    Precautions: Standard    Subjective     Pt reports: back pain is hurting worse . Dont want to ride bike anymore   She was compliant with home exercise program.  Response to previous treatment: sore  Functional change: improved shoulder motion    Pain: 6/10 back, 5/10 shoulder  Location: left shoulder /hip/ low back     Objective     Kizzy received therapeutic exercises to develop strength, endurance, ROM, flexibility, and posture for 22 minutes including:    Bike x 5'  Slant board x 2'  Bilateral hamstrings stretch on step  x 5  Pulleys x 3'  Supine flexion with dowel x10  Swissball knee flexion x 10  Swissball trunk rotation x 10  Bilateral single knee to chest x 5  Bilateral piriformis x 5  Hip adduction with bolster and bridging x 10     Range of motion   Hip flexion    right 121        left  112   Left shoulder flexion 160   abduction 150    Kizzy participated in neuromuscular re-education activities to improve: Posture for 8 minutes. The following activities were included:  Upper body ergometer x 5'  Scapular retraction x 10     Kizzy received the following supervised modalities after being cleared for contradictions: IFC Electrical Stimulation:  Kizzy received IFC Electrical Stimulation for pain control applied to the low back. Pt received stimulation at 100 % scan at a  frequency of 23 for 20 minutes. Kizzy tolderated treatment well without any adverse effects.      Kizzy received hot pack for 20 minutes to low back.    Home Exercises Provided and Patient Education Provided     Education provided: home exercise program     Written Home Exercises Provided: Patient instructed to cont prior HEP.  Exercises were reviewed and Kizzy was able to demonstrate them prior to the end of the session.  Kizzy demonstrated good  understanding of the education provided.     See EMR under Patient Instructions for exercises provided prior visit.    Assessment     Patient voicing decreased pain 2/10 post tx   Kizzy Is progressing well towards her goals.   Pt prognosis is Good.     Pt will continue to benefit from skilled outpatient physical therapy to address the deficits listed in the problem list box on initial evaluation, provide pt/family education and to maximize pt's level of independence in the home and community environment.     Pt's spiritual, cultural and educational needs considered and pt agreeable to plan of care and goals.     Anticipated barriers to physical therapy: compliance with home exercise program     Goals:  Short Term Goals: 4 weeks   Pt will be independent with home ex program. -met  Pt will increase bilateral hip flexion to 120 degrees   Pt will decrease back pain from 8/10  to 4/10 to improve quality of life   Increase lower extremity from 3+/5 to 4/5 to increase functional mobility and endurance.   Increase left shoulder arom to 155 degrees flexion and abduction.      Long Term Goals: 6 weeks   Pt will be able to dress pain free  Pt will be able to return to yard work.   Pt will be able to perform all housework pain free     Plan     Plan of care Certification: 3/3/2023 to 4/2/2023.     Outpatient Physical Therapy 2 times weekly for 4 weeks to include the following interventions: Manual Therapy, Neuromuscular Re-ed, Patient Education, Therapeutic Exercise, and modalities as  needed .   Anthony Gonsales, PT  3/22/2023

## 2023-03-27 ENCOUNTER — CLINICAL SUPPORT (OUTPATIENT)
Dept: REHABILITATION | Facility: HOSPITAL | Age: 68
End: 2023-03-27
Payer: MEDICARE

## 2023-03-27 ENCOUNTER — OFFICE VISIT (OUTPATIENT)
Dept: FAMILY MEDICINE | Facility: CLINIC | Age: 68
End: 2023-03-27
Payer: MEDICARE

## 2023-03-27 VITALS
HEIGHT: 69 IN | WEIGHT: 177 LBS | BODY MASS INDEX: 26.22 KG/M2 | HEART RATE: 69 BPM | DIASTOLIC BLOOD PRESSURE: 70 MMHG | RESPIRATION RATE: 18 BRPM | SYSTOLIC BLOOD PRESSURE: 118 MMHG | OXYGEN SATURATION: 99 % | TEMPERATURE: 98 F

## 2023-03-27 DIAGNOSIS — G89.29 CHRONIC LEFT SHOULDER PAIN: ICD-10-CM

## 2023-03-27 DIAGNOSIS — H81.09 MENIERE'S DISEASE, UNSPECIFIED LATERALITY: ICD-10-CM

## 2023-03-27 DIAGNOSIS — Z86.73 HISTORY OF TIA (TRANSIENT ISCHEMIC ATTACK): ICD-10-CM

## 2023-03-27 DIAGNOSIS — R25.2 BILATERAL LEG CRAMPS: ICD-10-CM

## 2023-03-27 DIAGNOSIS — M25.552 PAIN IN JOINT OF LEFT HIP: ICD-10-CM

## 2023-03-27 DIAGNOSIS — M25.512 CHRONIC LEFT SHOULDER PAIN: ICD-10-CM

## 2023-03-27 DIAGNOSIS — G44.86 CERVICOGENIC HEADACHE: Primary | ICD-10-CM

## 2023-03-27 DIAGNOSIS — M54.16 LUMBAR RADICULOPATHY: Primary | ICD-10-CM

## 2023-03-27 DIAGNOSIS — G43.809 OTHER MIGRAINE WITHOUT STATUS MIGRAINOSUS, NOT INTRACTABLE: ICD-10-CM

## 2023-03-27 LAB
ALBUMIN SERPL BCP-MCNC: 3.4 G/DL (ref 3.5–5)
ALBUMIN/GLOB SERPL: 1 {RATIO}
ALP SERPL-CCNC: 66 U/L (ref 55–142)
ALT SERPL W P-5'-P-CCNC: 18 U/L (ref 13–56)
ANION GAP SERPL CALCULATED.3IONS-SCNC: 8 MMOL/L (ref 7–16)
AST SERPL W P-5'-P-CCNC: 13 U/L (ref 15–37)
BASOPHILS # BLD AUTO: 0.05 K/UL (ref 0–0.2)
BASOPHILS NFR BLD AUTO: 0.7 % (ref 0–1)
BILIRUB SERPL-MCNC: 0.2 MG/DL (ref ?–1.2)
BUN SERPL-MCNC: 16 MG/DL (ref 7–18)
BUN/CREAT SERPL: 12 (ref 6–20)
CALCIUM SERPL-MCNC: 8.4 MG/DL (ref 8.5–10.1)
CHLORIDE SERPL-SCNC: 111 MMOL/L (ref 98–107)
CO2 SERPL-SCNC: 28 MMOL/L (ref 21–32)
CREAT SERPL-MCNC: 1.35 MG/DL (ref 0.55–1.02)
DIFFERENTIAL METHOD BLD: ABNORMAL
EGFR (NO RACE VARIABLE) (RUSH/TITUS): 43 ML/MIN/1.73M²
EOSINOPHIL # BLD AUTO: 0.13 K/UL (ref 0–0.5)
EOSINOPHIL NFR BLD AUTO: 1.8 % (ref 1–4)
ERYTHROCYTE [DISTWIDTH] IN BLOOD BY AUTOMATED COUNT: 13 % (ref 11.5–14.5)
GLOBULIN SER-MCNC: 3.5 G/DL (ref 2–4)
GLUCOSE SERPL-MCNC: 95 MG/DL (ref 74–106)
HCT VFR BLD AUTO: 37.2 % (ref 38–47)
HGB BLD-MCNC: 11.7 G/DL (ref 12–16)
IMM GRANULOCYTES # BLD AUTO: 0.02 K/UL (ref 0–0.04)
IMM GRANULOCYTES NFR BLD: 0.3 % (ref 0–0.4)
LYMPHOCYTES # BLD AUTO: 1.85 K/UL (ref 1–4.8)
LYMPHOCYTES NFR BLD AUTO: 25.5 % (ref 27–41)
MCH RBC QN AUTO: 29.9 PG (ref 27–31)
MCHC RBC AUTO-ENTMCNC: 31.5 G/DL (ref 32–36)
MCV RBC AUTO: 95.1 FL (ref 80–96)
MONOCYTES # BLD AUTO: 0.36 K/UL (ref 0–0.8)
MONOCYTES NFR BLD AUTO: 5 % (ref 2–6)
MPC BLD CALC-MCNC: 10.2 FL (ref 9.4–12.4)
NEUTROPHILS # BLD AUTO: 4.85 K/UL (ref 1.8–7.7)
NEUTROPHILS NFR BLD AUTO: 66.7 % (ref 53–65)
NRBC # BLD AUTO: 0 X10E3/UL
NRBC, AUTO (.00): 0 %
PLATELET # BLD AUTO: 291 K/UL (ref 150–400)
POTASSIUM SERPL-SCNC: 3.9 MMOL/L (ref 3.5–5.1)
PROT SERPL-MCNC: 6.9 G/DL (ref 6.4–8.2)
RBC # BLD AUTO: 3.91 M/UL (ref 4.2–5.4)
SODIUM SERPL-SCNC: 143 MMOL/L (ref 136–145)
WBC # BLD AUTO: 7.26 K/UL (ref 4.5–11)

## 2023-03-27 PROCEDURE — 85025 COMPLETE CBC W/AUTO DIFF WBC: CPT | Mod: ,,, | Performed by: CLINICAL MEDICAL LABORATORY

## 2023-03-27 PROCEDURE — 80053 COMPREHEN METABOLIC PANEL: CPT | Mod: ,,, | Performed by: CLINICAL MEDICAL LABORATORY

## 2023-03-27 PROCEDURE — 99214 PR OFFICE/OUTPT VISIT, EST, LEVL IV, 30-39 MIN: ICD-10-PCS | Mod: ,,, | Performed by: NURSE PRACTITIONER

## 2023-03-27 PROCEDURE — 80053 COMPREHENSIVE METABOLIC PANEL: ICD-10-PCS | Mod: ,,, | Performed by: CLINICAL MEDICAL LABORATORY

## 2023-03-27 PROCEDURE — 97014 ELECTRIC STIMULATION THERAPY: CPT | Mod: PN,CQ

## 2023-03-27 PROCEDURE — 99214 OFFICE O/P EST MOD 30 MIN: CPT | Mod: ,,, | Performed by: NURSE PRACTITIONER

## 2023-03-27 PROCEDURE — 96372 PR INJECTION,THERAP/PROPH/DIAG2ST, IM OR SUBCUT: ICD-10-PCS | Mod: ,,, | Performed by: NURSE PRACTITIONER

## 2023-03-27 PROCEDURE — 96372 THER/PROPH/DIAG INJ SC/IM: CPT | Mod: ,,, | Performed by: NURSE PRACTITIONER

## 2023-03-27 PROCEDURE — 85025 CBC WITH DIFFERENTIAL: ICD-10-PCS | Mod: ,,, | Performed by: CLINICAL MEDICAL LABORATORY

## 2023-03-27 PROCEDURE — 97110 THERAPEUTIC EXERCISES: CPT | Mod: PN,CQ

## 2023-03-27 PROCEDURE — 97112 NEUROMUSCULAR REEDUCATION: CPT | Mod: PN,CQ

## 2023-03-27 RX ORDER — DEXAMETHASONE SODIUM PHOSPHATE 4 MG/ML
8 INJECTION, SOLUTION INTRA-ARTICULAR; INTRALESIONAL; INTRAMUSCULAR; INTRAVENOUS; SOFT TISSUE
Status: COMPLETED | OUTPATIENT
Start: 2023-03-27 | End: 2023-03-27

## 2023-03-27 RX ORDER — MONTELUKAST SODIUM 4 MG/1
1 TABLET, CHEWABLE ORAL DAILY
COMMUNITY
Start: 2023-03-10

## 2023-03-27 RX ADMIN — DEXAMETHASONE SODIUM PHOSPHATE 8 MG: 4 INJECTION, SOLUTION INTRA-ARTICULAR; INTRALESIONAL; INTRAMUSCULAR; INTRAVENOUS; SOFT TISSUE at 04:03

## 2023-03-27 NOTE — PATIENT INSTRUCTIONS
Will give decadron injection today for headache and bursitis  Will get referral to neurology was a former patient of Vivek.

## 2023-03-27 NOTE — PROGRESS NOTES
Physical Therapy Treatment Note     Name: Kizzy Schofield  Clinic Number: [id number]    Therapy Diagnosis:   Encounter Diagnoses   Name Primary?    Lumbar radiculopathy Yes    Pain in joint of left hip     Chronic left shoulder pain      Physician: Juan Luis Covington MD    Visit Date: 3/27/2023  Physician Orders: PT Eval and Treat    Medical Diagnosis from Referral: lumbar radiculopathy, left hip pain, left shoulder pain   Evaluation Date: 3/3/2023  Authorization Period Expiration: 4/2/2023  Plan of Care Expiration: medicare   Visit # / Visits authorized: 7/ 20  PTA Visit #:1     Time In: 105  Time Out: 155  Total Billable Time: 50 minutes    Precautions: Standard    Subjective     Pt reports: I woke up yesterday with a knot in the back of my head and around to my right eye, still have a headache today. I'm going to see FNP today at 3:30.  She was compliant with home exercise program.  Response to previous treatment: sore  Functional change: improved shoulder motion    Pain: 8/10 back, hip  Location: left shoulder /hip/ low back     Objective     Kizzy received therapeutic exercises to develop strength, endurance, ROM, flexibility, and posture for 22 minutes including:     Slant board x 2'  Bilateral hamstrings stretch on step  x 5  Swissball knee flexion x 10  Swissball trunk rotation x 10  Bilateral single knee to chest x 5  Bilateral piriformis x 5  Hip adduction with bolster and bridging x 10  Bilateral straight leg raise x 10  Bilateral side lying hip abduction x 10     Range of motion   Hip flexion    right 122        left  112   Left shoulder flexion 160   abduction 150    Kizzy participated in neuromuscular re-education activities to improve: Posture for 8 minutes. The following activities were included:  Upper body ergometer x 5'  Scapular retraction x 10     Kizzy received the following supervised modalities after being cleared for contradictions: IFC Electrical Stimulation:  Kizzy received IFC Electrical  Stimulation for pain control applied to the low back. Pt received stimulation at 100 % scan at a frequency of 23 for 20 minutes. Kizzy tolderated treatment well without any adverse effects.      Kizzy received hot pack for 20 minutes to low back.    Home Exercises Provided and Patient Education Provided     Education provided: home exercise program     Written Home Exercises Provided: Patient instructed to cont prior HEP.  Exercises were reviewed and Kizzy was able to demonstrate them prior to the end of the session.  Kizzy demonstrated good  understanding of the education provided.     See EMR under Patient Instructions for exercises provided prior visit.    Assessment     Patient voicing decreased pain 3/10 post tx. Patient advised to use rolling walker rather than cane while having increased pain/headache   Kizzy Is progressing well towards her goals.   Pt prognosis is Good.     Pt will continue to benefit from skilled outpatient physical therapy to address the deficits listed in the problem list box on initial evaluation, provide pt/family education and to maximize pt's level of independence in the home and community environment.     Pt's spiritual, cultural and educational needs considered and pt agreeable to plan of care and goals.     Anticipated barriers to physical therapy: compliance with home exercise program     Goals:  Short Term Goals: 4 weeks   Pt will be independent with home ex program. -met  Pt will increase bilateral hip flexion to 120 degrees   Pt will decrease back pain from 8/10  to 4/10 to improve quality of life   Increase lower extremity from 3+/5 to 4/5 to increase functional mobility and endurance.   Increase left shoulder arom to 155 degrees flexion and abduction.      Long Term Goals: 6 weeks   Pt will be able to dress pain free  Pt will be able to return to yard work.   Pt will be able to perform all housework pain free     Plan     Plan of care Certification: 3/3/2023 to 4/2/2023.      Outpatient Physical Therapy 2 times weekly for 4 weeks to include the following interventions: Manual Therapy, Neuromuscular Re-ed, Patient Education, Therapeutic Exercise, and modalities as needed .   Sandra Covington, PTA  3/27/2023

## 2023-03-27 NOTE — PROGRESS NOTES
CHLOE Boswell   [facility]  [address]  [place] [address]     PATIENT NAME: Kizzy Schofield  : 1955  DATE: 3/27/23  MRN: [id number]      Billing Provider: CHLOE Boswell  Level of Service:   Patient PCP Information       Provider PCP Type    CHLOE Boswell General            Reason for Visit / Chief Complaint: Headache (Started yesterday, nauseated with it)       Update PCP  Update Chief Complaint         History of Present Illness / Problem Focused Workflow     Kizzy Schofield presents to the clinic same day appointment with complaints of headache with nausea starting yesterday -different from her usual migraine affecting her right side of face neck arm leg felt tingle. Felt a knot to right side of neck  took tylenol helped a little bit. She is able to raise eyebrows smile normal no weakness noted. She has been busy today and able to go to appointments and shop headache not severe. She also is needing a referral to neurology was a former patient of Dr Doyle  for  TIA but he has moved. She is still grieving loss of her spouse but feeling better and less stressed.  She is also going to physical therapy and has some bursa pain    Headache  Patient presents for evaluation of headache. Symptoms began about 1 day ago. Generally, the headaches last about a few hours and occur  randomly . The headaches do not seem to be related to any time of the day. The headaches are usually dull and poorly described and are located in generalized.  The patient rates her most severe headaches a 7 on a scale from 1 to 10. Recently, the headaches have been stable. Work attendance or other daily activities are not affected by the headaches. Precipitating factors include: stress and weather changes. The headaches are usually not preceded by an aura. Associated neurologic symptoms: decreased physical activity, dizziness, muscle weakness, and nausea . The patient  denies numbness of extremities, speech difficulties, and vision problems. Home treatment has included acetaminophen and resting with some improvement. Other history includes: migraine headaches diagnosed in the past and allergic rhinitis. Family history includes no known family members with significant headaches.      Hemoglobin A1C   Date Value Ref Range Status   06/07/2022 5.4 4.5 - 6.6 % Final     Comment:       Normal:               <5.7%  Pre-Diabetic:       5.7% to 6.4%  Diabetic:             >6.4%  Diabetic Goal:     <7%        CMP  Sodium   Date Value Ref Range Status   03/27/2023 143 136 - 145 mmol/L Final     Potassium   Date Value Ref Range Status   03/27/2023 3.9 3.5 - 5.1 mmol/L Final     Chloride   Date Value Ref Range Status   03/27/2023 111 (H) 98 - 107 mmol/L Final     CO2   Date Value Ref Range Status   03/27/2023 28 21 - 32 mmol/L Final     Glucose   Date Value Ref Range Status   03/27/2023 95 74 - 106 mg/dL Final     BUN   Date Value Ref Range Status   03/27/2023 16 7 - 18 mg/dL Final     Creatinine   Date Value Ref Range Status   03/27/2023 1.35 (H) 0.55 - 1.02 mg/dL Final     Calcium   Date Value Ref Range Status   03/27/2023 8.4 (L) 8.5 - 10.1 mg/dL Final     Total Protein   Date Value Ref Range Status   03/27/2023 6.9 6.4 - 8.2 g/dL Final     Albumin   Date Value Ref Range Status   03/27/2023 3.4 (L) 3.5 - 5.0 g/dL Final     Bilirubin, Total   Date Value Ref Range Status   03/27/2023 0.2 >0.0 - 1.2 mg/dL Final     Alk Phos   Date Value Ref Range Status   03/27/2023 66 55 - 142 U/L Final     AST   Date Value Ref Range Status   03/27/2023 13 (L) 15 - 37 U/L Final     ALT   Date Value Ref Range Status   03/27/2023 18 13 - 56 U/L Final     Anion Gap   Date Value Ref Range Status   03/27/2023 8 7 - 16 mmol/L Final     eGFR   Date Value Ref Range Status   03/27/2023 43 (L) >=60 mL/min/1.73m² Final        Lab Results   Component Value Date    WBC 7.26 03/27/2023    RBC 3.91 (L) 03/27/2023    HGB  11.7 (L) 03/27/2023    HCT 37.2 (L) 03/27/2023    MCV 95.1 03/27/2023    MCH 29.9 03/27/2023    MCHC 31.5 (L) 03/27/2023    RDW 13.0 03/27/2023     03/27/2023    MPV 10.2 03/27/2023    LYMPH 25.5 (L) 03/27/2023    LYMPH 1.85 03/27/2023    MONO 5.0 03/27/2023    EOS 0.13 03/27/2023    BASO 0.05 03/27/2023    EOSINOPHIL 1.8 03/27/2023    BASOPHIL 0.7 03/27/2023        Lab Results   Component Value Date    CHOL 126 10/31/2022    CHOL 147 03/15/2022    CHOL 162 10/05/2021     Lab Results   Component Value Date    HDL 67 (H) 10/31/2022    HDL 72 (H) 03/15/2022    HDL 72 (H) 10/05/2021     Lab Results   Component Value Date    LDLCALC 39 10/31/2022    LDLCALC 49 03/15/2022    LDLCALC 60 10/05/2021     Lab Results   Component Value Date    TRIG 98 10/31/2022    TRIG 130 03/15/2022    TRIG 150 10/05/2021     Lab Results   Component Value Date    CHOLHDL 1.9 10/31/2022    CHOLHDL 2.0 03/15/2022    CHOLHDL 2.3 10/05/2021        Wt Readings from Last 3 Encounters:   03/27/23 1530 80.3 kg (177 lb)   03/13/23 1418 75.8 kg (167 lb)   03/10/23 1025 75.8 kg (167 lb)        BP Readings from Last 3 Encounters:   03/27/23 118/70   03/10/23 122/84   02/20/23 (!) 138/49        Review of Systems     Review of Systems   Constitutional:  Negative for chills, fatigue and fever.   HENT:  Negative for nasal congestion, ear pain, sinus pressure/congestion and trouble swallowing.    Respiratory:  Negative for cough and shortness of breath.    Cardiovascular:  Negative for chest pain.   Gastrointestinal:  Positive for diarrhea. Negative for nausea and vomiting.   Genitourinary:  Negative for difficulty urinating.   Musculoskeletal:  Positive for arthralgias, back pain, gait problem and neck pain. Negative for neck stiffness.   Neurological:  Positive for dizziness and headaches. Negative for weakness.      Medical / Social / Family History     Past Medical History:   Diagnosis Date    Arthritis of left hip     Arthritis of left knee      Cervical radiculopathy     Chronic cystitis     Chronic pain syndrome     Degeneration of lumbar intervertebral disc     Depressive disorder     GERD (gastroesophageal reflux disease)     Hyperlipidemia     Hypertension     Lumbar post-laminectomy syndrome     Meniere disease     Osteoarthritis of cervical and lumbar spine     Osteoporosis        Past Surgical History:   Procedure Laterality Date    bladder tack      CHOLECYSTECTOMY      HYSTERECTOMY      LUMBAR LAMINECTOMY N/A 6/16/2022    Procedure: L4-L5 Laminectomy;  Surgeon: Juan Luis Covington MD;  Location: [facility];  Service: Neurosurgery;  Laterality: N/A;    OOPHORECTOMY         Social History  Ms.  reports that she has quit smoking. She has been exposed to tobacco smoke. She has never used smokeless tobacco. She reports that she does not currently use alcohol. She reports that she does not use drugs.    Family History  Ms.'s family history includes Cancer in her father; Glaucoma in her daughter; Heart disease in her mother; Hypertension in her father and mother; Lung cancer in her father; Prostate cancer in her father; Stroke in her father and mother; Thyroid disease in her daughter and mother.    Medications and Allergies     Medications  Outpatient Medications Marked as Taking for the 3/27/23 encounter (Office Visit) with CHLOE Boswell   Medication Sig Dispense Refill    acetaminophen (TYLENOL) 500 MG tablet Take 500 mg by mouth every 6 (six) hours as needed.      amitriptyline (ELAVIL) 10 MG tablet Take 1 tablet (10 mg total) by mouth 2 (two) times a day. 180 tablet 1    atorvastatin (LIPITOR) 40 MG tablet Take 1 tablet (40 mg total) by mouth once daily. 90 tablet 1    cetirizine (ZYRTEC) 10 MG tablet Take 10 mg by mouth once daily.      CHOLESTYRAMINE LIGHT 4 gram Powd 2 (two) times a day.      colestipoL (COLESTID) 1 gram Tab Take 1 g by mouth once daily.      diazePAM (VALIUM) 2 MG tablet TAKE ONE TABLET BY MOUTH EVERY TWELVE HOURS  AS NEEDED FOR ANXIETY 60 tablet 2    diclofenac sodium (VOLTAREN) 1 % Gel Apply 2 g topically once daily. 100 g 2    EPINEPHrine (EPIPEN) 0.3 mg/0.3 mL AtIn Inject into the muscle once.      fluticasone propionate (FLONASE) 50 mcg/actuation nasal spray USE 2 SPRAYS IN EACH NOSTRIL ONE TIME DAILY 48 g 2    folic acid (FOLVITE) 1 MG tablet Take 1,000 mcg by mouth once daily.      gabapentin (NEURONTIN) 600 MG tablet Take 1 tablet (600 mg total) by mouth 3 (three) times daily. 270 tablet 1    HYDROcodone-acetaminophen (NORCO) 7.5-325 mg per tablet Take 1 tablet by mouth 2 (two) times daily as needed for Pain.      hydrOXYzine HCL (ATARAX) 25 MG tablet Take 25 mg by mouth every evening.      levothyroxine (SYNTHROID) 50 MCG tablet Take 1 tablet (50 mcg total) by mouth before breakfast. 90 tablet 1    methocarbamoL (ROBAXIN) 500 MG Tab Take 1 tablet (500 mg total) by mouth 3 (three) times daily. 270 tablet 1    methotrexate 2.5 MG Tab Take 6 tablets by mouth every 7 days.      montelukast (SINGULAIR) 10 mg tablet Take 1 tablet (10 mg total) by mouth once daily. 90 tablet 1    ondansetron (ZOFRAN) 4 MG tablet Take 1 tablet (4 mg total) by mouth every 8 (eight) hours as needed for Nausea. 30 tablet 0    polyethylene glycol (GLYCOLAX) 17 gram PwPk Take by mouth. Take 17 grams by mouth 2 times daily mixed with 8 ounces of water, juice, soda, tea, or coffee      promethazine (PHENERGAN) 25 MG tablet Take 1 tablet (25 mg total) by mouth every 4 (four) hours. 45 tablet 0    RABEprazole (ACIPHEX) 20 mg tablet Take by mouth 2 (two) times daily.      raloxifene (EVISTA) 60 mg tablet Take 1 tablet (60 mg total) by mouth once daily. 90 tablet 1    topiramate (TOPAMAX) 100 MG tablet Take 1 tablet (100 mg total) by mouth 2 (two) times a day. 180 tablet 1    traZODone (DESYREL) 100 MG tablet Take 1 tablet (100 mg total) by mouth every evening. 30 tablet 2    triamcinolone acetonide 0.1% (KENALOG) 0.1 % ointment APPLY TO THE AFFECTED  "AREA(S) ON ARMS, LEGS AND trunk TWICE DAILY FOR ITCHING      triamterene-hydrochlorothiazide 37.5-25 mg (MAXZIDE-25) 37.5-25 mg per tablet Take 1 tablet by mouth once daily. 90 tablet 1    vitamin D (VITAMIN D3) 1000 units Tab Take 5,000 Units by mouth 2 (two) times a day.       Current Facility-Administered Medications for the 3/27/23 encounter (Office Visit) with Kizzy Candelaria CHLOE Ga   Medication Dose Route Frequency Provider Last Rate Last Admin    [COMPLETED] dexAMETHasone injection 8 mg  8 mg Intramuscular 1 time in Clinic/HOD Kizzy Millanmick CHLOE   8 mg at 03/27/23 1608       Allergies  Review of patient's allergies indicates:   Allergen Reactions    Adhesive tape-silicones     Aspirin     Celebrex [celecoxib]     Opioids - morphine analogues     Pcn [penicillins]     Shrimp     Ciprofloxacin      Pt reported that "it made her feel real bad".    Silicone Itching and Rash       Physical Examination     Vitals:    03/27/23 1530   BP: 118/70   BP Location: Left arm   Patient Position: Sitting   Pulse: 69   Resp: 18   Temp: 98.2 °F (36.8 °C)   TempSrc: Oral   SpO2: 99%   Weight: 80.3 kg (177 lb)   Height: 5' 9" (1.753 m)      Physical Exam  HENT:      Head: Normocephalic.      Right Ear: Tympanic membrane, ear canal and external ear normal.      Left Ear: Tympanic membrane, ear canal and external ear normal.      Mouth/Throat:      Mouth: Mucous membranes are moist.      Pharynx: Oropharynx is clear. No oropharyngeal exudate or posterior oropharyngeal erythema.   Eyes:      Conjunctiva/sclera: Conjunctivae normal.      Pupils: Pupils are equal, round, and reactive to light.   Cardiovascular:      Rate and Rhythm: Normal rate and regular rhythm.   Pulmonary:      Effort: Pulmonary effort is normal.      Breath sounds: Normal breath sounds.   Skin:     General: Skin is warm and dry.   Neurological:      General: No focal deficit present.      Mental Status: She is alert and oriented to person, " place, and time.      Cranial Nerves: Cranial nerves 2-12 are intact. No cranial nerve deficit, dysarthria or facial asymmetry.      Sensory: Sensation is intact.        Assessment and Plan (including Health Maintenance)      Problem List  Smart Sets  Document Outside HM   :    Plan:     Patient Instructions   Will give decadron injection today for headache and bursitis  Will get referral to neurology was a former patient of Vivek.      Health Maintenance Due   Topic Date Due    Shingles Vaccine (1 of 2) Never done    COVID-19 Vaccine (3 - Booster for Moderna series) 08/20/2021    TETANUS VACCINE  10/11/2021    Influenza Vaccine (1) 09/01/2022       Problem List Items Addressed This Visit          Neuro    Migraine    Relevant Orders    Ambulatory referral/consult to Neurology       ENT    Meniere's disease    Overview     Formatting of this note might be different from the original.  takes maxide for.         Relevant Orders    Ambulatory referral/consult to Neurology     Other Visit Diagnoses       Cervicogenic headache    -  Primary    Relevant Orders    CBC Auto Differential (Completed)    Bilateral leg cramps        Relevant Orders    Comprehensive Metabolic Panel (Completed)    History of TIA (transient ischemic attack)        Relevant Orders    Ambulatory referral/consult to Neurology          Cervicogenic headache  -     CBC Auto Differential; Future; Expected date: 03/27/2023    Bilateral leg cramps  -     Comprehensive Metabolic Panel; Future; Expected date: 03/27/2023    Other migraine without status migrainosus, not intractable  -     Ambulatory referral/consult to Neurology; Future; Expected date: 04/03/2023    Meniere's disease, unspecified laterality  -     Ambulatory referral/consult to Neurology; Future; Expected date: 04/03/2023    History of TIA (transient ischemic attack)  -     Ambulatory referral/consult to Neurology; Future; Expected date: 04/03/2023    Other orders  -     dexAMETHasone  injection 8 mg       Health Maintenance Topics with due status: Not Due       Topic Last Completion Date    DEXA Scan 09/02/2020    Colorectal Cancer Screening 08/17/2021    Hemoglobin A1c (Diabetic Prevention Screening) 06/07/2022    Lipid Panel 10/31/2022    Mammogram 11/15/2022       Procedures     Future Appointments   Date Time Provider Department Center   3/29/2023  1:15 PM Anthony Gonsales PT [facility]   4/3/2023  3:45 PM Omari Holt DO [facility] SLEEP [place]    4/25/2023  9:45 AM CHLOE Boswell [facility] [place]   4/27/2023  1:00 PM Yuri Levy MD [facility] NEURO [facility]   9/1/2023 10:15 AM Juan Luis Covington MD [facility] SPINE [facility]   1/24/2024  [facility]        No follow-ups on file.     Signature:  CHLOE Boswell    Date of encounter: 3/27/233/28/2023

## 2023-03-28 ENCOUNTER — PATIENT MESSAGE (OUTPATIENT)
Dept: FAMILY MEDICINE | Facility: CLINIC | Age: 68
End: 2023-03-28
Payer: MEDICARE

## 2023-03-29 ENCOUNTER — CLINICAL SUPPORT (OUTPATIENT)
Dept: REHABILITATION | Facility: HOSPITAL | Age: 68
End: 2023-03-29
Payer: MEDICARE

## 2023-03-29 DIAGNOSIS — M25.552 PAIN IN JOINT OF LEFT HIP: ICD-10-CM

## 2023-03-29 DIAGNOSIS — M54.16 LUMBAR RADICULOPATHY: Primary | ICD-10-CM

## 2023-03-29 DIAGNOSIS — M25.512 CHRONIC LEFT SHOULDER PAIN: ICD-10-CM

## 2023-03-29 DIAGNOSIS — G89.29 CHRONIC LEFT SHOULDER PAIN: ICD-10-CM

## 2023-03-29 PROCEDURE — 97014 ELECTRIC STIMULATION THERAPY: CPT | Mod: PN

## 2023-03-29 PROCEDURE — 97110 THERAPEUTIC EXERCISES: CPT | Mod: PN

## 2023-03-29 NOTE — PROGRESS NOTES
Physical Therapy Treatment Note     Name: Kizzy Schofield  Clinic Number: [id number]    Therapy Diagnosis:   Encounter Diagnoses   Name Primary?    Lumbar radiculopathy Yes    Pain in joint of left hip     Chronic left shoulder pain      Physician: Juan Luis Covington MD    Visit Date: 3/29/2023  Physician Orders: PT Eval and Treat    Medical Diagnosis from Referral: lumbar radiculopathy, left hip pain, left shoulder pain   Evaluation Date: 3/3/2023  Authorization Period Expiration: 4/2/2023  Plan of Care Expiration: medicare   Visit # / Visits authorized: 8/ 20  PTA Visit #:    Time In: 1247  Time Out: 1343  Total Billable Time: 50 minutes    Precautions: Standard    Subjective     Pt reports: I woke up yesterday with a knot in the back of my head and around to my right eye, still have a headache today. I'm going to see FNP today at 3:30.    She was compliant with home exercise program.  Response to previous treatment: sore  Functional change: improved shoulder motion    Pain: 8/10 back, hip  Location: left shoulder /hip/ low back     Objective     Kizzy received therapeutic exercises to develop strength, endurance, ROM, flexibility, and posture for 22 minutes including:     Slant board x 2'  Bilateral hamstrings stretch on step  x 5  Swissball knee flexion x 10  Swissball trunk rotation x 10  Bilateral single knee to chest x 5  Bilateral piriformis x 5  Hip adduction with bolster and bridging x 10  Bilateral straight leg raise x 10  Bilateral side lying hip abduction x 10     Range of motion       Hip flexion    right 122        left  112   Left shoulder flexion 160   abduction 150    Kizzy participated in neuromuscular re-education activities to improve: Posture for 8 minutes. The following activities were included:  Upper body ergometer x 5'  Scapular retraction x 10     Kizzy received the following supervised modalities after being cleared for contradictions: IFC Electrical Stimulation:  Kizzy received IFC  Electrical Stimulation for pain control applied to the low back. Pt received stimulation at 100 % scan at a frequency of 23 for 20 minutes. Kizzy tolderated treatment well without any adverse effects.      Kizzy received hot pack for 20 minutes to low back.    Home Exercises Provided and Patient Education Provided     Education provided: home exercise program     Written Home Exercises Provided: Patient instructed to cont prior HEP.  Exercises were reviewed and Kizzy was able to demonstrate them prior to the end of the session.  Kizzy demonstrated good  understanding of the education provided.     See EMR under Patient Instructions for exercises provided prior visit.    Assessment     Patient voicing decreased pain 3/10 post tx. Patient advised to use rolling walker rather than cane while having increased pain/headache   Kizzy Is progressing well towards her goals.   Pt prognosis is Good.     Pt will continue to benefit from skilled outpatient physical therapy to address the deficits listed in the problem list box on initial evaluation, provide pt/family education and to maximize pt's level of independence in the home and community environment.     Pt's spiritual, cultural and educational needs considered and pt agreeable to plan of care and goals.     Anticipated barriers to physical therapy: compliance with home exercise program     Goals:  Short Term Goals: 4 weeks   Pt will be independent with home ex program. -met  Pt will increase bilateral hip flexion to 120 degrees   Pt will decrease back pain from 8/10  to 4/10 to improve quality of life   Increase lower extremity from 3+/5 to 4/5 to increase functional mobility and endurance.   Increase left shoulder arom to 155 degrees flexion and abduction.      Long Term Goals: 6 weeks   Pt will be able to dress pain free  Pt will be able to return to yard work.   Pt will be able to perform all housework pain free     Plan     Plan of care Certification: 3/3/2023 to  4/2/2023.     Outpatient Physical Therapy 2 times weekly for 4 weeks to include the following interventions: Manual Therapy, Neuromuscular Re-ed, Patient Education, Therapeutic Exercise, and modalities as needed .   Anthony Gonsales, PT  3/29/2023

## 2023-03-31 ENCOUNTER — TELEPHONE (OUTPATIENT)
Dept: SLEEP MEDICINE | Facility: CLINIC | Age: 68
End: 2023-03-31
Payer: MEDICARE

## 2023-04-03 ENCOUNTER — OFFICE VISIT (OUTPATIENT)
Dept: SLEEP MEDICINE | Facility: CLINIC | Age: 68
End: 2023-04-03
Payer: MEDICARE

## 2023-04-03 VITALS
DIASTOLIC BLOOD PRESSURE: 69 MMHG | WEIGHT: 167.81 LBS | HEIGHT: 69 IN | BODY MASS INDEX: 24.86 KG/M2 | SYSTOLIC BLOOD PRESSURE: 121 MMHG | HEART RATE: 78 BPM | OXYGEN SATURATION: 100 %

## 2023-04-03 DIAGNOSIS — G47.33 OSA ON CPAP: Primary | ICD-10-CM

## 2023-04-03 DIAGNOSIS — G89.4 CHRONIC PAIN SYNDROME: ICD-10-CM

## 2023-04-03 PROCEDURE — 99215 OFFICE O/P EST HI 40 MIN: CPT | Mod: PBBFAC | Performed by: FAMILY MEDICINE

## 2023-04-03 PROCEDURE — 99999 PR STA SHADOW: ICD-10-PCS | Mod: PBBFAC,,, | Performed by: FAMILY MEDICINE

## 2023-04-03 PROCEDURE — 99212 OFFICE O/P EST SF 10 MIN: CPT | Mod: S$PBB | Performed by: FAMILY MEDICINE

## 2023-04-03 PROCEDURE — 99999 PR STA SHADOW: CPT | Mod: PBBFAC,,, | Performed by: FAMILY MEDICINE

## 2023-04-03 RX ORDER — FERROUS SULFATE, DRIED 160(50) MG
1 TABLET, EXTENDED RELEASE ORAL 2 TIMES DAILY WITH MEALS
COMMUNITY

## 2023-04-03 NOTE — PROGRESS NOTES
Subjective     Patient ID: Kizzy Schofield is a 67 y.o. female.    Chief Complaint: Sleep Apnea (Or CPAP management)    Patient follows up in sleep clinic for CPAP management having no problems with her CPAP or mask.  Compliance is 90% with an average AHI of 1.0 at a pressure of 13 cm H2O.  Effingham score is 3 and the patient is using and benefitting from CPAP.  Patient's initial AHI was 6.2 and she uses a fullface mask.    Review of Systems   Constitutional:  Negative for fatigue.        Negative EDS   Respiratory:  Negative for apnea.    Psychiatric/Behavioral:  Negative for sleep disturbance.         Objective     Physical Exam  Cardiovascular:      Rate and Rhythm: Normal rate and regular rhythm.      Heart sounds: No murmur heard.  Pulmonary:      Breath sounds: Normal breath sounds.   Skin:     General: Skin is warm and dry.   Neurological:      Mental Status: She is alert and oriented to person, place, and time.          Assessment and Plan     Problem List Items Addressed This Visit          Neuro    Chronic pain syndrome       Other    TITO on CPAP - Primary       Continue CPAP @ 13 cm H20  Caution while operating a motor vehicle  Prescription for new supplies  Follow up sleep clinic in 1 year.

## 2023-04-03 NOTE — PROGRESS NOTES
Patient presents to clinic for CPAP F/U. ESS is 3. Patient gets supplies from [facility] in [place]. Patient wears a full face mask. Patient stated there were a few nights she did not sleep at all so she did not use her machine.

## 2023-04-21 ENCOUNTER — OFFICE VISIT (OUTPATIENT)
Dept: FAMILY MEDICINE | Facility: CLINIC | Age: 68
End: 2023-04-21
Payer: MEDICARE

## 2023-04-21 ENCOUNTER — APPOINTMENT (OUTPATIENT)
Dept: RADIOLOGY | Facility: CLINIC | Age: 68
End: 2023-04-21
Attending: NURSE PRACTITIONER
Payer: MEDICARE

## 2023-04-21 VITALS
OXYGEN SATURATION: 98 % | DIASTOLIC BLOOD PRESSURE: 76 MMHG | BODY MASS INDEX: 24.73 KG/M2 | RESPIRATION RATE: 18 BRPM | SYSTOLIC BLOOD PRESSURE: 120 MMHG | TEMPERATURE: 98 F | HEART RATE: 64 BPM | WEIGHT: 167 LBS | HEIGHT: 69 IN

## 2023-04-21 DIAGNOSIS — R30.9 PAIN WITH URINATION: ICD-10-CM

## 2023-04-21 DIAGNOSIS — M25.561 PAIN IN BOTH KNEES, UNSPECIFIED CHRONICITY: ICD-10-CM

## 2023-04-21 DIAGNOSIS — R29.6 FALL IN ELDERLY PATIENT: ICD-10-CM

## 2023-04-21 DIAGNOSIS — M25.562 PAIN IN BOTH KNEES, UNSPECIFIED CHRONICITY: ICD-10-CM

## 2023-04-21 DIAGNOSIS — N39.0 URINARY TRACT INFECTION WITHOUT HEMATURIA, SITE UNSPECIFIED: ICD-10-CM

## 2023-04-21 DIAGNOSIS — M25.562 PAIN IN BOTH KNEES, UNSPECIFIED CHRONICITY: Primary | ICD-10-CM

## 2023-04-21 DIAGNOSIS — M25.561 PAIN IN BOTH KNEES, UNSPECIFIED CHRONICITY: Primary | ICD-10-CM

## 2023-04-21 LAB
BILIRUB SERPL-MCNC: NEGATIVE MG/DL
BLOOD URINE, POC: NEGATIVE
COLOR, POC UA: YELLOW
GLUCOSE UR QL STRIP: NEGATIVE
KETONES UR QL STRIP: NEGATIVE
LEUKOCYTE ESTERASE URINE, POC: ABNORMAL
NITRITE, POC UA: POSITIVE
PH, POC UA: 7
PROTEIN, POC: NEGATIVE
SPECIFIC GRAVITY, POC UA: 1.01
UROBILINOGEN, POC UA: 0.2

## 2023-04-21 PROCEDURE — 87186 SC STD MICRODIL/AGAR DIL: CPT | Mod: ,,, | Performed by: CLINICAL MEDICAL LABORATORY

## 2023-04-21 PROCEDURE — 87086 URINE CULTURE/COLONY COUNT: CPT | Mod: ,,, | Performed by: CLINICAL MEDICAL LABORATORY

## 2023-04-21 PROCEDURE — 99213 OFFICE O/P EST LOW 20 MIN: CPT | Mod: ,,, | Performed by: NURSE PRACTITIONER

## 2023-04-21 PROCEDURE — 87086 CULTURE, URINE: ICD-10-PCS | Mod: ,,, | Performed by: CLINICAL MEDICAL LABORATORY

## 2023-04-21 PROCEDURE — 87077 CULTURE, URINE: ICD-10-PCS | Mod: ,,, | Performed by: CLINICAL MEDICAL LABORATORY

## 2023-04-21 PROCEDURE — 87186 CULTURE, URINE: ICD-10-PCS | Mod: ,,, | Performed by: CLINICAL MEDICAL LABORATORY

## 2023-04-21 PROCEDURE — 99213 PR OFFICE/OUTPT VISIT, EST, LEVL III, 20-29 MIN: ICD-10-PCS | Mod: ,,, | Performed by: NURSE PRACTITIONER

## 2023-04-21 PROCEDURE — 73562 X-RAY EXAM OF KNEE 3: CPT | Mod: 50,TC,RHCUB | Performed by: NURSE PRACTITIONER

## 2023-04-21 PROCEDURE — 73562 XR KNEE AP LAT WITH SUNRISE BILAT: ICD-10-PCS | Mod: 26,50,, | Performed by: RADIOLOGY

## 2023-04-21 PROCEDURE — 81003 URINALYSIS AUTO W/O SCOPE: CPT | Mod: RHCUB | Performed by: NURSE PRACTITIONER

## 2023-04-21 PROCEDURE — 73562 X-RAY EXAM OF KNEE 3: CPT | Mod: 26,50,, | Performed by: RADIOLOGY

## 2023-04-21 PROCEDURE — 87077 CULTURE AEROBIC IDENTIFY: CPT | Mod: ,,, | Performed by: CLINICAL MEDICAL LABORATORY

## 2023-04-21 RX ORDER — HYDROCODONE BITARTRATE AND ACETAMINOPHEN 10; 325 MG/1; MG/1
TABLET ORAL
COMMUNITY
Start: 2023-03-30 | End: 2024-02-06 | Stop reason: ALTCHOICE

## 2023-04-21 NOTE — PATIENT INSTRUCTIONS
Cool pack to knees to help with pain and swelling added urine culture to urine, keep cane /walker with self at all times, encouraged physical therapy for gait she declines    X-Ray Knee AP LAT with Rockwell Bilat  Narrative: EXAMINATION:  XR KNEE AP LAT WITH SUNRISE BILAT    CLINICAL HISTORY:  Pain in right knee    TECHNIQUE:  Bilateral knees, AP lateral and sunrise views bilaterally    COMPARISON:  11/09/2020    FINDINGS:  In the right knee, there is mild osteoarthritis involving the patellofemoral joint and centrally.  No effusion is present.  Mild central osteoarthritis is present in the left knee but there is no evidence of an effusion.  Impression: Mild osteoarthritis is present in each knee without significant change when compared to the previous study.    Place of service: [facility]    Electronically signed by: Ginette Alfonso  Date:    04/21/2023  Time:    10:29

## 2023-04-21 NOTE — PROGRESS NOTES
CHLOE Boswell   [facility] [place]  [address]  [place] [address]     PATIENT NAME: Kizzy Schofield  : 1955  DATE: 23  MRN: [id number]      Billing Provider: CHLOE Boswell  Level of Service:   Patient PCP Information       Provider PCP Type    CHLOE Boswell General            Reason for Visit / Chief Complaint: Fall (Has had a couple of falls), Knee Pain (Bilateral knee pain, hurt left knee last week, this week right knee and it hurts worse), and Urinary Tract Infection (C/o left kidney pain)   Health Maintenance Due   Topic Date Due    Shingles Vaccine (1 of 2) Never done    COVID-19 Vaccine (3 - Booster for Moderna series) 2021    TETANUS VACCINE  10/11/2021    Influenza Vaccine (1) 2022          Update PCP  Update Chief Complaint         History of Present Illness / Problem Focused Workflow     Kizzy Schofield presents to the clinic as a walk in with reports of a few falls and dysuria and a history of uti. She was outside and had been trimming bushes was fatigue and stepped in hole. Another day she tripped over a dog chain each fall she landed on her knees. Her knee are painful     Hemoglobin A1C   Date Value Ref Range Status   2022 5.4 4.5 - 6.6 % Final     Comment:       Normal:               <5.7%  Pre-Diabetic:       5.7% to 6.4%  Diabetic:             >6.4%  Diabetic Goal:     <7%        CMP  Sodium   Date Value Ref Range Status   2023 143 136 - 145 mmol/L Final     Potassium   Date Value Ref Range Status   2023 3.9 3.5 - 5.1 mmol/L Final     Chloride   Date Value Ref Range Status   2023 111 (H) 98 - 107 mmol/L Final     CO2   Date Value Ref Range Status   2023 28 21 - 32 mmol/L Final     Glucose   Date Value Ref Range Status   2023 95 74 - 106 mg/dL Final     BUN   Date Value Ref Range Status   2023 16 7 - 18 mg/dL Final     Creatinine   Date Value Ref Range Status    03/27/2023 1.35 (H) 0.55 - 1.02 mg/dL Final     Calcium   Date Value Ref Range Status   03/27/2023 8.4 (L) 8.5 - 10.1 mg/dL Final     Total Protein   Date Value Ref Range Status   03/27/2023 6.9 6.4 - 8.2 g/dL Final     Albumin   Date Value Ref Range Status   03/27/2023 3.4 (L) 3.5 - 5.0 g/dL Final     Bilirubin, Total   Date Value Ref Range Status   03/27/2023 0.2 >0.0 - 1.2 mg/dL Final     Alk Phos   Date Value Ref Range Status   03/27/2023 66 55 - 142 U/L Final     AST   Date Value Ref Range Status   03/27/2023 13 (L) 15 - 37 U/L Final     ALT   Date Value Ref Range Status   03/27/2023 18 13 - 56 U/L Final     Anion Gap   Date Value Ref Range Status   03/27/2023 8 7 - 16 mmol/L Final     eGFR   Date Value Ref Range Status   03/27/2023 43 (L) >=60 mL/min/1.73m² Final        Lab Results   Component Value Date    WBC 7.26 03/27/2023    RBC 3.91 (L) 03/27/2023    HGB 11.7 (L) 03/27/2023    HCT 37.2 (L) 03/27/2023    MCV 95.1 03/27/2023    MCH 29.9 03/27/2023    MCHC 31.5 (L) 03/27/2023    RDW 13.0 03/27/2023     03/27/2023    MPV 10.2 03/27/2023    LYMPH 25.5 (L) 03/27/2023    LYMPH 1.85 03/27/2023    MONO 5.0 03/27/2023    EOS 0.13 03/27/2023    BASO 0.05 03/27/2023    EOSINOPHIL 1.8 03/27/2023    BASOPHIL 0.7 03/27/2023        Lab Results   Component Value Date    CHOL 126 10/31/2022    CHOL 147 03/15/2022    CHOL 162 10/05/2021     Lab Results   Component Value Date    HDL 67 (H) 10/31/2022    HDL 72 (H) 03/15/2022    HDL 72 (H) 10/05/2021     Lab Results   Component Value Date    LDLCALC 39 10/31/2022    LDLCALC 49 03/15/2022    LDLCALC 60 10/05/2021     Lab Results   Component Value Date    TRIG 98 10/31/2022    TRIG 130 03/15/2022    TRIG 150 10/05/2021     Lab Results   Component Value Date    CHOLHDL 1.9 10/31/2022    CHOLHDL 2.0 03/15/2022    CHOLHDL 2.3 10/05/2021        Wt Readings from Last 3 Encounters:   04/21/23 0922 75.8 kg (167 lb)   04/03/23 1037 76.1 kg (167 lb 12.8 oz)   03/27/23 1530  80.3 kg (177 lb)        BP Readings from Last 3 Encounters:   04/21/23 120/76   04/03/23 121/69   03/27/23 118/70        Review of Systems     Review of Systems   Constitutional:  Negative for appetite change.   Gastrointestinal:  Negative for change in bowel habit, fecal incontinence and change in bowel habit.   Genitourinary:  Positive for dysuria. Negative for difficulty urinating.   Musculoskeletal:  Positive for arthralgias, back pain, joint swelling and leg pain.      Medical / Social / Family History     Past Medical History:   Diagnosis Date    Arthritis of left hip     Arthritis of left knee     Cervical radiculopathy     Chronic cystitis     Chronic pain syndrome     Degeneration of lumbar intervertebral disc     Depressive disorder     GERD (gastroesophageal reflux disease)     Hyperlipidemia     Hypertension     Lumbar post-laminectomy syndrome     Meniere disease     Osteoarthritis of cervical and lumbar spine     Osteoporosis        Past Surgical History:   Procedure Laterality Date    bladder tack      CHOLECYSTECTOMY      HYSTERECTOMY      LUMBAR LAMINECTOMY N/A 6/16/2022    Procedure: L4-L5 Laminectomy;  Surgeon: Juan Luis Covington MD;  Location: [facility];  Service: Neurosurgery;  Laterality: N/A;    OOPHORECTOMY         Social History  Ms.  reports that she has quit smoking. She has been exposed to tobacco smoke. She has never used smokeless tobacco. She reports that she does not currently use alcohol. She reports that she does not use drugs.    Family History  Ms.'s family history includes Cancer in her father; Glaucoma in her daughter; Heart disease in her mother; Hypertension in her father and mother; Lung cancer in her father; Prostate cancer in her father; Stroke in her father and mother; Thyroid disease in her daughter and mother.    Medications and Allergies     Medications  Outpatient Medications Marked as Taking for the 4/21/23 encounter (Office Visit) with CHLOE Boswell    Medication Sig Dispense Refill    acetaminophen (TYLENOL) 500 MG tablet Take 500 mg by mouth every 6 (six) hours as needed.      amitriptyline (ELAVIL) 10 MG tablet Take 1 tablet (10 mg total) by mouth 2 (two) times a day. 180 tablet 1    atorvastatin (LIPITOR) 40 MG tablet Take 1 tablet (40 mg total) by mouth once daily. 90 tablet 1    calcium-vitamin D3 (OS-AIDEE 500 + D3) 500 mg-5 mcg (200 unit) per tablet Take 1 tablet by mouth 2 (two) times daily with meals.      cetirizine (ZYRTEC) 10 MG tablet Take 10 mg by mouth once daily.      colestipoL (COLESTID) 1 gram Tab Take 1 g by mouth once daily.      diazePAM (VALIUM) 2 MG tablet TAKE ONE TABLET BY MOUTH EVERY TWELVE HOURS AS NEEDED FOR ANXIETY 60 tablet 2    EPINEPHrine (EPIPEN) 0.3 mg/0.3 mL AtIn Inject into the muscle once.      fluticasone propionate (FLONASE) 50 mcg/actuation nasal spray USE 2 SPRAYS IN EACH NOSTRIL ONE TIME DAILY 48 g 2    folic acid (FOLVITE) 1 MG tablet Take 1,000 mcg by mouth once daily.      gabapentin (NEURONTIN) 600 MG tablet Take 1 tablet (600 mg total) by mouth 3 (three) times daily. 270 tablet 1    HYDROcodone-acetaminophen (NORCO)  mg per tablet Take by mouth.      HYDROcodone-acetaminophen (NORCO) 7.5-325 mg per tablet Take 1 tablet by mouth 2 (two) times daily as needed for Pain.      levothyroxine (SYNTHROID) 50 MCG tablet Take 1 tablet (50 mcg total) by mouth before breakfast. 90 tablet 1    methocarbamoL (ROBAXIN) 500 MG Tab Take 1 tablet (500 mg total) by mouth 3 (three) times daily. 270 tablet 1    methotrexate 2.5 MG Tab Take 6 tablets by mouth every 7 days.      montelukast (SINGULAIR) 10 mg tablet Take 1 tablet (10 mg total) by mouth once daily. 90 tablet 1    ondansetron (ZOFRAN) 4 MG tablet Take 1 tablet (4 mg total) by mouth every 8 (eight) hours as needed for Nausea. 30 tablet 0    polyethylene glycol (GLYCOLAX) 17 gram PwPk Take by mouth. Take 17 grams by mouth 2 times daily mixed with 8 ounces of water,  "juice, soda, tea, or coffee      promethazine (PHENERGAN) 25 MG tablet Take 1 tablet (25 mg total) by mouth every 4 (four) hours. 45 tablet 0    RABEprazole (ACIPHEX) 20 mg tablet Take by mouth 2 (two) times daily.      raloxifene (EVISTA) 60 mg tablet Take 1 tablet (60 mg total) by mouth once daily. 90 tablet 1    topiramate (TOPAMAX) 100 MG tablet Take 1 tablet (100 mg total) by mouth 2 (two) times a day. 180 tablet 1    traZODone (DESYREL) 100 MG tablet Take 1 tablet (100 mg total) by mouth every evening. 30 tablet 2    triamcinolone acetonide 0.1% (KENALOG) 0.1 % ointment APPLY TO THE AFFECTED AREA(S) ON ARMS, LEGS AND trunk TWICE DAILY FOR ITCHING      triamterene-hydrochlorothiazide 37.5-25 mg (MAXZIDE-25) 37.5-25 mg per tablet Take 1 tablet by mouth once daily. 90 tablet 1    vitamin D (VITAMIN D3) 1000 units Tab Take 5,000 Units by mouth 2 (two) times a day.         Allergies  Review of patient's allergies indicates:   Allergen Reactions    Adhesive tape-silicones     Aspirin     Celebrex [celecoxib]     Opioids - morphine analogues     Pcn [penicillins]     Shrimp     Ciprofloxacin      Pt reported that "it made her feel real bad".    Silicone Itching and Rash       Physical Examination     Vitals:    04/21/23 0922   BP: 120/76   BP Location: Right arm   Patient Position: Sitting   Pulse: 64   Resp: 18   Temp: 97.8 °F (36.6 °C)   TempSrc: Oral   SpO2: 98%   Weight: 75.8 kg (167 lb)   Height: 5' 9" (1.753 m)      Physical Exam  Vitals and nursing note reviewed.   Constitutional:       General: She is not in acute distress.     Appearance: Normal appearance. She is not ill-appearing, toxic-appearing or diaphoretic.   Cardiovascular:      Rate and Rhythm: Normal rate and regular rhythm.   Pulmonary:      Effort: Pulmonary effort is normal.      Breath sounds: Normal breath sounds.   Abdominal:      Tenderness: There is no right CVA tenderness or left CVA tenderness.   Musculoskeletal:      Right knee: Swelling " and crepitus present. Decreased range of motion.      Left knee: Crepitus present. No swelling. Decreased range of motion.   Skin:     Capillary Refill: Capillary refill takes less than 2 seconds.   Neurological:      Mental Status: She is alert and oriented to person, place, and time.    X-Ray Knee AP LAT with [place] Bilat  Narrative: EXAMINATION:  XR KNEE AP LAT WITH SUNRISE BILAT    CLINICAL HISTORY:  Pain in right knee    TECHNIQUE:  Bilateral knees, AP lateral and sunrise views bilaterally    COMPARISON:  11/09/2020    FINDINGS:  In the right knee, there is mild osteoarthritis involving the patellofemoral joint and centrally.  No effusion is present.  Mild central osteoarthritis is present in the left knee but there is no evidence of an effusion.  Impression: Mild osteoarthritis is present in each knee without significant change when compared to the previous study.    [facility] of service[facility]    Electronically signed by: Ginette Alfonso  Date:    04/21/2023  Time:    10:29       Assessment and Plan (including Health Maintenance)      Problem List  Smart Sets  Document Outside HM   :    Plan:     Patient Instructions   Cool pack to knees to help with pain and swelling added urine culture to urine, keep cane /walker with self at all times, encouraged physical therapy for gait she declines    X-Ray Knee AP LAT with [place] Bilat  Narrative: EXAMINATION:  XR KNEE AP LAT WITH SUNRISE BILAT    CLINICAL HISTORY:  Pain in right knee    TECHNIQUE:  Bilateral knees, AP lateral and sunrise views bilaterally    COMPARISON:  11/09/2020    FINDINGS:  In the right knee, there is mild osteoarthritis involving the patellofemoral joint and centrally.  No effusion is present.  Mild central osteoarthritis is present in the left knee but there is no evidence of an effusion.  Impression: Mild osteoarthritis is present in each knee without significant change when compared to the previous study.    Place of service:  [facility]    Electronically signed by: Ginette Alfonso  Date:    04/21/2023  Time:    10:29      Health Maintenance Due   Topic Date Due    Shingles Vaccine (1 of 2) Never done    COVID-19 Vaccine (3 - Booster for Moderna series) 08/20/2021    TETANUS VACCINE  10/11/2021    Influenza Vaccine (1) 09/01/2022        Health Maintenance Due   Topic Date Due    Shingles Vaccine (1 of 2) Never done    COVID-19 Vaccine (3 - Booster for Moderna series) 08/20/2021    TETANUS VACCINE  10/11/2021    Influenza Vaccine (1) 09/01/2022       Problem List Items Addressed This Visit          Renal/    Urinary tract infection without hematuria    Relevant Orders    Urine culture     Other Visit Diagnoses       Pain in both knees, unspecified chronicity    -  Primary    Relevant Orders    X-Ray Knee AP LAT with [place] Bilat (Completed)    Fall in elderly patient        Pain with urination        Relevant Orders    POCT URINALYSIS W/O SCOPE (Completed)          Pain in both knees, unspecified chronicity  -     X-Ray Knee AP LAT with [place] Bilat; Future; Expected date: 04/21/2023    Fall in elderly patient    Pain with urination  -     POCT URINALYSIS W/O SCOPE    Urinary tract infection without hematuria, site unspecified  -     Urine culture       Health Maintenance Topics with due status: Not Due       Topic Last Completion Date    DEXA Scan 09/02/2020    Colorectal Cancer Screening 08/17/2021    Lipid Panel 10/31/2022    Mammogram 11/15/2022       Procedures   Health Maintenance Due   Topic Date Due    Shingles Vaccine (1 of 2) Never done    COVID-19 Vaccine (3 - Booster for Moderna series) 08/20/2021    TETANUS VACCINE  10/11/2021    Influenza Vaccine (1) 09/01/2022        Future Appointments   Date Time Provider Department Center   4/25/2023  9:45 AM [facility]   9/1/2023 10:15 AM Juan Luis Covington MD [facility]   [facility]24[facility]  [facility]   4/8/2024 10:30 AM Omari Holt DO [facility]        Follow up if symptoms worsen or fail to improve.    Health Maintenance Due   Topic Date Due    Shingles Vaccine (1 of 2) Never done    COVID-19 Vaccine (3 - Booster for Moderna series) 08/20/2021    TETANUS VACCINE  10/11/2021    Influenza Vaccine (1) 09/01/2022        Signature:  CHLOE Boswell    Date of encounter: 4/21/234/21/2023

## 2023-04-23 LAB — UA COMPLETE W REFLEX CULTURE PNL UR: ABNORMAL

## 2023-04-24 DIAGNOSIS — N39.0 URINARY TRACT INFECTION WITHOUT HEMATURIA, SITE UNSPECIFIED: Primary | ICD-10-CM

## 2023-04-24 RX ORDER — NITROFURANTOIN 25; 75 MG/1; MG/1
100 CAPSULE ORAL 2 TIMES DAILY
Qty: 20 CAPSULE | Refills: 0 | Status: SHIPPED | OUTPATIENT
Start: 2023-04-24 | End: 2023-05-04

## 2023-04-25 DIAGNOSIS — N39.0 URINARY TRACT INFECTION WITHOUT HEMATURIA, SITE UNSPECIFIED: Primary | ICD-10-CM

## 2023-04-25 RX ORDER — METHENAMINE HIPPURATE 1000 MG/1
1 TABLET ORAL 2 TIMES DAILY
Qty: 180 TABLET | Refills: 3 | Status: SHIPPED | OUTPATIENT
Start: 2023-04-25 | End: 2024-04-19

## 2023-04-25 NOTE — TELEPHONE ENCOUNTER
Patient request refill on methenamine hippurate. She ran out of the medication and now she is being treated with Macrobid and will finish on 5/4/23.   Patient will follow up with Dr Coello appointment on 5/9/2023 at 10:30.

## 2023-04-27 ENCOUNTER — PATIENT MESSAGE (OUTPATIENT)
Dept: FAMILY MEDICINE | Facility: CLINIC | Age: 68
End: 2023-04-27
Payer: MEDICARE

## 2023-04-27 ENCOUNTER — OFFICE VISIT (OUTPATIENT)
Dept: NEUROLOGY | Facility: CLINIC | Age: 68
End: 2023-04-27
Payer: MEDICARE

## 2023-04-27 VITALS
OXYGEN SATURATION: 98 % | WEIGHT: 167 LBS | HEART RATE: 76 BPM | HEIGHT: 69 IN | SYSTOLIC BLOOD PRESSURE: 126 MMHG | BODY MASS INDEX: 24.73 KG/M2 | DIASTOLIC BLOOD PRESSURE: 86 MMHG | RESPIRATION RATE: 18 BRPM

## 2023-04-27 DIAGNOSIS — G44.221 CHRONIC TENSION-TYPE HEADACHE, INTRACTABLE: ICD-10-CM

## 2023-04-27 DIAGNOSIS — G89.4 CHRONIC PAIN SYNDROME: ICD-10-CM

## 2023-04-27 DIAGNOSIS — G43.119 INTRACTABLE MIGRAINE WITH AURA WITHOUT STATUS MIGRAINOSUS: Primary | ICD-10-CM

## 2023-04-27 PROCEDURE — 99204 PR OFFICE/OUTPT VISIT, NEW, LEVL IV, 45-59 MIN: ICD-10-PCS | Mod: S$PBB,,, | Performed by: PSYCHIATRY & NEUROLOGY

## 2023-04-27 PROCEDURE — 99204 OFFICE O/P NEW MOD 45 MIN: CPT | Mod: S$PBB,,, | Performed by: PSYCHIATRY & NEUROLOGY

## 2023-04-27 PROCEDURE — 99215 OFFICE O/P EST HI 40 MIN: CPT | Mod: PBBFAC | Performed by: PSYCHIATRY & NEUROLOGY

## 2023-04-27 RX ORDER — TOPIRAMATE 200 MG/1
200 TABLET ORAL 2 TIMES DAILY
Qty: 180 TABLET | Refills: 3 | Status: SHIPPED | OUTPATIENT
Start: 2023-04-27

## 2023-04-27 RX ORDER — TOPIRAMATE 200 MG/1
200 TABLET ORAL 2 TIMES DAILY
Qty: 180 TABLET | Refills: 3 | Status: SHIPPED | OUTPATIENT
Start: 2023-04-27 | End: 2023-04-27 | Stop reason: SDUPTHER

## 2023-04-27 NOTE — PROGRESS NOTES
Subjective:       Patient ID: Kizzy Schofield is a 67 y.o. female     Chief Complaint:    Chief Complaint   Patient presents with    Headache     Mostly on right side of head, pt states when headaches occur she sees shadows        Allergies:  Adhesive tape-silicones, Aspirin, Celebrex [celecoxib], Opioids - morphine analogues, Pcn [penicillins], Shrimp, Ciprofloxacin, and Silicone    Current Medications:    Outpatient Encounter Medications as of 4/27/2023   Medication Sig Dispense Refill    acetaminophen (TYLENOL) 500 MG tablet Take 500 mg by mouth every 6 (six) hours as needed.      amitriptyline (ELAVIL) 10 MG tablet Take 1 tablet (10 mg total) by mouth 2 (two) times a day. 180 tablet 1    atorvastatin (LIPITOR) 40 MG tablet Take 1 tablet (40 mg total) by mouth once daily. 90 tablet 1    calcium-vitamin D3 (OS-AIDEE 500 + D3) 500 mg-5 mcg (200 unit) per tablet Take 1 tablet by mouth 2 (two) times daily with meals.      cetirizine (ZYRTEC) 10 MG tablet Take 10 mg by mouth once daily.      colestipoL (COLESTID) 1 gram Tab Take 1 g by mouth once daily.      diazePAM (VALIUM) 2 MG tablet TAKE ONE TABLET BY MOUTH EVERY TWELVE HOURS AS NEEDED FOR ANXIETY 60 tablet 2    EPINEPHrine (EPIPEN) 0.3 mg/0.3 mL AtIn Inject into the muscle once.      fluticasone propionate (FLONASE) 50 mcg/actuation nasal spray USE 2 SPRAYS IN EACH NOSTRIL ONE TIME DAILY 48 g 2    folic acid (FOLVITE) 1 MG tablet Take 1,000 mcg by mouth once daily.      gabapentin (NEURONTIN) 600 MG tablet Take 1 tablet (600 mg total) by mouth 3 (three) times daily. 270 tablet 1    HYDROcodone-acetaminophen (NORCO)  mg per tablet Take by mouth.      HYDROcodone-acetaminophen (NORCO) 7.5-325 mg per tablet Take 1 tablet by mouth 2 (two) times daily as needed for Pain.      levothyroxine (SYNTHROID) 50 MCG tablet Take 1 tablet (50 mcg total) by mouth before breakfast. 90 tablet 1    methenamine (HIPREX) 1 gram Tab Take 1 tablet (1 g total) by mouth 2  (two) times daily. 180 tablet 3    methocarbamoL (ROBAXIN) 500 MG Tab Take 1 tablet (500 mg total) by mouth 3 (three) times daily. 270 tablet 1    methotrexate 2.5 MG Tab Take 6 tablets by mouth every 7 days.      montelukast (SINGULAIR) 10 mg tablet Take 1 tablet (10 mg total) by mouth once daily. 90 tablet 1    nitrofurantoin, macrocrystal-monohydrate, (MACROBID) 100 MG capsule Take 1 capsule (100 mg total) by mouth 2 (two) times daily. for 10 days 20 capsule 0    ondansetron (ZOFRAN) 4 MG tablet Take 1 tablet (4 mg total) by mouth every 8 (eight) hours as needed for Nausea. 30 tablet 0    polyethylene glycol (GLYCOLAX) 17 gram PwPk Take by mouth. Take 17 grams by mouth 2 times daily mixed with 8 ounces of water, juice, soda, tea, or coffee      promethazine (PHENERGAN) 25 MG tablet Take 1 tablet (25 mg total) by mouth every 4 (four) hours. 45 tablet 0    RABEprazole (ACIPHEX) 20 mg tablet Take by mouth 2 (two) times daily.      raloxifene (EVISTA) 60 mg tablet Take 1 tablet (60 mg total) by mouth once daily. 90 tablet 1    traZODone (DESYREL) 100 MG tablet Take 1 tablet (100 mg total) by mouth every evening. 30 tablet 2    triamcinolone acetonide 0.1% (KENALOG) 0.1 % ointment APPLY TO THE AFFECTED AREA(S) ON ARMS, LEGS AND trunk TWICE DAILY FOR ITCHING      triamterene-hydrochlorothiazide 37.5-25 mg (MAXZIDE-25) 37.5-25 mg per tablet Take 1 tablet by mouth once daily. 90 tablet 1    vitamin D (VITAMIN D3) 1000 units Tab Take 5,000 Units by mouth 2 (two) times a day.      [DISCONTINUED] topiramate (TOPAMAX) 100 MG tablet Take 1 tablet (100 mg total) by mouth 2 (two) times a day. 180 tablet 1    topiramate (TOPAMAX) 200 MG Tab Take 1 tablet (200 mg total) by mouth 2 (two) times daily. 180 tablet 3    [DISCONTINUED] topiramate (TOPAMAX) 200 MG Tab Take 1 tablet (200 mg total) by mouth 2 (two) times daily. 180 tablet 3     No facility-administered encounter medications on file as of 4/27/2023.       History of  Present Illness  68 yo WF w/ several decades of chronic HEADACHE's - migrainous in nature but a recent peculiar variation beginning in posterior neck and radiates anteriorly R side of head and she claims into RUE and RLE?   Freq about weekly and nature dull,throbbing pain   She has TITO needing CPAP as well as chronic pain syndrome and mult failed back syndromes - has been on opioid narcotics for quarter century   States on Valium for Meniere's disease                  Past Medical History:   Diagnosis Date    Arthritis of left hip     Arthritis of left knee     Cervical radiculopathy     Chronic cystitis     Chronic pain syndrome     Degeneration of lumbar intervertebral disc     Depressive disorder     GERD (gastroesophageal reflux disease)     Hyperlipidemia     Hypertension     Lumbar post-laminectomy syndrome     Meniere disease     Osteoarthritis of cervical and lumbar spine     Osteoporosis        Past Surgical History:   Procedure Laterality Date    bladder tack      CHOLECYSTECTOMY      HYSTERECTOMY      LUMBAR LAMINECTOMY N/A 6/16/2022    Procedure: L4-L5 Laminectomy;  Surgeon: Juan Luis Covington MD;  Location: [facility];  Service: Neurosurgery;  Laterality: N/A;    OOPHORECTOMY         Social History  Ms. Schofield  reports that she has quit smoking. She has been exposed to tobacco smoke. She has never used smokeless tobacco. She reports that she does not currently use alcohol. She reports that she does not use drugs.    Family History  Ms.'s Schofield family history includes Cancer in her father; Glaucoma in her daughter; Heart disease in her mother; Hypertension in her father and mother; Lung cancer in her father; Prostate cancer in her father; Stroke in her father and mother; Thyroid disease in her daughter and mother.    Review of Systems  Review of Systems   Musculoskeletal:  Positive for back pain, joint pain, myalgias and neck pain.   Neurological:  Positive for dizziness, tingling, sensory  "change and headaches.   Psychiatric/Behavioral:  The patient is nervous/anxious.    All other systems reviewed and are negative.   Objective:   /86 (BP Location: Left arm, Patient Position: Sitting, BP Method: Large (Manual))   Pulse 76   Resp 18   Ht 5' 9" (1.753 m)   Wt 75.8 kg (167 lb)   SpO2 98%   BMI 24.66 kg/m²    NEUROLOGICAL EXAMINATION:     MENTAL STATUS   Oriented to person, place, and time.   Attention: normal. Concentration: normal.   Speech: speech is normal   Level of consciousness: alert  Knowledge: good.     CRANIAL NERVES   Cranial nerves II through XII intact.     MOTOR EXAM     Strength   Strength 5/5 throughout.     SENSORY EXAM        Paresthesias      GAIT AND COORDINATION        Antalgic gait      Physical Exam  Vitals reviewed.   Constitutional:       Appearance: She is normal weight.   Neurological:      General: No focal deficit present.      Mental Status: She is alert and oriented to person, place, and time. Mental status is at baseline.      Cranial Nerves: Cranial nerves 2-12 are intact.      Motor: Motor strength is normal.   Psychiatric:         Speech: Speech normal.        Assessment:     Intractable migraine with aura without status migrainosus    Chronic pain syndrome  -     Discontinue: topiramate (TOPAMAX) 200 MG Tab; Take 1 tablet (200 mg total) by mouth 2 (two) times daily.  Dispense: 180 tablet; Refill: 3  -     topiramate (TOPAMAX) 200 MG Tab; Take 1 tablet (200 mg total) by mouth 2 (two) times daily.  Dispense: 180 tablet; Refill: 3    Chronic tension-type headache, intractable  -     CT Head Without Contrast; Future; Expected date: 04/27/2023         Primary Diagnosis and ICD10  Intractable migraine with aura without status migrainosus [G43.119]    Plan:     Patient Instructions   CT head r/o pathology   Cont Elavil but incr to 20 mg bedtime  Cont TOPAMAX but incr to 200 mg twice daily   F/u 3 months    Medications Discontinued During This Encounter "   Medication Reason    topiramate (TOPAMAX) 100 MG tablet Reorder    topiramate (TOPAMAX) 200 MG Tab Reorder       Requested Prescriptions     Signed Prescriptions Disp Refills    topiramate (TOPAMAX) 200 MG Tab 180 tablet 3     Sig: Take 1 tablet (200 mg total) by mouth 2 (two) times daily.

## 2023-04-27 NOTE — PATIENT INSTRUCTIONS
CT head r/o pathology   Cont Elavil but incr to 20 mg bedtime  Cont TOPAMAX but incr to 200 mg twice daily   F/u 3 months

## 2023-05-08 ENCOUNTER — OFFICE VISIT (OUTPATIENT)
Dept: UROLOGY | Facility: CLINIC | Age: 68
End: 2023-05-08
Payer: MEDICARE

## 2023-05-08 VITALS
SYSTOLIC BLOOD PRESSURE: 110 MMHG | BODY MASS INDEX: 24.73 KG/M2 | HEIGHT: 69 IN | HEART RATE: 91 BPM | WEIGHT: 167 LBS | DIASTOLIC BLOOD PRESSURE: 72 MMHG

## 2023-05-08 DIAGNOSIS — R79.89 PRERENAL AZOTEMIA: ICD-10-CM

## 2023-05-08 DIAGNOSIS — M51.36 DEGENERATIVE DISC DISEASE, LUMBAR: ICD-10-CM

## 2023-05-08 DIAGNOSIS — N39.0 URINARY TRACT INFECTION WITHOUT HEMATURIA, SITE UNSPECIFIED: ICD-10-CM

## 2023-05-08 DIAGNOSIS — N30.20 CHRONIC CYSTITIS: Primary | ICD-10-CM

## 2023-05-08 PROCEDURE — 99213 PR OFFICE/OUTPT VISIT, EST, LEVL III, 20-29 MIN: ICD-10-PCS | Mod: S$PBB,,, | Performed by: UROLOGY

## 2023-05-08 PROCEDURE — 99213 OFFICE O/P EST LOW 20 MIN: CPT | Mod: PBBFAC | Performed by: UROLOGY

## 2023-05-08 PROCEDURE — 99213 OFFICE O/P EST LOW 20 MIN: CPT | Mod: S$PBB,,, | Performed by: UROLOGY

## 2023-05-08 NOTE — PATIENT INSTRUCTIONS
Continue methenamine hippurate.  Submit urine culture p.r.n. when she feels she needs to be treated with antibiotics.  1 year appointment or sooner if needed.

## 2023-05-08 NOTE — PROGRESS NOTES
Subjective     Patient ID: Kizzy Schofield is a 68 y.o. female.    Chief Complaint: Follow-up (Treated for UTI, last dose taken yesterday of Macrobid)    History of Present Illness  Patient sent to me by Dr. Omari Holt for problem of abnormal voiding. She has sensation of needing to void frequently when she can't. She's also had recurrent urinary tract infections every couple months for least the last couple of years. She has had urgency problems with some urge incontinence and has had enuresis problems. She does better daytime than she does at night. She's had multiple back operations and is confined to wheelchair for the most part. She apparently saw Dr. Maloney about a year or so ago for similar complaints. She had a hysterectomy 22 years ago and had her ovaries removed later. Patient was seen by Dr. lazcano in December 1979 for recurrent urinary tract infections. She had infections as a child and he treated her for another infection at that time. I saw her in 1982 for recurrent flank pain and recurrent UTIs, but I don't see any additional visits in the review of the [facility] record. She had vaginal hysterectomy done Dr. Jennings on July 10, 1990.  The patient's daughter, Rose, was my patient as a child and I operated on her for vesicoureteral reflux.  (June 7, 2012)     The above-note is from June 7. Patient has been on Macrodantin suppression 50 mg daily and is doing well with her infection. No symptoms of urinary infection currently but has been on Keflex and 2 other antibiotics for dental problems and has a yeast stomatitis from this. She also has a yeast vaginitis and needs medication for both problems. Nothing to make her think that there is a residual urinary tract infection. She has had problems with migraine headaches for the last several weeks. This has improved somewhat since she's been on off label prescribed drug. (August 23, 2012)     Above-note is the note of August 23, 2012. The  patient has continued on suppression with Macrodantin all along. She did have a low-grade urinary tract infection in October we treated with Cipro. SHe is currently essentially asymptomatic. She has no sensation of infection today. She is having some mild discomfort in her bladder. I think it is nearly time to leave her off of antimicrobials and see how she does when prescription runs out. (November 29, 2012)     Last note above is the note of November 29, 2012. We stopped Macrodantin in December  but had recurrent infection over 100,000 colonies of staph and just treated with Bactrim recently. Still feels like there is still an  infection present . Her left flank is tender and has some dysuria yet. Has not had any fever.  (March 26, 2013)     Patient was put back on Hiprex after the above note. She is off this now because of economic concernes. She is still having left flank pain, burning, and possibly some dysuria. She has itching. She has had no fever. She had positive urine last week and had nitrite positive but no culture was done apparently. Feels she is infected today.  (July 29 2013)     Patient seen for the first time in over 4 years. She Was on the methenamine hippurate starting back in 2013 and took it for 4 years with no problems until about a year ago when she started having recurrent infections again. She did have occasional breakthroughs while on the medication but less frequently until last 1-1/2 years when she's been on frequent antibiotics again with recurrent infections. Recently had IV antibiotics for UTI while she was in [facility]. She is unsure what the antibiotic was that she took but it was given intravenously by home health. That was about a month ago. After that she was treated with the by mouth Macrodantin and is now on suppressive Macrodantin 50 mg daily. She feels like she may have another urinary tract infection today. She has continued to have UTIs through the years.  Infection problems date back all the way to childhood basically. Problem may have been complicated because the bladder injuries at the time of ventral hernia repair and at time of ovary surgery .  She did have a bladder tack about 1990 associated with hysterectomy . Her problems seemed to have been worse since she had neck surgery in April. That was a fusion done in [place] by Dr. Larios, but she has had bladder problems for many years predating that. (November 15, 2017)     Mrs. Schofield returned today for her workup with a renal ultrasound  and cystoscopy. She's had problems with a sinus infection and pneumonia for the last several weeks and I think she delayed procedure. She has no sensation of urinary tract infection currently on suppression.  (January 4, 2018)     Mr. Schofield is feeling better. She has had no problems with UTI symptoms since she was here 3 months ago. Her urine culture was negative in January. She continues on the Macrodantin suppression and overall is improved. She is having a lot of sinus problems which she's had for the last several months. She is off all narcotics now, and only on Neurontin for her back problems. No clinical problems with her bladder today.  (April 3, 2018)     Mrs. Schofield is in for six-month follow-up. She has been doing fairly well in recent past although has had a lot of trouble with her hip and feels like she is going to have a hip replacement. Obviously she needs to be infection free before having a hip replacement. Having some pain in her left flank. On methenamine hippurate now as Macrodantin is not covered on her insurance plan. She really likes the Macrodantin better but is able to take the methenamine hippurate. Patient had  left lower back pain which is her usual symptoms. No lower tract irritative symptoms. Additional history reveals she is only taking the methenamine hippurate once a day instead of the recommended twice a day. Urine culture last  week grew over 100,000 colonies of Escherichia coli ESBL sensitive only to parenteral medications.  In with her . (October 8, 2018)      is in for six-month follow-up. She did have her hip replaced and it was successful. I don't think she probably had urine culture  ahead of time like we suggested.  She did have another urinary tract infection last month and was treated. That was the only time she has had to take antimicrobials for UTI breakthrough since she has been on on the Hiprex. She is taking her Hiprex twice daily now. She feels like she is doing reasonably well and has no symptoms of active infection today.  (April 8, 2019)     Mrs. Schofield is in for first visit since April. She is doing better than she was. She is able to walk now and feels she is doing better in general with back and neck etc. Has been hurting in her left flank that improved after she had recent treatment with IV antibiotics. Last urine culture in November grew ESBL Escherichia coli and we treated her with meropenem for 7 days. Had to be stuck some 28 times to get to get that 7 days of antibiotics in. She's had no high fever and feels like she is doing okay now been off medicine 2 weeks, but still has the left flank pain. Patient in with her . (December 10, 2019)     Mrs. Schofield returns today for 6 month follow-up visit. She has been doing relatively well as far as her urinary tract infection situation taking the methenamine hippurate until recently when she brought culture in. Has not had any major clinical problems related to infections prior to a few weeks ago. Her back is doing some better now.. The recent urine culture  grew Escherichia coli. We stopped her Hiprex at that time and placed her on Bactrim. That is the first clinical problems she's had in a while. She is doing better since taking the Bactrim.   Her  had brain stimulator placed for parkinsonism and has been recovering from that.    She feels she is better since taking the Bactrim over the last few days.  (2020)     Mrs. Schofield is in for six-month follow-up of chronic UTI. Has done significantly better on the methenamine hippurate but had to have treatment for UTI in November with sulfa. She is now back on the methenamine hippurate. She does not feel she is over the infection yet as she still has odor to the urine and discoloration. Not having any significant dysuria. Overall better since she's been on the methenamine hippurate. Problem with  recent history of injury of hip. No other major medical problems.  (2020)     --------------------------------------------------------------------------------------------------------------------------------------------------------------------------------------------------------------------------------------------------------      is in for her 6 month follow-up with [facility].  She has been having some left lower back pain that is a major issue for her but does not really sound like it is colicky.  Has a lot back problems and has trouble sleeping because back problems.  She is for injection tomorrow.  She has lost some 13 lb.  Since last visit.  Has had to help care for her  who has parkinsonism and has had a lot problems including fractured hand yesterday.  Patient has been on methenamine hippurate in recent past and has done much better in general since she has been on that.  She did have an infection when she was here 6 months ago growing over 100,000 colonies of Klebsiella that we treated with 10 days of antimicrobials.  She takes probiotics all the time.  Feels she may have clinical infection now.  Things are considerably better since she has been on the methenamine hippurate however.  (2021)     is in for the 1st time in nearly 2 years.  She has had lot health problems herself and her  had been quite ill.  He   recently of complications of parkinsonism apparently.  Patient had back surgery with Dr. Covington last year.  She continues to have urinary tract infection problems.  She has generally taken her methenamine hippurate but had been treated with Macrobid for recent UTI.  Just restarted her methenamine hippurate and is feeling better now than she was.  Urinary urgency but has not been aware of any fever or full-blown symptoms of infection in recent past. [May 8, 2023]      Review of Systems       Objective     Physical Exam  Constitutional:       Appearance: She is normal weight. She is ill-appearing.   Neurological:      Mental Status: She is alert.   Psychiatric:         Mood and Affect: Mood normal.         Behavior: Behavior normal.      Urinalysis loaded with bacteria with several pus cells per high-powered field.  Dipstick had a trace of blood, 2+ leukocytes, pH 7.0, and specific gravity 1.010  Assessment and Plan     Problem List Items Addressed This Visit          Renal/    Urinary tract infection without hematuria    Chronic cystitis - Primary     Other Visit Diagnoses       Degenerative disc disease, lumbar        Prerenal azotemia             Continue methenamine hippurate.  Submit urine culture p.r.n. when she feels she needs to be treated with antibiotics.  1 year appointment or sooner if needed.

## 2023-05-10 DIAGNOSIS — T78.40XS ALLERGY, SEQUELA: ICD-10-CM

## 2023-05-11 RX ORDER — MONTELUKAST SODIUM 10 MG/1
TABLET ORAL
Qty: 90 TABLET | Refills: 1 | Status: SHIPPED | OUTPATIENT
Start: 2023-05-11

## 2023-05-12 ENCOUNTER — HOSPITAL ENCOUNTER (OUTPATIENT)
Dept: RADIOLOGY | Facility: HOSPITAL | Age: 68
Discharge: HOME | End: 2023-05-12
Attending: PSYCHIATRY & NEUROLOGY
Payer: MEDICARE

## 2023-05-12 DIAGNOSIS — G44.221 CHRONIC TENSION-TYPE HEADACHE, INTRACTABLE: ICD-10-CM

## 2023-05-12 PROCEDURE — 70450 CT HEAD/BRAIN W/O DYE: CPT | Mod: TC

## 2023-05-12 PROCEDURE — 70450 CT HEAD/BRAIN W/O DYE: CPT | Mod: 26,,, | Performed by: RADIOLOGY

## 2023-05-12 PROCEDURE — 70450 CT HEAD WITHOUT CONTRAST: ICD-10-PCS | Mod: 26,,, | Performed by: RADIOLOGY

## 2023-05-22 ENCOUNTER — HOSPITAL ENCOUNTER (OUTPATIENT)
Dept: RADIOLOGY | Facility: HOSPITAL | Age: 68
Discharge: HOME | End: 2023-05-22
Attending: NURSE PRACTITIONER
Payer: MEDICARE

## 2023-05-22 ENCOUNTER — OFFICE VISIT (OUTPATIENT)
Dept: FAMILY MEDICINE | Facility: CLINIC | Age: 68
End: 2023-05-22
Payer: MEDICARE

## 2023-05-22 VITALS
HEART RATE: 72 BPM | WEIGHT: 167 LBS | RESPIRATION RATE: 18 BRPM | DIASTOLIC BLOOD PRESSURE: 76 MMHG | OXYGEN SATURATION: 96 % | HEIGHT: 69 IN | SYSTOLIC BLOOD PRESSURE: 131 MMHG | BODY MASS INDEX: 24.73 KG/M2 | TEMPERATURE: 99 F
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
## 2023-05-22 DIAGNOSIS — M25.552 PAIN IN JOINT OF LEFT HIP: ICD-10-CM

## 2023-05-22 DIAGNOSIS — M54.50 LUMBAR BACK PAIN: ICD-10-CM
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
## 2023-05-22 DIAGNOSIS — Z99.89 WALKER AS AMBULATION AID: ICD-10-CM
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
## 2023-05-22 DIAGNOSIS — M54.6 ACUTE LEFT-SIDED THORACIC BACK PAIN: ICD-10-CM

## 2023-05-22 PROCEDURE — 72100 X-RAY EXAM L-S SPINE 2/3 VWS: CPT | Mod: TC

## 2023-05-22 PROCEDURE — 99213 OFFICE O/P EST LOW 20 MIN: CPT | Mod: ,,, | Performed by: NURSE PRACTITIONER

## 2023-05-22 PROCEDURE — 99213 PR OFFICE/OUTPT VISIT, EST, LEVL III, 20-29 MIN: ICD-10-PCS | Mod: ,,, | Performed by: NURSE PRACTITIONER

## 2023-05-22 PROCEDURE — 72070 X-RAY EXAM THORAC SPINE 2VWS: CPT | Mod: TC

## 2023-05-22 PROCEDURE — 73501 X-RAY EXAM HIP UNI 1 VIEW: CPT | Mod: TC,LT

## 2023-05-22 NOTE — PROGRESS NOTES
[facility]LOE Bos[facility]   [facility]  [address]  [place] [address]     PATIENT NAME: Kizzy Schofield  : 1955  DATE: 23  MRN: [id number]      Billing Provider: CHLOE Boswell  Level of Service:   Patient PCP Information       Provider PCP Type    [facility]            Reason for Visit / Chief Complaint: Fall (Fell last Tuesday over her dogs, having a lot of pain) and Back Pain (Landed left hip and back is really hurting)   Health Maintenance Due   Topic Date Due    Shingles Vaccine (1 of 2) Never done    COVID-19 Vaccine (3 - Booster for Moderna series) 2021    TETANUS VACCINE  10/11/2021          Update PCP  Update Chief Complaint         History of Present Illness / Problem Focused Workflow     Kizzy Schofield presents to the clinic patient reports a fall last Tuesday at home tripping over her dogs having a lot of back pain and left hip pain, hx of lumbar laminectomy,     Hemoglobin A1C   Date Value Ref Range Status   2022 5.4 4.5 - 6.6 % Final     Comment:       Normal:               <5.7%  Pre-Diabetic:       5.7% to 6.4%  Diabetic:             >6.4%  Diabetic Goal:     <7%        CMP  Sodium   Date Value Ref Range Status   2023 143 136 - 145 mmol/L Final     Potassium   Date Value Ref Range Status   2023 3.9 3.5 - 5.1 mmol/L Final     Chloride   Date Value Ref Range Status   2023 111 (H) 98 - 107 mmol/L Final     CO2   Date Value Ref Range Status   2023 28 21 - 32 mmol/L Final     Glucose   Date Value Ref Range Status   2023 95 74 - 106 mg/dL Final     BUN   Date Value Ref Range Status   2023 16 7 - 18 mg/dL Final     Creatinine   Date Value Ref Range Status   2023 1.35 (H) 0.55 - 1.02 mg/dL Final     Calcium   Date Value Ref Range Status   2023 8.4 (L) 8.5 - 10.1 mg/dL Final     Total Protein   Date Value Ref Range Status   2023 6.9 6.4 - 8.2 g/dL Final      Albumin   Date Value Ref Range Status   03/27/2023 3.4 (L) 3.5 - 5.0 g/dL Final     Bilirubin, Total   Date Value Ref Range Status   03/27/2023 0.2 >0.0 - 1.2 mg/dL Final     Alk Phos   Date Value Ref Range Status   03/27/2023 66 55 - 142 U/L Final     AST   Date Value Ref Range Status   03/27/2023 13 (L) 15 - 37 U/L Final     ALT   Date Value Ref Range Status   03/27/2023 18 13 - 56 U/L Final     Anion Gap   Date Value Ref Range Status   03/27/2023 8 7 - 16 mmol/L Final     eGFR   Date Value Ref Range Status   03/27/2023 43 (L) >=60 mL/min/1.73m² Final        Lab Results   Component Value Date    WBC 7.26 03/27/2023    RBC 3.91 (L) 03/27/2023    HGB 11.7 (L) 03/27/2023    HCT 37.2 (L) 03/27/2023    MCV 95.1 03/27/2023    MCH 29.9 03/27/2023    MCHC 31.5 (L) 03/27/2023    RDW 13.0 03/27/2023     03/27/2023    MPV 10.2 03/27/2023    LYMPH 25.5 (L) 03/27/2023    LYMPH 1.85 03/27/2023    MONO 5.0 03/27/2023    EOS 0.13 03/27/2023    BASO 0.05 03/27/2023    EOSINOPHIL 1.8 03/27/2023    BASOPHIL 0.7 03/27/2023        Lab Results   Component Value Date    CHOL 126 10/31/2022    CHOL 147 03/15/2022    CHOL 162 10/05/2021     Lab Results   Component Value Date    HDL 67 (H) 10/31/2022    HDL 72 (H) 03/15/2022    HDL 72 (H) 10/05/2021     Lab Results   Component Value Date    LDLCALC 39 10/31/2022    LDLCALC 49 03/15/2022    LDLCALC 60 10/05/2021     Lab Results   Component Value Date    TRIG 98 10/31/2022    TRIG 130 03/15/2022    TRIG 150 10/05/2021     Lab Results   Component Value Date    CHOLHDL 1.9 10/31/2022    CHOLHDL 2.0 03/15/2022    CHOLHDL 2.3 10/05/2021        Wt Readings from Last 3 Encounters:   05/22/23 1414 75.8 kg (167 lb)   05/08/23 1115 75.8 kg (167 lb)   04/27/23 1036 75.8 kg (167 lb)        BP Readings from Last 3 Encounters:   05/22/23 131/76   05/08/23 110/72   04/27/23 126/86        Review of Systems     Review of Systems   Gastrointestinal:  Negative for fecal incontinence.    Genitourinary:  Negative for bladder incontinence.   Musculoskeletal:  Positive for arthralgias, back pain, gait problem, leg pain and myalgias.      Medical / Social / Family History     Past Medical History:   Diagnosis Date    Arthritis of left hip     Arthritis of left knee     Cervical radiculopathy     Chronic cystitis     Chronic pain syndrome     Degeneration of lumbar intervertebral disc     Depressive disorder     GERD (gastroesophageal reflux disease)     Hyperlipidemia     Hypertension     Lumbar post-laminectomy syndrome     Meniere disease     Osteoarthritis of cervical and lumbar spine     Osteoporosis        Past Surgical History:   Procedure Laterality Date    bladder tack      CHOLECYSTECTOMY      HYSTERECTOMY      LUMBAR LAMINECTOMY N/A 6/16/2022    Procedure: L4-L5 Laminectomy;  Surgeon: Juan Luis Covington MD;  Location: [facility];  Service: Neurosurgery;  Laterality: N/A;    OOPHORECTOMY         Social History  Ms.  reports that she quit smoking about 47 years ago. Her smoking use included cigarettes. She started smoking about 47 years ago. She has a 1.00 pack-year smoking history. She has been exposed to tobacco smoke. She has never used smokeless tobacco. She reports that she does not drink alcohol and does not use drugs.    Family History  Ms.'s family history includes Alcohol abuse in her brother, brother, and brother; Arthritis in her mother; COPD in her mother; Cancer in her brother, brother, and father; Glaucoma in her daughter; Hearing loss in her brother and mother; Heart disease in her brother and mother; Hypertension in her brother, father, and mother; Lung cancer in her father; Prostate cancer in her father; Stroke in her father and mother; Thyroid disease in her daughter and mother.    Medications and Allergies     Medications  Outpatient Medications Marked as Taking for the 5/22/23 encounter (Office Visit) with CHLOE Boswell   Medication Sig Dispense Refill     acetaminophen (TYLENOL) 500 MG tablet Take 500 mg by mouth every 6 (six) hours as needed.      amitriptyline (ELAVIL) 10 MG tablet Take 1 tablet (10 mg total) by mouth 2 (two) times a day. (Patient taking differently: Take 20 mg by mouth every evening.) 180 tablet 1    atorvastatin (LIPITOR) 40 MG tablet Take 1 tablet (40 mg total) by mouth once daily. 90 tablet 1    calcium-vitamin D3 (OS-AIDEE 500 + D3) 500 mg-5 mcg (200 unit) per tablet Take 1 tablet by mouth 2 (two) times daily with meals.      cetirizine (ZYRTEC) 10 MG tablet Take 10 mg by mouth once daily.      colestipoL (COLESTID) 1 gram Tab Take 1 g by mouth once daily.      diazePAM (VALIUM) 2 MG tablet TAKE ONE TABLET BY MOUTH EVERY TWELVE HOURS AS NEEDED FOR ANXIETY 60 tablet 2    EPINEPHrine (EPIPEN) 0.3 mg/0.3 mL AtIn Inject into the muscle once.      fluticasone propionate (FLONASE) 50 mcg/actuation nasal spray USE 2 SPRAYS IN EACH NOSTRIL ONE TIME DAILY 48 g 2    folic acid (FOLVITE) 1 MG tablet Take 1,000 mcg by mouth once daily.      gabapentin (NEURONTIN) 600 MG tablet Take 1 tablet (600 mg total) by mouth 3 (three) times daily. (Patient taking differently: Take 600 mg by mouth 2 (two) times daily.) 270 tablet 1    HYDROcodone-acetaminophen (NORCO)  mg per tablet Take by mouth.      HYDROcodone-acetaminophen (NORCO) 7.5-325 mg per tablet Take 1 tablet by mouth 2 (two) times daily as needed for Pain.      levothyroxine (SYNTHROID) 50 MCG tablet Take 1 tablet (50 mcg total) by mouth before breakfast. 90 tablet 1    methenamine (HIPREX) 1 gram Tab Take 1 tablet (1 g total) by mouth 2 (two) times daily. 180 tablet 3    methocarbamoL (ROBAXIN) 500 MG Tab Take 1 tablet (500 mg total) by mouth 3 (three) times daily. 270 tablet 1    methotrexate 2.5 MG Tab Take 6 tablets by mouth every 7 days.      montelukast (SINGULAIR) 10 mg tablet TAKE 1 TABLET ONE TIME DAILY 90 tablet 1    ondansetron (ZOFRAN) 4 MG tablet Take 1 tablet (4 mg total) by mouth every  "8 (eight) hours as needed for Nausea. 30 tablet 0    polyethylene glycol (GLYCOLAX) 17 gram PwPk Take by mouth. Take 17 grams by mouth 2 times daily mixed with 8 ounces of water, juice, soda, tea, or coffee      promethazine (PHENERGAN) 25 MG tablet Take 1 tablet (25 mg total) by mouth every 4 (four) hours. 45 tablet 0    RABEprazole (ACIPHEX) 20 mg tablet Take by mouth 2 (two) times daily.      raloxifene (EVISTA) 60 mg tablet Take 1 tablet (60 mg total) by mouth once daily. 90 tablet 1    topiramate (TOPAMAX) 200 MG Tab Take 1 tablet (200 mg total) by mouth 2 (two) times daily. 180 tablet 3    traZODone (DESYREL) 100 MG tablet Take 1 tablet (100 mg total) by mouth every evening. 30 tablet 2    triamcinolone acetonide 0.1% (KENALOG) 0.1 % ointment APPLY TO THE AFFECTED AREA(S) ON ARMS, LEGS AND trunk TWICE DAILY FOR ITCHING      triamterene-hydrochlorothiazide 37.5-25 mg (MAXZIDE-25) 37.5-25 mg per tablet Take 1 tablet by mouth once daily. 90 tablet 1    vitamin D (VITAMIN D3) 1000 units Tab Take 5,000 Units by mouth 2 (two) times a day.         Allergies  Review of patient's allergies indicates:   Allergen Reactions    Adhesive tape-silicones     Aspirin     Celebrex [celecoxib]     Opioids - morphine analogues     Pcn [penicillins]     Shrimp     Ciprofloxacin      Pt reported that "it made her feel real bad".    Silicone Itching and Rash       Physical Examination     Vitals:    05/22/23 1414   BP: 131/76   BP Location: Left arm   Patient Position: Sitting   Pulse: 72   Resp: 18   Temp: 98.5 °F (36.9 °C)   TempSrc: Oral   SpO2: 96%   Weight: 75.8 kg (167 lb)   Height: 5' 9" (1.753 m)      Physical Exam  Vitals and nursing note reviewed.   Constitutional:       Appearance: Normal appearance.   HENT:      Mouth/Throat:      Mouth: Mucous membranes are moist.   Pulmonary:      Effort: Pulmonary effort is normal.      Breath sounds: Normal breath sounds.   Musculoskeletal:      Thoracic back: Tenderness present.     "  Left hip: Tenderness present.      Comments: Tenderness to left hip and increased soft tissue swelling noted.    Skin:     General: Skin is warm and dry.   Neurological:      Mental Status: She is alert and oriented to person, place, and time.   Psychiatric:         Behavior: Behavior normal.    X-Ray Lumbar Spine AP And Lateral  Narrative: EXAMINATION:  XR LUMBAR SPINE AP AND LATERAL    CLINICAL HISTORY:  Unspecified fall, initial encounter    COMPARISON:  Lumbar spine x-ray February 28, 2023    TECHNIQUE:  Frontal and lateral views of the lumbar spine.    FINDINGS:  4 mm anterolisthesis of L4 upon L5.  Mild-to-moderate loss of disc space height at L4-5.  Osseous fusion of L5-S1.  Two syndesmotic screws extend across the left SI joint.  Question disc spacer device at the L5-S1 level.  Moderate levoconvex curvature of the lumbar spine.  Lumbar vertebral body heights appear maintained.  Scattered posterior facet arthropathy, greatest inferiorly.  Surgical clips project over the right upper quadrant of the abdomen.  Hip arthroplasty partially visualized on lateral view.  Impression: Degenerative change and scoliosis of the lumbar spine as detailed above.    Point of Service: [facility]    Electronically signed by: Milton Hodges  Date:    05/22/2023  Time:    16:19  X-Ray Thoracic Spine AP Lateral  Narrative: EXAMINATION:  XR THORACIC SPINE AP LATERAL    CLINICAL HISTORY:  Unspecified fall, initial encounter    TECHNIQUE:  XR THORACIC SPINE AP LATERAL    COMPARISON:  06/07/2022 and 2021    FINDINGS:  Anterior spinal fusion hardware involving the cervical spine is in expected position.    No acute fracture or dislocation.    Mild multilevel degenerative change of the thoracic spine.  The visualized lungs are clear.  The visualized pleura is normal.  Heart is normal in size.  Impression: No acutely displaced fracture of the thoracic spine although if there is point tenderness then CT of the thoracic spine  is recommended    Electronically signed by: Zachariah Ledezma  Date:    05/22/2023  Time:    16:02  X-Ray Hip 1 View Left (with Pelvis when performed)  Narrative: EXAMINATION:  XR HIP 1 VIEW LEFT (WITH PELVIS WHEN PERFORMED)    CLINICAL HISTORY:  Unspecified fall, initial encounter    TECHNIQUE:  XR HIP 1 VIEW LEFT (WITH PELVIS WHEN PERFORMED)    COMPARISON:  05/22/2023    FINDINGS:  No acute fracture or dislocation.    Left sacroiliac joint screws are redemonstrated.  Left total hip arthroplasty hardware is in expected position.    No radiopaque foreign bodies.  Impression: As above    Electronically signed by: Zachariah Ledezma  Date:    05/22/2023  Time:    16:00       Assessment and Plan (including Health Maintenance)      Problem List  Smart Sets  Document Outside HM   :    Plan:     Patient Instructions   Use walker at all times start cool for 20 minutes several times per day     Will get xray done at the hospital   Health Maintenance Due   Topic Date Due    Shingles Vaccine (1 of 2) Never done    COVID-19 Vaccine (3 - Booster for Moderna series) 08/20/2021    TETANUS VACCINE  10/11/2021        Health Maintenance Due   Topic Date Due    Shingles Vaccine (1 of 2) Never done    COVID-19 Vaccine (3 - Booster for Moderna series) 08/20/2021    TETANUS VACCINE  10/11/2021       Problem List Items Addressed This Visit          Orthopedic    Pain in joint of left hip    Relevant Orders    X-Ray Hip 1 View Left (with Pelvis when performed) (Completed)    Fall as cause of accidental injury at home as place of occurrence - Primary    Relevant Orders    X-Ray Thoracic Spine AP Lateral (Completed)    X-Ray Lumbar Spine AP And Lateral (Completed)    X-Ray Hip 1 View Left (with Pelvis when performed) (Completed)       Other    Walker as ambulation aid     Other Visit Diagnoses       Acute left-sided thoracic back pain        Relevant Orders    X-Ray Thoracic Spine AP Lateral (Completed)    Lumbar back pain        Relevant  Orders    X-Ray Lumbar Spine AP And Lateral (Completed)          Fall as cause of accidental injury in home as place of occurrence, initial encounter  -     X-Ray Thoracic Spine AP Lateral; Future; Expected date: 05/22/2023  -     X-Ray Lumbar Spine AP And Lateral; Future; Expected date: 05/22/2023  -     X-Ray Hip 1 View Left (with Pelvis when performed); Future; Expected date: 05/22/2023    Acute left-sided thoracic back pain  -     X-Ray Thoracic Spine AP Lateral; Future; Expected date: 05/22/2023    Lumbar back pain  -     X-Ray Lumbar Spine AP And Lateral; Future; Expected date: 05/22/2023    Pain in joint of left hip  -     X-Ray Hip 1 View Left (with Pelvis when performed); Future; Expected date: 05/22/2023    Walker as ambulation aid       Health Maintenance Topics with due status: Not Due       Topic Last Completion Date    DEXA Scan 09/02/2020    Colorectal Cancer Screening 08/17/2021    Influenza Vaccine 09/25/2021    Lipid Panel 10/31/2022    Mammogram 11/15/2022    High Dose Statin 05/22/2023       Procedures   Health Maintenance Due   Topic Date Due    Shingles Vaccine (1 of 2) Never done    COVID-19 Vaccine (3 - Booster for Moderna series) 08/20/2021    TETANUS VACCINE  10/11/2021        Future Appointments   Date Time Provider Department Center   6/8/2023  3:30 PM [facility] [place]   7/27/2023  2:45 PM CHLOE Polanco [facility] NEURO [facility]   9/1/2023 10:15 AM Juan Luis Covington MD [facility] SPINE [facility]   1/24/2024  [facility]   4/8/2024 10:30 AM Omari Holt DO [facility] SLEEP [facility]   5/7/2024 10:15 AM Kameron Coello Jr., MD [facility] UROL [facility]        No follow-ups on file.    Health Maintenance Due   Topic Date Due    Shingles Vaccine (1 of 2) Never done    COVID-19 Vaccine (3 - Booster for Moderna series) 08/20/2021    TETANUS VACCINE  10/11/2021        Signature:  Kizzy Ga, FNP    Date of  encounter: 5/22/235/22/2023

## 2023-05-22 NOTE — PATIENT INSTRUCTIONS
Use walker at all times start cool for 20 minutes several times per day     Will get xray done at the hospital

## 2023-05-30 DIAGNOSIS — T78.40XS ALLERGIC REACTION, SEQUELA: Primary | ICD-10-CM

## 2023-05-30 RX ORDER — EPINEPHRINE 0.3 MG/.3ML
1 INJECTION SUBCUTANEOUS
Qty: 1 EACH | Refills: 2 | Status: SHIPPED | OUTPATIENT
Start: 2023-05-30

## 2023-06-05 RX ORDER — ATORVASTATIN CALCIUM 40 MG/1
TABLET, FILM COATED ORAL
Qty: 90 TABLET | Refills: 1 | Status: SHIPPED | OUTPATIENT
Start: 2023-06-05 | End: 2024-02-14

## 2023-06-08 ENCOUNTER — OFFICE VISIT (OUTPATIENT)
Dept: FAMILY MEDICINE | Facility: CLINIC | Age: 68
End: 2023-06-08
Payer: MEDICARE

## 2023-06-08 VITALS
HEART RATE: 65 BPM | WEIGHT: 166 LBS | SYSTOLIC BLOOD PRESSURE: 125 MMHG | HEIGHT: 69 IN | BODY MASS INDEX: 24.59 KG/M2 | TEMPERATURE: 98 F | DIASTOLIC BLOOD PRESSURE: 81 MMHG | RESPIRATION RATE: 18 BRPM | OXYGEN SATURATION: 99 %

## 2023-06-08 DIAGNOSIS — E03.9 HYPOTHYROIDISM, UNSPECIFIED TYPE: Primary | ICD-10-CM

## 2023-06-08 DIAGNOSIS — G89.4 CHRONIC PAIN SYNDROME: ICD-10-CM

## 2023-06-08 DIAGNOSIS — R29.6 FREQUENT FALLS: ICD-10-CM

## 2023-06-08 DIAGNOSIS — I10 HYPERTENSION, UNSPECIFIED TYPE: ICD-10-CM

## 2023-06-08 DIAGNOSIS — R11.0 NAUSEA: ICD-10-CM

## 2023-06-08 LAB — TSH SERPL DL<=0.005 MIU/L-ACNC: 3.51 UIU/ML (ref 0.36–3.74)

## 2023-06-08 PROCEDURE — 84443 ASSAY THYROID STIM HORMONE: CPT | Mod: ,,, | Performed by: CLINICAL MEDICAL LABORATORY

## 2023-06-08 PROCEDURE — 99213 OFFICE O/P EST LOW 20 MIN: CPT | Mod: ,,, | Performed by: NURSE PRACTITIONER

## 2023-06-08 PROCEDURE — 99213 PR OFFICE/OUTPT VISIT, EST, LEVL III, 20-29 MIN: ICD-10-PCS | Mod: ,,, | Performed by: NURSE PRACTITIONER

## 2023-06-08 PROCEDURE — 84443 TSH: ICD-10-PCS | Mod: ,,, | Performed by: CLINICAL MEDICAL LABORATORY

## 2023-06-08 RX ORDER — TRIAMTERENE/HYDROCHLOROTHIAZID 37.5-25 MG
1 TABLET ORAL DAILY
Qty: 90 TABLET | Refills: 1 | Status: SHIPPED | OUTPATIENT
Start: 2023-06-08 | End: 2024-01-29 | Stop reason: SDUPTHER

## 2023-06-08 RX ORDER — ONDANSETRON 4 MG/1
4 TABLET, FILM COATED ORAL EVERY 8 HOURS PRN
Qty: 30 TABLET | Refills: 0 | Status: SHIPPED | OUTPATIENT
Start: 2023-06-08 | End: 2023-08-31

## 2023-06-08 RX ORDER — AMITRIPTYLINE HYDROCHLORIDE 10 MG/1
20 TABLET, FILM COATED ORAL NIGHTLY
Qty: 90 TABLET | Refills: 1 | Status: SHIPPED | OUTPATIENT
Start: 2023-06-08 | End: 2023-08-31

## 2023-06-08 RX ORDER — CETIRIZINE HYDROCHLORIDE 10 MG/1
10 TABLET ORAL DAILY
Qty: 90 TABLET | Refills: 0 | Status: SHIPPED | OUTPATIENT
Start: 2023-06-08 | End: 2023-08-31

## 2023-06-08 NOTE — PROGRESS NOTES
CHLOE Boswell   [facility]  [address]  [place] [address]     PATIENT NAME: Kizzy Schofield  : 1955  DATE: 23  MRN: [id number]      Billing Provider: CHLOE Boswell  Level of Service:   Patient PCP Information       Provider PCP Type    CHLOE Boswell [facility]            Reason for Visit / Chief Complaint: Thyroid Problem, Health Maintenance (Shingles Vaccine(1 of 2) Never done- declined/COVID-19 Vaccine(3 - Moderna series) due on 2021- declined/TETANUS VACCINE due on 10/11/2021-declined/), Medication Refill, and Fall ([place] beach due [place] knocking her over then again at the graveyard where she held on to the tombstone and walker.)   Health Maintenance Due   Topic Date Due    Shingles Vaccine (1 of 2) Never done    COVID-19 Vaccine (3 - Moderna series) 2021    TETANUS VACCINE  10/11/2021          Update PCP  Update Chief Complaint         History of Present Illness / Problem Focused Workflow     Kizzy Schofield presents to the clinic having hip pain frequent falls. She feels like her hips are not holding her up and looses balance frequently. She did have procedure recently in her shoulder pain is better her legs just seemed to give out on her. She is feeling less depressed since death of spouse has began playing dominos with 3 other widows and getting out more.     Hemoglobin A1C   Date Value Ref Range Status   2022 5.4 4.5 - 6.6 % Final     Comment:       Normal:               <5.7%  Pre-Diabetic:       5.7% to 6.4%  Diabetic:             >6.4%  Diabetic Goal:     <7%        CMP  Sodium   Date Value Ref Range Status   2023 143 136 - 145 mmol/L Final     Potassium   Date Value Ref Range Status   2023 3.9 3.5 - 5.1 mmol/L Final     Chloride   Date Value Ref Range Status   2023 111 (H) 98 - 107 mmol/L Final     CO2   Date Value Ref Range Status   2023 28 21 - 32 mmol/L  Final     Glucose   Date Value Ref Range Status   03/27/2023 95 74 - 106 mg/dL Final     BUN   Date Value Ref Range Status   03/27/2023 16 7 - 18 mg/dL Final     Creatinine   Date Value Ref Range Status   03/27/2023 1.35 (H) 0.55 - 1.02 mg/dL Final     Calcium   Date Value Ref Range Status   03/27/2023 8.4 (L) 8.5 - 10.1 mg/dL Final     Total Protein   Date Value Ref Range Status   03/27/2023 6.9 6.4 - 8.2 g/dL Final     Albumin   Date Value Ref Range Status   03/27/2023 3.4 (L) 3.5 - 5.0 g/dL Final     Bilirubin, Total   Date Value Ref Range Status   03/27/2023 0.2 >0.0 - 1.2 mg/dL Final     Alk Phos   Date Value Ref Range Status   03/27/2023 66 55 - 142 U/L Final     AST   Date Value Ref Range Status   03/27/2023 13 (L) 15 - 37 U/L Final     ALT   Date Value Ref Range Status   03/27/2023 18 13 - 56 U/L Final     Anion Gap   Date Value Ref Range Status   03/27/2023 8 7 - 16 mmol/L Final     eGFR   Date Value Ref Range Status   03/27/2023 43 (L) >=60 mL/min/1.73m² Final        Lab Results   Component Value Date    WBC 7.26 03/27/2023    RBC 3.91 (L) 03/27/2023    HGB 11.7 (L) 03/27/2023    HCT 37.2 (L) 03/27/2023    MCV 95.1 03/27/2023    MCH 29.9 03/27/2023    MCHC 31.5 (L) 03/27/2023    RDW 13.0 03/27/2023     03/27/2023    MPV 10.2 03/27/2023    LYMPH 25.5 (L) 03/27/2023    LYMPH 1.85 03/27/2023    MONO 5.0 03/27/2023    EOS 0.13 03/27/2023    BASO 0.05 03/27/2023    EOSINOPHIL 1.8 03/27/2023    BASOPHIL 0.7 03/27/2023        Lab Results   Component Value Date    CHOL 126 10/31/2022    CHOL 147 03/15/2022    CHOL 162 10/05/2021     Lab Results   Component Value Date    HDL 67 (H) 10/31/2022    HDL 72 (H) 03/15/2022    HDL 72 (H) 10/05/2021     Lab Results   Component Value Date    LDLCALC 39 10/31/2022    LDLCALC 49 03/15/2022    LDLCALC 60 10/05/2021     Lab Results   Component Value Date    TRIG 98 10/31/2022    TRIG 130 03/15/2022    TRIG 150 10/05/2021     Lab Results   Component Value Date    CHOLHDL  1.9 10/31/2022    CHOLHDL 2.0 03/15/2022    CHOLHDL 2.3 10/05/2021        Wt Readings from Last 3 Encounters:   06/08/23 1536 75.3 kg (166 lb)   05/22/23 1414 75.8 kg (167 lb)   05/08/23 1115 75.8 kg (167 lb)        BP Readings from Last 3 Encounters:   06/08/23 125/81   05/22/23 131/76   05/08/23 110/72        Review of Systems     Review of Systems   Respiratory:  Negative for cough and shortness of breath.    Cardiovascular:  Positive for leg swelling.   Gastrointestinal:  Negative for fecal incontinence.   Genitourinary:  Negative for bladder incontinence.   Musculoskeletal:  Positive for arthralgias, back pain, gait problem, leg pain and myalgias.   Neurological:  Positive for weakness. Negative for syncope.   Psychiatric/Behavioral:  The patient is not nervous/anxious.       Medical / Social / Family History     Past Medical History:   Diagnosis Date    Arthritis of left hip     Arthritis of left knee     Cervical radiculopathy     Chronic cystitis     Chronic pain syndrome     Degeneration of lumbar intervertebral disc     Depressive disorder     GERD (gastroesophageal reflux disease)     Hyperlipidemia     Hypertension     Lumbar post-laminectomy syndrome     Meniere disease     Osteoarthritis of cervical and lumbar spine     Osteoporosis        Past Surgical History:   Procedure Laterality Date    bladder tack      CHOLECYSTECTOMY      HYSTERECTOMY      LUMBAR LAMINECTOMY N/A 6/16/2022    Procedure: L4-L5 Laminectomy;  Surgeon: Juan Luis Covington MD;  Location: [facility];  Service: Neurosurgery;  Laterality: N/A;    OOPHORECTOMY         Social History  Ms.  reports that she quit smoking about 47 years ago. Her smoking use included cigarettes. She started smoking about 47 years ago. She has a 1.00 pack-year smoking history. She has been exposed to tobacco smoke. She has never used smokeless tobacco. She reports that she does not drink alcohol and does not use drugs.    Family History  Ms.'s family history  includes Alcohol abuse in her brother, brother, and brother; Arthritis in her mother; COPD in her mother; Cancer in her brother, brother, and father; Glaucoma in her daughter; Hearing loss in her brother and mother; Heart disease in her brother and mother; Hypertension in her brother, father, and mother; Lung cancer in her father; Prostate cancer in her father; Stroke in her father and mother; Thyroid disease in her daughter and mother.    Medications and Allergies     Medications  Outpatient Medications Marked as Taking for the 6/8/23 encounter (Office Visit) with CHLOE Boswell   Medication Sig Dispense Refill    acetaminophen (TYLENOL) 500 MG tablet Take 500 mg by mouth every 6 (six) hours as needed.      atorvastatin (LIPITOR) 40 MG tablet TAKE 1 TABLET ONE TIME DAILY 90 tablet 1    calcium-vitamin D3 (OS-AIDEE 500 + D3) 500 mg-5 mcg (200 unit) per tablet Take 1 tablet by mouth 2 (two) times daily with meals.      EPINEPHrine (EPIPEN) 0.3 mg/0.3 mL AtIn Inject 0.3 mLs (0.3 mg total) into the muscle as needed (allergic reaction). 1 each 2    fluticasone propionate (FLONASE) 50 mcg/actuation nasal spray USE 2 SPRAYS IN EACH NOSTRIL ONE TIME DAILY 48 g 2    folic acid (FOLVITE) 1 MG tablet Take 1,000 mcg by mouth once daily.      gabapentin (NEURONTIN) 600 MG tablet Take 1 tablet (600 mg total) by mouth 3 (three) times daily. (Patient taking differently: Take 600 mg by mouth 2 (two) times daily.) 270 tablet 1    HYDROcodone-acetaminophen (NORCO) 7.5-325 mg per tablet Take 1 tablet by mouth 2 (two) times daily as needed for Pain.      methenamine (HIPREX) 1 gram Tab Take 1 tablet (1 g total) by mouth 2 (two) times daily. 180 tablet 3    methocarbamoL (ROBAXIN) 500 MG Tab Take 1 tablet (500 mg total) by mouth 3 (three) times daily. 270 tablet 1    methotrexate 2.5 MG Tab Take 6 tablets by mouth every 7 days.      montelukast (SINGULAIR) 10 mg tablet TAKE 1 TABLET ONE TIME DAILY 90 tablet 1     "polyethylene glycol (GLYCOLAX) 17 gram PwPk Take by mouth. Take 17 grams by mouth 2 times daily mixed with 8 ounces of water, juice, soda, tea, or coffee      promethazine (PHENERGAN) 25 MG tablet Take 1 tablet (25 mg total) by mouth every 4 (four) hours. 45 tablet 0    RABEprazole (ACIPHEX) 20 mg tablet Take by mouth 2 (two) times daily.      raloxifene (EVISTA) 60 mg tablet Take 1 tablet (60 mg total) by mouth once daily. 90 tablet 1    topiramate (TOPAMAX) 200 MG Tab Take 1 tablet (200 mg total) by mouth 2 (two) times daily. 180 tablet 3    triamcinolone acetonide 0.1% (KENALOG) 0.1 % ointment APPLY TO THE AFFECTED AREA(S) ON ARMS, LEGS AND trunk TWICE DAILY FOR ITCHING      vitamin D (VITAMIN D3) 1000 units Tab Take 5,000 Units by mouth 2 (two) times a day.      [DISCONTINUED] amitriptyline (ELAVIL) 10 MG tablet Take 1 tablet (10 mg total) by mouth 2 (two) times a day. (Patient taking differently: Take 20 mg by mouth every evening.) 180 tablet 1    [DISCONTINUED] cetirizine (ZYRTEC) 10 MG tablet Take 10 mg by mouth once daily.      [DISCONTINUED] levothyroxine (SYNTHROID) 50 MCG tablet Take 1 tablet (50 mcg total) by mouth before breakfast. 90 tablet 1    [DISCONTINUED] ondansetron (ZOFRAN) 4 MG tablet Take 1 tablet (4 mg total) by mouth every 8 (eight) hours as needed for Nausea. 30 tablet 0    [DISCONTINUED] triamterene-hydrochlorothiazide 37.5-25 mg (MAXZIDE-25) 37.5-25 mg per tablet Take 1 tablet by mouth once daily. 90 tablet 1       Allergies  Review of patient's allergies indicates:   Allergen Reactions    Adhesive tape-silicones     Aspirin     Celebrex [celecoxib]     Opioids - morphine analogues     Pcn [penicillins]     Shrimp     Ciprofloxacin      Pt reported that "it made her feel real bad".    Silicone Itching and Rash       Physical Examination     Vitals:    06/08/23 1536   BP: 125/81   BP Location: Left arm   Patient Position: Sitting   Pulse: 65   Resp: 18   Temp: 98.4 °F (36.9 °C)   SpO2: 99% " "  Weight: 75.3 kg (166 lb)   Height: 5' 9" (1.753 m)      Physical Exam  Vitals and nursing note reviewed.   Constitutional:       Appearance: Normal appearance.   HENT:      Right Ear: External ear normal.      Left Ear: External ear normal.      Mouth/Throat:      Mouth: Mucous membranes are moist.   Eyes:      Pupils: Pupils are equal, round, and reactive to light.   Cardiovascular:      Rate and Rhythm: Normal rate and regular rhythm.      Heart sounds: Normal heart sounds.   Pulmonary:      Effort: Pulmonary effort is normal.      Breath sounds: Normal breath sounds.   Musculoskeletal:      Thoracic back: Tenderness present.      Left hip: Tenderness present.      Comments: Tenderness to left hip and increased soft tissue swelling noted.    Skin:     General: Skin is warm and dry.      Capillary Refill: Capillary refill takes less than 2 seconds.      Comments: Abrasions noted to knees.    Neurological:      Mental Status: She is alert and oriented to person, place, and time.      Cranial Nerves: No cranial nerve deficit or facial asymmetry.      Motor: Motor function is intact.      Gait: Gait abnormal.      Comments: Using walker   Psychiatric:         Behavior: Behavior normal.        Assessment and Plan (including Health Maintenance)      Problem List  Smart Sets  Document Outside HM   :    Plan:     Patient Instructions   Discussed using walker for balance follow up with pain treatment offered physical therapy but declined       Health Maintenance Due   Topic Date Due    Shingles Vaccine (1 of 2) Never done    COVID-19 Vaccine (3 - Moderna series) 08/20/2021    TETANUS VACCINE  10/11/2021       Problem List Items Addressed This Visit          Neuro    Chronic pain syndrome    Relevant Medications    amitriptyline (ELAVIL) 10 MG tablet       Endocrine    Hypothyroidism - Primary    Relevant Orders    TSH (Completed)       Other    Frequent falls     Other Visit Diagnoses       Nausea        Relevant " Medications    ondansetron (ZOFRAN) 4 MG tablet    Hypertension, unspecified type        Relevant Medications    triamterene-hydrochlorothiazide 37.5-25 mg (MAXZIDE-25) 37.5-25 mg per tablet          Hypothyroidism, unspecified type  -     TSH; Future; Expected date: 06/08/2023    Nausea  -     ondansetron (ZOFRAN) 4 MG tablet; Take 1 tablet (4 mg total) by mouth every 8 (eight) hours as needed for Nausea.  Dispense: 30 tablet; Refill: 0    Hypertension, unspecified type  -     triamterene-hydrochlorothiazide 37.5-25 mg (MAXZIDE-25) 37.5-25 mg per tablet; Take 1 tablet by mouth once daily.  Dispense: 90 tablet; Refill: 1    Chronic pain syndrome  -     amitriptyline (ELAVIL) 10 MG tablet; Take 2 tablets (20 mg total) by mouth every evening.  Dispense: 90 tablet; Refill: 1    Frequent falls    Other orders  -     cetirizine (ZYRTEC) 10 MG tablet; Take 1 tablet (10 mg total) by mouth once daily.  Dispense: 90 tablet; Refill: 0       Health Maintenance Topics with due status: Not Due       Topic Last Completion Date    DEXA Scan 09/02/2020    Colorectal Cancer Screening 08/17/2021    Influenza Vaccine 09/25/2021    Lipid Panel 10/31/2022    Mammogram 11/15/2022    High Dose Statin 06/08/2023         Future Appointments   Date Time Provider Department Center   7/27/2023  2:45 PM CHLOE Polanco [facility] NEURO [facility]   9/1/2023 10:15 AM Juan Luis Covington MD [facility] SPINE [facility]   12/7/2023 10:00 AM CHLOE Boswell [facility] FAM[facility] [place]   [facility]  [facility]   4/8/2024 10:30 AM Omari Holt DO [facility] SLE[facility] [place]   5/7/2024 10:15 AM Kameron Coello Jr., MD [facility]        No follow-ups on file.    Health Maintenance Due   Topic Date Due    Shingles Vaccine (1 of 2) Never done    COVID-19 Vaccine (3 - Moderna series) 08/20/2021    TETANUS VACCINE  10/11/2021        Signature:  CHLOE Boswell    Date of encounter: 6/8/23

## 2023-06-09 DIAGNOSIS — E03.9 HYPOTHYROIDISM, UNSPECIFIED TYPE: ICD-10-CM

## 2023-06-09 RX ORDER — LEVOTHYROXINE SODIUM 50 UG/1
50 TABLET ORAL
Qty: 90 TABLET | Refills: 1 | Status: SHIPPED | OUTPATIENT
Start: 2023-06-09

## 2023-06-09 NOTE — PATIENT INSTRUCTIONS
Discussed using walker for balance follow up with pain treatment offered physical therapy but declined

## 2023-06-13 DIAGNOSIS — M25.512 LEFT SHOULDER PAIN, UNSPECIFIED CHRONICITY: Primary | ICD-10-CM

## 2023-06-23 DIAGNOSIS — H81.03 MENIERE'S DISEASE (COCHLEAR HYDROPS), BILATERAL: ICD-10-CM

## 2023-06-23 RX ORDER — DIAZEPAM 2 MG/1
2 TABLET ORAL 2 TIMES DAILY
Qty: 60 TABLET | Refills: 0 | Status: SHIPPED | OUTPATIENT
Start: 2023-06-23 | End: 2023-06-26 | Stop reason: SDUPTHER

## 2023-06-26 DIAGNOSIS — H81.03 MENIERE'S DISEASE (COCHLEAR HYDROPS), BILATERAL: ICD-10-CM

## 2023-06-26 RX ORDER — DIAZEPAM 2 MG/1
2 TABLET ORAL 2 TIMES DAILY
Qty: 180 TABLET | Refills: 0 | Status: SHIPPED | OUTPATIENT
Start: 2023-06-26 | End: 2023-07-21 | Stop reason: SDUPTHER

## 2023-07-09 DIAGNOSIS — Z71.89 COMPLEX CARE COORDINATION: ICD-10-CM

## 2023-07-14 DIAGNOSIS — K58.9 IRRITABLE BOWEL SYNDROME, UNSPECIFIED TYPE: ICD-10-CM

## 2023-07-14 RX ORDER — DICYCLOMINE HYDROCHLORIDE 10 MG/1
CAPSULE ORAL
Qty: 120 CAPSULE | Refills: 0 | Status: SHIPPED | OUTPATIENT
Start: 2023-07-14

## 2023-07-21 DIAGNOSIS — H81.03 MENIERE'S DISEASE (COCHLEAR HYDROPS), BILATERAL: ICD-10-CM

## 2023-07-21 RX ORDER — DIAZEPAM 2 MG/1
2 TABLET ORAL 2 TIMES DAILY
Qty: 180 TABLET | Refills: 0 | Status: SHIPPED | OUTPATIENT
Start: 2023-07-21 | End: 2024-01-30 | Stop reason: SDUPTHER

## 2023-08-15 ENCOUNTER — PATIENT MESSAGE (OUTPATIENT)
Dept: ADMINISTRATIVE | Facility: HOSPITAL | Age: 68
End: 2023-08-15

## 2023-08-18 ENCOUNTER — HOSPITAL ENCOUNTER (OUTPATIENT)
Dept: RADIOLOGY | Facility: HOSPITAL | Age: 68
Discharge: HOME | End: 2023-08-18
Attending: NURSE PRACTITIONER
Payer: MEDICARE

## 2023-08-18 ENCOUNTER — OFFICE VISIT (OUTPATIENT)
Dept: FAMILY MEDICINE | Facility: CLINIC | Age: 68
End: 2023-08-18
Payer: MEDICARE

## 2023-08-18 VITALS
DIASTOLIC BLOOD PRESSURE: 73 MMHG | OXYGEN SATURATION: 98 % | HEIGHT: 69 IN | SYSTOLIC BLOOD PRESSURE: 113 MMHG | RESPIRATION RATE: 18 BRPM | HEART RATE: 68 BPM | TEMPERATURE: 98 F | WEIGHT: 171 LBS | BODY MASS INDEX: 25.33 KG/M2

## 2023-08-18 DIAGNOSIS — S00.83XA CONTUSION OF OTHER PART OF HEAD, INITIAL ENCOUNTER: ICD-10-CM

## 2023-08-18 DIAGNOSIS — R29.6 RECURRENT FALLS: Primary | ICD-10-CM

## 2023-08-18 DIAGNOSIS — R42 DIZZINESS AND GIDDINESS: ICD-10-CM

## 2023-08-18 DIAGNOSIS — R29.6 RECURRENT FALLS: ICD-10-CM

## 2023-08-18 PROCEDURE — 99212 PR OFFICE/OUTPT VISIT, EST, LEVL II, 10-19 MIN: ICD-10-PCS | Mod: ,,, | Performed by: NURSE PRACTITIONER

## 2023-08-18 PROCEDURE — 99212 OFFICE O/P EST SF 10 MIN: CPT | Mod: ,,, | Performed by: NURSE PRACTITIONER

## 2023-08-18 PROCEDURE — 70450 CT HEAD/BRAIN W/O DYE: CPT | Mod: TC

## 2023-08-18 RX ORDER — ONDANSETRON 4 MG/1
TABLET, ORALLY DISINTEGRATING ORAL
COMMUNITY
Start: 2023-07-18 | End: 2024-02-06 | Stop reason: ALTCHOICE

## 2023-08-18 NOTE — PROGRESS NOTES
CHLOE Boswell   [facility]  [address]  [place] [address]     PATIENT NAME: Kizzy Schofield  : 1955  DATE: 23  MRN: [id number]      Billing Provider: CHLOE Boswell  Level of Service:   Patient PCP Information       Provider PCP Type    CHLOE Boswell [facility]            Reason for Visit / Chief Complaint: Fall (C/o of having a bad fall Wednesday on the front porch and van't seem to remember what happened and hurt her left shoulder and right shoulder again and had a goose egg on side of head. States this make the 4th week having falls. Right elbow swollen from fall last week.)   Health Maintenance Due   Topic Date Due    Shingles Vaccine (1 of 2) Never done    COVID-19 Vaccine (3 - Moderna series) 2021    TETANUS VACCINE  10/11/2021    DEXA Scan  2023    Mammogram  11/15/2023          Update PCP  Update Chief Complaint         History of Present Illness / Problem Focused Workflow     Kizzy Schofield presents to the clinic had another fall Wednesday she is uncertain of the cause of the fall. She was standing on porch and then sitting down with swing bumping her head and hurt her shoulder and arm. Had a bump on her head and made her neck sore. She needed help from her grand kids to get her up. Has had a headache since feels nausted but no vomit, she is uncertain if she did or did not loose consiousness.     Hemoglobin A1C   Date Value Ref Range Status   2022 5.4 4.5 - 6.6 % Final     Comment:       Normal:               <5.7%  Pre-Diabetic:       5.7% to 6.4%  Diabetic:             >6.4%  Diabetic Goal:     <7%        CMP  Sodium   Date Value Ref Range Status   2023 143 136 - 145 mmol/L Final     Potassium   Date Value Ref Range Status   2023 3.9 3.5 - 5.1 mmol/L Final     Chloride   Date Value Ref Range Status   2023 111 (H) 98 - 107 mmol/L Final     CO2   Date Value Ref Range Status   2023 28  21 - 32 mmol/L Final     Glucose   Date Value Ref Range Status   03/27/2023 95 74 - 106 mg/dL Final     BUN   Date Value Ref Range Status   03/27/2023 16 7 - 18 mg/dL Final     Creatinine   Date Value Ref Range Status   03/27/2023 1.35 (H) 0.55 - 1.02 mg/dL Final     Calcium   Date Value Ref Range Status   03/27/2023 8.4 (L) 8.5 - 10.1 mg/dL Final     Total Protein   Date Value Ref Range Status   03/27/2023 6.9 6.4 - 8.2 g/dL Final     Albumin   Date Value Ref Range Status   03/27/2023 3.4 (L) 3.5 - 5.0 g/dL Final     Bilirubin, Total   Date Value Ref Range Status   03/27/2023 0.2 >0.0 - 1.2 mg/dL Final     Alk Phos   Date Value Ref Range Status   03/27/2023 66 55 - 142 U/L Final     AST   Date Value Ref Range Status   03/27/2023 13 (L) 15 - 37 U/L Final     ALT   Date Value Ref Range Status   03/27/2023 18 13 - 56 U/L Final     Anion Gap   Date Value Ref Range Status   03/27/2023 8 7 - 16 mmol/L Final     eGFR   Date Value Ref Range Status   03/27/2023 43 (L) >=60 mL/min/1.73m² Final        Lab Results   Component Value Date    WBC 7.26 03/27/2023    RBC 3.91 (L) 03/27/2023    HGB 11.7 (L) 03/27/2023    HCT 37.2 (L) 03/27/2023    MCV 95.1 03/27/2023    MCH 29.9 03/27/2023    MCHC 31.5 (L) 03/27/2023    RDW 13.0 03/27/2023     03/27/2023    MPV 10.2 03/27/2023    LYMPH 25.5 (L) 03/27/2023    LYMPH 1.85 03/27/2023    MONO 5.0 03/27/2023    EOS 0.13 03/27/2023    BASO 0.05 03/27/2023    EOSINOPHIL 1.8 03/27/2023    BASOPHIL 0.7 03/27/2023        Lab Results   Component Value Date    CHOL 126 10/31/2022    CHOL 147 03/15/2022    CHOL 162 10/05/2021     Lab Results   Component Value Date    HDL 67 (H) 10/31/2022    HDL 72 (H) 03/15/2022    HDL 72 (H) 10/05/2021     Lab Results   Component Value Date    LDLCALC 39 10/31/2022    LDLCALC 49 03/15/2022    LDLCALC 60 10/05/2021     Lab Results   Component Value Date    TRIG 98 10/31/2022    TRIG 130 03/15/2022    TRIG 150 10/05/2021     Lab Results   Component Value  Date    CHOLHDL 1.9 10/31/2022    CHOLHDL 2.0 03/15/2022    CHOLHDL 2.3 10/05/2021        Wt Readings from Last 3 Encounters:   08/18/23 1348 77.6 kg (171 lb)   06/08/23 1536 75.3 kg (166 lb)   05/22/23 1414 75.8 kg (167 lb)        BP Readings from Last 3 Encounters:   08/18/23 113/73   06/08/23 125/81   05/22/23 131/76        Review of Systems     Review of Systems   Constitutional:  Negative for appetite change, fatigue and fever.   HENT:  Negative for nasal congestion, ear pain, sore throat and voice change.    Respiratory:  Negative for shortness of breath.    Cardiovascular:  Negative for chest pain and palpitations.   Gastrointestinal:  Negative for change in bowel habit, nausea, vomiting and change in bowel habit.   Musculoskeletal:  Positive for arthralgias, back pain, gait problem, joint swelling, myalgias and neck pain. Negative for neck stiffness.   Integumentary:  Negative for rash and wound.   Neurological:  Positive for weakness, headaches, coordination difficulties and coordination difficulties. Negative for memory loss.        Medical / Social / Family History     Past Medical History:   Diagnosis Date    Arthritis of left hip     Arthritis of left knee     Cervical radiculopathy     Chronic cystitis     Chronic pain syndrome     Degeneration of lumbar intervertebral disc     Depressive disorder     GERD (gastroesophageal reflux disease)     Hyperlipidemia     Hypertension     Lumbar post-laminectomy syndrome     Meniere disease     Osteoarthritis of cervical and lumbar spine     Osteoporosis        Past Surgical History:   Procedure Laterality Date    bladder tack      CHOLECYSTECTOMY      HYSTERECTOMY      LUMBAR LAMINECTOMY N/A 06/16/2022    Procedure: L4-L5 Laminectomy;  Surgeon: Juan Luis Covington MD;  Location: [facility];  Service: Neurosurgery;  Laterality: N/A;    OOPHORECTOMY      SHOULDER SURGERY Left 07/24/2023       Social History  Ms.  reports that she quit smoking about 47 years ago.  Her smoking use included cigarettes. She started smoking about 47 years ago. She has a 1.0 pack-year smoking history. She has been exposed to tobacco smoke. She has never used smokeless tobacco. She reports that she does not drink alcohol and does not use drugs.    Family History  Ms.'s family history includes Alcohol abuse in her brother, brother, and brother; Arthritis in her mother; COPD in her mother; Cancer in her brother, brother, and father; Glaucoma in her daughter; Hearing loss in her brother and mother; Heart disease in her brother and mother; Hypertension in her brother, father, and mother; Lung cancer in her father; Prostate cancer in her father; Stroke in her father and mother; Thyroid disease in her daughter and mother.    Medications and Allergies     Medications  Outpatient Medications Marked as Taking for the 8/18/23 encounter (Office Visit) with Kizzy Ga FNP   Medication Sig Dispense Refill    acetaminophen (TYLENOL) 500 MG tablet Take 500 mg by mouth every 6 (six) hours as needed.      amitriptyline (ELAVIL) 10 MG tablet Take 2 tablets (20 mg total) by mouth every evening. 90 tablet 1    atorvastatin (LIPITOR) 40 MG tablet TAKE 1 TABLET ONE TIME DAILY 90 tablet 1    calcium-vitamin D3 (OS-AIDEE 500 + D3) 500 mg-5 mcg (200 unit) per tablet Take 1 tablet by mouth 2 (two) times daily with meals.      cetirizine (ZYRTEC) 10 MG tablet Take 1 tablet (10 mg total) by mouth once daily. 90 tablet 0    colestipoL (COLESTID) 1 gram Tab Take 1 g by mouth once daily.      diazePAM (VALIUM) 2 MG tablet Take 1 tablet (2 mg total) by mouth 2 (two) times daily. 180 tablet 0    dicyclomine (BENTYL) 10 MG capsule TAKE 1 CAPSULE FOUR TIMES DAILY BEFORE MEALS AND NIGHTLY AS NEEDED 120 capsule 0    EPINEPHrine (EPIPEN) 0.3 mg/0.3 mL AtIn Inject 0.3 mLs (0.3 mg total) into the muscle as needed (allergic reaction). 1 each 2    fluticasone propionate (FLONASE) 50 mcg/actuation nasal spray USE 2 SPRAYS IN  EACH NOSTRIL ONE TIME DAILY 48 g 2    folic acid (FOLVITE) 1 MG tablet Take 1,000 mcg by mouth once daily.      gabapentin (NEURONTIN) 600 MG tablet Take 1 tablet (600 mg total) by mouth 3 (three) times daily. (Patient taking differently: Take 600 mg by mouth 2 (two) times daily.) 270 tablet 1    HYDROcodone-acetaminophen (NORCO)  mg per tablet Take by mouth.      HYDROcodone-acetaminophen (NORCO) 7.5-325 mg per tablet Take 1 tablet by mouth 2 (two) times daily as needed for Pain.      levothyroxine (SYNTHROID) 50 MCG tablet Take 1 tablet (50 mcg total) by mouth before breakfast. 90 tablet 1    methenamine (HIPREX) 1 gram Tab Take 1 tablet (1 g total) by mouth 2 (two) times daily. 180 tablet 3    methocarbamoL (ROBAXIN) 500 MG Tab Take 1 tablet (500 mg total) by mouth 3 (three) times daily. 270 tablet 1    montelukast (SINGULAIR) 10 mg tablet TAKE 1 TABLET ONE TIME DAILY 90 tablet 1    ondansetron (ZOFRAN) 4 MG tablet Take 1 tablet (4 mg total) by mouth every 8 (eight) hours as needed for Nausea. 30 tablet 0    ondansetron (ZOFRAN-ODT) 4 MG TbDL       polyethylene glycol (GLYCOLAX) 17 gram PwPk Take by mouth. Take 17 grams by mouth 2 times daily mixed with 8 ounces of water, juice, soda, tea, or coffee      promethazine (PHENERGAN) 25 MG tablet Take 1 tablet (25 mg total) by mouth every 4 (four) hours. 45 tablet 0    RABEprazole (ACIPHEX) 20 mg tablet Take by mouth 2 (two) times daily.      raloxifene (EVISTA) 60 mg tablet Take 1 tablet (60 mg total) by mouth once daily. 90 tablet 1    topiramate (TOPAMAX) 200 MG Tab Take 1 tablet (200 mg total) by mouth 2 (two) times daily. 180 tablet 3    triamcinolone acetonide 0.1% (KENALOG) 0.1 % ointment APPLY TO THE AFFECTED AREA(S) ON ARMS, LEGS AND trunk TWICE DAILY FOR ITCHING      triamterene-hydrochlorothiazide 37.5-25 mg (MAXZIDE-25) 37.5-25 mg per tablet Take 1 tablet by mouth once daily. 90 tablet 1    vitamin D (VITAMIN D3) 1000 units Tab Take 5,000 Units by  "mouth 2 (two) times a day.         Allergies  Review of patient's allergies indicates:   Allergen Reactions    Adhesive tape-silicones     Aspirin     Celebrex [celecoxib]     Opioids - morphine analogues     Pcn [penicillins]     Shrimp     Ciprofloxacin      Pt reported that "it made her feel real bad".    Silicone Itching and Rash       Physical Examination     Vitals:    08/18/23 1348   BP: 113/73   BP Location: Right arm   Patient Position: Sitting   Pulse: 68   Resp: 18   Temp: 98.2 °F (36.8 °C)   TempSrc: Oral   SpO2: 98%   Weight: 77.6 kg (171 lb)   Height: 5' 9" (1.753 m)      Physical Exam  Vitals and nursing note reviewed.   Constitutional:       General: She is not in acute distress.     Appearance: Normal appearance.   HENT:      Right Ear: External ear normal.      Left Ear: External ear normal.      Nose: Nose normal.      Mouth/Throat:      Mouth: Mucous membranes are moist.      Pharynx: Oropharynx is clear.   Eyes:      Conjunctiva/sclera: Conjunctivae normal.      Pupils: Pupils are equal, round, and reactive to light.   Cardiovascular:      Rate and Rhythm: Normal rate and regular rhythm.      Heart sounds: Normal heart sounds.   Pulmonary:      Effort: Pulmonary effort is normal.      Breath sounds: Normal breath sounds. No wheezing.   Musculoskeletal:      Right elbow: Swelling present. Decreased range of motion.      Cervical back: Normal range of motion and neck supple.   Skin:     Capillary Refill: Capillary refill takes less than 2 seconds.   Neurological:      Mental Status: She is alert and oriented to person, place, and time.      Cranial Nerves: No cranial nerve deficit, dysarthria or facial asymmetry.      Sensory: Sensation is intact.      Coordination: Finger-Nose-Finger Test abnormal. Impaired rapid alternating movements.      Gait: Gait abnormal.   Psychiatric:         Behavior: Behavior normal.      CT Head Without Contrast  Narrative: EXAMINATION:  CT HEAD WITHOUT " CONTRAST    CLINICAL HISTORY:  Dizziness, persistent/recurrent, cardiac or vascular cause suspected;Headache, chronic, new features or increased frequency; Repeated falls    TECHNIQUE:  CT of the head performed without the use of intravenous contrast.  The CT examination was performed using one or more of the following dose reduction techniques: Automated exposure control, adjustment of the mA and kV according to patient's size, use of acute or iterative reconstruction techniques.    COMPARISON:  5/12/2023    FINDINGS:  No acute intracranial hemorrhage, mass, infarct, or fluid collection.  Mild chronic microvascular ischemic changes.  Ventricles, sulci, and cisterns appear normal.  No mass effect or midline shift.  Calvarium intact.  Mastoid air cells and paranasal sinuses clear.  Impression: No acute intracranial abnormality.    Electronically signed by: Anthony Haider  Date:    08/18/2023  Time:    15:34       Assessment and Plan (including Health Maintenance)      Problem List  Smart Sets  Document Outside HM   :    Plan:     Patient Instructions   CT head if any vomit increased headache or neuro changes call 911 or ER       Health Maintenance Due   Topic Date Due    Shingles Vaccine (1 of 2) Never done    COVID-19 Vaccine (3 - Moderna series) 08/20/2021    TETANUS VACCINE  10/11/2021    DEXA Scan  09/02/2023    Mammogram  11/15/2023       Problem List Items Addressed This Visit    None  Visit Diagnoses       Recurrent falls    -  Primary    Relevant Orders    CT Head Without Contrast (Completed)    Contusion of other part of head, initial encounter        Relevant Orders    CT Head Without Contrast (Completed)    Dizziness and giddiness        Relevant Orders    CT Head Without Contrast (Completed)          Recurrent falls  -     CT Head Without Contrast; Future; Expected date: 08/18/2023    Contusion of other part of head, initial encounter  -     CT Head Without Contrast; Future; Expected date:  08/18/2023    Dizziness and giddiness  -     CT Head Without Contrast; Future; Expected date: 08/18/2023       Health Maintenance Topics with due status: Not Due       Topic Last Completion Date    Colorectal Cancer Screening 08/17/2021    Influenza Vaccine 09/25/2021    Hemoglobin A1c (Diabetic Prevention Screening) 06/07/2022    Lipid Panel 10/31/2022         Future Appointments   Date Time Provider Department Center   9/1/2023 10:15 AM Juan Luis Covington MD OB SP[facility]   12/7/2023 10:00 AM Martina Frederick FNP [facility] FAM[facility] [place]   1/24/2024  1:00 PM [facility] MAMMO1 [facility]   4/8/2024 10:30 AM Omari Holt DO [facility] [place]   5/7/2024 10:15 AM Kameron Coello Jr., MD [facility] UROL [facility]        No follow-ups on file.    Health Maintenance Due   Topic Date Due    Shingles Vaccine (1 of 2) Never done    COVID-19 Vaccine (3 - Moderna series) 08/20/2021    TETANUS VACCINE  10/11/2021    DEXA Scan  09/02/2023    Mammogram  11/15/2023        Signature:  CHLOE Boswell    Date of encounter: 8/18/23

## 2023-08-21 ENCOUNTER — OFFICE VISIT (OUTPATIENT)
Dept: OTOLARYNGOLOGY | Facility: CLINIC | Age: 68
End: 2023-08-21
Payer: MEDICARE

## 2023-08-21 VITALS — BODY MASS INDEX: 25.33 KG/M2 | WEIGHT: 171 LBS | HEIGHT: 69 IN

## 2023-08-21 DIAGNOSIS — H90.3 SENSORINEURAL HEARING LOSS (SNHL) OF BOTH EARS: ICD-10-CM

## 2023-08-21 DIAGNOSIS — R42 DIZZINESS: ICD-10-CM

## 2023-08-21 DIAGNOSIS — H81.03 MENIERE'S DISEASE (COCHLEAR HYDROPS), BILATERAL: Primary | ICD-10-CM

## 2023-08-21 PROCEDURE — 99214 OFFICE O/P EST MOD 30 MIN: CPT | Mod: S$PBB,,, | Performed by: OTOLARYNGOLOGY

## 2023-08-21 PROCEDURE — 99214 PR OFFICE/OUTPT VISIT, EST, LEVL IV, 30-39 MIN: ICD-10-PCS | Mod: S$PBB,,, | Performed by: OTOLARYNGOLOGY

## 2023-08-21 PROCEDURE — 99213 OFFICE O/P EST LOW 20 MIN: CPT | Mod: PBBFAC | Performed by: OTOLARYNGOLOGY

## 2023-08-21 RX ORDER — DIAZEPAM 2 MG/1
2 TABLET ORAL EVERY 6 HOURS PRN
Qty: 60 TABLET | Refills: 1 | Status: SHIPPED | OUTPATIENT
Start: 2023-08-21 | End: 2023-09-20

## 2023-08-21 NOTE — PROGRESS NOTES
Subjective:       Patient ID: Kizzy Schofield is a 68 y.o. female.    Chief Complaint: Dizziness (F/U meniere's disease, needs valium refill.)    HPI  Review of Systems   HENT:  Positive for congestion.    Neurological:  Positive for dizziness.   All other systems reviewed and are negative.      Objective:      Physical Exam  General: NAD  Head: Normocephalic, atraumatic, no facial asymmetry/normal strength,  Ears: Both auricules normal in appearance, w/o deformities tympanic membranes normal external auditory canals normal  Nose: External nose w/o deformities normal turbinates no drainage or inflammation  Oral Cavity: Lips, gums, floor of mouth, tongue hard palate, and buccal mucosa without mass/lesion  Oropharynx: Mucosa pink and moist, soft palate, posterior pharynx and oropharyngeal wall without mass/lesion  Neck: Supple, symmetric, trachea midline, no palpable mass/lesion, no palpable cervical lymphadenopathy  Skin: Warm and dry, no concerning lesions  Respiratory: Respirations even, unlabored   Assessment:       1. Meniere's disease (cochlear hydrops), bilateral    2. Sensorineural hearing loss (SNHL) of both ears    3. Dizziness        Plan:       Valium   F/u 3 months

## 2023-08-24 ENCOUNTER — PATIENT OUTREACH (OUTPATIENT)
Dept: ADMINISTRATIVE | Facility: HOSPITAL | Age: 68
End: 2023-08-24

## 2023-08-24 NOTE — PROGRESS NOTES
Health maintenance record review for population health care gaps    Patient was given an upcoming appointment to come and discuss health maintenance request    Patient Questionnaire Submission  --------------------------------     Questionnaire: Preventative Care Screenings     Question: From the screening(s) listed above, please select the ones you would like to schedule:  Answer:   Hemoglobin A1c            Lipid Panel            Osteoporosis Screening     Question: Have you completed any of the following screenings at a non-[facility] location recently? Select all that apply or NONE:  Answer:   Urine Screening            Colorectal Cancer Screening            Mammogram            Eye Exam     Question: Name of location(s) where outside labs were done:  Answer:   [facility]     Question: When is the best time to call you?  Answer:   8:00 AM - 9:00 AM     Question: What is the best number to reach you?  Answer:   [phone]       Interface, Campaigns Outreach User  Kizzy KELLE Chandu      Health Maintenance Due   Topic Date Due    Shingles Vaccine (1 of 2) Never done    COVID-19 Vaccine (3 - Moderna series) 08/20/2021    TETANUS VACCINE  10/11/2021    DEXA Scan  09/02/2023    Mammogram  11/15/2023

## 2023-08-31 DIAGNOSIS — R11.0 NAUSEA: ICD-10-CM

## 2023-08-31 DIAGNOSIS — G89.4 CHRONIC PAIN SYNDROME: ICD-10-CM

## 2023-08-31 RX ORDER — CETIRIZINE HYDROCHLORIDE 10 MG/1
10 TABLET ORAL
Qty: 90 TABLET | Refills: 0 | Status: SHIPPED | OUTPATIENT
Start: 2023-08-31

## 2023-08-31 RX ORDER — AMITRIPTYLINE HYDROCHLORIDE 10 MG/1
20 TABLET, FILM COATED ORAL NIGHTLY
Qty: 180 TABLET | Refills: 2 | Status: SHIPPED | OUTPATIENT
Start: 2023-08-31

## 2023-08-31 RX ORDER — METHOCARBAMOL 500 MG/1
500 TABLET, FILM COATED ORAL 3 TIMES DAILY
Qty: 270 TABLET | Refills: 1 | Status: SHIPPED | OUTPATIENT
Start: 2023-08-31

## 2023-08-31 RX ORDER — ONDANSETRON 4 MG/1
4 TABLET, FILM COATED ORAL EVERY 8 HOURS PRN
Qty: 30 TABLET | Refills: 0 | Status: SHIPPED | OUTPATIENT
Start: 2023-08-31 | End: 2024-02-06 | Stop reason: ALTCHOICE

## 2023-09-08 DIAGNOSIS — Z09 FOLLOW-UP EXAMINATION AFTER ORTHOPEDIC SURGERY: Primary | ICD-10-CM

## 2024-01-09 ENCOUNTER — TELEPHONE (OUTPATIENT)
Dept: SLEEP MEDICINE | Facility: CLINIC | Age: 69
End: 2024-01-09
Payer: MEDICARE

## 2024-01-22 ENCOUNTER — HOSPITAL ENCOUNTER (OUTPATIENT)
Dept: RADIOLOGY | Facility: HOSPITAL | Age: 69
Discharge: HOME | End: 2024-01-22
Attending: NURSE PRACTITIONER
Payer: MEDICARE

## 2024-01-22 DIAGNOSIS — R05.9 COUGH: ICD-10-CM

## 2024-01-22 DIAGNOSIS — M54.6 LEFT-SIDED THORACIC BACK PAIN: ICD-10-CM

## 2024-01-22 PROCEDURE — 71100 X-RAY EXAM RIBS UNI 2 VIEWS: CPT | Mod: TC,LT

## 2024-01-24 ENCOUNTER — HOSPITAL ENCOUNTER (OUTPATIENT)
Dept: RADIOLOGY | Facility: HOSPITAL | Age: 69
Discharge: HOME | End: 2024-01-24
Payer: MEDICARE

## 2024-01-24 DIAGNOSIS — Z12.31 VISIT FOR SCREENING MAMMOGRAM: ICD-10-CM

## 2024-01-24 PROCEDURE — 77067 SCR MAMMO BI INCL CAD: CPT | Mod: 26,,, | Performed by: RADIOLOGY

## 2024-01-24 PROCEDURE — 77063 BREAST TOMOSYNTHESIS BI: CPT | Mod: 26,,, | Performed by: RADIOLOGY

## 2024-01-24 PROCEDURE — 77067 SCR MAMMO BI INCL CAD: CPT | Mod: TC

## 2024-01-29 ENCOUNTER — OFFICE VISIT (OUTPATIENT)
Dept: FAMILY MEDICINE | Facility: CLINIC | Age: 69
End: 2024-01-29
Payer: MEDICARE

## 2024-01-29 VITALS
DIASTOLIC BLOOD PRESSURE: 78 MMHG | HEART RATE: 66 BPM | SYSTOLIC BLOOD PRESSURE: 116 MMHG | OXYGEN SATURATION: 99 % | TEMPERATURE: 98 F | RESPIRATION RATE: 20 BRPM

## 2024-01-29 DIAGNOSIS — R05.9 COUGH, UNSPECIFIED TYPE: ICD-10-CM

## 2024-01-29 DIAGNOSIS — R30.0 DYSURIA: ICD-10-CM

## 2024-01-29 DIAGNOSIS — R73.09 ELEVATED GLUCOSE: ICD-10-CM

## 2024-01-29 DIAGNOSIS — E03.9 HYPOTHYROIDISM, UNSPECIFIED TYPE: ICD-10-CM

## 2024-01-29 DIAGNOSIS — Z13.220 SCREENING FOR LIPID DISORDERS: ICD-10-CM

## 2024-01-29 DIAGNOSIS — N39.0 URINARY TRACT INFECTION WITHOUT HEMATURIA, SITE UNSPECIFIED: Primary | ICD-10-CM

## 2024-01-29 DIAGNOSIS — I10 HYPERTENSION, UNSPECIFIED TYPE: ICD-10-CM

## 2024-01-29 PROBLEM — H81.09 MENIERE'S DISEASE: Status: RESOLVED | Noted: 2017-04-27 | Resolved: 2024-01-29

## 2024-01-29 LAB
ALBUMIN SERPL BCP-MCNC: 3.7 G/DL (ref 3.5–5)
ALBUMIN/GLOB SERPL: 0.9 {RATIO}
ALP SERPL-CCNC: 101 U/L (ref 55–142)
ALT SERPL W P-5'-P-CCNC: 21 U/L (ref 13–56)
ANION GAP SERPL CALCULATED.3IONS-SCNC: 14 MMOL/L (ref 7–16)
AST SERPL W P-5'-P-CCNC: 13 U/L (ref 15–37)
BASOPHILS # BLD AUTO: 0.06 K/UL (ref 0–0.2)
BASOPHILS NFR BLD AUTO: 0.6 % (ref 0–1)
BILIRUB SERPL-MCNC: 0.4 MG/DL (ref ?–1.2)
BILIRUB SERPL-MCNC: ABNORMAL MG/DL
BLOOD URINE, POC: ABNORMAL
BUN SERPL-MCNC: 20 MG/DL (ref 7–18)
BUN/CREAT SERPL: 17 (ref 6–20)
CALCIUM SERPL-MCNC: 9.3 MG/DL (ref 8.5–10.1)
CHLORIDE SERPL-SCNC: 104 MMOL/L (ref 98–107)
CHOLEST SERPL-MCNC: 169 MG/DL (ref 0–200)
CHOLEST/HDLC SERPL: 2.2 {RATIO}
CO2 SERPL-SCNC: 23 MMOL/L (ref 21–32)
COLOR, POC UA: YELLOW
CREAT SERPL-MCNC: 1.17 MG/DL (ref 0.55–1.02)
CTP QC/QA: YES
CTP QC/QA: YES
DIFFERENTIAL METHOD BLD: ABNORMAL
EGFR (NO RACE VARIABLE) (RUSH/TITUS): 50 ML/MIN/1.73M2
EOSINOPHIL # BLD AUTO: 0.07 K/UL (ref 0–0.5)
EOSINOPHIL NFR BLD AUTO: 0.7 % (ref 1–4)
ERYTHROCYTE [DISTWIDTH] IN BLOOD BY AUTOMATED COUNT: 12.6 % (ref 11.5–14.5)
FLUAV AG NPH QL: NEGATIVE
FLUBV AG NPH QL: NEGATIVE
GLOBULIN SER-MCNC: 4.1 G/DL (ref 2–4)
GLUCOSE SERPL-MCNC: 138 MG/DL (ref 74–106)
GLUCOSE UR QL STRIP: ABNORMAL
HCT VFR BLD AUTO: 41.1 % (ref 38–47)
HDLC SERPL-MCNC: 76 MG/DL (ref 40–60)
HGB BLD-MCNC: 13.6 G/DL (ref 12–16)
IMM GRANULOCYTES # BLD AUTO: 0.03 K/UL (ref 0–0.04)
IMM GRANULOCYTES NFR BLD: 0.3 % (ref 0–0.4)
KETONES UR QL STRIP: ABNORMAL
LDLC SERPL CALC-MCNC: 73 MG/DL
LDLC/HDLC SERPL: 1 {RATIO}
LEUKOCYTE ESTERASE URINE, POC: ABNORMAL
LYMPHOCYTES # BLD AUTO: 1.72 K/UL (ref 1–4.8)
LYMPHOCYTES NFR BLD AUTO: 17.8 % (ref 27–41)
MCH RBC QN AUTO: 30.2 PG (ref 27–31)
MCHC RBC AUTO-ENTMCNC: 33.1 G/DL (ref 32–36)
MCV RBC AUTO: 91.3 FL (ref 80–96)
MONOCYTES # BLD AUTO: 0.53 K/UL (ref 0–0.8)
MONOCYTES NFR BLD AUTO: 5.5 % (ref 2–6)
MPC BLD CALC-MCNC: 10.1 FL (ref 9.4–12.4)
NEUTROPHILS # BLD AUTO: 7.25 K/UL (ref 1.8–7.7)
NEUTROPHILS NFR BLD AUTO: 75.1 % (ref 53–65)
NITRITE, POC UA: POSITIVE
NONHDLC SERPL-MCNC: 93 MG/DL
NRBC # BLD AUTO: 0 X10E3/UL
NRBC, AUTO (.00): 0 %
PH, POC UA: 6
PLATELET # BLD AUTO: 308 K/UL (ref 150–400)
POTASSIUM SERPL-SCNC: 3.5 MMOL/L (ref 3.5–5.1)
PROT SERPL-MCNC: 7.8 G/DL (ref 6.4–8.2)
PROTEIN, POC: ABNORMAL
RBC # BLD AUTO: 4.5 M/UL (ref 4.2–5.4)
SARS-COV-2 AG RESP QL IA.RAPID: NEGATIVE
SODIUM SERPL-SCNC: 137 MMOL/L (ref 136–145)
SPECIFIC GRAVITY, POC UA: 1.01
TRIGL SERPL-MCNC: 102 MG/DL (ref 35–150)
TSH SERPL DL<=0.005 MIU/L-ACNC: 2.14 UIU/ML (ref 0.36–3.74)
UROBILINOGEN, POC UA: 0.2
VLDLC SERPL-MCNC: 20 MG/DL
WBC # BLD AUTO: 9.66 K/UL (ref 4.5–11)

## 2024-01-29 PROCEDURE — 84443 ASSAY THYROID STIM HORMONE: CPT | Mod: ,,, | Performed by: CLINICAL MEDICAL LABORATORY

## 2024-01-29 PROCEDURE — 87804 INFLUENZA ASSAY W/OPTIC: CPT | Mod: RHCUB | Performed by: NURSE PRACTITIONER

## 2024-01-29 PROCEDURE — 87426 SARSCOV CORONAVIRUS AG IA: CPT | Mod: RHCUB | Performed by: NURSE PRACTITIONER

## 2024-01-29 PROCEDURE — 99214 OFFICE O/P EST MOD 30 MIN: CPT | Mod: ,,, | Performed by: NURSE PRACTITIONER

## 2024-01-29 PROCEDURE — 87086 URINE CULTURE/COLONY COUNT: CPT | Mod: ,,, | Performed by: CLINICAL MEDICAL LABORATORY

## 2024-01-29 PROCEDURE — 83036 HEMOGLOBIN GLYCOSYLATED A1C: CPT | Mod: ,,, | Performed by: CLINICAL MEDICAL LABORATORY

## 2024-01-29 PROCEDURE — 80053 COMPREHEN METABOLIC PANEL: CPT | Mod: ,,, | Performed by: CLINICAL MEDICAL LABORATORY

## 2024-01-29 PROCEDURE — 81003 URINALYSIS AUTO W/O SCOPE: CPT | Mod: RHCUB | Performed by: NURSE PRACTITIONER

## 2024-01-29 PROCEDURE — 87186 SC STD MICRODIL/AGAR DIL: CPT | Mod: ,,, | Performed by: CLINICAL MEDICAL LABORATORY

## 2024-01-29 PROCEDURE — 80061 LIPID PANEL: CPT | Mod: ,,, | Performed by: CLINICAL MEDICAL LABORATORY

## 2024-01-29 PROCEDURE — 87077 CULTURE AEROBIC IDENTIFY: CPT | Mod: ,,, | Performed by: CLINICAL MEDICAL LABORATORY

## 2024-01-29 PROCEDURE — 85025 COMPLETE CBC W/AUTO DIFF WBC: CPT | Mod: ,,, | Performed by: CLINICAL MEDICAL LABORATORY

## 2024-01-29 RX ORDER — NITROFURANTOIN 25; 75 MG/1; MG/1
100 CAPSULE ORAL 2 TIMES DAILY
Qty: 14 CAPSULE | Refills: 0 | Status: ON HOLD | OUTPATIENT
Start: 2024-01-29 | End: 2024-02-07 | Stop reason: HOSPADM

## 2024-01-29 RX ORDER — TRIAMTERENE/HYDROCHLOROTHIAZID 37.5-25 MG
1 TABLET ORAL DAILY
Qty: 90 TABLET | Refills: 1 | Status: SHIPPED | OUTPATIENT
Start: 2024-01-29

## 2024-01-29 NOTE — ASSESSMENT & PLAN NOTE
Flu, COVID negative.   Instructed to continue current medication regimen/conservative treatment at home.   Lungs cta.   Instructed to RTC for any new/worsening/persisting ssx.

## 2024-01-29 NOTE — ASSESSMENT & PLAN NOTE
Hypothyroidism  is controlled. Current medical regimen is effective;   Will adjust according to results and monitor.
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
## 2024-01-29 NOTE — ASSESSMENT & PLAN NOTE
Pt was unable to give urine when first arriving at clinic. Pt gave urine specimen on the way out of the clinic, instructed would call her with results. I tried to call her at approx 1250; no answer, left voice mail but did go ahead and send in Macrobid bid x 7 days based on last urine culture.   Urine culture pending. Will inform of result when available.

## 2024-01-30 DIAGNOSIS — H81.03 MENIERE'S DISEASE (COCHLEAR HYDROPS), BILATERAL: ICD-10-CM

## 2024-01-30 LAB
EST. AVERAGE GLUCOSE BLD GHB EST-MCNC: 105 MG/DL
HBA1C MFR BLD HPLC: 5.3 % (ref 4.5–6.6)

## 2024-01-30 RX ORDER — DIAZEPAM 2 MG/1
2 TABLET ORAL 2 TIMES DAILY
Qty: 180 TABLET | Refills: 0 | Status: ON HOLD | OUTPATIENT
Start: 2024-01-30 | End: 2024-02-06

## 2024-01-30 NOTE — PROGRESS NOTES
If you can get pt on the phone ( I tried to call her yesterday about her urine) please let her know her labs look better than last check. Her thyroid level is normal, her kidney function and blood counts have improved since last check. Her glucose was elevated so I am going to add an A1C to her labs. I feel like this is normal but she has not had it checked since 2022. We should have this back late this afternoon or tomorrow. Thanks!

## 2024-02-01 LAB — UA COMPLETE W REFLEX CULTURE PNL UR: ABNORMAL

## 2024-02-01 NOTE — PROGRESS NOTES
Please let pt know her urine culture came back showing the macrobid was sensitive to the bacteria so this should treat her uti. Thanks!

## 2024-02-06 ENCOUNTER — HOSPITAL ENCOUNTER (OUTPATIENT)
Facility: HOSPITAL | Age: 69
Discharge: HOME | End: 2024-02-07
Attending: SURGERY | Admitting: SURGERY
Payer: MEDICARE

## 2024-02-06 DIAGNOSIS — G43.909 MIGRAINE WITHOUT STATUS MIGRAINOSUS, NOT INTRACTABLE, UNSPECIFIED MIGRAINE TYPE: ICD-10-CM

## 2024-02-06 DIAGNOSIS — E78.2 MIXED HYPERLIPIDEMIA: ICD-10-CM

## 2024-02-06 DIAGNOSIS — R07.9 CHEST PAIN: ICD-10-CM

## 2024-02-06 DIAGNOSIS — K21.9 GASTROESOPHAGEAL REFLUX DISEASE WITHOUT ESOPHAGITIS: ICD-10-CM

## 2024-02-06 DIAGNOSIS — G89.4 CHRONIC PAIN SYNDROME: ICD-10-CM

## 2024-02-06 DIAGNOSIS — R55 SYNCOPE AND COLLAPSE: ICD-10-CM

## 2024-02-06 DIAGNOSIS — H81.03 MENIERE'S DISEASE OF BOTH EARS: ICD-10-CM

## 2024-02-06 DIAGNOSIS — G47.33 OSA ON CPAP: ICD-10-CM

## 2024-02-06 DIAGNOSIS — I10 ESSENTIAL (PRIMARY) HYPERTENSION: ICD-10-CM

## 2024-02-06 DIAGNOSIS — S22.22XA CLOSED FRACTURE OF BODY OF STERNUM, INITIAL ENCOUNTER: Primary | ICD-10-CM
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
## 2024-02-06 DIAGNOSIS — N30.20 CHRONIC CYSTITIS: ICD-10-CM

## 2024-02-06 DIAGNOSIS — F41.8 DEPRESSION WITH ANXIETY: ICD-10-CM

## 2024-02-06 PROBLEM — H81.09 MENIERE'S DISEASE: Status: ACTIVE | Noted: 2017-04-27

## 2024-02-06 LAB
ALBUMIN SERPL BCP-MCNC: 3.3 G/DL (ref 3.5–5)
ALBUMIN/GLOB SERPL: 0.9 {RATIO}
ALP SERPL-CCNC: 80 U/L (ref 55–142)
ALT SERPL W P-5'-P-CCNC: 26 U/L (ref 13–56)
AMPHET UR QL SCN: NEGATIVE
ANION GAP SERPL CALCULATED.3IONS-SCNC: 13 MMOL/L (ref 7–16)
APTT PPP: 27.5 SECONDS (ref 25.2–37.3)
AST SERPL W P-5'-P-CCNC: 19 U/L (ref 15–37)
BARBITURATES UR QL SCN: NEGATIVE
BASOPHILS # BLD AUTO: 0.05 K/UL (ref 0–0.2)
BASOPHILS NFR BLD AUTO: 0.5 % (ref 0–1)
BENZODIAZ METAB UR QL SCN: POSITIVE
BILIRUB SERPL-MCNC: 0.3 MG/DL (ref ?–1.2)
BILIRUB UR QL STRIP: NEGATIVE
BUN SERPL-MCNC: 14 MG/DL (ref 7–18)
BUN/CREAT SERPL: 13 (ref 6–20)
CALCIUM SERPL-MCNC: 9 MG/DL (ref 8.5–10.1)
CANNABINOIDS UR QL SCN: NEGATIVE
CHLORIDE SERPL-SCNC: 106 MMOL/L (ref 98–107)
CLARITY UR: CLEAR
CO2 SERPL-SCNC: 24 MMOL/L (ref 21–32)
COCAINE UR QL SCN: NEGATIVE
COLOR UR: NORMAL
CREAT SERPL-MCNC: 1.12 MG/DL (ref 0.55–1.02)
DIFFERENTIAL METHOD BLD: ABNORMAL
EGFR (NO RACE VARIABLE) (RUSH/TITUS): 53 ML/MIN/1.73M2
EOSINOPHIL # BLD AUTO: 0.17 K/UL (ref 0–0.5)
EOSINOPHIL NFR BLD AUTO: 1.8 % (ref 1–4)
ERYTHROCYTE [DISTWIDTH] IN BLOOD BY AUTOMATED COUNT: 13.2 % (ref 11.5–14.5)
ETHANOL, BLOOD (CATEGORY): NOT DETECTED
GLOBULIN SER-MCNC: 3.6 G/DL (ref 2–4)
GLUCOSE SERPL-MCNC: 104 MG/DL (ref 74–106)
GLUCOSE UR STRIP-MCNC: NORMAL MG/DL
HCT VFR BLD AUTO: 39 % (ref 38–47)
HGB BLD-MCNC: 13 G/DL (ref 12–16)
IMM GRANULOCYTES # BLD AUTO: 0.11 K/UL (ref 0–0.04)
IMM GRANULOCYTES NFR BLD: 1.2 % (ref 0–0.4)
INDIRECT COOMBS: NORMAL
INR BLD: 1.01
KETONES UR STRIP-SCNC: NEGATIVE MG/DL
LACTATE SERPL-SCNC: 1.2 MMOL/L (ref 0.4–2)
LEUKOCYTE ESTERASE UR QL STRIP: NEGATIVE
LYMPHOCYTES # BLD AUTO: 1.83 K/UL (ref 1–4.8)
LYMPHOCYTES NFR BLD AUTO: 19.5 % (ref 27–41)
MAGNESIUM SERPL-MCNC: 2.2 MG/DL (ref 1.7–2.3)
MCH RBC QN AUTO: 30.7 PG (ref 27–31)
MCHC RBC AUTO-ENTMCNC: 33.3 G/DL (ref 32–36)
MCV RBC AUTO: 92 FL (ref 80–96)
MONOCYTES # BLD AUTO: 0.53 K/UL (ref 0–0.8)
MONOCYTES NFR BLD AUTO: 5.6 % (ref 2–6)
MPC BLD CALC-MCNC: 9.5 FL (ref 9.4–12.4)
NEUTROPHILS # BLD AUTO: 6.7 K/UL (ref 1.8–7.7)
NEUTROPHILS NFR BLD AUTO: 71.4 % (ref 53–65)
NITRITE UR QL STRIP: NEGATIVE
NRBC # BLD AUTO: 0 X10E3/UL
NRBC, AUTO (.00): 0 %
OPIATES UR QL SCN: NEGATIVE
PCP UR QL SCN: NEGATIVE
PH UR STRIP: 7 PH UNITS
PLATELET # BLD AUTO: 288 K/UL (ref 150–400)
POTASSIUM SERPL-SCNC: 3.4 MMOL/L (ref 3.5–5.1)
PROT SERPL-MCNC: 6.9 G/DL (ref 6.4–8.2)
PROT UR QL STRIP: NEGATIVE
PROTHROMBIN TIME: 13.2 SECONDS (ref 11.7–14.7)
RBC # BLD AUTO: 4.24 M/UL (ref 4.2–5.4)
RBC # UR STRIP: NEGATIVE /UL
RH BLD: NORMAL
SODIUM SERPL-SCNC: 140 MMOL/L (ref 136–145)
SP GR UR STRIP: 1.01
SPECIMEN OUTDATE: NORMAL
TROPONIN I SERPL DL<=0.01 NG/ML-MCNC: 6.5 PG/ML
UROBILINOGEN UR STRIP-ACNC: NORMAL MG/DL
WBC # BLD AUTO: 9.39 K/UL (ref 4.5–11)

## 2024-02-06 PROCEDURE — 81003 URINALYSIS AUTO W/O SCOPE: CPT | Performed by: NURSE PRACTITIONER

## 2024-02-06 PROCEDURE — 96374 THER/PROPH/DIAG INJ IV PUSH: CPT

## 2024-02-06 PROCEDURE — 99222 1ST HOSP IP/OBS MODERATE 55: CPT | Mod: AI,,, | Performed by: SURGERY

## 2024-02-06 PROCEDURE — 25500020 PHARM REV CODE 255: Performed by: NURSE PRACTITIONER

## 2024-02-06 PROCEDURE — 25000003 PHARM REV CODE 250: Performed by: HOSPITALIST

## 2024-02-06 PROCEDURE — G0390 TRAUMA RESPONS W/HOSP CRITI: HCPCS

## 2024-02-06 PROCEDURE — 82077 ASSAY SPEC XCP UR&BREATH IA: CPT | Performed by: NURSE PRACTITIONER

## 2024-02-06 PROCEDURE — G0378 HOSPITAL OBSERVATION PER HR: HCPCS

## 2024-02-06 PROCEDURE — 85610 PROTHROMBIN TIME: CPT | Performed by: NURSE PRACTITIONER

## 2024-02-06 PROCEDURE — 83735 ASSAY OF MAGNESIUM: CPT | Performed by: NURSE PRACTITIONER

## 2024-02-06 PROCEDURE — 80053 COMPREHEN METABOLIC PANEL: CPT | Performed by: NURSE PRACTITIONER

## 2024-02-06 PROCEDURE — 85025 COMPLETE CBC W/AUTO DIFF WBC: CPT | Performed by: NURSE PRACTITIONER

## 2024-02-06 PROCEDURE — 84484 ASSAY OF TROPONIN QUANT: CPT | Performed by: NURSE PRACTITIONER

## 2024-02-06 PROCEDURE — 80307 DRUG TEST PRSMV CHEM ANLYZR: CPT | Performed by: NURSE PRACTITIONER

## 2024-02-06 PROCEDURE — 83605 ASSAY OF LACTIC ACID: CPT | Performed by: NURSE PRACTITIONER

## 2024-02-06 PROCEDURE — 63600175 PHARM REV CODE 636 W HCPCS: Performed by: NURSE PRACTITIONER

## 2024-02-06 PROCEDURE — 85730 THROMBOPLASTIN TIME PARTIAL: CPT | Performed by: NURSE PRACTITIONER

## 2024-02-06 PROCEDURE — 93005 ELECTROCARDIOGRAM TRACING: CPT

## 2024-02-06 PROCEDURE — 99285 EMERGENCY DEPT VISIT HI MDM: CPT | Mod: 25

## 2024-02-06 PROCEDURE — 93010 ELECTROCARDIOGRAM REPORT: CPT | Mod: ,,, | Performed by: HOSPITALIST

## 2024-02-06 PROCEDURE — 86901 BLOOD TYPING SEROLOGIC RH(D): CPT | Performed by: NURSE PRACTITIONER

## 2024-02-06 PROCEDURE — 99285 EMERGENCY DEPT VISIT HI MDM: CPT | Mod: ,,, | Performed by: NURSE PRACTITIONER

## 2024-02-06 PROCEDURE — 25000003 PHARM REV CODE 250: Performed by: NURSE PRACTITIONER

## 2024-02-06 RX ORDER — RALOXIFENE HYDROCHLORIDE 60 MG/1
60 TABLET, FILM COATED ORAL DAILY
Status: DISCONTINUED | OUTPATIENT
Start: 2024-02-07 | End: 2024-02-07 | Stop reason: HOSPADM

## 2024-02-06 RX ORDER — FOLIC ACID 1 MG/1
1000 TABLET ORAL DAILY
Status: DISCONTINUED | OUTPATIENT
Start: 2024-02-07 | End: 2024-02-07 | Stop reason: HOSPADM

## 2024-02-06 RX ORDER — METHENAMINE HIPPURATE 1000 MG/1
1 TABLET ORAL 2 TIMES DAILY
Status: DISCONTINUED | OUTPATIENT
Start: 2024-02-06 | End: 2024-02-07 | Stop reason: HOSPADM

## 2024-02-06 RX ORDER — MONTELUKAST SODIUM 10 MG/1
10 TABLET ORAL DAILY
Status: DISCONTINUED | OUTPATIENT
Start: 2024-02-07 | End: 2024-02-07 | Stop reason: HOSPADM

## 2024-02-06 RX ORDER — HYDROCODONE BITARTRATE AND ACETAMINOPHEN 10; 325 MG/1; MG/1
1 TABLET ORAL
Status: COMPLETED | OUTPATIENT
Start: 2024-02-06 | End: 2024-02-06

## 2024-02-06 RX ORDER — PAROXETINE HYDROCHLORIDE 20 MG/1
20 TABLET, FILM COATED ORAL DAILY
COMMUNITY
Start: 2024-01-29 | End: 2024-04-16

## 2024-02-06 RX ORDER — PANTOPRAZOLE SODIUM 40 MG/1
40 TABLET, DELAYED RELEASE ORAL DAILY
Status: DISCONTINUED | OUTPATIENT
Start: 2024-02-07 | End: 2024-02-07 | Stop reason: HOSPADM

## 2024-02-06 RX ORDER — ORPHENADRINE CITRATE 30 MG/ML
30 INJECTION INTRAMUSCULAR; INTRAVENOUS
Status: COMPLETED | OUTPATIENT
Start: 2024-02-06 | End: 2024-02-06

## 2024-02-06 RX ORDER — SODIUM CHLORIDE 0.9 % (FLUSH) 0.9 %
10 SYRINGE (ML) INJECTION
Status: DISCONTINUED | OUTPATIENT
Start: 2024-02-06 | End: 2024-02-07 | Stop reason: HOSPADM

## 2024-02-06 RX ORDER — LEVOTHYROXINE SODIUM 50 UG/1
50 TABLET ORAL
Status: DISCONTINUED | OUTPATIENT
Start: 2024-02-07 | End: 2024-02-07 | Stop reason: HOSPADM

## 2024-02-06 RX ORDER — POLYETHYLENE GLYCOL 3350 17 G/17G
17 POWDER, FOR SOLUTION ORAL DAILY
Status: DISCONTINUED | OUTPATIENT
Start: 2024-02-07 | End: 2024-02-07 | Stop reason: HOSPADM

## 2024-02-06 RX ORDER — METHOCARBAMOL 500 MG/1
500 TABLET, FILM COATED ORAL 3 TIMES DAILY
Status: DISCONTINUED | OUTPATIENT
Start: 2024-02-06 | End: 2024-02-07 | Stop reason: HOSPADM

## 2024-02-06 RX ORDER — ATORVASTATIN CALCIUM 40 MG/1
40 TABLET, FILM COATED ORAL DAILY
Status: DISCONTINUED | OUTPATIENT
Start: 2024-02-07 | End: 2024-02-07 | Stop reason: HOSPADM

## 2024-02-06 RX ORDER — TALC
6 POWDER (GRAM) TOPICAL NIGHTLY PRN
Status: DISCONTINUED | OUTPATIENT
Start: 2024-02-06 | End: 2024-02-07 | Stop reason: HOSPADM

## 2024-02-06 RX ORDER — MORPHINE SULFATE 4 MG/ML
4 INJECTION, SOLUTION INTRAMUSCULAR; INTRAVENOUS EVERY 4 HOURS PRN
Status: DISCONTINUED | OUTPATIENT
Start: 2024-02-06 | End: 2024-02-07

## 2024-02-06 RX ORDER — FERROUS SULFATE, DRIED 160(50) MG
1 TABLET, EXTENDED RELEASE ORAL 2 TIMES DAILY WITH MEALS
Status: DISCONTINUED | OUTPATIENT
Start: 2024-02-07 | End: 2024-02-07 | Stop reason: HOSPADM

## 2024-02-06 RX ORDER — HYDROCODONE BITARTRATE AND ACETAMINOPHEN 7.5; 325 MG/1; MG/1
1 TABLET ORAL EVERY 6 HOURS PRN
Status: COMPLETED | OUTPATIENT
Start: 2024-02-06 | End: 2024-02-07

## 2024-02-06 RX ORDER — TOPIRAMATE 100 MG/1
200 TABLET, FILM COATED ORAL 2 TIMES DAILY
Status: DISCONTINUED | OUTPATIENT
Start: 2024-02-06 | End: 2024-02-07 | Stop reason: HOSPADM

## 2024-02-06 RX ORDER — GABAPENTIN 600 MG/1
600 TABLET ORAL 2 TIMES DAILY
Status: DISCONTINUED | OUTPATIENT
Start: 2024-02-06 | End: 2024-02-07 | Stop reason: HOSPADM

## 2024-02-06 RX ORDER — PAROXETINE HYDROCHLORIDE 20 MG/1
20 TABLET, FILM COATED ORAL DAILY
Status: DISCONTINUED | OUTPATIENT
Start: 2024-02-07 | End: 2024-02-07 | Stop reason: HOSPADM

## 2024-02-06 RX ADMIN — ORPHENADRINE CITRATE 30 MG: 60 INJECTION INTRAMUSCULAR; INTRAVENOUS at 08:02

## 2024-02-06 RX ADMIN — GABAPENTIN 600 MG: 600 TABLET, FILM COATED ORAL at 09:02

## 2024-02-06 RX ADMIN — METHENAMINE HIPPURATE 1 G: 1 TABLET ORAL at 10:02

## 2024-02-06 RX ADMIN — HYDROCODONE BITARTRATE AND ACETAMINOPHEN 1 TABLET: 10; 325 TABLET ORAL at 08:02

## 2024-02-06 RX ADMIN — METHOCARBAMOL TABLETS 500 MG: 500 TABLET, COATED ORAL at 10:02

## 2024-02-06 RX ADMIN — TOPIRAMATE 200 MG: 100 TABLET, FILM COATED ORAL at 10:02

## 2024-02-06 RX ADMIN — IOPAMIDOL 100 ML: 755 INJECTION, SOLUTION INTRAVENOUS at 06:02
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
## 2024-02-07 VITALS
WEIGHT: 174 LBS | SYSTOLIC BLOOD PRESSURE: 104 MMHG | RESPIRATION RATE: 18 BRPM | BODY MASS INDEX: 25.77 KG/M2 | TEMPERATURE: 98 F | OXYGEN SATURATION: 92 % | HEIGHT: 69 IN | HEART RATE: 67 BPM | DIASTOLIC BLOOD PRESSURE: 65 MMHG

## 2024-02-07 PROBLEM — R07.9 CHEST PAIN: Status: ACTIVE | Noted: 2024-02-07

## 2024-02-07 PROBLEM — R07.9 CHEST PAIN: Status: RESOLVED | Noted: 2024-02-07 | Resolved: 2024-02-07

## 2024-02-07 LAB
ANION GAP SERPL CALCULATED.3IONS-SCNC: 6 MMOL/L (ref 7–16)
AORTIC ROOT ANNULUS: 2.62 CM
AORTIC VALVE CUSP SEPERATION: 1.92 CM
AV INDEX (PROSTH): 0.86
AV MEAN GRADIENT: 3 MMHG
AV PEAK GRADIENT: 5 MMHG
AV VALVE AREA BY VELOCITY RATIO: 2.18 CM²
AV VALVE AREA: 2.2 CM²
AV VELOCITY RATIO: 0.86
BASOPHILS # BLD AUTO: 0.06 K/UL (ref 0–0.2)
BASOPHILS NFR BLD AUTO: 0.7 % (ref 0–1)
BSA FOR ECHO PROCEDURE: 1.96 M2
BUN SERPL-MCNC: 11 MG/DL (ref 7–18)
BUN/CREAT SERPL: 9 (ref 6–20)
CALCIUM SERPL-MCNC: 8.4 MG/DL (ref 8.5–10.1)
CHLORIDE SERPL-SCNC: 108 MMOL/L (ref 98–107)
CO2 SERPL-SCNC: 25 MMOL/L (ref 21–32)
CREAT SERPL-MCNC: 1.24 MG/DL (ref 0.55–1.02)
CV ECHO LV RWT: 0.31 CM
DIFFERENTIAL METHOD BLD: ABNORMAL
DOP CALC AO PEAK VEL: 1.12 M/S
DOP CALC AO VTI: 27.4 CM
DOP CALC LVOT AREA: 2.5 CM2
DOP CALC LVOT DIAMETER: 1.8 CM
DOP CALC LVOT PEAK VEL: 0.96 M/S
DOP CALC LVOT STROKE VOLUME: 60.28 CM3
DOP CALCLVOT PEAK VEL VTI: 23.7 CM
E WAVE DECELERATION TIME: 233.45 MSEC
E/A RATIO: 0.75
E/E' RATIO: 6.1 M/S
ECHO LV POSTERIOR WALL: 0.68 CM (ref 0.6–1.1)
EGFR (NO RACE VARIABLE) (RUSH/TITUS): 47 ML/MIN/1.73M2
EJECTION FRACTION: 60 %
EOSINOPHIL # BLD AUTO: 0.15 K/UL (ref 0–0.5)
EOSINOPHIL NFR BLD AUTO: 1.8 % (ref 1–4)
ERYTHROCYTE [DISTWIDTH] IN BLOOD BY AUTOMATED COUNT: 13.1 % (ref 11.5–14.5)
FRACTIONAL SHORTENING: 34 % (ref 28–44)
GLUCOSE SERPL-MCNC: 99 MG/DL (ref 74–106)
HCT VFR BLD AUTO: 36.3 % (ref 38–47)
HGB BLD-MCNC: 11.9 G/DL (ref 12–16)
IMM GRANULOCYTES # BLD AUTO: 0.04 K/UL (ref 0–0.04)
IMM GRANULOCYTES NFR BLD: 0.5 % (ref 0–0.4)
INTERVENTRICULAR SEPTUM: 0.77 CM (ref 0.6–1.1)
IVC DIAMETER: 1.26 CM
LA MAJOR: 2.94 CM
LEFT ATRIUM SIZE: 3.06 CM
LEFT INTERNAL DIMENSION IN SYSTOLE: 2.93 CM (ref 2.1–4)
LEFT VENTRICLE DIASTOLIC VOLUME INDEX: 45.96 ML/M2
LEFT VENTRICLE DIASTOLIC VOLUME: 89.62 ML
LEFT VENTRICLE MASS INDEX: 50 G/M2
LEFT VENTRICLE SYSTOLIC VOLUME INDEX: 17 ML/M2
LEFT VENTRICLE SYSTOLIC VOLUME: 33.14 ML
LEFT VENTRICULAR INTERNAL DIMENSION IN DIASTOLE: 4.44 CM (ref 3.5–6)
LEFT VENTRICULAR MASS: 97.78 G
LV LATERAL E/E' RATIO: 5.33 M/S
LV SEPTAL E/E' RATIO: 7.11 M/S
LVOT MG: 2.2 MMHG
LVOT MV: 0.71 CM/S
LYMPHOCYTES # BLD AUTO: 1.98 K/UL (ref 1–4.8)
LYMPHOCYTES NFR BLD AUTO: 23.9 % (ref 27–41)
MCH RBC QN AUTO: 30.2 PG (ref 27–31)
MCHC RBC AUTO-ENTMCNC: 32.8 G/DL (ref 32–36)
MCV RBC AUTO: 92.1 FL (ref 80–96)
MONOCYTES # BLD AUTO: 0.67 K/UL (ref 0–0.8)
MONOCYTES NFR BLD AUTO: 8.1 % (ref 2–6)
MPC BLD CALC-MCNC: 9.4 FL (ref 9.4–12.4)
MV PEAK A VEL: 0.85 M/S
MV PEAK E VEL: 0.64 M/S
NEUTROPHILS # BLD AUTO: 5.37 K/UL (ref 1.8–7.7)
NEUTROPHILS NFR BLD AUTO: 65 % (ref 53–65)
NRBC # BLD AUTO: 0 X10E3/UL
NRBC, AUTO (.00): 0 %
NT-PROBNP SERPL-MCNC: 80 PG/ML (ref 1–125)
OHS QRS DURATION: 78 MS
OHS QTC CALCULATION: 410 MS
PISA TR MAX VEL: 2.64 M/S
PLATELET # BLD AUTO: 250 K/UL (ref 150–400)
POTASSIUM SERPL-SCNC: 3.3 MMOL/L (ref 3.5–5.1)
PV PEAK GRADIENT: 3 MMHG
PV PEAK VELOCITY: 0.81 M/S
RA MAJOR: 4.21 CM
RA PRESSURE ESTIMATED: 3 MMHG
RBC # BLD AUTO: 3.94 M/UL (ref 4.2–5.4)
RIGHT VENTRICULAR END-DIASTOLIC DIMENSION: 3.3 CM
RV TB RVSP: 6 MMHG
SODIUM SERPL-SCNC: 136 MMOL/L (ref 136–145)
TDI LATERAL: 0.12 M/S
TDI SEPTAL: 0.09 M/S
TDI: 0.11 M/S
TR MAX PG: 28 MMHG
TRICUSPID ANNULAR PLANE SYSTOLIC EXCURSION: 1.74 CM
TV REST PULMONARY ARTERY PRESSURE: 31 MMHG
WBC # BLD AUTO: 8.27 K/UL (ref 4.5–11)
Z-SCORE OF LEFT VENTRICULAR DIMENSION IN END DIASTOLE: -2.25
Z-SCORE OF LEFT VENTRICULAR DIMENSION IN END SYSTOLE: -1.22

## 2024-02-07 PROCEDURE — G0378 HOSPITAL OBSERVATION PER HR: HCPCS

## 2024-02-07 PROCEDURE — 63600175 PHARM REV CODE 636 W HCPCS: Performed by: HOSPITALIST

## 2024-02-07 PROCEDURE — 94761 N-INVAS EAR/PLS OXIMETRY MLT: CPT

## 2024-02-07 PROCEDURE — 25000003 PHARM REV CODE 250: Performed by: HOSPITALIST

## 2024-02-07 PROCEDURE — 96376 TX/PRO/DX INJ SAME DRUG ADON: CPT

## 2024-02-07 PROCEDURE — 96375 TX/PRO/DX INJ NEW DRUG ADDON: CPT

## 2024-02-07 PROCEDURE — 85025 COMPLETE CBC W/AUTO DIFF WBC: CPT | Performed by: HOSPITALIST

## 2024-02-07 PROCEDURE — 83880 ASSAY OF NATRIURETIC PEPTIDE: CPT | Performed by: HOSPITALIST

## 2024-02-07 PROCEDURE — 80048 BASIC METABOLIC PNL TOTAL CA: CPT | Performed by: HOSPITALIST

## 2024-02-07 PROCEDURE — 25000003 PHARM REV CODE 250: Performed by: SURGERY

## 2024-02-07 PROCEDURE — 99238 HOSP IP/OBS DSCHRG MGMT 30/<: CPT | Mod: ,,, | Performed by: SURGERY

## 2024-02-07 RX ORDER — KETOROLAC TROMETHAMINE 15 MG/ML
15 INJECTION, SOLUTION INTRAMUSCULAR; INTRAVENOUS EVERY 6 HOURS PRN
Status: DISCONTINUED | OUTPATIENT
Start: 2024-02-07 | End: 2024-02-07 | Stop reason: HOSPADM

## 2024-02-07 RX ADMIN — PANTOPRAZOLE SODIUM 40 MG: 40 TABLET, DELAYED RELEASE ORAL at 09:02

## 2024-02-07 RX ADMIN — LEVOTHYROXINE SODIUM 50 MCG: 50 TABLET ORAL at 05:02

## 2024-02-07 RX ADMIN — METHOCARBAMOL TABLETS 500 MG: 500 TABLET, COATED ORAL at 09:02

## 2024-02-07 RX ADMIN — KETOROLAC TROMETHAMINE 15 MG: 15 INJECTION, SOLUTION INTRAMUSCULAR; INTRAVENOUS at 04:02

## 2024-02-07 RX ADMIN — FOLIC ACID 1000 MCG: 1 TABLET ORAL at 09:02

## 2024-02-07 RX ADMIN — TOPIRAMATE 200 MG: 100 TABLET, FILM COATED ORAL at 09:02

## 2024-02-07 RX ADMIN — MONTELUKAST 10 MG: 10 TABLET, FILM COATED ORAL at 09:02

## 2024-02-07 RX ADMIN — GABAPENTIN 600 MG: 600 TABLET, FILM COATED ORAL at 09:02

## 2024-02-07 RX ADMIN — PAROXETINE HYDROCHLORIDE 20 MG: 20 TABLET, FILM COATED ORAL at 09:02

## 2024-02-07 RX ADMIN — METHENAMINE HIPPURATE 1 G: 1 TABLET ORAL at 09:02

## 2024-02-07 RX ADMIN — POLYETHYLENE GLYCOL 3350 17 G: 17 POWDER, FOR SOLUTION ORAL at 09:02

## 2024-02-07 RX ADMIN — RALOXIFENE 60 MG: 60 TABLET ORAL at 09:02

## 2024-02-07 RX ADMIN — CALCIUM CARBONATE-VITAMIN D TAB 500 MG-200 UNIT 1 TABLET: 500-200 TAB at 09:02

## 2024-02-07 RX ADMIN — KETOROLAC TROMETHAMINE 15 MG: 15 INJECTION, SOLUTION INTRAMUSCULAR; INTRAVENOUS at 01:02

## 2024-02-07 RX ADMIN — HYDROCODONE BITARTRATE AND ACETAMINOPHEN 1 TABLET: 7.5; 325 TABLET ORAL at 05:02

## 2024-02-07 RX ADMIN — METHOCARBAMOL TABLETS 500 MG: 500 TABLET, COATED ORAL at 04:02

## 2024-02-07 RX ADMIN — ATORVASTATIN CALCIUM 40 MG: 40 TABLET, FILM COATED ORAL at 09:02

## 2024-02-07 RX ADMIN — CALCIUM CARBONATE-VITAMIN D TAB 500 MG-200 UNIT 1 TABLET: 500-200 TAB at 04:02

## 2024-02-07 RX ADMIN — HYDROCODONE BITARTRATE AND ACETAMINOPHEN 1 TABLET: 7.5; 325 TABLET ORAL at 01:02
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
## 2024-02-07 NOTE — ASSESSMENT & PLAN NOTE
Wears cpap at home but does not want to wear it tonight in case the pain meds make her nauseated.
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
## 2024-02-07 NOTE — SUBJECTIVE & OBJECTIVE
Interval History:     Review of Systems   Constitutional:  Negative for appetite change, chills, fatigue, fever and unexpected weight change.   HENT:  Negative for congestion, mouth sores, nosebleeds, sinus pain, sore throat and trouble swallowing.    Respiratory:  Negative for apnea, cough, chest tightness and shortness of breath.    Cardiovascular:  Positive for chest pain. Negative for palpitations and leg swelling.   Gastrointestinal:  Negative for abdominal pain, blood in stool, constipation, diarrhea, nausea and vomiting.   Genitourinary:  Negative for decreased urine volume, difficulty urinating, dysuria and frequency.   Musculoskeletal:  Positive for back pain and neck pain. Negative for arthralgias and joint swelling.   Skin:  Negative for rash.   Neurological:  Negative for syncope, light-headedness and headaches.   Hematological:  Does not bruise/bleed easily.   Psychiatric/Behavioral:  Negative for confusion and suicidal ideas.      Objective:     Vital Signs (Most Recent):  Temp: 97.8 °F (36.6 °C) (02/07/24 0625)  Pulse: (!) 56 (02/07/24 0625)  Resp: 16 (02/07/24 0625)  BP: 105/67 (02/07/24 0625)  SpO2: (!) 93 % (02/07/24 0625) Vital Signs (24h Range):  Temp:  [97.7 °F (36.5 °C)-98.9 °F (37.2 °C)] 97.8 °F (36.6 °C)  Pulse:  [56-73] 56  Resp:  [11-20] 16  SpO2:  [93 %-99 %] 93 %  BP: (105-160)/(67-96) 105/67     Weight: 78.9 kg (174 lb)  Body mass index is 25.7 kg/m².    Intake/Output Summary (Last 24 hours) at 2/7/2024 0747  Last data filed at 2/7/2024 0602  Gross per 24 hour   Intake 480 ml   Output 425 ml   Net 55 ml         Physical Exam  Vitals and nursing note reviewed. Exam conducted with a chaperone present.   Constitutional:       General: She is not in acute distress.     Appearance: She is not ill-appearing or toxic-appearing.   HENT:      Head: Atraumatic.      Mouth/Throat:      Mouth: Mucous membranes are moist.      Pharynx: Oropharynx is clear.   Eyes:      Conjunctiva/sclera:  Conjunctivae normal.      Pupils: Pupils are equal, round, and reactive to light.   Neck:      Vascular: No carotid bruit.   Cardiovascular:      Rate and Rhythm: Normal rate and regular rhythm.      Pulses: Normal pulses.      Heart sounds: Normal heart sounds.   Pulmonary:      Effort: Pulmonary effort is normal.      Breath sounds: Normal breath sounds.   Chest:      Chest wall: Tenderness present.   Abdominal:      General: Abdomen is flat. Bowel sounds are normal. There is no distension.      Palpations: Abdomen is soft.      Tenderness: There is no abdominal tenderness. There is no right CVA tenderness, left CVA tenderness, guarding or rebound.   Musculoskeletal:         General: No deformity or signs of injury. Normal range of motion.      Cervical back: Neck supple.      Right lower leg: No edema.      Left lower leg: No edema.   Skin:     General: Skin is warm and dry.      Capillary Refill: Capillary refill takes less than 2 seconds.      Coloration: Skin is not jaundiced or pale.      Findings: No bruising, lesion or rash.   Neurological:      General: No focal deficit present.      Mental Status: She is alert and oriented to person, place, and time.   Psychiatric:         Mood and Affect: Mood normal.             Significant Labs: All pertinent labs within the past 24 hours have been reviewed.    Significant Imaging: I have reviewed all pertinent imaging results/findings within the past 24 hours.

## 2024-02-07 NOTE — ASSESSMENT & PLAN NOTE
Observation, pain control  2/7:  Pain overall controlled.  Does complain of tenderness sternal area.  Troponins negative echo pending.
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
## 2024-02-07 NOTE — SUBJECTIVE & OBJECTIVE
"Interval History:  No acute events overnight.  Complains of chest tenderness.  Otherwise pain controlled    Medications:  Continuous Infusions:  Scheduled Meds:   atorvastatin  40 mg Oral Daily    calcium-vitamin D3  1 tablet Oral BID WM    folic acid  1,000 mcg Oral Daily    gabapentin  600 mg Oral BID    levothyroxine  50 mcg Oral Before breakfast    methenamine  1 g Oral BID    methocarbamoL  500 mg Oral TID    montelukast  10 mg Oral Daily    pantoprazole  40 mg Oral Daily    paroxetine  20 mg Oral Daily    polyethylene glycol  17 g Oral Daily    raloxifene  60 mg Oral Daily    topiramate  200 mg Oral BID     PRN Meds:ketorolac, melatonin, sodium chloride 0.9%     Review of patient's allergies indicates:   Allergen Reactions    Adhesive tape-silicones     Aspirin     Celebrex [celecoxib]     Opioids - morphine analogues Other (See Comments)     "Burning/hot in chest"    Pcn [penicillins]     Shrimp     Ciprofloxacin      Pt reported that "it made her feel real bad".    Silicone Itching and Rash     Objective:     Vital Signs (Most Recent):  Temp: 98.1 °F (36.7 °C) (02/07/24 1437)  Pulse: 67 (02/07/24 1437)  Resp: 18 (02/07/24 1437)  BP: 104/65 (02/07/24 1437)  SpO2: (!) 92 % (02/07/24 1437) Vital Signs (24h Range):  Temp:  [97.7 °F (36.5 °C)-98.9 °F (37.2 °C)] 98.1 °F (36.7 °C)  Pulse:  [56-73] 67  Resp:  [11-20] 18  SpO2:  [92 %-99 %] 92 %  BP: (104-160)/(65-96) 104/65     Weight: 78.9 kg (174 lb)  Body mass index is 25.7 kg/m².    Intake/Output - Last 3 Shifts         02/05 0700  02/06 0659 02/06 0700 02/07 0659 02/07 0700  02/08 0659    P.O.  480     Total Intake(mL/kg)  480 (6.1)     Urine (mL/kg/hr)  425     Stool  0     Total Output  425     Net  +55            Stool Occurrence  0 x              Physical Exam  Constitutional:       General: She is not in acute distress.     Appearance: She is obese.   HENT:      Head: Normocephalic.      Mouth/Throat:      Mouth: Mucous membranes are moist.   Eyes:      " General: No scleral icterus.     Extraocular Movements: Extraocular movements intact.   Cardiovascular:      Rate and Rhythm: Normal rate and regular rhythm.      Pulses:           Dorsalis pedis pulses are 1+ on the right side and 1+ on the left side.        Posterior tibial pulses are 1+ on the right side and 1+ on the left side.      Heart sounds:      No gallop.   Pulmonary:      Effort: Pulmonary effort is normal.      Breath sounds: Normal breath sounds.   Chest:      Comments: Tenderness sternal area  Abdominal:      General: Bowel sounds are normal.      Palpations: Abdomen is soft.      Tenderness: There is no abdominal tenderness.   Musculoskeletal:         General: Normal range of motion.      Cervical back: Normal range of motion.   Feet:      Right foot:      Skin integrity: Ulcer present.      Left foot:      Skin integrity: Ulcer present.   Skin:     General: Skin is warm and dry.      Capillary Refill: Capillary refill takes less than 2 seconds.      Findings: Wound present.   Neurological:      General: No focal deficit present.      Mental Status: She is alert and oriented to person, place, and time.      Sensory: No sensory deficit.   Psychiatric:         Mood and Affect: Mood normal.          Significant Labs:  I have reviewed all pertinent lab results within the past 24 hours.    Significant Diagnostics:  I have reviewed all pertinent imaging results/findings within the past 24 hours.
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
## 2024-02-07 NOTE — ASSESSMENT & PLAN NOTE
Follows with Dr. Carmen Rivera.  Continue opioids.  UDS is negative for opioids .       Per Deana with [facility] the [employer] prior authorization is still pending and she will update our office as soon as insurance reaches a determination.

## 2024-02-07 NOTE — ASSESSMENT & PLAN NOTE
Chronic, controlled. Latest blood pressure and vitals reviewed-     Temp:  [97.7 °F (36.5 °C)-98.9 °F (37.2 °C)]   Pulse:  [59-73]   Resp:  [11-20]   BP: (119-160)/(67-96)   SpO2:  [95 %-99 %] .   Home meds for hypertension were reviewed and noted below.   Hypertension Medications               triamterene-hydrochlorothiazide 37.5-25 mg (MAXZIDE-25) 37.5-25 mg per tablet Take 1 tablet by mouth once daily.            While in the hospital, will manage blood pressure as follows; Continue home antihypertensive regimen    Will utilize p.r.n. blood pressure medication only if patient's blood pressure greater than 140/90 and she develops symptoms such as worsening chest pain or shortness of breath.
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
## 2024-02-07 NOTE — NURSING
Discharge papers given to and gone over with the patient ; pt verbalizes an understanding of instructions. No questions voiced, iv dc'd with cath tip intact.

## 2024-02-07 NOTE — SUBJECTIVE & OBJECTIVE
No current facility-administered medications on file prior to encounter.     Current Outpatient Medications on File Prior to Encounter   Medication Sig    paroxetine (PAXIL) 20 MG tablet Take 20 mg by mouth once daily.    acetaminophen (TYLENOL) 500 MG tablet Take 500 mg by mouth every 6 (six) hours as needed.    amitriptyline (ELAVIL) 10 MG tablet TAKE 2 TABLETS EVERY EVENING    atorvastatin (LIPITOR) 40 MG tablet TAKE 1 TABLET ONE TIME DAILY    calcium-vitamin D3 (OS-AIDEE 500 + D3) 500 mg-5 mcg (200 unit) per tablet Take 1 tablet by mouth 2 (two) times daily with meals.    cetirizine (ZYRTEC) 10 MG tablet TAKE 1 TABLET ONE TIME DAILY    colestipoL (COLESTID) 1 gram Tab Take 1 g by mouth once daily.    diazePAM (VALIUM) 2 MG tablet Take 1 tablet (2 mg total) by mouth 2 (two) times daily.    dicyclomine (BENTYL) 10 MG capsule TAKE 1 CAPSULE FOUR TIMES DAILY BEFORE MEALS AND NIGHTLY AS NEEDED    EPINEPHrine (EPIPEN) 0.3 mg/0.3 mL AtIn Inject 0.3 mLs (0.3 mg total) into the muscle as needed (allergic reaction).    fluticasone propionate (FLONASE) 50 mcg/actuation nasal spray USE 2 SPRAYS IN EACH NOSTRIL ONE TIME DAILY    folic acid (FOLVITE) 1 MG tablet Take 1,000 mcg by mouth once daily.    gabapentin (NEURONTIN) 600 MG tablet Take 1 tablet (600 mg total) by mouth 3 (three) times daily. (Patient taking differently: Take 600 mg by mouth 2 (two) times daily.)    HYDROcodone-acetaminophen (NORCO) 7.5-325 mg per tablet Take 1 tablet by mouth 2 (two) times daily as needed for Pain.    levothyroxine (SYNTHROID) 50 MCG tablet Take 1 tablet (50 mcg total) by mouth before breakfast.    methenamine (HIPREX) 1 gram Tab Take 1 tablet (1 g total) by mouth 2 (two) times daily.    methocarbamoL (ROBAXIN) 500 MG Tab TAKE 1 TABLET THREE TIMES DAILY    montelukast (SINGULAIR) 10 mg tablet TAKE 1 TABLET ONE TIME DAILY    [] nitrofurantoin, macrocrystal-monohydrate, (MACROBID) 100 MG capsule Take 1 capsule (100 mg total) by mouth  "2 (two) times daily. for 7 days    polyethylene glycol (GLYCOLAX) 17 gram PwPk Take by mouth. Take 17 grams by mouth 2 times daily mixed with 8 ounces of water, juice, soda, tea, or coffee    promethazine (PHENERGAN) 25 MG tablet Take 1 tablet (25 mg total) by mouth every 4 (four) hours.    RABEprazole (ACIPHEX) 20 mg tablet Take by mouth 2 (two) times daily.    raloxifene (EVISTA) 60 mg tablet Take 1 tablet (60 mg total) by mouth once daily.    topiramate (TOPAMAX) 200 MG Tab Take 1 tablet (200 mg total) by mouth 2 (two) times daily.    triamcinolone acetonide 0.1% (KENALOG) 0.1 % ointment APPLY TO THE AFFECTED AREA(S) ON ARMS, LEGS AND trunk TWICE DAILY FOR ITCHING    triamterene-hydrochlorothiazide 37.5-25 mg (MAXZIDE-25) 37.5-25 mg per tablet Take 1 tablet by mouth once daily.    vitamin D (VITAMIN D3) 1000 units Tab Take 5,000 Units by mouth 2 (two) times a day.    [DISCONTINUED] HYDROcodone-acetaminophen (NORCO)  mg per tablet Take by mouth.    [DISCONTINUED] ondansetron (ZOFRAN) 4 MG tablet TAKE 1 TABLET EVERY 8 HOURS AS NEEDED FOR NAUSEA    [DISCONTINUED] ondansetron (ZOFRAN-ODT) 4 MG TbDL        Review of patient's allergies indicates:   Allergen Reactions    Adhesive tape-silicones     Aspirin     Celebrex [celecoxib]     Opioids - morphine analogues     Pcn [penicillins]     Shrimp     Ciprofloxacin      Pt reported that "it made her feel real bad".    Silicone Itching and Rash       Past Medical History:   Diagnosis Date    Arthritis of left hip     Arthritis of left knee     Cervical radiculopathy     Chronic cystitis     Chronic pain syndrome     Degeneration of lumbar intervertebral disc     Depressive disorder     GERD (gastroesophageal reflux disease)     Hyperlipidemia     Hypertension     Lumbar post-laminectomy syndrome     Meniere disease     Meniere's disease 04/27/2017    Formatting of this note might be different from the original.  takes maxide for.    Osteoarthritis of cervical and " lumbar spine     Osteoporosis     Sinusitis 2015     Past Surgical History:   Procedure Laterality Date    bladder tack      CHOLECYSTECTOMY      HYSTERECTOMY      LUMBAR LAMINECTOMY N/A 2022    Procedure: L4-L5 Laminectomy;  Surgeon: Juan Luis Covington MD;  Location: [facility];  Service: Neurosurgery;  Laterality: N/A;    OOPHORECTOMY      SHOULDER SURGERY Left 2023     Family History       Problem Relation (Age of Onset)    Alcohol abuse Brother, Brother, Brother    Arthritis Mother    COPD Mother    Cancer Father, Brother, Brother    Glaucoma Daughter    Hearing loss Mother, Brother    Heart disease Mother, Brother    Hypertension Mother, Father, Brother    Lung cancer Father    Prostate cancer Father    Stroke Mother, Father    Thyroid disease Mother, Daughter          Tobacco Use    Smoking status: Former     Current packs/day: 0.00     Average packs/day: 1 pack/day for 1 year (1.0 ttl pk-yrs)     Types: Cigarettes     Start date: 3/1/1976     Quit date: 1976     Years since quittin.7     Passive exposure: Past    Smokeless tobacco: Never    Tobacco comments:     70s   Substance and Sexual Activity    Alcohol use: Never    Drug use: Never    Sexual activity: Not Currently     Partners: Male     Birth control/protection: Post-menopausal, None     Review of Systems   All other systems reviewed and are negative.    Objective:     Vital Signs (Most Recent):  Temp: 98.9 °F (37.2 °C) (24)  Pulse: 73 (24)  Resp: 14 (24)  BP: (!) 154/82 (24)  SpO2: 98 % (24) Vital Signs (24h Range):  Temp:  [98.9 °F (37.2 °C)] 98.9 °F (37.2 °C)  Pulse:  [66-73] 73  Resp:  [12-18] 14  SpO2:  [95 %-99 %] 98 %  BP: (137-154)/(76-96) 154/82     Weight: 79.4 kg (175 lb)  Body mass index is 25.84 kg/m².     Physical Exam  Constitutional:       Appearance: She is obese.   HENT:      Mouth/Throat:      Mouth: Mucous membranes are dry.   Eyes:      General: No  scleral icterus.  Cardiovascular:      Rate and Rhythm: Regular rhythm.      Pulses:           Dorsalis pedis pulses are 1+ on the right side and 1+ on the left side.        Posterior tibial pulses are 1+ on the right side and 1+ on the left side.   Pulmonary:      Effort: Pulmonary effort is normal.      Breath sounds: Normal breath sounds.   Chest:      Comments: Tender to palpation over mid sternum  Abdominal:      Tenderness: There is no abdominal tenderness.   Musculoskeletal:      Cervical back: Normal range of motion.   Feet:      Right foot:      Skin integrity: Ulcer present.      Left foot:      Skin integrity: Ulcer present.   Skin:     Findings: Wound present.   Neurological:      Sensory: Sensory deficit present.   Psychiatric:         Mood and Affect: Mood normal.          I have reviewed all pertinent lab results within the past 24 hours.  CBC:   Recent Labs   Lab 02/06/24  1828   WBC 9.39   RBC 4.24   HGB 13.0   HCT 39.0      MCV 92.0   MCH 30.7   MCHC 33.3     BMP:   Recent Labs   Lab 02/06/24  1828         K 3.4*      CO2 24   BUN 14   CREATININE 1.12*   CALCIUM 9.0     CMP:   Recent Labs   Lab 02/06/24  1828      CALCIUM 9.0   ALBUMIN 3.3*   PROT 6.9      K 3.4*   CO2 24      BUN 14   CREATININE 1.12*   ALKPHOS 80   ALT 26   AST 19   BILITOT 0.3       Significant Diagnostics:  I have reviewed all pertinent imaging results/findings within the past 24 hours.  CT: I have reviewed all pertinent results/findings within the past 24 hours and my personal findings are:  per HPI

## 2024-02-07 NOTE — SUBJECTIVE & OBJECTIVE
"Past Medical History:   Diagnosis Date    Arthritis     Cervical spondylosis     Chronic cystitis     Chronic pain syndrome     opioid dependent    Depression with anxiety     Essential (primary) hypertension     GERD (gastroesophageal reflux disease)     Lumbar post-laminectomy syndrome     followed by Dr. Carmen Rivera    Lumbar spondylosis     Meniere's disease 04/27/2017    Migraine 01/23/2023    Mixed hyperlipidemia 01/23/2023    Nephrolithiasis 06/29/2021    TITO on CPAP 09/13/2021    Osteoporosis        Past Surgical History:   Procedure Laterality Date    BLADDER SUSPENSION      CHOLECYSTECTOMY      HYSTERECTOMY      LUMBAR LAMINECTOMY N/A 06/16/2022    Procedure: L4-L5 Laminectomy;  Surgeon: Juan Luis Covington MD;  Location: [facility];  Service: Neurosurgery;  Laterality: N/A;    OOPHORECTOMY      SHOULDER SURGERY Left 07/24/2023       Review of patient's allergies indicates:   Allergen Reactions    Adhesive tape-silicones     Aspirin     Celebrex [celecoxib]     Opioids - morphine analogues Other (See Comments)     "Burning/hot in chest"    Pcn [penicillins]     Shrimp     Ciprofloxacin      Pt reported that "it made her feel real bad".    Silicone Itching and Rash       No current facility-administered medications on file prior to encounter.     Current Outpatient Medications on File Prior to Encounter   Medication Sig    paroxetine (PAXIL) 20 MG tablet Take 20 mg by mouth once daily.    acetaminophen (TYLENOL) 500 MG tablet Take 500 mg by mouth every 6 (six) hours as needed.    amitriptyline (ELAVIL) 10 MG tablet TAKE 2 TABLETS EVERY EVENING    atorvastatin (LIPITOR) 40 MG tablet TAKE 1 TABLET ONE TIME DAILY    calcium-vitamin D3 (OS-AIDEE 500 + D3) 500 mg-5 mcg (200 unit) per tablet Take 1 tablet by mouth 2 (two) times daily with meals.    cetirizine (ZYRTEC) 10 MG tablet TAKE 1 TABLET ONE TIME DAILY    colestipoL (COLESTID) 1 gram Tab Take 1 g by mouth once daily.    dicyclomine (BENTYL) 10 MG capsule TAKE " 1 CAPSULE FOUR TIMES DAILY BEFORE MEALS AND NIGHTLY AS NEEDED    EPINEPHrine (EPIPEN) 0.3 mg/0.3 mL AtIn Inject 0.3 mLs (0.3 mg total) into the muscle as needed (allergic reaction).    fluticasone propionate (FLONASE) 50 mcg/actuation nasal spray USE 2 SPRAYS IN EACH NOSTRIL ONE TIME DAILY    folic acid (FOLVITE) 1 MG tablet Take 1,000 mcg by mouth once daily.    gabapentin (NEURONTIN) 600 MG tablet Take 1 tablet (600 mg total) by mouth 3 (three) times daily. (Patient taking differently: Take 600 mg by mouth 2 (two) times daily.)    HYDROcodone-acetaminophen (NORCO) 7.5-325 mg per tablet Take 1 tablet by mouth 2 (two) times daily as needed for Pain.    levothyroxine (SYNTHROID) 50 MCG tablet Take 1 tablet (50 mcg total) by mouth before breakfast.    methenamine (HIPREX) 1 gram Tab Take 1 tablet (1 g total) by mouth 2 (two) times daily.    methocarbamoL (ROBAXIN) 500 MG Tab TAKE 1 TABLET THREE TIMES DAILY    montelukast (SINGULAIR) 10 mg tablet TAKE 1 TABLET ONE TIME DAILY    polyethylene glycol (GLYCOLAX) 17 gram PwPk Take by mouth. Take 17 grams by mouth 2 times daily mixed with 8 ounces of water, juice, soda, tea, or coffee    promethazine (PHENERGAN) 25 MG tablet Take 1 tablet (25 mg total) by mouth every 4 (four) hours.    RABEprazole (ACIPHEX) 20 mg tablet Take by mouth 2 (two) times daily.    raloxifene (EVISTA) 60 mg tablet Take 1 tablet (60 mg total) by mouth once daily.    topiramate (TOPAMAX) 200 MG Tab Take 1 tablet (200 mg total) by mouth 2 (two) times daily.    triamcinolone acetonide 0.1% (KENALOG) 0.1 % ointment APPLY TO THE AFFECTED AREA(S) ON ARMS, LEGS AND trunk TWICE DAILY FOR ITCHING    triamterene-hydrochlorothiazide 37.5-25 mg (MAXZIDE-25) 37.5-25 mg per tablet Take 1 tablet by mouth once daily.    vitamin D (VITAMIN D3) 1000 units Tab Take 5,000 Units by mouth 2 (two) times a day.     Family History       Problem Relation (Age of Onset)    Alcohol abuse Brother, Brother, Brother    Arthritis  Mother    COPD Mother    Cancer Father, Brother, Brother    Glaucoma Daughter    Hearing loss Mother, Brother    Heart disease Mother, Brother    Hypertension Mother, Father, Brother    Lung cancer Father    Prostate cancer Father    Stroke Mother, Father    Thyroid disease Mother, Daughter          Tobacco Use    Smoking status: Former     Current packs/day: 0.00     Average packs/day: 1 pack/day for 1 year (1.0 ttl pk-yrs)     Types: Cigarettes     Start date: 3/1/1976     Quit date: 1976     Years since quittin.7     Passive exposure: Past    Smokeless tobacco: Never    Tobacco comments:     70s   Substance and Sexual Activity    Alcohol use: Never    Drug use: Never    Sexual activity: Not Currently     Partners: Male     Birth control/protection: Post-menopausal, None     Review of Systems   Constitutional:  Negative for appetite change, chills, fatigue, fever and unexpected weight change.   HENT:  Negative for congestion, mouth sores, nosebleeds, sinus pain, sore throat and trouble swallowing.    Respiratory:  Negative for apnea, cough, chest tightness and shortness of breath.    Cardiovascular:  Positive for chest pain. Negative for palpitations and leg swelling.   Gastrointestinal:  Negative for abdominal pain, blood in stool, constipation, diarrhea, nausea and vomiting.   Genitourinary:  Negative for decreased urine volume, difficulty urinating, dysuria and frequency.   Musculoskeletal:  Positive for back pain and neck pain. Negative for arthralgias and joint swelling.   Skin:  Negative for rash.   Neurological:  Negative for syncope, light-headedness and headaches.   Hematological:  Does not bruise/bleed easily.   Psychiatric/Behavioral:  Negative for confusion and suicidal ideas.      Objective:     Vital Signs (Most Recent):  Temp: 97.7 °F (36.5 °C) (24)  Pulse: (!) 59 (24)  Resp: 20 (24)  BP: 119/69 (24)  SpO2: 95 % (24) Vital Signs (24h  Range):  Temp:  [97.7 °F (36.5 °C)-98.9 °F (37.2 °C)] 97.7 °F (36.5 °C)  Pulse:  [59-73] 59  Resp:  [11-20] 20  SpO2:  [95 %-99 %] 95 %  BP: (119-160)/(67-96) 119/69     Weight: 78.9 kg (174 lb)  Body mass index is 25.7 kg/m².     Physical Exam  Vitals and nursing note reviewed. Exam conducted with a chaperone present.   Constitutional:       General: She is not in acute distress.     Appearance: She is not ill-appearing or toxic-appearing.   HENT:      Head: Atraumatic.      Mouth/Throat:      Mouth: Mucous membranes are moist.      Pharynx: Oropharynx is clear.   Eyes:      Conjunctiva/sclera: Conjunctivae normal.      Pupils: Pupils are equal, round, and reactive to light.   Neck:      Vascular: No carotid bruit.   Cardiovascular:      Rate and Rhythm: Normal rate and regular rhythm.      Pulses: Normal pulses.      Heart sounds: Normal heart sounds.   Pulmonary:      Effort: Pulmonary effort is normal.      Breath sounds: Normal breath sounds.   Chest:      Chest wall: Tenderness present.   Abdominal:      General: Abdomen is flat. Bowel sounds are normal. There is no distension.      Palpations: Abdomen is soft.      Tenderness: There is no abdominal tenderness. There is no right CVA tenderness, left CVA tenderness, guarding or rebound.   Musculoskeletal:         General: No deformity or signs of injury. Normal range of motion.      Cervical back: Neck supple.      Right lower leg: No edema.      Left lower leg: No edema.   Skin:     General: Skin is warm and dry.      Capillary Refill: Capillary refill takes less than 2 seconds.      Coloration: Skin is not jaundiced or pale.      Findings: No bruising, lesion or rash.   Neurological:      General: No focal deficit present.      Mental Status: She is alert and oriented to person, place, and time.   Psychiatric:         Mood and Affect: Mood normal.          Significant Labs: All pertinent labs within the past 24 hours have been reviewed.    Significant  Imaging: I have reviewed all pertinent imaging results/findings within the past 24 hours.

## 2024-02-08 ENCOUNTER — PATIENT OUTREACH (OUTPATIENT)
Dept: ADMINISTRATIVE | Facility: CLINIC | Age: 69
End: 2024-02-08

## 2024-02-08 NOTE — PROGRESS NOTES
Spoke with daughter Rose.   States she is at work and is unable to take the call at this time.  Call back information left with Rose.

## 2024-02-08 NOTE — ASSESSMENT & PLAN NOTE
Defer to primary surgical trauma team.    Will monitor overnight on telemetry.      2/7: normal echo. Chest pain is improving.

## 2024-02-08 NOTE — PROGRESS NOTES
C3 nurse attempted to contact Kizzy Schofield  for a TCC post hospital discharge follow up call. No answer. Left voicemail with callback information. The patient has a scheduled HOSFU appointment with CHLOE Snyder  on 2/14/2024 @ 2784.
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
## 2024-02-08 NOTE — SUBJECTIVE & OBJECTIVE
Interval History:     Review of Systems   Constitutional:  Negative for appetite change, chills, fatigue, fever and unexpected weight change.   HENT:  Negative for congestion, mouth sores, nosebleeds, sinus pain, sore throat and trouble swallowing.    Respiratory:  Negative for apnea, cough, chest tightness and shortness of breath.    Cardiovascular:  Positive for chest pain. Negative for palpitations and leg swelling.   Gastrointestinal:  Negative for abdominal pain, blood in stool, constipation, diarrhea, nausea and vomiting.   Genitourinary:  Negative for decreased urine volume, difficulty urinating, dysuria and frequency.   Musculoskeletal:  Positive for back pain and neck pain. Negative for arthralgias and joint swelling.   Skin:  Negative for rash.   Neurological:  Negative for syncope, light-headedness and headaches.   Hematological:  Does not bruise/bleed easily.   Psychiatric/Behavioral:  Negative for confusion and suicidal ideas.      Objective:     Vital Signs (Most Recent):  Temp: 98.1 °F (36.7 °C) (02/07/24 1437)  Pulse: 67 (02/07/24 1437)  Resp: 18 (02/07/24 1437)  BP: 104/65 (02/07/24 1437)  SpO2: (!) 92 % (02/07/24 1437) Vital Signs (24h Range):  Temp:  [97.7 °F (36.5 °C)-98.8 °F (37.1 °C)] 98.1 °F (36.7 °C)  Pulse:  [56-71] 67  Resp:  [11-20] 18  SpO2:  [92 %-99 %] 92 %  BP: (104-160)/(65-84) 104/65     Weight: 78.9 kg (174 lb)  Body mass index is 25.7 kg/m².    Intake/Output Summary (Last 24 hours) at 2/7/2024 1948  Last data filed at 2/7/2024 0602  Gross per 24 hour   Intake 480 ml   Output 425 ml   Net 55 ml         Physical Exam  Vitals and nursing note reviewed. Exam conducted with a chaperone present.   Constitutional:       General: She is not in acute distress.     Appearance: She is not ill-appearing or toxic-appearing.   HENT:      Head: Atraumatic.      Mouth/Throat:      Mouth: Mucous membranes are moist.      Pharynx: Oropharynx is clear.   Eyes:      Conjunctiva/sclera: Conjunctivae  normal.      Pupils: Pupils are equal, round, and reactive to light.   Neck:      Vascular: No carotid bruit.   Cardiovascular:      Rate and Rhythm: Normal rate and regular rhythm.      Pulses: Normal pulses.      Heart sounds: Normal heart sounds.   Pulmonary:      Effort: Pulmonary effort is normal.      Breath sounds: Normal breath sounds.   Chest:      Chest wall: Tenderness present.   Abdominal:      General: Abdomen is flat. Bowel sounds are normal. There is no distension.      Palpations: Abdomen is soft.      Tenderness: There is no abdominal tenderness. There is no right CVA tenderness, left CVA tenderness, guarding or rebound.   Musculoskeletal:         General: No deformity or signs of injury. Normal range of motion.      Cervical back: Neck supple.      Right lower leg: No edema.      Left lower leg: No edema.   Skin:     General: Skin is warm and dry.      Capillary Refill: Capillary refill takes less than 2 seconds.      Coloration: Skin is not jaundiced or pale.      Findings: No bruising, lesion or rash.   Neurological:      General: No focal deficit present.      Mental Status: She is alert and oriented to person, place, and time.   Psychiatric:         Mood and Affect: Mood normal.             Significant Labs: All pertinent labs within the past 24 hours have been reviewed.    Significant Imaging: I have reviewed all pertinent imaging results/findings within the past 24 hours.

## 2024-02-08 NOTE — PLAN OF CARE
[facility]  Discharge Final Note    Primary Care Provider: Vero Price FNP    Expected Discharge Date: 2/7/2024    Final Discharge Note (most recent)       Final Note - 02/08/24 0846          Final Note    Assessment Type Final Discharge Note     Anticipated Discharge Disposition Home-Health Care [facility]     What phone number can be called within the next 1-3 days to see how you are doing after discharge? [phone]        Post-Acute Status    Post-Acute Authorization Home Health     Home Health Status Set-up Complete/Auth obtained     Patient choice form signed by patient/caregiver List with quality metrics by geographic area provided;List from CMS Compare     Discharge Delays None known at this time                     Important Message from Medicare              Follow-up providers       Vero Price FNP   Specialty: Family Medicine   Relationship: PCP - General    [address] [place] [address] [place] [address]   Phone: [phone]       Next Steps: Schedule an appointment as soon as possible for a visit in 1 week(s)    Instructions: Please follow up on February 14 at 10:00              After-discharge care                Home Medical Care       [facility]   Service: Home Nursing    [address] [facility] [place] [address]   Phone: [phone]                             Pt discharged home yesterday. SW faxed dc orders to [facility] this a.m. No other needs.

## 2024-02-09 DIAGNOSIS — Z71.89 COMPLEX CARE COORDINATION: ICD-10-CM
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
## 2024-02-09 NOTE — PROGRESS NOTES
C3 nurse spoke with Kizzy Schofield  for a TCC post hospital discharge follow up call. The patient has a scheduled [facility] appointment with Ting Pryor FNP  on 2/14/2024 @ 1040.

## 2024-02-14 ENCOUNTER — OFFICE VISIT (OUTPATIENT)
Dept: FAMILY MEDICINE | Facility: CLINIC | Age: 69
End: 2024-02-14
Payer: MEDICARE

## 2024-02-14 VITALS
RESPIRATION RATE: 19 BRPM | OXYGEN SATURATION: 98 % | HEART RATE: 59 BPM | DIASTOLIC BLOOD PRESSURE: 85 MMHG | SYSTOLIC BLOOD PRESSURE: 145 MMHG | TEMPERATURE: 98 F

## 2024-02-14 DIAGNOSIS — Z09 HOSPITAL DISCHARGE FOLLOW-UP: Primary | ICD-10-CM

## 2024-02-14 DIAGNOSIS — R30.0 DYSURIA: ICD-10-CM

## 2024-02-14 DIAGNOSIS — S22.22XA CLOSED FRACTURE OF BODY OF STERNUM, INITIAL ENCOUNTER: ICD-10-CM

## 2024-02-14 DIAGNOSIS — N39.0 URINARY TRACT INFECTION WITHOUT HEMATURIA, SITE UNSPECIFIED: ICD-10-CM

## 2024-02-14 LAB
BILIRUB SERPL-MCNC: ABNORMAL MG/DL
BLOOD URINE, POC: ABNORMAL
COLOR, POC UA: YELLOW
GLUCOSE UR QL STRIP: ABNORMAL
KETONES UR QL STRIP: ABNORMAL
LEUKOCYTE ESTERASE URINE, POC: ABNORMAL
NITRITE, POC UA: ABNORMAL
PH, POC UA: 7.5
PROTEIN, POC: ABNORMAL
SPECIFIC GRAVITY, POC UA: 1.01
UROBILINOGEN, POC UA: 0.2

## 2024-02-14 PROCEDURE — 87086 URINE CULTURE/COLONY COUNT: CPT | Mod: ,,, | Performed by: CLINICAL MEDICAL LABORATORY

## 2024-02-14 PROCEDURE — 81003 URINALYSIS AUTO W/O SCOPE: CPT | Mod: RHCUB | Performed by: NURSE PRACTITIONER

## 2024-02-14 PROCEDURE — 99496 TRANSJ CARE MGMT HIGH F2F 7D: CPT | Mod: ,,, | Performed by: NURSE PRACTITIONER

## 2024-02-14 RX ORDER — DUPILUMAB 300 MG/2ML
INJECTION, SOLUTION SUBCUTANEOUS
COMMUNITY
Start: 2024-02-13 | End: 2024-03-25

## 2024-02-14 RX ORDER — ATORVASTATIN CALCIUM 40 MG/1
40 TABLET, FILM COATED ORAL
Qty: 90 TABLET | Refills: 0 | Status: SHIPPED | OUTPATIENT
Start: 2024-02-14 | End: 2024-04-30 | Stop reason: SDUPTHER

## 2024-02-14 NOTE — ASSESSMENT & PLAN NOTE
UA with small amount of leukocytes.   Urine Culture pending. Will inform of results when available.     Color, UA Yellow   Spec Grav UA 1.015   pH, UA 7.5   WBC, UA small   Nitrite, UA neg   Protein, POC neg   Glucose, UA neg   Ketones, UA neg   Bilirubin, POC neg   Urobilinogen, UA 0.2   Blood, UA neg
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
## 2024-02-15 ENCOUNTER — LAB REQUISITION (OUTPATIENT)
Dept: LAB | Facility: HOSPITAL | Age: 69
End: 2024-02-15
Attending: NURSE PRACTITIONER
Payer: MEDICARE

## 2024-02-15 ENCOUNTER — EXTERNAL HOME HEALTH (OUTPATIENT)
Dept: HOME HEALTH SERVICES | Facility: HOSPITAL | Age: 69
End: 2024-02-15
Payer: MEDICARE

## 2024-02-15 DIAGNOSIS — Z79.899 OTHER LONG TERM (CURRENT) DRUG THERAPY: ICD-10-CM

## 2024-02-15 DIAGNOSIS — L20.89 OTHER ATOPIC DERMATITIS: ICD-10-CM

## 2024-02-15 PROCEDURE — 80074 ACUTE HEPATITIS PANEL: CPT | Performed by: NURSE PRACTITIONER

## 2024-02-15 PROCEDURE — 86481 TB AG RESPONSE T-CELL SUSP: CPT | Performed by: NURSE PRACTITIONER

## 2024-02-16 LAB
HAV IGM SER QL: NORMAL
HBV CORE IGM SER QL: NORMAL
HBV SURFACE AG SERPL QL IA: NORMAL
HCV AB SER QL: NORMAL
UA COMPLETE W REFLEX CULTURE PNL UR: NORMAL

## 2024-02-19 LAB
GAMMA INTERFERON BACKGROUND BLD IA-ACNC: 0.05 [IU]/ML
M TB IFN-G CD4+ BCKGRND COR BLD-ACNC: 0 [IU]/ML
MITOGEN IGNF BCKGRD COR BLD-ACNC: 9.35 [IU]/ML
QUANTIFERON-TB GOLD PLUS RESULT: NEGATIVE
TB2 AG MINUS NIL RESULT: 0.01

## 2024-02-21 ENCOUNTER — DOCUMENT SCAN (OUTPATIENT)
Dept: HOME HEALTH SERVICES | Facility: HOSPITAL | Age: 69
End: 2024-02-21
Payer: MEDICARE

## 2024-02-22 ENCOUNTER — DOCUMENT SCAN (OUTPATIENT)
Dept: HOME HEALTH SERVICES | Facility: HOSPITAL | Age: 69
End: 2024-02-22
Payer: MEDICARE

## 2024-02-22 DIAGNOSIS — J01.00 ACUTE NON-RECURRENT MAXILLARY SINUSITIS: Primary | ICD-10-CM

## 2024-02-22 RX ORDER — AZITHROMYCIN 250 MG/1
TABLET, FILM COATED ORAL
Qty: 6 TABLET | Refills: 0 | Status: SHIPPED | OUTPATIENT
Start: 2024-02-22 | End: 2024-02-27

## 2024-02-22 NOTE — PROGRESS NOTES
"HH coordinator came today stating her nurse said pt was having some sinus drainage, cough, No fever or other symptoms felt like it was getting in her "chest". She was requesting abx so it would not get worse. Informed I will send zpak in and she vu.     "

## 2024-03-25 ENCOUNTER — OFFICE VISIT (OUTPATIENT)
Dept: FAMILY MEDICINE | Facility: CLINIC | Age: 69
End: 2024-03-25
Payer: MEDICARE

## 2024-03-25 ENCOUNTER — HOSPITAL ENCOUNTER (OUTPATIENT)
Dept: RADIOLOGY | Facility: HOSPITAL | Age: 69
Discharge: HOME | End: 2024-03-25
Attending: NURSE PRACTITIONER
Payer: MEDICARE

## 2024-03-25 VITALS
RESPIRATION RATE: 18 BRPM | HEART RATE: 64 BPM | HEIGHT: 69 IN | TEMPERATURE: 98 F | WEIGHT: 171 LBS | BODY MASS INDEX: 25.33 KG/M2 | SYSTOLIC BLOOD PRESSURE: 125 MMHG | DIASTOLIC BLOOD PRESSURE: 80 MMHG | OXYGEN SATURATION: 98 %

## 2024-03-25 DIAGNOSIS — M25.552 BILATERAL HIP PAIN: ICD-10-CM

## 2024-03-25 DIAGNOSIS — R05.1 ACUTE COUGH: ICD-10-CM

## 2024-03-25 DIAGNOSIS — M25.561 CHRONIC PAIN OF BOTH KNEES: ICD-10-CM

## 2024-03-25 DIAGNOSIS — D84.821 DRUG-INDUCED IMMUNODEFICIENCY: ICD-10-CM

## 2024-03-25 DIAGNOSIS — R29.898 WEAKNESS OF BOTH UPPER EXTREMITIES: ICD-10-CM

## 2024-03-25 DIAGNOSIS — M25.551 BILATERAL HIP PAIN: ICD-10-CM

## 2024-03-25 DIAGNOSIS — J04.0 ACUTE LARYNGITIS: Primary | ICD-10-CM

## 2024-03-25 DIAGNOSIS — M25.551 BILATERAL HIP PAIN: Primary | ICD-10-CM

## 2024-03-25 DIAGNOSIS — M25.562 CHRONIC PAIN OF BOTH KNEES: ICD-10-CM

## 2024-03-25 DIAGNOSIS — G89.29 CHRONIC PAIN OF BOTH KNEES: ICD-10-CM

## 2024-03-25 DIAGNOSIS — M25.552 BILATERAL HIP PAIN: Primary | ICD-10-CM

## 2024-03-25 DIAGNOSIS — N18.31 CHRONIC KIDNEY DISEASE, STAGE 3A: ICD-10-CM

## 2024-03-25 DIAGNOSIS — Z79.899 DRUG-INDUCED IMMUNODEFICIENCY: ICD-10-CM

## 2024-03-25 DIAGNOSIS — I69.354 HEMIPLEGIA AND HEMIPARESIS FOLLOWING CEREBRAL INFARCTION AFFECTING LEFT NON-DOMINANT SIDE: ICD-10-CM

## 2024-03-25 PROCEDURE — 99213 OFFICE O/P EST LOW 20 MIN: CPT | Mod: ,,, | Performed by: NURSE PRACTITIONER

## 2024-03-25 PROCEDURE — 96372 THER/PROPH/DIAG INJ SC/IM: CPT | Mod: ,,, | Performed by: NURSE PRACTITIONER

## 2024-03-25 PROCEDURE — 71046 X-RAY EXAM CHEST 2 VIEWS: CPT | Mod: TC

## 2024-03-25 RX ORDER — ADALIMUMAB 4 MG/ML
KIT INJECTION
COMMUNITY
Start: 2024-02-27

## 2024-03-25 RX ORDER — AZITHROMYCIN 250 MG/1
TABLET, FILM COATED ORAL
Qty: 6 TABLET | Refills: 0 | Status: SHIPPED | OUTPATIENT
Start: 2024-03-25 | End: 2024-03-30

## 2024-03-25 RX ORDER — ADALIMUMAB 40MG/0.4ML
40 KIT SUBCUTANEOUS
COMMUNITY
Start: 2024-03-18

## 2024-03-25 RX ORDER — DIAZEPAM 2 MG/1
2 TABLET ORAL 2 TIMES DAILY
COMMUNITY
Start: 2024-02-29

## 2024-03-25 RX ORDER — DEXAMETHASONE SODIUM PHOSPHATE 4 MG/ML
4 INJECTION, SOLUTION INTRA-ARTICULAR; INTRALESIONAL; INTRAMUSCULAR; INTRAVENOUS; SOFT TISSUE
Status: COMPLETED | OUTPATIENT
Start: 2024-03-25 | End: 2024-03-25

## 2024-03-25 RX ADMIN — DEXAMETHASONE SODIUM PHOSPHATE 4 MG: 4 INJECTION, SOLUTION INTRA-ARTICULAR; INTRALESIONAL; INTRAMUSCULAR; INTRAVENOUS; SOFT TISSUE at 09:03

## 2024-03-25 NOTE — ASSESSMENT & PLAN NOTE
PT referral pending.   Notified [facility] pt would like to be dc to start outpatient therapy.

## 2024-03-25 NOTE — ASSESSMENT & PLAN NOTE
PT referral pending.   Notified [facility] pt would like to be dc to start outpatient therapy.

## 2024-03-25 NOTE — ASSESSMENT & PLAN NOTE
PT referral pending.   Notified [facility] pt would like to be dc to start outpatient therapy.
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
## 2024-03-25 NOTE — ASSESSMENT & PLAN NOTE
PT referral pending.   Notified [facility] pt would like to be dc to start outpatient therapy.

## 2024-03-25 NOTE — PROGRESS NOTES
Please let her know her cxr was normal. She is wanting to start outpatient therapy at [facility] in [place]. I will finish her not so we can refer her there. Thanks!

## 2024-03-25 NOTE — ASSESSMENT & PLAN NOTE
Decadron 4mg injection given. No adverse reaction noted.   Zpak prescribed to take as directed. Instructed to take all abx until gone even if start to feel better.    Instructed to RTC for any new/worsening/persisting ssx.

## 2024-03-25 NOTE — ASSESSMENT & PLAN NOTE
CKD  is controlled. Current medical regimen is effective;   Will adjust according to results and monitor.

## 2024-04-01 ENCOUNTER — TELEPHONE (OUTPATIENT)
Dept: FAMILY MEDICINE | Facility: CLINIC | Age: 69
End: 2024-04-01
Payer: MEDICARE

## 2024-04-04 ENCOUNTER — DOCUMENT SCAN (OUTPATIENT)
Dept: HOME HEALTH SERVICES | Facility: HOSPITAL | Age: 69
End: 2024-04-04
Payer: MEDICARE

## 2024-04-09 DIAGNOSIS — R29.6 FREQUENT FALLS: Primary | ICD-10-CM

## 2024-04-11 ENCOUNTER — OFFICE VISIT (OUTPATIENT)
Dept: OTOLARYNGOLOGY | Facility: CLINIC | Age: 69
End: 2024-04-11
Payer: MEDICARE

## 2024-04-11 VITALS — WEIGHT: 171 LBS | BODY MASS INDEX: 25.33 KG/M2 | HEIGHT: 69 IN

## 2024-04-11 DIAGNOSIS — R42 VERTIGO: ICD-10-CM

## 2024-04-11 DIAGNOSIS — H81.03 MENIERE'S DISEASE (COCHLEAR HYDROPS), BILATERAL: Primary | ICD-10-CM

## 2024-04-11 DIAGNOSIS — H90.3 SENSORINEURAL HEARING LOSS (SNHL) OF BOTH EARS: ICD-10-CM

## 2024-04-11 PROCEDURE — 99214 OFFICE O/P EST MOD 30 MIN: CPT | Mod: PBBFAC | Performed by: OTOLARYNGOLOGY

## 2024-04-11 PROCEDURE — 99214 OFFICE O/P EST MOD 30 MIN: CPT | Mod: S$PBB,,, | Performed by: OTOLARYNGOLOGY

## 2024-04-11 RX ORDER — DIAZEPAM 2 MG/1
2 TABLET ORAL 2 TIMES DAILY
Qty: 60 TABLET | Refills: 2 | Status: SHIPPED | OUTPATIENT
Start: 2024-04-11 | End: 2024-07-10

## 2024-04-11 NOTE — PROGRESS NOTES
Subjective:       Patient ID: Kizzy Schofield is a 68 y.o. female.    Chief Complaint: Follow-up (Patient is following up. States she has no problems. Requesting a refill on her valium.)    Follow-up      Review of Systems   HENT:  Positive for hearing loss.    Neurological:  Positive for dizziness.   All other systems reviewed and are negative.      Objective:      Physical Exam  General: NAD  Head: Normocephalic, atraumatic, no facial asymmetry/normal strength,  Ears: Both auricules normal in appearance, w/o deformities tympanic membranes normal external auditory canals normal  Nose: External nose w/o deformities normal turbinates no drainage or inflammation  Oral Cavity: Lips, gums, floor of mouth, tongue hard palate, and buccal mucosa without mass/lesion  Oropharynx: Mucosa pink and moist, soft palate, posterior pharynx and oropharyngeal wall without mass/lesion  Neck: Supple, symmetric, trachea midline, no palpable mass/lesion, no palpable cervical lymphadenopathy  Skin: Warm and dry, no concerning lesions  Respiratory: Respirations even, unlabored  Assessment:       1. Meniere's disease (cochlear hydrops), bilateral    2. Sensorineural hearing loss (SNHL) of both ears    3. Vertigo        Plan:       Refill Valium for meniere's   F/u 3 months

## 2024-04-16 DIAGNOSIS — M85.80 OSTEOPENIA AFTER MENOPAUSE: ICD-10-CM

## 2024-04-16 DIAGNOSIS — G89.4 CHRONIC PAIN SYNDROME: ICD-10-CM

## 2024-04-16 DIAGNOSIS — K58.9 IRRITABLE BOWEL SYNDROME, UNSPECIFIED TYPE: ICD-10-CM

## 2024-04-16 DIAGNOSIS — M81.0 AGE-RELATED OSTEOPOROSIS WITHOUT CURRENT PATHOLOGICAL FRACTURE: ICD-10-CM

## 2024-04-16 DIAGNOSIS — E03.9 HYPOTHYROIDISM, UNSPECIFIED TYPE: ICD-10-CM

## 2024-04-16 DIAGNOSIS — Z78.0 OSTEOPENIA AFTER MENOPAUSE: ICD-10-CM

## 2024-04-16 DIAGNOSIS — T78.40XS ALLERGY, SEQUELA: ICD-10-CM

## 2024-04-16 DIAGNOSIS — Z13.820 SCREENING FOR OSTEOPOROSIS: Primary | ICD-10-CM

## 2024-04-16 RX ORDER — FLUTICASONE PROPIONATE 50 MCG
2 SPRAY, SUSPENSION (ML) NASAL DAILY
Qty: 16 G | Refills: 3 | Status: SHIPPED | OUTPATIENT
Start: 2024-04-16

## 2024-04-16 RX ORDER — AMITRIPTYLINE HYDROCHLORIDE 10 MG/1
10 TABLET, FILM COATED ORAL NIGHTLY
Qty: 180 TABLET | Refills: 0 | Status: SHIPPED | OUTPATIENT
Start: 2024-04-16

## 2024-04-16 RX ORDER — FOLIC ACID 1 MG/1
1 TABLET ORAL DAILY
Qty: 90 TABLET | Refills: 0 | Status: SHIPPED | OUTPATIENT
Start: 2024-04-16

## 2024-04-16 RX ORDER — RALOXIFENE HYDROCHLORIDE 60 MG/1
60 TABLET, FILM COATED ORAL DAILY
Qty: 90 TABLET | Refills: 0 | Status: SHIPPED | OUTPATIENT
Start: 2024-04-16

## 2024-04-16 RX ORDER — DICYCLOMINE HYDROCHLORIDE 10 MG/1
10 CAPSULE ORAL 3 TIMES DAILY
Qty: 270 CAPSULE | Refills: 0 | Status: SHIPPED | OUTPATIENT
Start: 2024-04-16

## 2024-04-16 RX ORDER — METHOCARBAMOL 500 MG/1
500 TABLET, FILM COATED ORAL 3 TIMES DAILY
Qty: 270 TABLET | Refills: 0 | Status: SHIPPED | OUTPATIENT
Start: 2024-04-16

## 2024-04-16 RX ORDER — MONTELUKAST SODIUM 10 MG/1
10 TABLET ORAL NIGHTLY
Qty: 90 TABLET | Refills: 0 | Status: SHIPPED | OUTPATIENT
Start: 2024-04-16

## 2024-04-16 RX ORDER — TOPIRAMATE 100 MG/1
100 TABLET, FILM COATED ORAL 2 TIMES DAILY
Qty: 180 TABLET | Refills: 0 | Status: SHIPPED | OUTPATIENT
Start: 2024-04-16

## 2024-04-16 RX ORDER — LEVOTHYROXINE SODIUM 50 UG/1
50 TABLET ORAL
Qty: 90 TABLET | Refills: 0 | Status: SHIPPED | OUTPATIENT
Start: 2024-04-16

## 2024-04-16 RX ORDER — GABAPENTIN 600 MG/1
600 TABLET ORAL 2 TIMES DAILY
Qty: 180 TABLET | Refills: 0 | Status: SHIPPED | OUTPATIENT
Start: 2024-04-16

## 2024-04-16 RX ORDER — PAROXETINE HYDROCHLORIDE 20 MG/1
20 TABLET, FILM COATED ORAL DAILY
Qty: 90 TABLET | Refills: 0 | Status: SHIPPED | OUTPATIENT
Start: 2024-04-16

## 2024-04-16 RX ORDER — ONDANSETRON 4 MG/1
4 TABLET, ORALLY DISINTEGRATING ORAL EVERY 8 HOURS PRN
Qty: 90 TABLET | Refills: 0 | Status: SHIPPED | OUTPATIENT
Start: 2024-04-16

## 2024-04-16 NOTE — TELEPHONE ENCOUNTER
Pt called back and states she would like to have it done at [facility] if possible and she is also currently going to physical therapy on Mon Wed And Fri at 845 am

## 2024-04-16 NOTE — TELEPHONE ENCOUNTER
I have called and left a message on her vm to call us back and let us know where she would like that Dexa Scan scheduled.

## 2024-04-16 NOTE — TELEPHONE ENCOUNTER
I had refill requests I guess from the pharmacy for several of her meds. She is on an osteoporosis medication and is due for dexa scan if you can let her know and we can get one ordered. Thanks!

## 2024-04-29 DIAGNOSIS — T78.40XS ALLERGY, SEQUELA: Primary | ICD-10-CM

## 2024-04-29 RX ORDER — CETIRIZINE HYDROCHLORIDE 10 MG/1
10 TABLET ORAL DAILY
Qty: 90 TABLET | Refills: 3 | Status: SHIPPED | OUTPATIENT
Start: 2024-04-29

## 2024-04-29 RX ORDER — CETIRIZINE HYDROCHLORIDE 10 MG/1
10 TABLET ORAL DAILY
Qty: 90 TABLET | Refills: 3 | OUTPATIENT
Start: 2024-04-29

## 2024-04-29 RX ORDER — DIAZEPAM 2 MG/1
TABLET ORAL
Qty: 60 TABLET | Refills: 0 | OUTPATIENT
Start: 2024-04-29

## 2024-04-30 RX ORDER — ATORVASTATIN CALCIUM 40 MG/1
40 TABLET, FILM COATED ORAL NIGHTLY
Qty: 90 TABLET | Refills: 0 | Status: SHIPPED | OUTPATIENT
Start: 2024-04-30

## 2024-05-07 ENCOUNTER — OFFICE VISIT (OUTPATIENT)
Dept: UROLOGY | Facility: CLINIC | Age: 69
End: 2024-05-07
Payer: MEDICARE

## 2024-05-07 VITALS
HEIGHT: 69 IN | SYSTOLIC BLOOD PRESSURE: 130 MMHG | BODY MASS INDEX: 25.33 KG/M2 | HEART RATE: 60 BPM | WEIGHT: 171 LBS | DIASTOLIC BLOOD PRESSURE: 60 MMHG

## 2024-05-07 DIAGNOSIS — M51.36 DEGENERATIVE DISC DISEASE, LUMBAR: ICD-10-CM

## 2024-05-07 DIAGNOSIS — R79.89 PRERENAL AZOTEMIA: ICD-10-CM

## 2024-05-07 DIAGNOSIS — N30.20 CHRONIC CYSTITIS: Primary | ICD-10-CM

## 2024-05-07 DIAGNOSIS — N39.0 URINARY TRACT INFECTION WITHOUT HEMATURIA, SITE UNSPECIFIED: ICD-10-CM

## 2024-05-07 DIAGNOSIS — Z87.442 HISTORY OF NEPHROLITHIASIS: ICD-10-CM

## 2024-05-07 PROCEDURE — 99213 OFFICE O/P EST LOW 20 MIN: CPT | Mod: S$PBB,,, | Performed by: UROLOGY

## 2024-05-07 PROCEDURE — 99213 OFFICE O/P EST LOW 20 MIN: CPT | Mod: PBBFAC | Performed by: UROLOGY

## 2024-05-07 RX ORDER — METHENAMINE HIPPURATE 1000 MG/1
1 TABLET ORAL 2 TIMES DAILY
Qty: 180 TABLET | Refills: 3 | Status: SHIPPED | OUTPATIENT
Start: 2024-05-07 | End: 2025-05-02
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
## 2024-05-08 NOTE — PATIENT INSTRUCTIONS
Renewed methenamine hippurate.  Submit urine culture p.r.n. for clinical symptoms.  Patient understands I plan to retire this summer.  Appointment with Mr. Hernandez in 1 year.

## 2024-05-20 PROBLEM — N39.0 URINARY TRACT INFECTION WITHOUT HEMATURIA: Status: RESOLVED | Noted: 2024-02-14 | Resolved: 2024-05-20

## 2024-05-20 PROBLEM — Z09 HOSPITAL DISCHARGE FOLLOW-UP: Status: RESOLVED | Noted: 2024-02-14 | Resolved: 2024-05-20

## 2024-06-06 NOTE — ASSESSMENT & PLAN NOTE
Defer to primary surgical trauma team.    Will monitor overnight on telemetry.       STAT team Note:    A stat team was called because the patient was sitting up in the chair and had decreased responsiveness, but had not passed out.  His blood pressure was 86/49.  His blood pressure had been 183/74 at midnight.  He had not received a medication for hypertension overnight.  When I arrived the patient was awake, sitting up in his chair.  He denied any dizziness or lightheadedness.  He was assisted back to bed.  His blood pressure was rechecked and was 128 systolic.  He denies chest pain, palpitations, shortness of breath.  He has been having some blood in his urine and a urinalysis is ordered but has not yet been sent.    Visit Vitals  BP (!) 86/49 (BP Location: LUE - Left upper extremity, Patient Position: Semi-Jacques's)   Pulse 72   Temp 98.2 °F (36.8 °C) (Oral)   Resp 17   Ht 5' 5\" (1.651 m)   Wt 75.8 kg (167 lb 1.7 oz)   SpO2 90%   BMI 27.81 kg/m²     On exam he is awake and alert.  He has normal heart sounds.  He is coughing occasionally, but lungs sound clear.  He has some right hand weakness from his recent stroke.  Has difficulty raising his legs bilaterally.    The etiology of his hypotension is not clear.  He has not been having great oral intake.  I am going to give him a 500 mL fluid bolus.  I am getting his morning labs including CBC, BMP, magnesium.  I added a lactate.  We will get his urinalysis and chest x-ray to evaluate for infection.    I will follow-up his study results.  We will monitor his blood pressure closely and if he has persistent hypotension we can consider transfer back to the hospital.    Thank you for allowing us to participate in the care of your patient, Colt Will. If there are any questions please feel free to contact the Hospitalist service.        Tanvir Quiñones MD  6/6/2024  5:19 AM

## 2024-06-26 ENCOUNTER — HOSPITAL ENCOUNTER (OUTPATIENT)
Dept: RADIOLOGY | Facility: HOSPITAL | Age: 69
Discharge: HOME | End: 2024-06-26
Attending: NURSE PRACTITIONER
Payer: MEDICARE

## 2024-06-26 DIAGNOSIS — M85.80 OSTEOPENIA AFTER MENOPAUSE: ICD-10-CM

## 2024-06-26 DIAGNOSIS — Z13.820 SCREENING FOR OSTEOPOROSIS: ICD-10-CM

## 2024-06-26 DIAGNOSIS — Z78.0 OSTEOPENIA AFTER MENOPAUSE: ICD-10-CM

## 2024-06-26 PROCEDURE — 77080 DXA BONE DENSITY AXIAL: CPT | Mod: TC

## 2024-06-26 NOTE — PROGRESS NOTES
Spoke with pt, pt voiced understanding.     She said to let you know she went to ortho yesterday and he didn't like the way her MRI looked and was referring her to a spine specialist in [place].

## 2024-06-26 NOTE — PROGRESS NOTES
Please let pt know bone density scan still shows osteopenia, which is just bone loss and not osteoporosis. Her numbers are actually better than last scan. Continue calcium and vitamin d. Thanks! RX for protonix refilled per Dr Farooq's protocol and eprescribed to preferred pharmacy.  Per patient sent to [facility]

## 2024-07-11 ENCOUNTER — OFFICE VISIT (OUTPATIENT)
Dept: OTOLARYNGOLOGY | Facility: CLINIC | Age: 69
End: 2024-07-11
Payer: MEDICARE

## 2024-07-11 ENCOUNTER — HOSPITAL ENCOUNTER (OUTPATIENT)
Dept: RADIOLOGY | Facility: HOSPITAL | Age: 69
Discharge: HOME | End: 2024-07-11
Attending: ORTHOPAEDIC SURGERY
Payer: MEDICARE

## 2024-07-11 ENCOUNTER — OFFICE VISIT (OUTPATIENT)
Dept: SPINE | Facility: CLINIC | Age: 69
End: 2024-07-11
Payer: MEDICARE

## 2024-07-11 VITALS — BODY MASS INDEX: 25.33 KG/M2 | WEIGHT: 171 LBS | HEIGHT: 69 IN

## 2024-07-11 DIAGNOSIS — M54.16 LUMBAR RADICULOPATHY: Primary | ICD-10-CM

## 2024-07-11 DIAGNOSIS — R42 DIZZINESS: ICD-10-CM

## 2024-07-11 DIAGNOSIS — H90.3 SENSORINEURAL HEARING LOSS (SNHL) OF BOTH EARS: ICD-10-CM

## 2024-07-11 DIAGNOSIS — M51.36 DDD (DEGENERATIVE DISC DISEASE), LUMBAR: ICD-10-CM

## 2024-07-11 DIAGNOSIS — M54.16 LUMBAR RADICULOPATHY: ICD-10-CM

## 2024-07-11 DIAGNOSIS — H81.03 MENIERE'S DISEASE (COCHLEAR HYDROPS), BILATERAL: Primary | ICD-10-CM

## 2024-07-11 PROCEDURE — 99212 OFFICE O/P EST SF 10 MIN: CPT | Mod: PBBFAC,25 | Performed by: ORTHOPAEDIC SURGERY

## 2024-07-11 PROCEDURE — 99999 PR PBB SHADOW E&M-EST. PATIENT-LVL II: CPT | Mod: PBBFAC,,, | Performed by: ORTHOPAEDIC SURGERY

## 2024-07-11 PROCEDURE — 99214 OFFICE O/P EST MOD 30 MIN: CPT | Mod: S$PBB,,, | Performed by: OTOLARYNGOLOGY

## 2024-07-11 PROCEDURE — 99215 OFFICE O/P EST HI 40 MIN: CPT | Mod: PBBFAC | Performed by: OTOLARYNGOLOGY

## 2024-07-11 PROCEDURE — 72110 X-RAY EXAM L-2 SPINE 4/>VWS: CPT | Mod: TC

## 2024-07-11 PROCEDURE — 99214 OFFICE O/P EST MOD 30 MIN: CPT | Mod: S$PBB,,, | Performed by: ORTHOPAEDIC SURGERY

## 2024-07-11 PROCEDURE — 99999 PR PBB SHADOW E&M-EST. PATIENT-LVL V: CPT | Mod: PBBFAC,,, | Performed by: OTOLARYNGOLOGY

## 2024-07-11 RX ORDER — TRIAMTERENE/HYDROCHLOROTHIAZID 37.5-25 MG
1 TABLET ORAL DAILY
Qty: 30 TABLET | Refills: 6 | Status: SHIPPED | OUTPATIENT
Start: 2024-07-11 | End: 2025-02-06

## 2024-07-11 RX ORDER — DIAZEPAM 2 MG/1
2 TABLET ORAL DAILY
Qty: 30 TABLET | Refills: 5 | Status: SHIPPED | OUTPATIENT
Start: 2024-07-11 | End: 2025-02-06

## 2024-07-11 RX ORDER — DIAZEPAM 5 MG/1
5 TABLET ORAL EVERY 6 HOURS PRN
Status: CANCELLED | OUTPATIENT
Start: 2024-07-11 | End: 2024-08-10

## 2024-07-11 NOTE — PROGRESS NOTES
AP, lateral, flexion-extension views of the lumbar spine reviewed    On the AP there is lumbar curvature to the left.  There are 5 non-rib-bearing lumbar vertebrae.  On the lateral there is decreased lumbar lordosis.  There is spondylotic disease with decreased disc height and osteophyte formation noted.  There has been prior left-sided sacroiliac fixation.  Prior L4-5 laminectomy.  Grade 1 anterolisthesis at L4-5    Impression:  Spondylotic changes of the lumbar spine as noted above
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
## 2024-07-11 NOTE — PROGRESS NOTES
Subjective:       Patient ID: Kizzy Schofield is a 69 y.o. female.    Chief Complaint: Follow-up (3 months follow up. ) and Dizziness (Patient states that the valium and Maxide helps control the dizziness.)    Follow-up    Dizziness:    Associated symptoms: hearing loss.    Review of Systems   HENT:  Positive for hearing loss.    Neurological:  Positive for dizziness.   All other systems reviewed and are negative.      Objective:      Physical Exam  General: NAD  Head: Normocephalic, atraumatic, no facial asymmetry/normal strength,  Ears: Both auricules normal in appearance, w/o deformities tympanic membranes normal external auditory canals normal  Nose: External nose w/o deformities normal turbinates no drainage or inflammation  Oral Cavity: Lips, gums, floor of mouth, tongue hard palate, and buccal mucosa without mass/lesion  Oropharynx: Mucosa pink and moist, soft palate, posterior pharynx and oropharyngeal wall without mass/lesion  Neck: Supple, symmetric, trachea midline, no palpable mass/lesion, no palpable cervical lymphadenopathy  Skin: Warm and dry, no concerning lesions  Respiratory: Respirations even, unlabored  Assessment:       1. Meniere's disease (cochlear hydrops), bilateral    2. Sensorineural hearing loss (SNHL) of both ears    3. Dizziness        Plan:       Maxide and valium refills   F/u 6 months

## 2024-07-19 ENCOUNTER — CLINICAL SUPPORT (OUTPATIENT)
Dept: UROLOGY | Facility: CLINIC | Age: 69
End: 2024-07-19
Payer: MEDICARE

## 2024-07-19 VITALS
TEMPERATURE: 98 F | DIASTOLIC BLOOD PRESSURE: 70 MMHG | HEART RATE: 68 BPM | BODY MASS INDEX: 25.62 KG/M2 | WEIGHT: 173 LBS | HEIGHT: 69 IN | SYSTOLIC BLOOD PRESSURE: 117 MMHG

## 2024-07-19 DIAGNOSIS — N39.0 URINARY TRACT INFECTION WITHOUT HEMATURIA, SITE UNSPECIFIED: Primary | ICD-10-CM

## 2024-07-19 DIAGNOSIS — N30.20 CHRONIC CYSTITIS: ICD-10-CM

## 2024-07-19 PROCEDURE — 87077 CULTURE AEROBIC IDENTIFY: CPT | Mod: ,,, | Performed by: CLINICAL MEDICAL LABORATORY

## 2024-07-19 PROCEDURE — 87186 SC STD MICRODIL/AGAR DIL: CPT | Mod: ,,, | Performed by: CLINICAL MEDICAL LABORATORY

## 2024-07-19 PROCEDURE — 99999 PR PBB SHADOW E&M-EST. PATIENT-LVL III: CPT | Mod: PBBFAC,,,

## 2024-07-19 PROCEDURE — 87086 URINE CULTURE/COLONY COUNT: CPT | Mod: ,,, | Performed by: CLINICAL MEDICAL LABORATORY

## 2024-07-19 PROCEDURE — 99213 OFFICE O/P EST LOW 20 MIN: CPT | Mod: PBBFAC
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
## 2024-07-23 ENCOUNTER — OFFICE VISIT (OUTPATIENT)
Dept: FAMILY MEDICINE | Facility: CLINIC | Age: 69
End: 2024-07-23
Payer: MEDICARE

## 2024-07-23 VITALS
HEART RATE: 71 BPM | TEMPERATURE: 99 F | DIASTOLIC BLOOD PRESSURE: 77 MMHG | HEIGHT: 69 IN | SYSTOLIC BLOOD PRESSURE: 119 MMHG | OXYGEN SATURATION: 95 % | RESPIRATION RATE: 20 BRPM | BODY MASS INDEX: 25.15 KG/M2 | WEIGHT: 169.81 LBS

## 2024-07-23 DIAGNOSIS — G89.4 CHRONIC PAIN SYNDROME: ICD-10-CM

## 2024-07-23 DIAGNOSIS — N39.0 URINARY TRACT INFECTION WITHOUT HEMATURIA, SITE UNSPECIFIED: Primary | ICD-10-CM

## 2024-07-23 DIAGNOSIS — T78.40XS ALLERGY, SEQUELA: ICD-10-CM

## 2024-07-23 DIAGNOSIS — E78.2 MIXED HYPERLIPIDEMIA: ICD-10-CM

## 2024-07-23 DIAGNOSIS — I10 HYPERTENSION, UNSPECIFIED TYPE: Primary | ICD-10-CM

## 2024-07-23 DIAGNOSIS — F41.8 DEPRESSION WITH ANXIETY: ICD-10-CM

## 2024-07-23 DIAGNOSIS — K58.9 IRRITABLE BOWEL SYNDROME, UNSPECIFIED TYPE: ICD-10-CM

## 2024-07-23 DIAGNOSIS — M81.0 AGE-RELATED OSTEOPOROSIS WITHOUT CURRENT PATHOLOGICAL FRACTURE: ICD-10-CM

## 2024-07-23 DIAGNOSIS — N18.32 STAGE 3B CHRONIC KIDNEY DISEASE: ICD-10-CM

## 2024-07-23 DIAGNOSIS — E03.9 HYPOTHYROIDISM, UNSPECIFIED TYPE: ICD-10-CM

## 2024-07-23 LAB
ANION GAP SERPL CALCULATED.3IONS-SCNC: 7 MMOL/L (ref 7–16)
BASOPHILS # BLD AUTO: 0.07 K/UL (ref 0–0.2)
BASOPHILS NFR BLD AUTO: 1 % (ref 0–1)
BUN SERPL-MCNC: 13 MG/DL (ref 7–18)
BUN/CREAT SERPL: 8 (ref 6–20)
CALCIUM SERPL-MCNC: 9.1 MG/DL (ref 8.5–10.1)
CHLORIDE SERPL-SCNC: 107 MMOL/L (ref 98–107)
CO2 SERPL-SCNC: 29 MMOL/L (ref 21–32)
CREAT SERPL-MCNC: 1.72 MG/DL (ref 0.55–1.02)
DIFFERENTIAL METHOD BLD: ABNORMAL
EGFR (NO RACE VARIABLE) (RUSH/TITUS): 31 ML/MIN/1.73M2
EOSINOPHIL # BLD AUTO: 0.15 K/UL (ref 0–0.5)
EOSINOPHIL NFR BLD AUTO: 2.2 % (ref 1–4)
ERYTHROCYTE [DISTWIDTH] IN BLOOD BY AUTOMATED COUNT: 13.1 % (ref 11.5–14.5)
GLUCOSE SERPL-MCNC: 110 MG/DL (ref 74–106)
HCT VFR BLD AUTO: 44.1 % (ref 38–47)
HGB BLD-MCNC: 13.9 G/DL (ref 12–16)
IMM GRANULOCYTES # BLD AUTO: 0.01 K/UL (ref 0–0.04)
IMM GRANULOCYTES NFR BLD: 0.1 % (ref 0–0.4)
LYMPHOCYTES # BLD AUTO: 2.74 K/UL (ref 1–4.8)
LYMPHOCYTES NFR BLD AUTO: 39.9 % (ref 27–41)
MCH RBC QN AUTO: 30 PG (ref 27–31)
MCHC RBC AUTO-ENTMCNC: 31.5 G/DL (ref 32–36)
MCV RBC AUTO: 95.2 FL (ref 80–96)
MONOCYTES # BLD AUTO: 0.55 K/UL (ref 0–0.8)
MONOCYTES NFR BLD AUTO: 8 % (ref 2–6)
MPC BLD CALC-MCNC: 10.2 FL (ref 9.4–12.4)
NEUTROPHILS # BLD AUTO: 3.35 K/UL (ref 1.8–7.7)
NEUTROPHILS NFR BLD AUTO: 48.8 % (ref 53–65)
NRBC # BLD AUTO: 0 X10E3/UL
NRBC, AUTO (.00): 0 %
PLATELET # BLD AUTO: 340 K/UL (ref 150–400)
POTASSIUM SERPL-SCNC: 4.4 MMOL/L (ref 3.5–5.1)
RBC # BLD AUTO: 4.63 M/UL (ref 4.2–5.4)
SODIUM SERPL-SCNC: 139 MMOL/L (ref 136–145)
TSH SERPL DL<=0.005 MIU/L-ACNC: 3.79 UIU/ML (ref 0.36–3.74)
UA COMPLETE W REFLEX CULTURE PNL UR: ABNORMAL
WBC # BLD AUTO: 6.87 K/UL (ref 4.5–11)

## 2024-07-23 PROCEDURE — 84443 ASSAY THYROID STIM HORMONE: CPT | Mod: ,,, | Performed by: CLINICAL MEDICAL LABORATORY

## 2024-07-23 PROCEDURE — 99214 OFFICE O/P EST MOD 30 MIN: CPT | Mod: ,,, | Performed by: NURSE PRACTITIONER

## 2024-07-23 PROCEDURE — 80048 BASIC METABOLIC PNL TOTAL CA: CPT | Mod: ,,, | Performed by: CLINICAL MEDICAL LABORATORY

## 2024-07-23 PROCEDURE — 85025 COMPLETE CBC W/AUTO DIFF WBC: CPT | Mod: ,,, | Performed by: CLINICAL MEDICAL LABORATORY

## 2024-07-23 RX ORDER — FOLIC ACID 1 MG/1
1 TABLET ORAL DAILY
Qty: 90 TABLET | Refills: 0 | Status: CANCELLED | OUTPATIENT
Start: 2024-07-23

## 2024-07-23 RX ORDER — LEVOTHYROXINE SODIUM 50 UG/1
50 TABLET ORAL
Qty: 90 TABLET | Refills: 1 | Status: SHIPPED | OUTPATIENT
Start: 2024-07-23

## 2024-07-23 RX ORDER — DICYCLOMINE HYDROCHLORIDE 10 MG/1
10 CAPSULE ORAL 3 TIMES DAILY
Qty: 270 CAPSULE | Refills: 1 | Status: SHIPPED | OUTPATIENT
Start: 2024-07-23

## 2024-07-23 RX ORDER — MONTELUKAST SODIUM 4 MG/1
1 TABLET, CHEWABLE ORAL DAILY
Qty: 90 TABLET | Refills: 1 | Status: SHIPPED | OUTPATIENT
Start: 2024-07-23

## 2024-07-23 RX ORDER — CYANOCOBALAMIN 1000 UG/ML
INJECTION, SOLUTION INTRAMUSCULAR; SUBCUTANEOUS
COMMUNITY
Start: 2024-05-23

## 2024-07-23 RX ORDER — AMITRIPTYLINE HYDROCHLORIDE 10 MG/1
10 TABLET, FILM COATED ORAL NIGHTLY
Qty: 90 TABLET | Refills: 1 | Status: SHIPPED | OUTPATIENT
Start: 2024-07-23 | End: 2024-07-25 | Stop reason: SDUPTHER

## 2024-07-23 RX ORDER — PAROXETINE HYDROCHLORIDE 20 MG/1
20 TABLET, FILM COATED ORAL DAILY
Qty: 90 TABLET | Refills: 1 | Status: SHIPPED | OUTPATIENT
Start: 2024-07-23

## 2024-07-23 RX ORDER — EPINEPHRINE HCL IN 0.9 % NACL 1 MG/10 ML
0.3 SYRINGE (ML) INTRAVENOUS
COMMUNITY
End: 2024-08-25

## 2024-07-23 RX ORDER — ATORVASTATIN CALCIUM 40 MG/1
40 TABLET, FILM COATED ORAL NIGHTLY
Qty: 90 TABLET | Refills: 1 | Status: SHIPPED | OUTPATIENT
Start: 2024-07-23

## 2024-07-23 RX ORDER — MONTELUKAST SODIUM 10 MG/1
10 TABLET ORAL NIGHTLY
Qty: 90 TABLET | Refills: 1 | Status: SHIPPED | OUTPATIENT
Start: 2024-07-23

## 2024-07-23 RX ORDER — ONDANSETRON 4 MG/1
4 TABLET, ORALLY DISINTEGRATING ORAL EVERY 8 HOURS PRN
Qty: 90 TABLET | Refills: 0 | Status: CANCELLED | OUTPATIENT
Start: 2024-07-23

## 2024-07-23 RX ORDER — CEFUROXIME AXETIL 250 MG/1
250 TABLET ORAL 2 TIMES DAILY
Qty: 20 TABLET | Refills: 0 | Status: SHIPPED | OUTPATIENT
Start: 2024-07-23 | End: 2024-08-02

## 2024-07-23 RX ORDER — METHOCARBAMOL 500 MG/1
500 TABLET, FILM COATED ORAL 3 TIMES DAILY
Qty: 270 TABLET | Refills: 1 | Status: SHIPPED | OUTPATIENT
Start: 2024-07-23

## 2024-07-23 RX ORDER — GABAPENTIN 600 MG/1
600 TABLET ORAL 2 TIMES DAILY
Qty: 180 TABLET | Refills: 1 | Status: SHIPPED | OUTPATIENT
Start: 2024-07-23

## 2024-07-23 RX ORDER — RALOXIFENE HYDROCHLORIDE 60 MG/1
60 TABLET, FILM COATED ORAL DAILY
Qty: 90 TABLET | Refills: 1 | Status: SHIPPED | OUTPATIENT
Start: 2024-07-23

## 2024-07-23 RX ORDER — TOPIRAMATE 100 MG/1
100 TABLET, FILM COATED ORAL 2 TIMES DAILY
Qty: 180 TABLET | Refills: 1 | Status: SHIPPED | OUTPATIENT
Start: 2024-07-23

## 2024-07-23 RX ORDER — TRIAMTERENE/HYDROCHLOROTHIAZID 37.5-25 MG
1 TABLET ORAL DAILY
Qty: 90 TABLET | Refills: 1 | Status: SHIPPED | OUTPATIENT
Start: 2024-07-23

## 2024-07-23 NOTE — PROGRESS NOTES
CHLOE Lazo   [facility]  [address]  [place] [address]     PATIENT NAME: Kizzy Schofield  : 1955  DATE: 24  MRN: [id number]      Billing Provider: CHLOE Lazo  Level of Service:   Patient PCP Information       Provider PCP Type    CHLOE Lazo General            Reason for Visit / Chief Complaint: Hypertension   Health Maintenance Due   Topic Date Due    Annual UACr  Never done    Colorectal Cancer Screening  2024          Update PCP  Update Chief Complaint         History of Present Illness / Problem Focused Workflow     Kizzy Schofield presents to the clinic for med refills and lab. She has been to Neurologist ABRAHAM Wiley NP and has had tests ran to make sure it was nothing else wrong with her other than her back. She states everything has came back nothing wrong but her back. She states they are trying to get in with a specialist in [place]. She states she was told years ago nothing can be done for her back. She is doing acupuncture currently that has helped. She denies any other needs at this time. NAD noted.     Hemoglobin A1C   Date Value Ref Range Status   2024 5.3 4.5 - 6.6 % Final     Comment:       Normal:               <5.7%  Pre-Diabetic:       5.7% to 6.4%  Diabetic:             >6.4%  Diabetic Goal:     <7%   2022 5.4 4.5 - 6.6 % Final     Comment:       Normal:               <5.7%  Pre-Diabetic:       5.7% to 6.4%  Diabetic:             >6.4%  Diabetic Goal:     <7%        CMP  Sodium   Date Value Ref Range Status   2024 139 136 - 145 mmol/L Final     Potassium   Date Value Ref Range Status   2024 4.4 3.5 - 5.1 mmol/L Final     Chloride   Date Value Ref Range Status   2024 107 98 - 107 mmol/L Final     CO2   Date Value Ref Range Status   2024 29 21 - 32 mmol/L Final     Glucose   Date Value Ref Range Status   2024 110 (H) 74 - 106 mg/dL Final     BUN   Date Value Ref Range Status   2024 13 7 -  18 mg/dL Final     Creatinine   Date Value Ref Range Status   07/23/2024 1.72 (H) 0.55 - 1.02 mg/dL Final     Calcium   Date Value Ref Range Status   07/23/2024 9.1 8.5 - 10.1 mg/dL Final     Total Protein   Date Value Ref Range Status   02/06/2024 6.9 6.4 - 8.2 g/dL Final     Albumin   Date Value Ref Range Status   02/06/2024 3.3 (L) 3.5 - 5.0 g/dL Final     Bilirubin, Total   Date Value Ref Range Status   02/06/2024 0.3 >0.0 - 1.2 mg/dL Final     Alk Phos   Date Value Ref Range Status   02/06/2024 80 55 - 142 U/L Final     AST   Date Value Ref Range Status   02/06/2024 19 15 - 37 U/L Final     ALT   Date Value Ref Range Status   02/06/2024 26 13 - 56 U/L Final     Anion Gap   Date Value Ref Range Status   07/23/2024 7 7 - 16 mmol/L Final     eGFR   Date Value Ref Range Status   07/23/2024 31 (L) >=60 mL/min/1.73m2 Final        Lab Results   Component Value Date    WBC 6.87 07/23/2024    RBC 4.63 07/23/2024    HGB 13.9 07/23/2024    HCT 44.1 07/23/2024    MCV 95.2 07/23/2024    MCH 30.0 07/23/2024    MCHC 31.5 (L) 07/23/2024    RDW 13.1 07/23/2024     07/23/2024    MPV 10.2 07/23/2024    LYMPH 39.9 07/23/2024    LYMPH 2.74 07/23/2024    MONO 8.0 (H) 07/23/2024    EOS 0.15 07/23/2024    BASO 0.07 07/23/2024    EOSINOPHIL 2.2 07/23/2024    BASOPHIL 1.0 07/23/2024        Lab Results   Component Value Date    CHOL 169 01/29/2024    CHOL 126 10/31/2022    CHOL 147 03/15/2022     Lab Results   Component Value Date    HDL 76 (H) 01/29/2024    HDL 67 (H) 10/31/2022    HDL 72 (H) 03/15/2022     Lab Results   Component Value Date    LDLCALC 73 01/29/2024    LDLCALC 39 10/31/2022    LDLCALC 49 03/15/2022     Lab Results   Component Value Date    TRIG 102 01/29/2024    TRIG 98 10/31/2022    TRIG 130 03/15/2022     Lab Results   Component Value Date    CHOLHDL 2.2 01/29/2024    CHOLHDL 1.9 10/31/2022    CHOLHDL 2.0 03/15/2022        Wt Readings from Last 3 Encounters:   07/23/24 1556 77 kg (169 lb 12.8 oz)   07/19/24  1026 78.5 kg (173 lb)   07/11/24 1317 77.6 kg (171 lb)        BP Readings from Last 3 Encounters:   07/23/24 119/77   07/19/24 117/70   05/07/24 130/60        Review of Systems     Review of Systems   Constitutional: Negative.    Respiratory: Negative.     Cardiovascular: Negative.    Gastrointestinal: Negative.    Endocrine: Negative.    Genitourinary: Negative.    Musculoskeletal:  Positive for arthralgias, back pain and gait problem.   Integumentary:  Negative.   Allergic/Immunologic: Negative.    Neurological:  Positive for coordination difficulties.   Hematological: Negative.    Psychiatric/Behavioral: Negative.          Medical / Social / Family History     Past Medical History:   Diagnosis Date    Arthritis     Cervical spondylosis     Chronic cystitis     Chronic pain syndrome     opioid dependent    Depression with anxiety     Essential (primary) hypertension     GERD (gastroesophageal reflux disease)     Lumbar post-laminectomy syndrome     followed by Dr. Carmen Rivera    Lumbar spondylosis     Meniere's disease 04/27/2017    Migraine 01/23/2023    Mixed hyperlipidemia 01/23/2023    Nephrolithiasis 06/29/2021    TITO on CPAP 09/13/2021    Osteoporosis        Past Surgical History:   Procedure Laterality Date    BLADDER SUSPENSION      CHOLECYSTECTOMY      HYSTERECTOMY      LUMBAR LAMINECTOMY N/A 06/16/2022    Procedure: L4-L5 Laminectomy;  Surgeon: Juan Luis Covington MD;  Location: [facility];  Service: Neurosurgery;  Laterality: N/A;    OOPHORECTOMY      SHOULDER SURGERY Left 07/24/2023       Social History  Ms.  reports that she quit smoking about 48 years ago. Her smoking use included cigarettes. She started smoking about 48 years ago. She has a 1 pack-year smoking history. She has been exposed to tobacco smoke. She has never used smokeless tobacco. She reports that she does not drink alcohol and does not use drugs.    Family History  Ms.'s family history includes Alcohol abuse in her brother, brother,  and brother; Arthritis in her mother; COPD in her mother; Cancer in her brother, brother, and father; Glaucoma in her daughter; Hearing loss in her brother and mother; Heart disease in her brother and mother; Hypertension in her brother, father, and mother; Lung cancer in her father; Prostate cancer in her father; Stroke in her father and mother; Thyroid disease in her daughter and mother.    Medications and Allergies     Medications  Outpatient Medications Marked as Taking for the 24 encounter (Office Visit) with Ting Pryor FNP   Medication Sig Dispense Refill    acetaminophen (TYLENOL) 500 MG tablet Take 500 mg by mouth every 6 (six) hours as needed.      calcium-vitamin D3 (OS-AIDEE 500 + D3) 500 mg-5 mcg (200 unit) per tablet Take 1 tablet by mouth 2 (two) times daily with meals.      [] cefUROXime (CEFTIN) 250 MG tablet Take 1 tablet (250 mg total) by mouth 2 (two) times daily. for 10 days 20 tablet 0    cetirizine (ZYRTEC) 10 MG tablet Take 1 tablet (10 mg total) by mouth once daily. 90 tablet 3    cyanocobalamin 1,000 mcg/mL injection Inject 1ml IM weekly x 6 then every other week afterwards . Please dispense needles and syringes.      diazePAM (VALIUM) 2 MG tablet Take 1 tablet (2 mg total) by mouth once daily. 30 tablet 5    EPINEPHrine (EPIPEN) 0.3 mg/0.3 mL AtIn Inject 0.3 mLs (0.3 mg total) into the muscle as needed (allergic reaction). 1 each 2    fluticasone propionate (FLONASE) 50 mcg/actuation nasal spray 2 sprays (100 mcg total) by Each Nostril route once daily. 16 g 3    folic acid (FOLVITE) 1 MG tablet Take 1 tablet (1 mg total) by mouth once daily. 90 tablet 0    HUMIRA,CF, PEN 40 mg/0.4 mL PnKt Inject 40 mg into the skin.      HYDROcodone-acetaminophen (NORCO) 7.5-325 mg per tablet Take 1 tablet by mouth 2 (two) times daily as needed for Pain.      methenamine (HIPREX) 1 gram Tab Take 1 tablet (1 g total) by mouth 2 (two) times daily. 180 tablet 3    ondansetron (ZOFRAN-ODT) 4 MG  TbDL Take 1 tablet (4 mg total) by mouth every 8 (eight) hours as needed (nausea). 90 tablet 0    polyethylene glycol (GLYCOLAX) 17 gram PwPk Take by mouth. Take 17 grams by mouth 2 times daily mixed with 8 ounces of water, juice, soda, tea, or coffee      promethazine (PHENERGAN) 25 MG tablet Take 1 tablet (25 mg total) by mouth every 4 (four) hours. 45 tablet 0    RABEprazole (ACIPHEX) 20 mg tablet Take by mouth 2 (two) times daily.      triamcinolone acetonide 0.1% (KENALOG) 0.1 % ointment APPLY TO THE AFFECTED AREA(S) ON ARMS, LEGS AND trunk TWICE DAILY FOR ITCHING      triamterene-hydrochlorothiazide 37.5-25 mg (MAXZIDE-25) 37.5-25 mg per tablet Take 1 tablet by mouth once daily. 30 tablet 6    vitamin D (VITAMIN D3) 1000 units Tab Take 5,000 Units by mouth 2 (two) times a day.      [DISCONTINUED] amitriptyline (ELAVIL) 10 MG tablet Take 1 tablet (10 mg total) by mouth every evening. (Patient taking differently: Take 10 mg by mouth every evening. Pt takes 20 mg at night) 180 tablet 0    [DISCONTINUED] atorvastatin (LIPITOR) 40 MG tablet Take 1 tablet (40 mg total) by mouth every evening. 90 tablet 0    [DISCONTINUED] dicyclomine (BENTYL) 10 MG capsule Take 1 capsule (10 mg total) by mouth 3 (three) times daily. 270 capsule 0    [DISCONTINUED] gabapentin (NEURONTIN) 600 MG tablet Take 1 tablet (600 mg total) by mouth 2 (two) times daily. 180 tablet 0    [DISCONTINUED] levothyroxine (SYNTHROID) 50 MCG tablet Take 1 tablet (50 mcg total) by mouth before breakfast. 90 tablet 0    [DISCONTINUED] methocarbamoL (ROBAXIN) 500 MG Tab Take 1 tablet (500 mg total) by mouth 3 (three) times daily. 270 tablet 0    [DISCONTINUED] montelukast (SINGULAIR) 10 mg tablet Take 1 tablet (10 mg total) by mouth every evening. 90 tablet 0    [DISCONTINUED] paroxetine (PAXIL) 20 MG tablet Take 1 tablet (20 mg total) by mouth once daily. 90 tablet 0    [DISCONTINUED] raloxifene (EVISTA) 60 mg tablet Take 1 tablet (60 mg total) by mouth  "once daily. 90 tablet 0    [DISCONTINUED] topiramate (TOPAMAX) 100 MG tablet Take 1 tablet (100 mg total) by mouth 2 (two) times daily. 180 tablet 0       Allergies  Review of patient's allergies indicates:   Allergen Reactions    Adhesive tape-silicones     Aspirin     Celebrex [celecoxib]     Morphine      Makes insides feel like they are on fire.    Opioids - morphine analogues Other (See Comments)     "Burning/hot in chest"    Pcn [penicillins]     Shrimp     Ciprofloxacin      Pt reported that "it made her feel real bad".    Silicone Itching and Rash       Physical Examination     Vitals:    07/23/24 1556   BP: 119/77   BP Location: Left arm   Patient Position: Sitting   BP Method: Medium (Automatic)   Pulse: 71   Resp: 20   Temp: 98.6 °F (37 °C)   TempSrc: Oral   SpO2: 95%   Weight: 77 kg (169 lb 12.8 oz)   Height: 5' 9" (1.753 m)      Physical Exam  Constitutional:       Appearance: Normal appearance.   HENT:      Head: Normocephalic.   Eyes:      Extraocular Movements: Extraocular movements intact.   Cardiovascular:      Rate and Rhythm: Normal rate and regular rhythm.      Pulses: Normal pulses.      Heart sounds: Normal heart sounds.   Pulmonary:      Effort: Pulmonary effort is normal.      Breath sounds: Normal breath sounds.   Musculoskeletal:      Comments: Ambulates with rollator   Skin:     General: Skin is warm and dry.      Capillary Refill: Capillary refill takes less than 2 seconds.   Neurological:      General: No focal deficit present.      Mental Status: She is alert and oriented to person, place, and time.      Coordination: Coordination abnormal.      Gait: Gait abnormal.   Psychiatric:         Mood and Affect: Mood normal.         Behavior: Behavior normal.          Assessment and Plan (including Health Maintenance)      Problem List  Smart Sets  Document Outside HM   :    Plan:     There are no Patient Instructions on file for this visit.       Health Maintenance Due   Topic Date Due    " Annual UACr  Never done    Colorectal Cancer Screening  08/17/2024       Problem List Items Addressed This Visit       Age-related osteoporosis without current pathological fracture    Current Assessment & Plan     Osteoporosis  is controlled. Current medical regimen is effective;   Will adjust according to results and monitor.          Relevant Medications    raloxifene (EVISTA) 60 mg tablet    Allergies    Current Assessment & Plan     allergy  is controlled. Current medical regimen is effective;   Will adjust according to results and monitor.          Relevant Medications    montelukast (SINGULAIR) 10 mg tablet    Chronic pain syndrome    Current Assessment & Plan     Followed by Pain Management and Neuro.          Relevant Medications    gabapentin (NEURONTIN) 600 MG tablet    methocarbamoL (ROBAXIN) 500 MG Tab    topiramate (TOPAMAX) 100 MG tablet    amitriptyline (ELAVIL) 10 MG tablet    Depression with anxiety    Current Assessment & Plan     Depression is controlled. Current medical regimen is effective;   Will adjust according to results and monitor.          Relevant Medications    paroxetine (PAXIL) 20 MG tablet    Hypertension - Primary    Current Assessment & Plan     Blood pressure is controlled. Current medical regimen is effective;   Will adjust according to results and monitor.          Relevant Medications    triamterene-hydrochlorothiazide 37.5-25 mg (MAXZIDE-25) 37.5-25 mg per tablet    Other Relevant Orders    CBC Auto Differential (Completed)    Basic Metabolic Panel (Completed)    Hypothyroidism    Current Assessment & Plan     Labs pending. Will inform of results when available.          Relevant Medications    levothyroxine (SYNTHROID) 50 MCG tablet    Other Relevant Orders    TSH (Completed)    Irritable bowel syndrome    Current Assessment & Plan     IBS  is controlled. Current medical regimen is effective;   Will adjust according to results and monitor.          Relevant Medications     colestipoL (COLESTID) 1 gram Tab    dicyclomine (BENTYL) 10 MG capsule    Mixed hyperlipidemia    Current Assessment & Plan     Hyperlipidemia is controlled. Current medical regimen is effective;   Will adjust according to results and monitor.          Relevant Medications    atorvastatin (LIPITOR) 40 MG tablet    Stage 3b chronic kidney disease    Current Assessment & Plan     CkD  is controlled. Current medical regimen is effective;   Will adjust according to results and monitor.           Hypertension, unspecified type  -     triamterene-hydrochlorothiazide 37.5-25 mg (MAXZIDE-25) 37.5-25 mg per tablet; Take 1 tablet by mouth once daily.  Dispense: 90 tablet; Refill: 1  -     CBC Auto Differential; Future; Expected date: 07/23/2024  -     Basic Metabolic Panel; Future; Expected date: 07/23/2024  -     Cancel: Microalbumin/Creatinine Ratio, Urine    Chronic pain syndrome  -     Discontinue: amitriptyline (ELAVIL) 10 MG tablet; Take 1 tablet (10 mg total) by mouth every evening. Pt takes 20 mg at night  Dispense: 90 tablet; Refill: 1  -     gabapentin (NEURONTIN) 600 MG tablet; Take 1 tablet (600 mg total) by mouth 2 (two) times daily.  Dispense: 180 tablet; Refill: 1  -     methocarbamoL (ROBAXIN) 500 MG Tab; Take 1 tablet (500 mg total) by mouth 3 (three) times daily.  Dispense: 270 tablet; Refill: 1  -     topiramate (TOPAMAX) 100 MG tablet; Take 1 tablet (100 mg total) by mouth 2 (two) times daily.  Dispense: 180 tablet; Refill: 1  -     amitriptyline (ELAVIL) 10 MG tablet; Take 2 tablets (20 mg total) by mouth every evening. Pt takes 20 mg at night  Dispense: 180 tablet; Refill: 1    Irritable bowel syndrome, unspecified type  -     colestipoL (COLESTID) 1 gram Tab; Take 1 tablet (1 g total) by mouth once daily.  Dispense: 90 tablet; Refill: 1  -     dicyclomine (BENTYL) 10 MG capsule; Take 1 capsule (10 mg total) by mouth 3 (three) times daily.  Dispense: 270 capsule; Refill: 1    Hypothyroidism, unspecified  type  -     levothyroxine (SYNTHROID) 50 MCG tablet; Take 1 tablet (50 mcg total) by mouth before breakfast.  Dispense: 90 tablet; Refill: 1  -     TSH; Future; Expected date: 07/23/2024    Allergy, sequela  -     montelukast (SINGULAIR) 10 mg tablet; Take 1 tablet (10 mg total) by mouth every evening.  Dispense: 90 tablet; Refill: 1    Age-related osteoporosis without current pathological fracture  -     raloxifene (EVISTA) 60 mg tablet; Take 1 tablet (60 mg total) by mouth once daily.  Dispense: 90 tablet; Refill: 1    Mixed hyperlipidemia  -     atorvastatin (LIPITOR) 40 MG tablet; Take 1 tablet (40 mg total) by mouth every evening.  Dispense: 90 tablet; Refill: 1    Depression with anxiety  -     paroxetine (PAXIL) 20 MG tablet; Take 1 tablet (20 mg total) by mouth once daily.  Dispense: 90 tablet; Refill: 1    Stage 3b chronic kidney disease       Health Maintenance Topics with due status: Not Due       Topic Last Completion Date    Influenza Vaccine 09/25/2023    Mammogram 01/24/2024    Lipid Panel 01/29/2024    DEXA Scan 06/26/2024    High Dose Statin 07/23/2024         Future Appointments   Date Time Provider Department Center   10/11/2024  9:00 AM Juan Luis Covington MD [facility] SPINE [facility]   10/23/2024  9:20 AM Ting Pryor FNP [facility]   [facility]17[facility]20[facility]  [facility]   5/7/2025 10:40 AM Wyatt Hernandez NP [facility]        Follow up in about 3 months (around 10/23/2024), or if symptoms worsen or fail to improve.    Health Maintenance Due   Topic Date Due    Annual UACr  Never done    Colorectal Cancer Screening  08/17/2024        Signature:  CHLOE Lazo    Date of encounter: 7/23/24

## 2024-07-24 NOTE — PROGRESS NOTES
Please let pt know her labs look good other than her kidney function has declined some. She sees several specialists and is on several medications so some of the medications could cause this. She could also be dehydrated. She was unable to give a urine specimen for her urine microalbumin yesterday but this would give us a little better insight of her kidney function when she is able to complete this. In the meantime, I would make sure she is trying to drink plenty of water and not to take any kind of anti inflammatories such as ibuprofen, advil, aleve, etc. We will just monitor this. Thanks!

## 2024-07-25 RX ORDER — AMITRIPTYLINE HYDROCHLORIDE 10 MG/1
20 TABLET, FILM COATED ORAL NIGHTLY
Qty: 180 TABLET | Refills: 1 | Status: SHIPPED | OUTPATIENT
Start: 2024-07-25

## 2024-08-25 PROBLEM — N18.32 STAGE 3B CHRONIC KIDNEY DISEASE: Status: ACTIVE | Noted: 2024-08-25

## 2024-08-25 PROBLEM — E03.9 HYPOTHYROIDISM: Status: ACTIVE | Noted: 2024-08-25

## 2024-08-25 PROBLEM — M81.0 AGE-RELATED OSTEOPOROSIS WITHOUT CURRENT PATHOLOGICAL FRACTURE: Status: ACTIVE | Noted: 2024-08-25

## 2024-08-25 PROBLEM — T78.40XA ALLERGIES: Status: ACTIVE | Noted: 2024-08-25

## 2024-08-25 PROBLEM — I10 HYPERTENSION: Status: ACTIVE | Noted: 2024-08-25

## 2024-08-25 PROBLEM — K58.9 IRRITABLE BOWEL SYNDROME: Status: ACTIVE | Noted: 2024-08-25

## 2024-08-25 NOTE — ASSESSMENT & PLAN NOTE
allergy  is controlled. Current medical regimen is effective;   Will adjust according to results and monitor.

## 2024-08-25 NOTE — ASSESSMENT & PLAN NOTE
IBS  is controlled. Current medical regimen is effective;   Will adjust according to results and monitor.

## 2024-08-25 NOTE — ASSESSMENT & PLAN NOTE
Osteoporosis  is controlled. Current medical regimen is effective;   Will adjust according to results and monitor.

## 2024-09-04 DIAGNOSIS — H81.03 MENIERE'S DISEASE (COCHLEAR HYDROPS), BILATERAL: Primary | ICD-10-CM

## 2024-09-04 RX ORDER — DIAZEPAM 2 MG/1
2 TABLET ORAL 2 TIMES DAILY
Qty: 60 TABLET | Refills: 4 | Status: SHIPPED | OUTPATIENT
Start: 2024-09-04 | End: 2025-02-01

## 2024-09-16 DIAGNOSIS — H81.03 MENIERE'S DISEASE (COCHLEAR HYDROPS), BILATERAL: ICD-10-CM

## 2024-09-16 RX ORDER — DIAZEPAM 2 MG/1
2 TABLET ORAL 2 TIMES DAILY
Qty: 60 TABLET | Refills: 2 | OUTPATIENT
Start: 2024-09-16

## 2024-09-27 ENCOUNTER — OFFICE VISIT (OUTPATIENT)
Dept: FAMILY MEDICINE | Facility: CLINIC | Age: 69
End: 2024-09-27
Payer: MEDICARE

## 2024-09-27 VITALS
HEIGHT: 69 IN | HEART RATE: 69 BPM | RESPIRATION RATE: 20 BRPM | OXYGEN SATURATION: 96 % | TEMPERATURE: 99 F | DIASTOLIC BLOOD PRESSURE: 79 MMHG | BODY MASS INDEX: 25.45 KG/M2 | SYSTOLIC BLOOD PRESSURE: 123 MMHG | WEIGHT: 171.81 LBS

## 2024-09-27 DIAGNOSIS — Z20.822 EXPOSURE TO COVID-19 VIRUS: Primary | ICD-10-CM

## 2024-09-27 DIAGNOSIS — N81.10 CYSTOCELE, UNSPECIFIED: ICD-10-CM

## 2024-09-27 DIAGNOSIS — Z23 ENCOUNTER FOR IMMUNIZATION: ICD-10-CM

## 2024-09-27 PROCEDURE — G0008 ADMIN INFLUENZA VIRUS VAC: HCPCS | Mod: ,,, | Performed by: NURSE PRACTITIONER

## 2024-09-27 PROCEDURE — 99213 OFFICE O/P EST LOW 20 MIN: CPT | Mod: ,,, | Performed by: NURSE PRACTITIONER

## 2024-09-27 PROCEDURE — 90653 IIV ADJUVANT VACCINE IM: CPT | Mod: ,,, | Performed by: NURSE PRACTITIONER

## 2024-09-27 NOTE — PROGRESS NOTES
"   CHLOE Lazo   [facility]  [address]  [place] [address]     PATIENT NAME: Kizzy Schofield  : 1955  DATE: 24  MRN: [id number]      Billing Provider: CHLOE Lazo  Level of Service:   Patient PCP Information       Provider PCP Type    CHLOE Lazo General            Reason for Visit / Chief Complaint: Headache (Has had a headache for about a week and went tot he dr in [place] and they gave her a shot and some medication but it didn't help ///Also needs a referral for her bladder dropping/)   Health Maintenance Due   Topic Date Due    Annual UACr  Never done    Colorectal Cancer Screening  2024          Update PCP  Update Chief Complaint         History of Present Illness / Problem Focused Workflow     Kizzy Schofield presents to the clinic mainly to be tested for flu and covid as she has been exposed. She does have a headache, but has been treated for sinusitis recently with not much relief. She is also requesting referral to a Gynecologist as she has had her bladder "dropped" before and had to have it "tacked". She states she know she has to urinate and can reach "up and rub it" and then she can "pee". She states this has been ongoing for the past few months. She states at times it just "hurts" for her to "pee" but denies any other urinary ssx. She requests referral to [facility] to a female doctor. She states she has finally found her a "back doctor" and was told she is not a candidate for sx but she may benefit from a stimulator. She has had injections recently to her back but have not helped. She states she is seeing neuro currently and is being treated for migraines so she will let them know about her headaches. She would also like her flu shot today if negative for flu/covid as she is not having any other uri ssx other than h/a. She denies any other needs at this time. NAD noted.     Hemoglobin A1C   Date Value Ref Range Status   2024 5.3 4.5 - 6.6 % " Final     Comment:       Normal:               <5.7%  Pre-Diabetic:       5.7% to 6.4%  Diabetic:             >6.4%  Diabetic Goal:     <7%   06/07/2022 5.4 4.5 - 6.6 % Final     Comment:       Normal:               <5.7%  Pre-Diabetic:       5.7% to 6.4%  Diabetic:             >6.4%  Diabetic Goal:     <7%        CMP  Sodium   Date Value Ref Range Status   07/23/2024 139 136 - 145 mmol/L Final     Potassium   Date Value Ref Range Status   07/23/2024 4.4 3.5 - 5.1 mmol/L Final     Chloride   Date Value Ref Range Status   07/23/2024 107 98 - 107 mmol/L Final     CO2   Date Value Ref Range Status   07/23/2024 29 21 - 32 mmol/L Final     Glucose   Date Value Ref Range Status   07/23/2024 110 (H) 74 - 106 mg/dL Final     BUN   Date Value Ref Range Status   07/23/2024 13 7 - 18 mg/dL Final     Creatinine   Date Value Ref Range Status   07/23/2024 1.72 (H) 0.55 - 1.02 mg/dL Final     Calcium   Date Value Ref Range Status   07/23/2024 9.1 8.5 - 10.1 mg/dL Final     Total Protein   Date Value Ref Range Status   02/06/2024 6.9 6.4 - 8.2 g/dL Final     Albumin   Date Value Ref Range Status   02/06/2024 3.3 (L) 3.5 - 5.0 g/dL Final     Bilirubin, Total   Date Value Ref Range Status   02/06/2024 0.3 >0.0 - 1.2 mg/dL Final     Alk Phos   Date Value Ref Range Status   02/06/2024 80 55 - 142 U/L Final     AST   Date Value Ref Range Status   02/06/2024 19 15 - 37 U/L Final     ALT   Date Value Ref Range Status   02/06/2024 26 13 - 56 U/L Final     Anion Gap   Date Value Ref Range Status   07/23/2024 7 7 - 16 mmol/L Final     eGFR   Date Value Ref Range Status   07/23/2024 31 (L) >=60 mL/min/1.73m2 Final        Lab Results   Component Value Date    WBC 6.87 07/23/2024    RBC 4.63 07/23/2024    HGB 13.9 07/23/2024    HCT 44.1 07/23/2024    MCV 95.2 07/23/2024    MCH 30.0 07/23/2024    MCHC 31.5 (L) 07/23/2024    RDW 13.1 07/23/2024     07/23/2024    MPV 10.2 07/23/2024    LYMPH 39.9 07/23/2024    LYMPH 2.74 07/23/2024    MONO  8.0 (H) 07/23/2024    EOS 0.15 07/23/2024    BASO 0.07 07/23/2024    EOSINOPHIL 2.2 07/23/2024    BASOPHIL 1.0 07/23/2024        Lab Results   Component Value Date    CHOL 169 01/29/2024    CHOL 126 10/31/2022    CHOL 147 03/15/2022     Lab Results   Component Value Date    HDL 76 (H) 01/29/2024    HDL 67 (H) 10/31/2022    HDL 72 (H) 03/15/2022     Lab Results   Component Value Date    LDLCALC 73 01/29/2024    LDLCALC 39 10/31/2022    LDLCALC 49 03/15/2022     Lab Results   Component Value Date    TRIG 102 01/29/2024    TRIG 98 10/31/2022    TRIG 130 03/15/2022     Lab Results   Component Value Date    CHOLHDL 2.2 01/29/2024    CHOLHDL 1.9 10/31/2022    CHOLHDL 2.0 03/15/2022        Wt Readings from Last 3 Encounters:   09/27/24 1443 77.9 kg (171 lb 12.8 oz)   07/23/24 1556 77 kg (169 lb 12.8 oz)   07/19/24 1026 78.5 kg (173 lb)        BP Readings from Last 3 Encounters:   09/27/24 123/79   07/23/24 119/77   07/19/24 117/70        Review of Systems     Review of Systems   Constitutional: Negative.    Respiratory: Negative.     Cardiovascular: Negative.    Gastrointestinal: Negative.    Endocrine: Negative.    Genitourinary:  Positive for bladder incontinence and difficulty urinating.   Musculoskeletal:  Positive for arthralgias and back pain.   Integumentary:  Negative.   Allergic/Immunologic: Negative.    Neurological:  Positive for headaches.   Hematological: Negative.    Psychiatric/Behavioral: Negative.          Medical / Social / Family History     Past Medical History:   Diagnosis Date    Arthritis     Cervical spondylosis     Chronic cystitis     Chronic pain syndrome     opioid dependent    Depression with anxiety     Essential (primary) hypertension     GERD (gastroesophageal reflux disease)     Lumbar post-laminectomy syndrome     followed by Dr. Carmen Rivera    Lumbar spondylosis     Meniere's disease 04/27/2017    Migraine 01/23/2023    Mixed hyperlipidemia 01/23/2023    Nephrolithiasis 06/29/2021    TITO  on CPAP 09/13/2021    Osteoporosis        Past Surgical History:   Procedure Laterality Date    BLADDER SUSPENSION      CHOLECYSTECTOMY      HYSTERECTOMY      LUMBAR LAMINECTOMY N/A 06/16/2022    Procedure: L4-L5 Laminectomy;  Surgeon: Juan Luis Covington MD;  Location: [facility];  Service: Neurosurgery;  Laterality: N/A;    OOPHORECTOMY      SHOULDER SURGERY Left 07/24/2023       Social History  Ms.  reports that she quit smoking about 48 years ago. Her smoking use included cigarettes. She started smoking about 48 years ago. She has a 1 pack-year smoking history. She has been exposed to tobacco smoke. She has never used smokeless tobacco. She reports that she does not drink alcohol and does not use drugs.    Family History  Ms.'s family history includes Alcohol abuse in her brother, brother, and brother; Arthritis in her mother; COPD in her mother; Cancer in her brother, brother, and father; Glaucoma in her daughter; Hearing loss in her brother and mother; Heart disease in her brother and mother; Hypertension in her brother, father, and mother; Lung cancer in her father; Prostate cancer in her father; Stroke in her father and mother; Thyroid disease in her daughter and mother.    Medications and Allergies     Medications  Outpatient Medications Marked as Taking for the 9/27/24 encounter (Office Visit) with Ting Pryor FNP   Medication Sig Dispense Refill    acetaminophen (TYLENOL) 500 MG tablet Take 500 mg by mouth every 6 (six) hours as needed.      amitriptyline (ELAVIL) 10 MG tablet Take 2 tablets (20 mg total) by mouth every evening. Pt takes 20 mg at night 180 tablet 1    atorvastatin (LIPITOR) 40 MG tablet Take 1 tablet (40 mg total) by mouth every evening. 90 tablet 1    calcium-vitamin D3 (OS-AIDEE 500 + D3) 500 mg-5 mcg (200 unit) per tablet Take 1 tablet by mouth 2 (two) times daily with meals.      cetirizine (ZYRTEC) 10 MG tablet Take 1 tablet (10 mg total) by mouth once daily. 90 tablet 3    colestipoL  (COLESTID) 1 gram Tab Take 1 tablet (1 g total) by mouth once daily. 90 tablet 1    cyanocobalamin 1,000 mcg/mL injection Inject 1ml IM weekly x 6 then every other week afterwards . Please dispense needles and syringes.      diazePAM (VALIUM) 2 MG tablet Take 1 tablet (2 mg total) by mouth once daily. 30 tablet 5    diazePAM (VALIUM) 2 MG tablet Take 1 tablet (2 mg total) by mouth 2 (two) times a day. 60 tablet 4    dicyclomine (BENTYL) 10 MG capsule Take 1 capsule (10 mg total) by mouth 3 (three) times daily. 270 capsule 1    EPINEPHrine (EPIPEN) 0.3 mg/0.3 mL AtIn Inject 0.3 mLs (0.3 mg total) into the muscle as needed (allergic reaction). 1 each 2    fluticasone propionate (FLONASE) 50 mcg/actuation nasal spray 2 sprays (100 mcg total) by Each Nostril route once daily. 16 g 3    folic acid (FOLVITE) 1 MG tablet Take 1 tablet (1 mg total) by mouth once daily. 90 tablet 0    gabapentin (NEURONTIN) 600 MG tablet Take 1 tablet (600 mg total) by mouth 2 (two) times daily. 180 tablet 1    HYDROcodone-acetaminophen (NORCO) 7.5-325 mg per tablet Take 1 tablet by mouth 2 (two) times daily as needed for Pain.      levothyroxine (SYNTHROID) 50 MCG tablet Take 1 tablet (50 mcg total) by mouth before breakfast. 90 tablet 1    methenamine (HIPREX) 1 gram Tab Take 1 tablet (1 g total) by mouth 2 (two) times daily. 180 tablet 3    methocarbamoL (ROBAXIN) 500 MG Tab Take 1 tablet (500 mg total) by mouth 3 (three) times daily. 270 tablet 1    montelukast (SINGULAIR) 10 mg tablet Take 1 tablet (10 mg total) by mouth every evening. 90 tablet 1    ondansetron (ZOFRAN-ODT) 4 MG TbDL Take 1 tablet (4 mg total) by mouth every 8 (eight) hours as needed (nausea). 90 tablet 0    paroxetine (PAXIL) 20 MG tablet Take 1 tablet (20 mg total) by mouth once daily. 90 tablet 1    polyethylene glycol (GLYCOLAX) 17 gram PwPk Take by mouth. Take 17 grams by mouth 2 times daily mixed with 8 ounces of water, juice, soda, tea, or coffee       "promethazine (PHENERGAN) 25 MG tablet Take 1 tablet (25 mg total) by mouth every 4 (four) hours. 45 tablet 0    RABEprazole (ACIPHEX) 20 mg tablet Take by mouth 2 (two) times daily.      raloxifene (EVISTA) 60 mg tablet Take 1 tablet (60 mg total) by mouth once daily. 90 tablet 1    topiramate (TOPAMAX) 100 MG tablet Take 1 tablet (100 mg total) by mouth 2 (two) times daily. 180 tablet 1    triamcinolone acetonide 0.1% (KENALOG) 0.1 % ointment APPLY TO THE AFFECTED AREA(S) ON ARMS, LEGS AND trunk TWICE DAILY FOR ITCHING      triamterene-hydrochlorothiazide 37.5-25 mg (MAXZIDE-25) 37.5-25 mg per tablet Take 1 tablet by mouth once daily. 30 tablet 6    triamterene-hydrochlorothiazide 37.5-25 mg (MAXZIDE-25) 37.5-25 mg per tablet Take 1 tablet by mouth once daily. 90 tablet 1    vitamin D (VITAMIN D3) 1000 units Tab Take 5,000 Units by mouth 2 (two) times a day.         Allergies  Review of patient's allergies indicates:   Allergen Reactions    Adhesive tape-silicones     Aspirin     Celebrex [celecoxib]     Morphine      Makes insides feel like they are on fire.    Opioids - morphine analogues Other (See Comments)     "Burning/hot in chest"    Pcn [penicillins]     Shrimp     Ciprofloxacin      Pt reported that "it made her feel real bad".    Silicone Itching and Rash       Physical Examination     Vitals:    09/27/24 1443   BP: 123/79   BP Location: Left arm   Patient Position: Sitting   BP Method: Medium (Automatic)   Pulse: 69   Resp: 20   Temp: 98.7 °F (37.1 °C)   TempSrc: Oral   SpO2: 96%   Weight: 77.9 kg (171 lb 12.8 oz)   Height: 5' 9" (1.753 m)      Physical Exam  Constitutional:       Appearance: Normal appearance.   HENT:      Head: Normocephalic.   Eyes:      Extraocular Movements: Extraocular movements intact.   Cardiovascular:      Rate and Rhythm: Normal rate and regular rhythm.      Pulses: Normal pulses.      Heart sounds: Normal heart sounds.   Pulmonary:      Effort: Pulmonary effort is normal.      " Breath sounds: Normal breath sounds.   Skin:     General: Skin is warm and dry.      Capillary Refill: Capillary refill takes less than 2 seconds.   Neurological:      General: No focal deficit present.      Mental Status: She is alert and oriented to person, place, and time.   Psychiatric:         Mood and Affect: Mood normal.         Behavior: Behavior normal.          Assessment and Plan (including Health Maintenance)      Problem List  Smart Sets  Document Outside HM   :    Plan:     There are no Patient Instructions on file for this visit.       Health Maintenance Due   Topic Date Due    Annual UACr  Never done    Colorectal Cancer Screening  08/17/2024       Problem List Items Addressed This Visit       Cystocele, unspecified    Current Assessment & Plan     Referral pending to Gynecology.          Relevant Orders    Ambulatory referral/consult to Gynecology    Encounter for immunization    Current Assessment & Plan     Flu shot given. No adverse reaction noted.          Exposure to COVID-19 virus - Primary    Current Assessment & Plan     COVID negative.   Will f/u with Neuro for headaches.          Relevant Orders    POCT COVID-19 Rapid Screening    POCT Influenza A/B Molecular     Exposure to COVID-19 virus  -     POCT COVID-19 Rapid Screening  -     POCT Influenza A/B Molecular    Encounter for immunization  -     influenza (adjuvanted) (Fluad) 45 mcg/0.5 mL IM vaccine (> or = 64 yo) 0.5 mL    Cystocele, unspecified  -     Ambulatory referral/consult to Gynecology; Future; Expected date: 10/04/2024       Health Maintenance Topics with due status: Not Due       Topic Last Completion Date    Mammogram 01/24/2024    Lipid Panel 01/29/2024    DEXA Scan 06/26/2024    High Dose Statin 09/27/2024         Future Appointments   Date Time Provider Department Center   2/17/2025  1:20 PM [facility] MAMMO1 [facility] MAMMO [facility]   3/27/2025  2:20 PM Ting Pryor FNP [facility]   5/7/2025 10:40 AM  Wyatt Hernandez, NP [facility] UROL [facility]        Follow up in about 6 months (around 3/27/2025), or if symptoms worsen or fail to improve.    Health Maintenance Due   Topic Date Due    Annual UACr  Never done    Colorectal Cancer Screening  08/17/2024        Signature:  CHLOE Lazo    Date of encounter: 9/27/24

## 2024-09-29 PROBLEM — N81.10 CYSTOCELE, UNSPECIFIED: Status: ACTIVE | Noted: 2024-09-29

## 2024-09-29 PROBLEM — R05.1 ACUTE COUGH: Status: RESOLVED | Noted: 2024-03-25 | Resolved: 2024-09-29

## 2024-09-29 PROBLEM — Z23 ENCOUNTER FOR IMMUNIZATION: Status: ACTIVE | Noted: 2024-09-29

## 2024-10-11 ENCOUNTER — OFFICE VISIT (OUTPATIENT)
Dept: FAMILY MEDICINE | Facility: CLINIC | Age: 69
End: 2024-10-11
Payer: MEDICARE

## 2024-10-11 VITALS
DIASTOLIC BLOOD PRESSURE: 76 MMHG | WEIGHT: 170.38 LBS | TEMPERATURE: 99 F | HEIGHT: 69 IN | OXYGEN SATURATION: 96 % | RESPIRATION RATE: 18 BRPM | BODY MASS INDEX: 25.23 KG/M2 | SYSTOLIC BLOOD PRESSURE: 110 MMHG | HEART RATE: 76 BPM

## 2024-10-11 DIAGNOSIS — J04.0 ACUTE LARYNGITIS: Primary | ICD-10-CM

## 2024-10-11 DIAGNOSIS — R05.9 COUGH, UNSPECIFIED TYPE: ICD-10-CM

## 2024-10-11 PROCEDURE — 96372 THER/PROPH/DIAG INJ SC/IM: CPT | Mod: ,,, | Performed by: NURSE PRACTITIONER

## 2024-10-11 PROCEDURE — 99214 OFFICE O/P EST MOD 30 MIN: CPT | Mod: ,,, | Performed by: NURSE PRACTITIONER

## 2024-10-11 RX ORDER — AZITHROMYCIN 250 MG/1
TABLET, FILM COATED ORAL
Qty: 6 TABLET | Refills: 0 | Status: SHIPPED | OUTPATIENT
Start: 2024-10-11 | End: 2024-10-16

## 2024-10-11 RX ORDER — DEXAMETHASONE SODIUM PHOSPHATE 4 MG/ML
4 INJECTION, SOLUTION INTRA-ARTICULAR; INTRALESIONAL; INTRAMUSCULAR; INTRAVENOUS; SOFT TISSUE
Status: COMPLETED | OUTPATIENT
Start: 2024-10-11 | End: 2024-10-11

## 2024-10-11 RX ADMIN — DEXAMETHASONE SODIUM PHOSPHATE 4 MG: 4 INJECTION, SOLUTION INTRA-ARTICULAR; INTRALESIONAL; INTRAMUSCULAR; INTRAVENOUS; SOFT TISSUE at 09:10

## 2024-10-11 NOTE — PROGRESS NOTES
CHLOE Lazo   [facility], [place]  [address]  [place] [address]     PATIENT NAME: Kizzy Schofield  : 1955  DATE: 10/11/24  MRN: [id number]      Billing Provider: CHLOE Lazo  Level of Service:   Patient PCP Information       Provider PCP Type    CHLOE Lazo General            Reason for Visit / Chief Complaint: Cough (Sneezing, yellow phlegm, this is 3rd day)   Health Maintenance Due   Topic Date Due    Annual UACr  Never done    Colorectal Cancer Screening  2024          Update PCP  Update Chief Complaint         History of Present Illness / Problem Focused Workflow     Kizzy Schofield presents to the clinic for sick visit. Pt has productive cough with yellow sputum, sneezing, headaches, runny nose, hoarseness. She denies fever, sore throat, earaches, abd pain, n/v/d, rash. She denies sick known contacts. She denies any other needs at this time. NAD noted.     Hemoglobin A1C   Date Value Ref Range Status   2024 5.3 4.5 - 6.6 % Final     Comment:       Normal:               <5.7%  Pre-Diabetic:       5.7% to 6.4%  Diabetic:             >6.4%  Diabetic Goal:     <7%   2022 5.4 4.5 - 6.6 % Final     Comment:       Normal:               <5.7%  Pre-Diabetic:       5.7% to 6.4%  Diabetic:             >6.4%  Diabetic Goal:     <7%        CMP  Sodium   Date Value Ref Range Status   2024 139 136 - 145 mmol/L Final     Potassium   Date Value Ref Range Status   2024 4.4 3.5 - 5.1 mmol/L Final     Chloride   Date Value Ref Range Status   2024 107 98 - 107 mmol/L Final     CO2   Date Value Ref Range Status   2024 29 21 - 32 mmol/L Final     Glucose   Date Value Ref Range Status   2024 110 (H) 74 - 106 mg/dL Final     BUN   Date Value Ref Range Status   2024 13 7 - 18 mg/dL Final     Creatinine   Date Value Ref Range Status   2024 1.72 (H) 0.55 - 1.02 mg/dL Final     Calcium   Date Value Ref Range Status   2024 9.1 8.5 -  10.1 mg/dL Final     Total Protein   Date Value Ref Range Status   02/06/2024 6.9 6.4 - 8.2 g/dL Final     Albumin   Date Value Ref Range Status   02/06/2024 3.3 (L) 3.5 - 5.0 g/dL Final     Bilirubin, Total   Date Value Ref Range Status   02/06/2024 0.3 >0.0 - 1.2 mg/dL Final     Alk Phos   Date Value Ref Range Status   02/06/2024 80 55 - 142 U/L Final     AST   Date Value Ref Range Status   02/06/2024 19 15 - 37 U/L Final     ALT   Date Value Ref Range Status   02/06/2024 26 13 - 56 U/L Final     Anion Gap   Date Value Ref Range Status   07/23/2024 7 7 - 16 mmol/L Final     eGFR   Date Value Ref Range Status   07/23/2024 31 (L) >=60 mL/min/1.73m2 Final        Lab Results   Component Value Date    WBC 6.87 07/23/2024    RBC 4.63 07/23/2024    HGB 13.9 07/23/2024    HCT 44.1 07/23/2024    MCV 95.2 07/23/2024    MCH 30.0 07/23/2024    MCHC 31.5 (L) 07/23/2024    RDW 13.1 07/23/2024     07/23/2024    MPV 10.2 07/23/2024    LYMPH 39.9 07/23/2024    LYMPH 2.74 07/23/2024    MONO 8.0 (H) 07/23/2024    EOS 0.15 07/23/2024    BASO 0.07 07/23/2024    EOSINOPHIL 2.2 07/23/2024    BASOPHIL 1.0 07/23/2024        Lab Results   Component Value Date    CHOL 169 01/29/2024    CHOL 126 10/31/2022    CHOL 147 03/15/2022     Lab Results   Component Value Date    HDL 76 (H) 01/29/2024    HDL 67 (H) 10/31/2022    HDL 72 (H) 03/15/2022     Lab Results   Component Value Date    LDLCALC 73 01/29/2024    LDLCALC 39 10/31/2022    LDLCALC 49 03/15/2022     Lab Results   Component Value Date    TRIG 102 01/29/2024    TRIG 98 10/31/2022    TRIG 130 03/15/2022     Lab Results   Component Value Date    CHOLHDL 2.2 01/29/2024    CHOLHDL 1.9 10/31/2022    CHOLHDL 2.0 03/15/2022        Wt Readings from Last 3 Encounters:   10/11/24 0922 77.3 kg (170 lb 6.4 oz)   09/27/24 1443 77.9 kg (171 lb 12.8 oz)   07/23/24 1556 77 kg (169 lb 12.8 oz)        BP Readings from Last 3 Encounters:   10/11/24 110/76   09/27/24 123/79   07/23/24 119/77         Review of Systems     Review of Systems   Constitutional: Negative.    HENT:  Positive for rhinorrhea, sneezing and voice change.    Respiratory:  Positive for cough.    Cardiovascular: Negative.    Gastrointestinal: Negative.    Endocrine: Negative.    Genitourinary: Negative.    Musculoskeletal:  Positive for arthralgias.   Integumentary:  Negative.   Allergic/Immunologic: Negative.    Neurological:  Positive for headaches.   Hematological: Negative.    Psychiatric/Behavioral: Negative.          Medical / Social / Family History     Past Medical History:   Diagnosis Date    Arthritis     Cervical spondylosis     Chronic cystitis     Chronic pain syndrome     opioid dependent    Depression with anxiety     Essential (primary) hypertension     GERD (gastroesophageal reflux disease)     Lumbar post-laminectomy syndrome     followed by Dr. Carmen Rivera    Lumbar spondylosis     Meniere's disease 04/27/2017    Migraine 01/23/2023    Mixed hyperlipidemia 01/23/2023    Nephrolithiasis 06/29/2021    TITO on CPAP 09/13/2021    Osteoporosis        Past Surgical History:   Procedure Laterality Date    BLADDER SUSPENSION      CHOLECYSTECTOMY      HYSTERECTOMY      LUMBAR LAMINECTOMY N/A 06/16/2022    Procedure: L4-L5 Laminectomy;  Surgeon: Juan Luis Covington MD;  Location: [facility] [place];  Service: Neurosurgery;  Laterality: N/A;    OOPHORECTOMY      SHOULDER SURGERY Left 07/24/2023       Social History  Ms.  reports that she quit smoking about 48 years ago. Her smoking use included cigarettes. She started smoking about 48 years ago. She has a 1 pack-year smoking history. She has been exposed to tobacco smoke. She has never used smokeless tobacco. She reports that she does not drink alcohol and does not use drugs.    Family History  Ms.'s family history includes Alcohol abuse in her brother, brother, and brother; Arthritis in her mother; COPD in her mother; Cancer in her brother, brother, and father; Glaucoma in her daughter;  Hearing loss in her brother and mother; Heart disease in her brother and mother; Hypertension in her brother, father, and mother; Lung cancer in her father; Prostate cancer in her father; Stroke in her father and mother; Thyroid disease in her daughter and mother.    Medications and Allergies     Medications  Outpatient Medications Marked as Taking for the 10/11/24 encounter (Office Visit) with Ting Pryor FNP   Medication Sig Dispense Refill    acetaminophen (TYLENOL) 500 MG tablet Take 500 mg by mouth every 6 (six) hours as needed.      amitriptyline (ELAVIL) 10 MG tablet Take 2 tablets (20 mg total) by mouth every evening. Pt takes 20 mg at night 180 tablet 1    atorvastatin (LIPITOR) 40 MG tablet Take 1 tablet (40 mg total) by mouth every evening. 90 tablet 1    calcium-vitamin D3 (OS-AIDEE 500 + D3) 500 mg-5 mcg (200 unit) per tablet Take 1 tablet by mouth 2 (two) times daily with meals.      cetirizine (ZYRTEC) 10 MG tablet Take 1 tablet (10 mg total) by mouth once daily. 90 tablet 3    colestipoL (COLESTID) 1 gram Tab Take 1 tablet (1 g total) by mouth once daily. 90 tablet 1    cyanocobalamin 1,000 mcg/mL injection Inject 1ml IM weekly x 6 then every other week afterwards . Please dispense needles and syringes.      diazePAM (VALIUM) 2 MG tablet Take 1 tablet (2 mg total) by mouth once daily. 30 tablet 5    dicyclomine (BENTYL) 10 MG capsule Take 1 capsule (10 mg total) by mouth 3 (three) times daily. 270 capsule 1    EPINEPHrine (EPIPEN) 0.3 mg/0.3 mL AtIn Inject 0.3 mLs (0.3 mg total) into the muscle as needed (allergic reaction). 1 each 2    fluticasone propionate (FLONASE) 50 mcg/actuation nasal spray 2 sprays (100 mcg total) by Each Nostril route once daily. 16 g 3    folic acid (FOLVITE) 1 MG tablet Take 1 tablet (1 mg total) by mouth once daily. 90 tablet 0    gabapentin (NEURONTIN) 600 MG tablet Take 1 tablet (600 mg total) by mouth 2 (two) times daily. 180 tablet 1    HYDROcodone-acetaminophen  "(NORCO) 7.5-325 mg per tablet Take 1 tablet by mouth 2 (two) times daily as needed for Pain.      levothyroxine (SYNTHROID) 50 MCG tablet Take 1 tablet (50 mcg total) by mouth before breakfast. 90 tablet 1    methenamine (HIPREX) 1 gram Tab Take 1 tablet (1 g total) by mouth 2 (two) times daily. 180 tablet 3    methocarbamoL (ROBAXIN) 500 MG Tab Take 1 tablet (500 mg total) by mouth 3 (three) times daily. 270 tablet 1    montelukast (SINGULAIR) 10 mg tablet Take 1 tablet (10 mg total) by mouth every evening. 90 tablet 1    ondansetron (ZOFRAN-ODT) 4 MG TbDL Take 1 tablet (4 mg total) by mouth every 8 (eight) hours as needed (nausea). 90 tablet 0    paroxetine (PAXIL) 20 MG tablet Take 1 tablet (20 mg total) by mouth once daily. 90 tablet 1    polyethylene glycol (GLYCOLAX) 17 gram PwPk Take by mouth. Take 17 grams by mouth 2 times daily mixed with 8 ounces of water, juice, soda, tea, or coffee      promethazine (PHENERGAN) 25 MG tablet Take 1 tablet (25 mg total) by mouth every 4 (four) hours. 45 tablet 0    RABEprazole (ACIPHEX) 20 mg tablet Take by mouth 2 (two) times daily.      raloxifene (EVISTA) 60 mg tablet Take 1 tablet (60 mg total) by mouth once daily. 90 tablet 1    topiramate (TOPAMAX) 100 MG tablet Take 1 tablet (100 mg total) by mouth 2 (two) times daily. 180 tablet 1    triamcinolone acetonide 0.1% (KENALOG) 0.1 % ointment APPLY TO THE AFFECTED AREA(S) ON ARMS, LEGS AND trunk TWICE DAILY FOR ITCHING      triamterene-hydrochlorothiazide 37.5-25 mg (MAXZIDE-25) 37.5-25 mg per tablet Take 1 tablet by mouth once daily. 30 tablet 6    vitamin D (VITAMIN D3) 1000 units Tab Take 5,000 Units by mouth 2 (two) times a day.         Allergies  Review of patient's allergies indicates:   Allergen Reactions    Adhesive tape-silicones     Aspirin     Celebrex [celecoxib]     Morphine      Makes insides feel like they are on fire.    Opioids - morphine analogues Other (See Comments)     "Burning/hot in chest"    Pcn " "[penicillins]     Shrimp     Ciprofloxacin      Pt reported that "it made her feel real bad".    Silicone Itching and Rash       Physical Examination     Vitals:    10/11/24 0922   BP: 110/76   Pulse: 76   Resp: 18   Temp: 98.7 °F (37.1 °C)   TempSrc: Oral   SpO2: 96%   Weight: 77.3 kg (170 lb 6.4 oz)   Height: 5' 9" (1.753 m)      Physical Exam  Constitutional:       Appearance: Normal appearance.   HENT:      Head: Normocephalic.      Right Ear: Hearing, ear canal and external ear normal. A middle ear effusion is present.      Left Ear: Hearing, ear canal and external ear normal. A middle ear effusion is present.      Nose: Rhinorrhea present. Rhinorrhea is clear.      Mouth/Throat:      Mouth: Mucous membranes are moist.      Pharynx: Posterior oropharyngeal erythema present.   Eyes:      Extraocular Movements: Extraocular movements intact.      Pupils: Pupils are equal, round, and reactive to light.   Cardiovascular:      Rate and Rhythm: Normal rate and regular rhythm.      Pulses: Normal pulses.      Heart sounds: Normal heart sounds.   Pulmonary:      Effort: Pulmonary effort is normal.      Breath sounds: Normal breath sounds.   Abdominal:      General: Abdomen is flat. Bowel sounds are normal.      Palpations: Abdomen is soft.   Skin:     General: Skin is warm and dry.      Capillary Refill: Capillary refill takes less than 2 seconds.   Neurological:      General: No focal deficit present.      Mental Status: She is alert and oriented to person, place, and time.   Psychiatric:         Mood and Affect: Mood normal.         Behavior: Behavior normal.          Assessment and Plan (including Health Maintenance)      Problem List  Smart Sets  Document Outside HM   :    Plan:     There are no Patient Instructions on file for this visit.       Health Maintenance Due   Topic Date Due    Annual UACr  Never done    Colorectal Cancer Screening  08/17/2024       Problem List Items Addressed This Visit       Acute " laryngitis - Primary    Current Assessment & Plan     Flu, strep, covid negative.   Decadron 4mg injection given. No adverse reaction noted.   Zpak prescribed to take as directed. Instructed to take all abx until gone even if start to feel better.    Instructed to RTC for any new/worsening/persisting ssx.           Relevant Medications    azithromycin (Z-ANA) 250 MG tablet    Cough    Current Assessment & Plan     See above plan.   Pt has otc cough suppressant that has been helping.   Instructed to RTC for any new/worsening/persisting ssx.           Relevant Orders    POCT COVID-19 Rapid Screening    POCT Influenza A/B Molecular    POCT Strep A, Molecular     Acute laryngitis  -     dexAMETHasone injection 4 mg  -     azithromycin (Z-ANA) 250 MG tablet; Take 2 tablets by mouth on day 1; Take 1 tablet by mouth on days 2-5  Dispense: 6 tablet; Refill: 0    Cough, unspecified type  -     POCT COVID-19 Rapid Screening  -     POCT Influenza A/B Molecular  -     POCT Strep A, Molecular       Health Maintenance Topics with due status: Not Due       Topic Last Completion Date    Mammogram 01/24/2024    Lipid Panel 01/29/2024    DEXA Scan 06/26/2024    High Dose Statin 10/11/2024         Future Appointments   Date Time Provider Department Center   2/17/2025  1:20 PM [facility]   3/27/2025  2:20 PM Ting Pryor FNP [facility]   5/7/2025 10:40 AM [facility]        No follow-ups on file.    Health Maintenance Due   Topic Date Due    Annual UACr  Never done    Colorectal Cancer Screening  08/17/2024        Signature:  CHLOE Lazo    Date of encounter: 10/11/24

## 2024-10-13 PROBLEM — R05.9 COUGH: Status: ACTIVE | Noted: 2024-10-13

## 2024-10-14 NOTE — ASSESSMENT & PLAN NOTE
See above plan.   Pt has otc cough suppressant that has been helping.   Instructed to RTC for any new/worsening/persisting ssx.

## 2024-10-14 NOTE — ASSESSMENT & PLAN NOTE
Flu, strep, covid negative.   Decadron 4mg injection given. No adverse reaction noted.   Zpak prescribed to take as directed. Instructed to take all abx until gone even if start to feel better.    Instructed to RTC for any new/worsening/persisting ssx.

## 2024-11-07 DIAGNOSIS — H81.03 MENIERE'S DISEASE (COCHLEAR HYDROPS), BILATERAL: ICD-10-CM

## 2024-11-07 DIAGNOSIS — R42 DIZZINESS: ICD-10-CM

## 2024-11-07 RX ORDER — DIAZEPAM 2 MG/1
TABLET ORAL
Qty: 60 TABLET | OUTPATIENT
Start: 2024-11-07

## 2024-11-08 RX ORDER — FOLIC ACID 1 MG/1
1 TABLET ORAL DAILY
Qty: 90 TABLET | Refills: 1 | Status: SHIPPED | OUTPATIENT
Start: 2024-11-08

## 2024-12-18 ENCOUNTER — EXTERNAL HOSPITAL ADMISSION (OUTPATIENT)
Dept: ADMINISTRATIVE | Facility: CLINIC | Age: 69
End: 2024-12-18

## 2024-12-18 ENCOUNTER — PATIENT OUTREACH (OUTPATIENT)
Dept: ADMINISTRATIVE | Facility: CLINIC | Age: 69
End: 2024-12-18

## 2024-12-18 NOTE — PROGRESS NOTES
C3 nurse attempted to contact Kizzy NINA Chandu  for a TCC post hospital discharge follow up call. No answer. Left voicemail with callback information. The patient has a scheduled HOSFU appointment with Ting Pryor FNP  on 12/26/24 @ 1000.

## 2024-12-26 ENCOUNTER — OFFICE VISIT (OUTPATIENT)
Dept: FAMILY MEDICINE | Facility: CLINIC | Age: 69
End: 2024-12-26
Payer: MEDICARE

## 2024-12-26 VITALS
OXYGEN SATURATION: 100 % | DIASTOLIC BLOOD PRESSURE: 76 MMHG | BODY MASS INDEX: 26.07 KG/M2 | WEIGHT: 176 LBS | HEART RATE: 66 BPM | SYSTOLIC BLOOD PRESSURE: 125 MMHG | RESPIRATION RATE: 21 BRPM | HEIGHT: 69 IN | TEMPERATURE: 98 F

## 2024-12-26 DIAGNOSIS — Z87.440 HISTORY OF UTI: ICD-10-CM

## 2024-12-26 DIAGNOSIS — I10 PRIMARY HYPERTENSION: ICD-10-CM

## 2024-12-26 DIAGNOSIS — M81.0 AGE-RELATED OSTEOPOROSIS WITHOUT CURRENT PATHOLOGICAL FRACTURE: ICD-10-CM

## 2024-12-26 DIAGNOSIS — Z09 HOSPITAL DISCHARGE FOLLOW-UP: Primary | ICD-10-CM

## 2024-12-26 LAB
BILIRUB SERPL-MCNC: NEGATIVE MG/DL
BLOOD URINE, POC: NEGATIVE
CLARITY, UA: CLEAR
COLOR, UA: YELLOW
CREAT UR-MCNC: 44 MG/DL (ref 15–325)
GLUCOSE UR QL STRIP: NEGATIVE
KETONES UR QL STRIP: NEGATIVE
LEUKOCYTE ESTERASE URINE, POC: NEGATIVE
MICROALBUMIN UR-MCNC: <0.5 MG/DL
MICROALBUMIN/CREAT RATIO PNL UR: NORMAL
NITRITE, POC UA: NEGATIVE
PH, POC UA: 6.5
PROTEIN, POC: NEGATIVE
SPECIFIC GRAVITY, POC UA: 1.01
UROBILINOGEN, POC UA: NORMAL

## 2024-12-26 PROCEDURE — 82570 ASSAY OF URINE CREATININE: CPT | Mod: ,,, | Performed by: CLINICAL MEDICAL LABORATORY

## 2024-12-26 PROCEDURE — 82043 UR ALBUMIN QUANTITATIVE: CPT | Mod: ,,, | Performed by: CLINICAL MEDICAL LABORATORY

## 2024-12-26 RX ORDER — LEVOFLOXACIN 500 MG/1
500 TABLET, FILM COATED ORAL
COMMUNITY
Start: 2024-12-17

## 2024-12-26 RX ORDER — LORATADINE 10 MG/1
10 TABLET ORAL DAILY
COMMUNITY

## 2024-12-26 RX ORDER — MAGNESIUM HYDROXIDE 400 MG/5ML
595 SUSPENSION, ORAL (FINAL DOSE FORM) ORAL
COMMUNITY

## 2024-12-26 RX ORDER — RALOXIFENE HYDROCHLORIDE 60 MG/1
60 TABLET, FILM COATED ORAL DAILY
Qty: 10 TABLET | Refills: 0 | Status: SHIPPED | OUTPATIENT
Start: 2024-12-26 | End: 2024-12-27 | Stop reason: SDUPTHER

## 2024-12-26 NOTE — PROGRESS NOTES
"   CHLOE Lazo   [facility]  [address]  [place] [address]     PATIENT NAME: Kizzy Schofield  : 1955  DATE: 24  MRN: [id number]      Billing Provider: CHLOE Lazo  Level of Service:   Patient PCP Information       Provider PCP Type    CHLOE Lazo General            Reason for Visit / Chief Complaint: Hospital Follow Up (Pt Is here for a hospital follow up. She went to the ER at [facility]. She had a uti. They checked her for a blood clot due sob. She still isnt she what was found or what was found.)   Health Maintenance Due   Topic Date Due    Annual UACr  Never done    Colorectal Cancer Screening  2024    Mammogram  2025          Update PCP  Update Chief Complaint         History of Present Illness / Problem Focused Workflow     History of Present Illness    CHIEF COMPLAINT:  Patient presents today for hospital follow-up regarding multiple health concerns. Pt was initially admitted due to sob. There were no cardiac or pulmonary findings upon admission, but pt did have a uti. Pt has appt with Cardiology Dr. Cabello on 1/10/24.     NEUROLOGICAL SYMPTOMS:  She reports headaches for the past 2-3 weeks that occur when becoming out of breath and sitting down, describing the sensation as her head feeling like it will "explode." She is under neurological care with Dr. Marcus for migraines. Multiple neurological tests indicate back-related issues rather than primary headache disorders.    BACK PAIN:  She reports pain between lower and middle back that becomes severe with activity. She is scheduled to see pain clinic in January for further evaluation.      RECENT MEDICAL ISSUES:  She recently had a UTI. CT and chest XR of the lungs were performed. She has been referred to pulmonology from her recent hospitalization but has not established care yet. Pt had requested to see Dr. Salomon Lira but has never seen him before. We will send referral to them.    SKIN:  She " reports an insect bite from 2-3 days ago causing swelling and itching, unrelieved by alcohol or various creams.      ROS:  General: -fever, -chills, -fatigue, -weight gain, -weight loss  Eyes: +vision changes, -redness, -discharge  ENT: -ear pain, -nasal congestion, -sore throat  Cardiovascular: -chest pain, -palpitations, -lower extremity edema  Respiratory: -cough, -shortness of breath  Gastrointestinal: -abdominal pain, -nausea, -vomiting, -diarrhea, -constipation, -blood in stool  Genitourinary: -dysuria, -hematuria, -frequency  Musculoskeletal: -joint pain, -muscle pain, +back pain  Skin: -rash, -lesion, +itching  Neurological: +headache, -dizziness, -numbness, -tingling  Psychiatric: -anxiety, -depression, -sleep difficulty          Hemoglobin A1C   Date Value Ref Range Status   01/29/2024 5.3 4.5 - 6.6 % Final     Comment:       Normal:               <5.7%  Pre-Diabetic:       5.7% to 6.4%  Diabetic:             >6.4%  Diabetic Goal:     <7%   06/07/2022 5.4 4.5 - 6.6 % Final     Comment:       Normal:               <5.7%  Pre-Diabetic:       5.7% to 6.4%  Diabetic:             >6.4%  Diabetic Goal:     <7%        CMP  Sodium   Date Value Ref Range Status   07/23/2024 139 136 - 145 mmol/L Final     Potassium   Date Value Ref Range Status   07/23/2024 4.4 3.5 - 5.1 mmol/L Final     Chloride   Date Value Ref Range Status   07/23/2024 107 98 - 107 mmol/L Final     CO2   Date Value Ref Range Status   07/23/2024 29 21 - 32 mmol/L Final     Glucose   Date Value Ref Range Status   07/23/2024 110 (H) 74 - 106 mg/dL Final     BUN   Date Value Ref Range Status   07/23/2024 13 7 - 18 mg/dL Final     Creatinine   Date Value Ref Range Status   07/23/2024 1.72 (H) 0.55 - 1.02 mg/dL Final     Calcium   Date Value Ref Range Status   07/23/2024 9.1 8.5 - 10.1 mg/dL Final     Total Protein   Date Value Ref Range Status   02/06/2024 6.9 6.4 - 8.2 g/dL Final     Albumin   Date Value Ref Range Status   02/06/2024 3.3 (L) 3.5 -  5.0 g/dL Final     Bilirubin, Total   Date Value Ref Range Status   02/06/2024 0.3 >0.0 - 1.2 mg/dL Final     Alk Phos   Date Value Ref Range Status   02/06/2024 80 55 - 142 U/L Final     AST   Date Value Ref Range Status   02/06/2024 19 15 - 37 U/L Final     ALT   Date Value Ref Range Status   02/06/2024 26 13 - 56 U/L Final     Anion Gap   Date Value Ref Range Status   07/23/2024 7 7 - 16 mmol/L Final     eGFR   Date Value Ref Range Status   07/23/2024 31 (L) >=60 mL/min/1.73m2 Final        Lab Results   Component Value Date    WBC 6.87 07/23/2024    RBC 4.63 07/23/2024    HGB 13.9 07/23/2024    HCT 44.1 07/23/2024    MCV 95.2 07/23/2024    MCH 30.0 07/23/2024    MCHC 31.5 (L) 07/23/2024    RDW 13.1 07/23/2024     07/23/2024    MPV 10.2 07/23/2024    LYMPH 39.9 07/23/2024    LYMPH 2.74 07/23/2024    MONO 8.0 (H) 07/23/2024    EOS 0.15 07/23/2024    BASO 0.07 07/23/2024    EOSINOPHIL 2.2 07/23/2024    BASOPHIL 1.0 07/23/2024        Lab Results   Component Value Date    CHOL 169 01/29/2024    CHOL 126 10/31/2022    CHOL 147 03/15/2022     Lab Results   Component Value Date    HDL 76 (H) 01/29/2024    HDL 67 (H) 10/31/2022    HDL 72 (H) 03/15/2022     Lab Results   Component Value Date    LDLCALC 73 01/29/2024    LDLCALC 39 10/31/2022    LDLCALC 49 03/15/2022     Lab Results   Component Value Date    TRIG 102 01/29/2024    TRIG 98 10/31/2022    TRIG 130 03/15/2022     Lab Results   Component Value Date    CHOLHDL 2.2 01/29/2024    CHOLHDL 1.9 10/31/2022    CHOLHDL 2.0 03/15/2022        Wt Readings from Last 3 Encounters:   12/26/24 0957 79.8 kg (176 lb)   10/11/24 0922 77.3 kg (170 lb 6.4 oz)   09/27/24 1443 77.9 kg (171 lb 12.8 oz)        BP Readings from Last 3 Encounters:   12/26/24 125/76   10/11/24 110/76   09/27/24 123/79        Review of Systems     Review of Systems       Medical / Social / Family History     Past Medical History:   Diagnosis Date    Arthritis     Cervical spondylosis     Chronic  cystitis     Chronic pain syndrome     opioid dependent    Depression with anxiety     Essential (primary) hypertension     GERD (gastroesophageal reflux disease)     Lumbar post-laminectomy syndrome     followed by Dr. Carmen Rivera    Lumbar spondylosis     Meniere's disease 04/27/2017    Migraine 01/23/2023    Mixed hyperlipidemia 01/23/2023    Nephrolithiasis 06/29/2021    TITO on CPAP 09/13/2021    Osteoporosis        Past Surgical History:   Procedure Laterality Date    BLADDER SUSPENSION      CHOLECYSTECTOMY      HYSTERECTOMY      LUMBAR LAMINECTOMY N/A 06/16/2022    Procedure: L4-L5 Laminectomy;  Surgeon: Juan Luis Covington MD;  Location: [facility];  Service: Neurosurgery;  Laterality: N/A;    OOPHORECTOMY      SHOULDER SURGERY Left 07/24/2023       Social History  Ms.  reports that she quit smoking about 48 years ago. Her smoking use included cigarettes. She started smoking about 48 years ago. She has a 1 pack-year smoking history. She has been exposed to tobacco smoke. She has never used smokeless tobacco. She reports that she does not drink alcohol and does not use drugs.    Family History  Ms.'s family history includes Alcohol abuse in her brother, brother, and brother; Arthritis in her mother; COPD in her mother; Cancer in her brother, brother, and father; Glaucoma in her daughter; Hearing loss in her brother and mother; Heart disease in her brother and mother; Hypertension in her brother, father, and mother; Lung cancer in her father; Prostate cancer in her father; Stroke in her father and mother; Thyroid disease in her daughter and mother.    Medications and Allergies     Medications  Outpatient Medications Marked as Taking for the 12/26/24 encounter (Office Visit) with Ting Pryor FNP   Medication Sig Dispense Refill    acetaminophen (TYLENOL) 500 MG tablet Take 500 mg by mouth every 6 (six) hours as needed.      amitriptyline (ELAVIL) 10 MG tablet Take 2 tablets (20 mg total) by mouth every evening.  Pt takes 20 mg at night 180 tablet 1    atorvastatin (LIPITOR) 40 MG tablet Take 1 tablet (40 mg total) by mouth every evening. 90 tablet 1    calcium-vitamin D3 (OS-AIDEE 500 + D3) 500 mg-5 mcg (200 unit) per tablet Take 1 tablet by mouth 2 (two) times daily with meals.      cetirizine (ZYRTEC) 10 MG tablet Take 1 tablet (10 mg total) by mouth once daily. 90 tablet 3    colestipoL (COLESTID) 1 gram Tab Take 1 tablet (1 g total) by mouth once daily. 90 tablet 1    cyanocobalamin 1,000 mcg/mL injection Inject 1ml IM weekly x 6 then every other week afterwards . Please dispense needles and syringes.      diazePAM (VALIUM) 2 MG tablet Take 1 tablet (2 mg total) by mouth once daily. 30 tablet 5    dicyclomine (BENTYL) 10 MG capsule Take 1 capsule (10 mg total) by mouth 3 (three) times daily. 270 capsule 1    EPINEPHrine (EPIPEN) 0.3 mg/0.3 mL AtIn Inject 0.3 mLs (0.3 mg total) into the muscle as needed (allergic reaction). 1 each 2    fluticasone propionate (FLONASE) 50 mcg/actuation nasal spray 2 sprays (100 mcg total) by Each Nostril route once daily. 16 g 3    folic acid (FOLVITE) 1 MG tablet Take 1 tablet (1 mg total) by mouth once daily. 90 tablet 1    gabapentin (NEURONTIN) 600 MG tablet Take 1 tablet (600 mg total) by mouth 2 (two) times daily. 180 tablet 1    HYDROcodone-acetaminophen (NORCO) 7.5-325 mg per tablet Take 1 tablet by mouth 2 (two) times daily as needed for Pain.      levoFLOXacin (LEVAQUIN) 500 MG tablet Take 500 mg by mouth.      levothyroxine (SYNTHROID) 50 MCG tablet Take 1 tablet (50 mcg total) by mouth before breakfast. 90 tablet 1    loratadine (CLARITIN) 10 mg tablet Take 10 mg by mouth once daily.      methenamine (HIPREX) 1 gram Tab Take 1 tablet (1 g total) by mouth 2 (two) times daily. 180 tablet 3    methocarbamoL (ROBAXIN) 500 MG Tab Take 1 tablet (500 mg total) by mouth 3 (three) times daily. 270 tablet 1    montelukast (SINGULAIR) 10 mg tablet Take 1 tablet (10 mg total) by mouth every  "evening. 90 tablet 1    ondansetron (ZOFRAN-ODT) 4 MG TbDL Take 1 tablet (4 mg total) by mouth every 8 (eight) hours as needed (nausea). 90 tablet 0    paroxetine (PAXIL) 20 MG tablet Take 1 tablet (20 mg total) by mouth once daily. 90 tablet 1    polyethylene glycol (GLYCOLAX) 17 gram PwPk Take by mouth. Take 17 grams by mouth 2 times daily mixed with 8 ounces of water, juice, soda, tea, or coffee      potassium gluconate 595 mg (99 mg) Tab Take 595 mg by mouth.      promethazine (PHENERGAN) 25 MG tablet Take 1 tablet (25 mg total) by mouth every 4 (four) hours. 45 tablet 0    RABEprazole (ACIPHEX) 20 mg tablet Take by mouth 2 (two) times daily.      topiramate (TOPAMAX) 100 MG tablet Take 1 tablet (100 mg total) by mouth 2 (two) times daily. 180 tablet 1    triamcinolone acetonide 0.1% (KENALOG) 0.1 % ointment APPLY TO THE AFFECTED AREA(S) ON ARMS, LEGS AND trunk TWICE DAILY FOR ITCHING      triamterene-hydrochlorothiazide 37.5-25 mg (MAXZIDE-25) 37.5-25 mg per tablet Take 1 tablet by mouth once daily. 30 tablet 6    vitamin D (VITAMIN D3) 1000 units Tab Take 5,000 Units by mouth 2 (two) times a day.      [DISCONTINUED] raloxifene (EVISTA) 60 mg tablet Take 1 tablet (60 mg total) by mouth once daily. 90 tablet 1       Allergies  Review of patient's allergies indicates:   Allergen Reactions    Adhesive tape-silicones     Aspirin     Celebrex [celecoxib]     Morphine      Makes insides feel like they are on fire.    Opioids - morphine analogues Other (See Comments)     "Burning/hot in chest"    Pcn [penicillins]     Shrimp     Ciprofloxacin      Pt reported that "it made her feel real bad".    Silicone Itching and Rash       Physical Examination     Vitals:    12/26/24 0957   BP: 125/76   BP Location: Right arm   Patient Position: Sitting   Pulse: 66   Resp: (!) 21   Temp: 98.4 °F (36.9 °C)   TempSrc: Oral   SpO2: 100%   Weight: 79.8 kg (176 lb)   Height: 5' 9" (1.753 m)      Physical Exam  Constitutional:       " Appearance: Normal appearance.   HENT:      Head: Normocephalic.   Eyes:      Extraocular Movements: Extraocular movements intact.      Pupils: Pupils are equal, round, and reactive to light.   Cardiovascular:      Rate and Rhythm: Normal rate and regular rhythm.      Pulses: Normal pulses.      Heart sounds: Normal heart sounds.   Pulmonary:      Effort: Pulmonary effort is normal.      Breath sounds: Normal breath sounds.   Abdominal:      General: Abdomen is flat. Bowel sounds are normal.      Palpations: Abdomen is soft.   Musculoskeletal:      Comments: Amb with rollator   Skin:     General: Skin is warm and dry.      Capillary Refill: Capillary refill takes less than 2 seconds.   Neurological:      General: No focal deficit present.      Mental Status: She is alert and oriented to person, place, and time.   Psychiatric:         Mood and Affect: Mood normal.         Behavior: Behavior normal.          Assessment and Plan (including Health Maintenance)      Problem List  Smart Sets  Document Outside HM   :    Plan:     There are no Patient Instructions on file for this visit.       Health Maintenance Due   Topic Date Due    Annual UACr  Never done    Colorectal Cancer Screening  08/17/2024    Mammogram  01/24/2025       Problem List Items Addressed This Visit       Age-related osteoporosis without current pathological fracture    Relevant Medications    raloxifene (EVISTA) 60 mg tablet    Hypertension - Primary    Relevant Orders    Microalbumin/Creatinine Ratio, Urine     Other Visit Diagnoses       History of UTI        Relevant Orders    POCT URINALYSIS W/O SCOPE (Completed)          Assessment & Plan    IMPRESSION:  - Assessed shortness of breath, headaches, and hand swelling with pruritus  - Considered cardiac etiology given family history of heart disease  - Reviewed recent ED visit findings, including chest XR, CT, and heart monitor results  - Evaluated need for further cardiac and pulmonary  workup    URINARY TRACT INFECTION (UTI):  - Ordered urinalysis and urine microalbumin tests.  - Will call the patient with urinalysis and urine microalbumin test results.  - Noted that the patient had a confirmed UTI recently.  - Planned to check urine for follow-up after UTI treatment.    SHORTNESS OF BREATH:  - Noted that the patient reports shortness of breath.  - Acknowledged the patient's concern about shortness of breath possibly being related to heart issues.  - Attempted referral to pulmonologist.  - Plan to send a message to facilitate pulmonologist appointment.    HEADACHES:  - Instructed the patient to inform neurology about headaches occurring between migraines at next appointment.  - Noted that headaches that are not migraines, occurring for 2-3 weeks.  - Acknowledged the headache issue and discussed current pain management.    MIGRAINES:  - Scheduled follow up in 4 months (around January 23rd) with Dr. Judith Marcus (neurology).  - Noted that the patient has a history of migraines and is being followed by neurology.  - Reviewed neurologist's note and reminded the patient of upcoming follow-up.  - Noted that the patient receives injections for migraine treatment from neurologist.    BACK PAIN:  - Noted that the patient reports ongoing back pain, particularly in lower and middle back.  - Reviewed recent imaging which showed minimal findings to explain pain severity.  - Acknowledged ongoing back pain issues and multiple tests performed.  - Noted that the patient is scheduled to see pain clinic in January for potential treatment.  - Continued current pain medications and muscle relaxers.    HYPERTENSION:  - Plan to check urine microalbumin, which is related to kidney function and often monitored in hypertensive patients.  - Noted that the patient is being followed by nephrology (Dr. Perea) for kidney-related issues, which may be associated with hypertension.    OTHER INSTRUCTIONS:  - Noted that the  patient mentions having hearing aids.        Primary hypertension  -     Microalbumin/Creatinine Ratio, Urine    Age-related osteoporosis without current pathological fracture  -     raloxifene (EVISTA) 60 mg tablet; Take 1 tablet (60 mg total) by mouth once daily.  Dispense: 10 tablet; Refill: 0    History of UTI  -     POCT URINALYSIS W/O SCOPE       Health Maintenance Topics with due status: Not Due       Topic Last Completion Date    DEXA Scan 06/26/2024    Lipid Panel 12/17/2024    High Dose Statin 12/26/2024         Future Appointments   Date Time Provider Department Center   2/17/2025  1:20 PM [facility] MAMMO1 [facility] MAMMO [facility]   2/25/2025 10:00 AM AW[facility] NURSE, [facility] FAMILY MEDICINE [facility]   3/27/2025  2:20 PM Ting Pryor FNP [facility]   5/7/2025 10:40 AM Wyatt Hernandez NP OB UROL [facility]        No follow-ups on file.    Health Maintenance Due   Topic Date Due    Annual UACr  Never done    Colorectal Cancer Screening  08/17/2024    Mammogram  01/24/2025        Signature:  CHLOE Lazo    Date of encounter: 12/26/24  This note was generated with the assistance of ambient listening technology. Verbal consent was obtained by the patient and accompanying visitor(s) for the recording of patient appointment to facilitate this note. I attest to having reviewed and edited the generated note for accuracy, though some syntax or spelling errors may persist. Please contact the author of this note for any clarification.

## 2024-12-27 DIAGNOSIS — M81.0 AGE-RELATED OSTEOPOROSIS WITHOUT CURRENT PATHOLOGICAL FRACTURE: ICD-10-CM

## 2024-12-27 PROBLEM — Z87.440 HISTORY OF UTI: Status: ACTIVE | Noted: 2024-12-27

## 2024-12-27 PROBLEM — Z09 HOSPITAL DISCHARGE FOLLOW-UP: Status: ACTIVE | Noted: 2024-12-27

## 2024-12-27 RX ORDER — RALOXIFENE HYDROCHLORIDE 60 MG/1
60 TABLET, FILM COATED ORAL DAILY
Qty: 90 TABLET | Refills: 0 | Status: SHIPPED | OUTPATIENT
Start: 2024-12-27

## 2025-01-02 ENCOUNTER — APPOINTMENT (OUTPATIENT)
Dept: RADIOLOGY | Facility: CLINIC | Age: 70
End: 2025-01-02
Attending: NURSE PRACTITIONER
Payer: MEDICARE

## 2025-01-02 ENCOUNTER — OFFICE VISIT (OUTPATIENT)
Dept: FAMILY MEDICINE | Facility: CLINIC | Age: 70
End: 2025-01-02
Payer: MEDICARE

## 2025-01-02 VITALS
OXYGEN SATURATION: 97 % | HEART RATE: 97 BPM | RESPIRATION RATE: 18 BRPM | DIASTOLIC BLOOD PRESSURE: 87 MMHG | BODY MASS INDEX: 23.66 KG/M2 | WEIGHT: 169 LBS | SYSTOLIC BLOOD PRESSURE: 134 MMHG | HEIGHT: 71 IN | TEMPERATURE: 98 F
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
## 2025-01-02 DIAGNOSIS — M53.3 PAIN IN THE COCCYX: ICD-10-CM

## 2025-01-02 DIAGNOSIS — M53.3 PAIN IN THE COCCYX: Primary | ICD-10-CM

## 2025-01-02 PROBLEM — M47.814 THORACIC SPONDYLOSIS: Status: ACTIVE | Noted: 2024-11-13

## 2025-01-02 PROBLEM — M51.379 DDD (DEGENERATIVE DISC DISEASE), LUMBOSACRAL: Status: ACTIVE | Noted: 2024-09-18

## 2025-01-02 PROBLEM — M96.1 POSTLAMINECTOMY SYNDROME, LUMBAR REGION: Status: ACTIVE | Noted: 2024-09-18

## 2025-01-02 PROBLEM — R06.00 DYSPNEA: Status: ACTIVE | Noted: 2024-12-16

## 2025-01-02 PROBLEM — G43.709 CHRONIC MIGRAINE WITHOUT AURA, NOT INTRACTABLE, WITHOUT STATUS MIGRAINOSUS: Status: ACTIVE | Noted: 2025-01-02

## 2025-01-02 PROBLEM — M54.51 VERTEBROGENIC LOW BACK PAIN: Status: ACTIVE | Noted: 2024-09-18

## 2025-01-02 PROBLEM — Z79.891 LONG-TERM CURRENT USE OF OPIATE ANALGESIC: Status: ACTIVE | Noted: 2024-09-18

## 2025-01-02 PROCEDURE — 72220 X-RAY EXAM SACRUM TAILBONE: CPT | Mod: 26,,, | Performed by: RADIOLOGY

## 2025-01-02 PROCEDURE — 72220 X-RAY EXAM SACRUM TAILBONE: CPT | Mod: TC,RHCUB | Performed by: NURSE PRACTITIONER

## 2025-01-02 RX ORDER — KETOROLAC TROMETHAMINE 30 MG/ML
30 INJECTION, SOLUTION INTRAMUSCULAR; INTRAVENOUS
Status: DISCONTINUED | OUTPATIENT
Start: 2025-01-02 | End: 2025-01-02

## 2025-01-02 RX ORDER — KETOROLAC TROMETHAMINE 30 MG/ML
30 INJECTION, SOLUTION INTRAMUSCULAR; INTRAVENOUS
Status: COMPLETED | OUTPATIENT
Start: 2025-01-02 | End: 2025-01-02

## 2025-01-02 RX ADMIN — KETOROLAC TROMETHAMINE 30 MG: 30 INJECTION, SOLUTION INTRAMUSCULAR; INTRAVENOUS at 12:01

## 2025-01-02 NOTE — PATIENT INSTRUCTIONS
Place an ice pack or a bag of frozen peas wrapped in a towel on the painful part. Never put ice right on the skin. Do not leave the ice on more than 10 to 15 minutes at a time.  Try not to sit for long periods of time, especially on hard surfaces.  Lean forward when you sit. Shift your weight from side to side.  Sit on a special cushion with a hole in the middle to take pressure off of the tailbone.

## 2025-01-02 NOTE — PROGRESS NOTES
"             [facility]    Laquita Valladares, AGPCNP-BC [facility]  [address] [address]  [place] [address]  [phone]          PATIENT NAME: Kizzy Schofield  : 1955  DATE: 25  MRN: [id number]          Reason for Visit        Chief Complaint   Patient presents with    Fall     Patient stated she fell 0245 yesterday morning on her bedroom floor, after coming from the bathroom. Patient states when she's getting up and down pain is a "9" on numeric pain scale.        History of Present Illness     CHIEF COMPLAINT:  Patient presents today after a fall.     HISTORY OF PRESENT ILLNESS:  She reports her feet went out from under her when turning off the light in the hallway. She did not hit her head or lose consciousness. The majority of her pain is located on the left-hand side of her tailbone. She uses cushions when sitting in an attempt to alleviate discomfort.    MEDICAL HISTORY:  She has two bad discs contributing to her back problems. She recently was hospitalized for shortness of breath and continues to experience breathing difficulties. She reports having an upcoming appointment with Dr. Cabello.     ALLERGIES:  She reports allergies to penicillin, cipro, shrimp, silicone, tape, celecoxib, and morphine.    ROS:  General: -fever, -chills, -fatigue, -weight gain, -weight loss  Eyes: -vision changes, -redness, -discharge  ENT: -ear pain, -nasal congestion, -sore throat  Cardiovascular: -chest pain, -palpitations, -lower extremity edema  Respiratory: -cough, +shortness of breath  Gastrointestinal: -abdominal pain, -nausea, -vomiting, -diarrhea, -constipation, -blood in stool  Genitourinary: -dysuria, -hematuria, -frequency  Musculoskeletal: -joint pain, -muscle pain  Skin: -rash, -lesion  Neurological: -headache, -dizziness, -numbness, -tingling  Psychiatric: -anxiety, -depression, -sleep difficulty          MEDICAL / SURGICAL / SOCIAL " HISTORY     Past Medical History:   Diagnosis Date    Arthritis     Cervical spondylosis     Chronic cystitis     Chronic pain syndrome     opioid dependent    Depression with anxiety     Essential (primary) hypertension     GERD (gastroesophageal reflux disease)     Lumbar post-laminectomy syndrome     followed by Dr. Carmen Rivera    Lumbar spondylosis     Meniere's disease 2017    Migraine 2023    Mixed hyperlipidemia 2023    Nephrolithiasis 2021    TITO on CPAP 2021    Osteoporosis        Past Surgical History:   Procedure Laterality Date    BLADDER SUSPENSION      CHOLECYSTECTOMY      HYSTERECTOMY      LUMBAR LAMINECTOMY N/A 2022    Procedure: L4-L5 Laminectomy;  Surgeon: Juan Luis Covington MD;  Location: [facility];  Service: Neurosurgery;  Laterality: N/A;    OOPHORECTOMY      SHOULDER SURGERY Left 2023       Social History     Tobacco Use    Smoking status: Former     Current packs/day: 0.00     Average packs/day: 1 pack/day for 1 year (1.0 ttl pk-yrs)     Types: Cigarettes     Start date: 3/1/1976     Quit date: 1976     Years since quittin.6     Passive exposure: Past    Smokeless tobacco: Never    Tobacco comments:     70s   Substance Use Topics    Alcohol use: Never    Drug use: Never         I personally reviewed all past medical, surgical, and social.     MEDICATIONS / ALLERGIES / HM     Current Outpatient Medications   Medication Sig Dispense Refill    acetaminophen (TYLENOL) 500 MG tablet Take 500 mg by mouth every 6 (six) hours as needed.      amitriptyline (ELAVIL) 10 MG tablet Take 2 tablets (20 mg total) by mouth every evening. Pt takes 20 mg at night 180 tablet 1    atorvastatin (LIPITOR) 40 MG tablet Take 1 tablet (40 mg total) by mouth every evening. 90 tablet 1    calcium-vitamin D3 (OS-AIDEE 500 + D3) 500 mg-5 mcg (200 unit) per tablet Take 1 tablet by mouth 2 (two) times daily with meals.      cetirizine (ZYRTEC) 10 MG tablet Take 1 tablet (10  mg total) by mouth once daily. 90 tablet 3    colestipoL (COLESTID) 1 gram Tab Take 1 tablet (1 g total) by mouth once daily. 90 tablet 1    cyanocobalamin 1,000 mcg/mL injection Inject 1ml IM weekly x 6 then every other week afterwards . Please dispense needles and syringes.      diazePAM (VALIUM) 2 MG tablet Take 1 tablet (2 mg total) by mouth once daily. 30 tablet 5    dicyclomine (BENTYL) 10 MG capsule Take 1 capsule (10 mg total) by mouth 3 (three) times daily. 270 capsule 1    EPINEPHrine (EPIPEN) 0.3 mg/0.3 mL AtIn Inject 0.3 mLs (0.3 mg total) into the muscle as needed (allergic reaction). 1 each 2    fluticasone propionate (FLONASE) 50 mcg/actuation nasal spray 2 sprays (100 mcg total) by Each Nostril route once daily. 16 g 3    folic acid (FOLVITE) 1 MG tablet Take 1 tablet (1 mg total) by mouth once daily. 90 tablet 1    gabapentin (NEURONTIN) 600 MG tablet Take 1 tablet (600 mg total) by mouth 2 (two) times daily. 180 tablet 1    HYDROcodone-acetaminophen (NORCO) 7.5-325 mg per tablet Take 1 tablet by mouth 2 (two) times daily as needed for Pain.      levoFLOXacin (LEVAQUIN) 500 MG tablet Take 500 mg by mouth.      levothyroxine (SYNTHROID) 50 MCG tablet Take 1 tablet (50 mcg total) by mouth before breakfast. 90 tablet 1    loratadine (CLARITIN) 10 mg tablet Take 10 mg by mouth once daily.      methenamine (HIPREX) 1 gram Tab Take 1 tablet (1 g total) by mouth 2 (two) times daily. 180 tablet 3    methocarbamoL (ROBAXIN) 500 MG Tab Take 1 tablet (500 mg total) by mouth 3 (three) times daily. 270 tablet 1    montelukast (SINGULAIR) 10 mg tablet Take 1 tablet (10 mg total) by mouth every evening. 90 tablet 1    ondansetron (ZOFRAN-ODT) 4 MG TbDL Take 1 tablet (4 mg total) by mouth every 8 (eight) hours as needed (nausea). 90 tablet 0    paroxetine (PAXIL) 20 MG tablet Take 1 tablet (20 mg total) by mouth once daily. 90 tablet 1    polyethylene glycol (GLYCOLAX) 17 gram PwPk Take by mouth. Take 17 grams by  "mouth 2 times daily mixed with 8 ounces of water, juice, soda, tea, or coffee      potassium gluconate 595 mg (99 mg) Tab Take 595 mg by mouth.      promethazine (PHENERGAN) 25 MG tablet Take 1 tablet (25 mg total) by mouth every 4 (four) hours. 45 tablet 0    RABEprazole (ACIPHEX) 20 mg tablet Take by mouth 2 (two) times daily.      raloxifene (EVISTA) 60 mg tablet Take 1 tablet (60 mg total) by mouth once daily. 90 tablet 0    topiramate (TOPAMAX) 100 MG tablet Take 1 tablet (100 mg total) by mouth 2 (two) times daily. 180 tablet 1    triamcinolone acetonide 0.1% (KENALOG) 0.1 % ointment APPLY TO THE AFFECTED AREA(S) ON ARMS, LEGS AND trunk TWICE DAILY FOR ITCHING      triamterene-hydrochlorothiazide 37.5-25 mg (MAXZIDE-25) 37.5-25 mg per tablet Take 1 tablet by mouth once daily. 30 tablet 6    vitamin D (VITAMIN D3) 1000 units Tab Take 5,000 Units by mouth 2 (two) times a day.       No current facility-administered medications for this visit.       Review of patient's allergies indicates:   Allergen Reactions    Adhesive tape-silicones     Aspirin     Celebrex [celecoxib]     Morphine      Makes insides feel like they are on fire.    Opioids - morphine analogues Other (See Comments)     "Burning/hot in chest"    Pcn [penicillins]     Shrimp     Ciprofloxacin      Pt reported that "it made her feel real bad".    Silicone Itching and Rash       Immunization History   Administered Date(s) Administered    COVID-19, MRNA, LN-S, PF (MODERNA FULL 0.5 ML DOSE) 05/28/2021, 06/25/2021    Influenza 09/25/2023    Influenza - Trivalent - Fluad - Adjuvanted - PF (65 years and older 09/27/2024    Influenza - Trivalent - Fluzone High Dose - PF (65 years and older) 09/25/2021    Pneumococcal Conjugate - 13 Valent 08/06/2018    Pneumococcal Polysaccharide - 23 Valent 10/24/2019    Td (ADULT) 10/11/2011        Health Maintenance   Topic Date Due    Shingles Vaccine (1 of 2) Never done    RSV Vaccine (Age 60+ and Pregnant patients) " "(1 - Risk 60-74 years 1-dose series) Never done    COVID-19 Vaccine (3 - Moderna risk series) 07/23/2021    TETANUS VACCINE  10/11/2021    Colorectal Cancer Screening  08/17/2024    Mammogram  01/24/2025    High Dose Statin  01/02/2026    DEXA Scan  06/26/2027    Lipid Panel  12/17/2029    Hepatitis C Screening  Completed    Influenza Vaccine  Completed    Pneumococcal Vaccines (Age 50+)  Completed        Physical Exam      Vital Signs  Temp: 97.9 °F (36.6 °C)  Temp Source: Oral  Pulse: 97  Resp: 18  SpO2: 97 %  BP: 134/87  BP Location: Right arm  Patient Position: Sitting  Pain Loc: Buttocks  Height and Weight  Height: 5' 10.8" (179.8 cm)  Weight: 76.7 kg (169 lb)  BSA (Calculated - sq m): 1.96 sq meters  BMI (Calculated): 23.7  Weight in (lb) to have BMI = 25: 177.9]    Physical Exam  Constitutional:       Appearance: She is ill-appearing.   HENT:      Head: Normocephalic.   Cardiovascular:      Rate and Rhythm: Normal rate and regular rhythm.      Pulses: Normal pulses.      Heart sounds: Normal heart sounds.   Pulmonary:      Effort: Pulmonary effort is normal.      Breath sounds: Normal breath sounds.   Musculoskeletal:      Lumbar back: Tenderness present.        Back:    Skin:     General: Skin is warm and dry.   Neurological:      Mental Status: She is alert and oriented to person, place, and time.      Gait: Gait abnormal (use rollator walker to assist with ambulation).            Laboratory:    Lab Results   Component Value Date     (H) 07/23/2024     07/23/2024    K 4.4 07/23/2024     07/23/2024    CO2 29 07/23/2024    BUN 13 07/23/2024    CREATININE 1.72 (H) 07/23/2024    CALCIUM 9.1 07/23/2024    PROT 6.9 02/06/2024    ALBUMIN 3.3 (L) 02/06/2024    BILITOT 0.3 02/06/2024    ALKPHOS 80 02/06/2024    AST 19 02/06/2024    ALT 26 02/06/2024    ANIONGAP 7 07/23/2024    EGFRNONAA 41 (L) 06/07/2022       Lab Results   Component Value Date    WBC 6.87 07/23/2024    RBC 4.63 07/23/2024    HGB " "13.9 07/23/2024    HCT 44.1 07/23/2024    MCV 95.2 07/23/2024    RDW 13.1 07/23/2024     07/23/2024        Lab Results   Component Value Date    CHOL 169 01/29/2024    TRIG 102 01/29/2024    HDL 76 (H) 01/29/2024    LDLCALC 73 01/29/2024       Lab Results   Component Value Date    TSH 3.790 (H) 07/23/2024       Lab Results   Component Value Date    HGBA1C 5.3 01/29/2024    ESTIMATEDAVG 105 01/29/2024        No results found for: "IUTCJKUL32"    No results found for: "[id number]"    Point Of Care Testing:    WBC, UA   Date Value Ref Range Status   12/26/2024 negative  Final     Nitrite, UA   Date Value Ref Range Status   12/26/2024 negative  Final     Urobilinogen, UA   Date Value Ref Range Status   12/26/2024 0.2 e.u./dl  Final     Protein, POC   Date Value Ref Range Status   12/26/2024 negative  Final     pH, UA   Date Value Ref Range Status   12/26/2024 6.5  Final     Spec Grav UA   Date Value Ref Range Status   12/26/2024 1.010  Final     Ketones, UA   Date Value Ref Range Status   12/26/2024 negative  Final     Bilirubin, POC   Date Value Ref Range Status   12/26/2024 negative  Final     Glucose, UA   Date Value Ref Range Status   12/26/2024 negative  Final       No results found for: "[id number]", "FLUAMOLEC", "FLUBMOLEC", "MOLSTREPAPOC"    Assessment/Plan     Pain in the coccyx  -     X-Ray Sacrum And Coccyx; Future; Expected date: 01/02/2025  --     ketorolac injection 30 mg    Fall as cause of accidental injury in home as place of occurrence, initial encounter  -     X-Ray Sacrum And Coccyx; Future; Expected date: 01/02/2025      IMPRESSION:  - Assessed patient for potential fracture following a fall, awaiting formal x-ray results  - Considered pain management options, decided on Toradol injection for pain and inflammation  - Confirmed patient did not hit head or lose consciousness during the fall.  - Will follow up and call patient when x-ray results are available.  - Advised patient to use " cushions or donut-shaped support when sitting to alleviate pressure on tailbone.    Follow up with PCP and/or pain management if symptoms persist, worsen, and/or new symptoms develop          Future Appointments   Date Time Provider Department Center   [facility]20[facility]  [facility]   2/25/2025 10:00 AM AWV NURSE, [facility]   3/27/2025  2:20 PM Ting Pryor FNP [facility]   5/7/2025 10:40 AM Wyatt Hernandez, NP OB UROL [facility]       Workup results were reviewed and all questions were answered. Diagnosis and treatment options were discussed and the patient  is amenable with the overall treatment plan. Verbal and written discharge instructions were given including to return to clinic/ED with any acute worsening of symptoms or failure of symptoms to improve. The reasons for return to the clinic/ED were explained in lay terms. No further intervention is warranted at this time. The patient agrees with the plan, expresses understanding, is hemodynamically stable and in no acute distress.     All questions answered to desired level of satisfaction    This note was generated with the assistance of ambient listening technology. Verbal consent was obtained by the patient and accompanying visitor(s) for the recording of patient appointment to facilitate this note. I attest to having reviewed and edited the generated note for accuracy, though some syntax or spelling errors may persist. Please contact the author of this note for any clarification.             АНДРЕЙ Osuna-BC  [facility]

## 2025-01-23 ENCOUNTER — OFFICE VISIT (OUTPATIENT)
Dept: FAMILY MEDICINE | Facility: CLINIC | Age: 70
End: 2025-01-23
Payer: MEDICARE

## 2025-01-23 VITALS
OXYGEN SATURATION: 96 % | SYSTOLIC BLOOD PRESSURE: 114 MMHG | RESPIRATION RATE: 20 BRPM | DIASTOLIC BLOOD PRESSURE: 74 MMHG | HEART RATE: 64 BPM | BODY MASS INDEX: 23.1 KG/M2 | TEMPERATURE: 98 F | WEIGHT: 165 LBS | HEIGHT: 71 IN

## 2025-01-23 DIAGNOSIS — N18.32 STAGE 3B CHRONIC KIDNEY DISEASE: ICD-10-CM

## 2025-01-23 DIAGNOSIS — I69.354 HEMIPLEGIA AND HEMIPARESIS FOLLOWING CEREBRAL INFARCTION AFFECTING LEFT NON-DOMINANT SIDE: ICD-10-CM

## 2025-01-23 DIAGNOSIS — M79.18 LEFT BUTTOCK PAIN: Primary | ICD-10-CM

## 2025-01-23 PROCEDURE — 96372 THER/PROPH/DIAG INJ SC/IM: CPT | Mod: ,,, | Performed by: NURSE PRACTITIONER

## 2025-01-23 PROCEDURE — 99213 OFFICE O/P EST LOW 20 MIN: CPT | Mod: ,,, | Performed by: NURSE PRACTITIONER

## 2025-01-23 RX ORDER — KETOROLAC TROMETHAMINE 30 MG/ML
30 INJECTION, SOLUTION INTRAMUSCULAR; INTRAVENOUS
Status: COMPLETED | OUTPATIENT
Start: 2025-01-23 | End: 2025-01-23

## 2025-01-23 RX ORDER — NALOXONE HYDROCHLORIDE 4 MG/.1ML
1 SPRAY NASAL
COMMUNITY
Start: 2025-01-15

## 2025-01-23 RX ADMIN — KETOROLAC TROMETHAMINE 30 MG: 30 INJECTION, SOLUTION INTRAMUSCULAR; INTRAVENOUS at 02:01

## 2025-01-24 NOTE — PROGRESS NOTES
CHLOE Lazo   [facility]  [address]  [place] [address]     PATIENT NAME: Kizzy Schofield  : 1955  DATE: 25  MRN: [id number]      Billing Provider: CHLOE Lazo  Level of Service:   Patient PCP Information       Provider PCP Type    CHLOE Lazo General            Reason for Visit / Chief Complaint: Hip Pain (Fell first of January and she states that she still had a  lot of pain and swelling to that spot. Pt states it interferes with walking and sleep and sitting )   Health Maintenance Due   Topic Date Due    Shingles Vaccine (1 of 2) Never done    RSV Vaccine (Age 60+ and Pregnant patients) (1 - Risk 60-74 years 1-dose series) Never done    COVID-19 Vaccine (3 - Moderna risk series) 2021    TETANUS VACCINE  10/11/2021    Colorectal Cancer Screening  2024    Mammogram  2025          Update PCP  Update Chief Complaint         History of Present Illness / Problem Focused Workflow     History of Present Illness    CHIEF COMPLAINT:  Patient presents today for follow-up of pain and swelling from a fall.    PAIN AND FUNCTIONAL STATUS:  She reports ongoing pain and swelling primarily affecting the left side, with varying severity. She requires pillows for support while sitting, driving, and at [employer]. She experiences significant sleep disturbances due to pain, requiring pain medication during the night and frequent position changes between right side, back, and left side for comfort. She is currently receiving home health services and physical therapy twice weekly. X-ray of the affected area was normal.    MEDICATIONS:  She reports taking muscle relaxers for pain management. Her pain medications have been reduced following the fall.    CARDIOVASCULAR:  For the past two days, she reports experiencing elevated pulse upon standing and walking, accompanied by headaches. Her pulse ranges from 60 BPM at rest but increases significantly with activity. She has  upcoming cardiac tests scheduled, including a nuclear medicine test and an electrocardiogram. She is not currently taking any medication for heart rate control.      ROS:  General: -fever, -chills, -fatigue, -weight gain, -weight loss  Eyes: -vision changes, -redness, -discharge  ENT: -ear pain, -nasal congestion, -sore throat  Cardiovascular: -chest pain, -palpitations, -lower extremity edema  Respiratory: -cough, -shortness of breath  Gastrointestinal: -abdominal pain, -nausea, -vomiting, -diarrhea, -constipation, -blood in stool  Genitourinary: -dysuria, -hematuria, -frequency  Musculoskeletal: +joint pain, -muscle pain, +joint swelling  Skin: -rash, -lesion  Neurological: +headache, -dizziness, -numbness, -tingling  Psychiatric: -anxiety, -depression, +sleep difficulty          Hemoglobin A1C   Date Value Ref Range Status   12/17/2024 5.6 4.5 - 6.2 % Final   01/29/2024 5.3 4.5 - 6.6 % Final     Comment:       Normal:               <5.7%  Pre-Diabetic:       5.7% to 6.4%  Diabetic:             >6.4%  Diabetic Goal:     <7%   06/07/2022 5.4 4.5 - 6.6 % Final     Comment:       Normal:               <5.7%  Pre-Diabetic:       5.7% to 6.4%  Diabetic:             >6.4%  Diabetic Goal:     <7%        CMP  Sodium   Date Value Ref Range Status   07/23/2024 139 136 - 145 mmol/L Final     Potassium   Date Value Ref Range Status   07/23/2024 4.4 3.5 - 5.1 mmol/L Final     Chloride   Date Value Ref Range Status   07/23/2024 107 98 - 107 mmol/L Final     CO2   Date Value Ref Range Status   07/23/2024 29 21 - 32 mmol/L Final     Glucose   Date Value Ref Range Status   07/23/2024 110 (H) 74 - 106 mg/dL Final     BUN   Date Value Ref Range Status   07/23/2024 13 7 - 18 mg/dL Final     Creatinine   Date Value Ref Range Status   07/23/2024 1.72 (H) 0.55 - 1.02 mg/dL Final     Calcium   Date Value Ref Range Status   07/23/2024 9.1 8.5 - 10.1 mg/dL Final     Total Protein   Date Value Ref Range Status   02/06/2024 6.9 6.4 - 8.2  g/dL Final     Albumin   Date Value Ref Range Status   02/06/2024 3.3 (L) 3.5 - 5.0 g/dL Final     Bilirubin, Total   Date Value Ref Range Status   02/06/2024 0.3 >0.0 - 1.2 mg/dL Final     Alk Phos   Date Value Ref Range Status   02/06/2024 80 55 - 142 U/L Final     AST   Date Value Ref Range Status   02/06/2024 19 15 - 37 U/L Final     ALT   Date Value Ref Range Status   02/06/2024 26 13 - 56 U/L Final     Anion Gap   Date Value Ref Range Status   07/23/2024 7 7 - 16 mmol/L Final     eGFR   Date Value Ref Range Status   07/23/2024 31 (L) >=60 mL/min/1.73m2 Final        Lab Results   Component Value Date    WBC 6.87 07/23/2024    RBC 4.63 07/23/2024    HGB 13.9 07/23/2024    HCT 44.1 07/23/2024    MCV 95.2 07/23/2024    MCH 30.0 07/23/2024    MCHC 31.5 (L) 07/23/2024    RDW 13.1 07/23/2024     07/23/2024    MPV 10.2 07/23/2024    LYMPH 39.9 07/23/2024    LYMPH 2.74 07/23/2024    MONO 8.0 (H) 07/23/2024    EOS 0.15 07/23/2024    BASO 0.07 07/23/2024    EOSINOPHIL 2.2 07/23/2024    BASOPHIL 1.0 07/23/2024        Lab Results   Component Value Date    CHOL 169 01/29/2024    CHOL 126 10/31/2022    CHOL 147 03/15/2022     Lab Results   Component Value Date    HDL 76 (H) 01/29/2024    HDL 67 (H) 10/31/2022    HDL 72 (H) 03/15/2022     Lab Results   Component Value Date    LDLCALC 73 01/29/2024    LDLCALC 39 10/31/2022    LDLCALC 49 03/15/2022     Lab Results   Component Value Date    TRIG 102 01/29/2024    TRIG 98 10/31/2022    TRIG 130 03/15/2022     Lab Results   Component Value Date    CHOLHDL 2.2 01/29/2024    CHOLHDL 1.9 10/31/2022    CHOLHDL 2.0 03/15/2022        Wt Readings from Last 3 Encounters:   01/23/25 1402 74.8 kg (165 lb)   01/02/25 1037 76.7 kg (169 lb)   12/26/24 0957 79.8 kg (176 lb)        BP Readings from Last 3 Encounters:   01/23/25 114/74   01/02/25 134/87   12/26/24 125/76        Review of Systems     Review of Systems       Medical / Social / Family History     Past Medical History:    Diagnosis Date    Arthritis     Cervical spondylosis     Chronic cystitis     Chronic pain syndrome     opioid dependent    Depression with anxiety     Essential (primary) hypertension     GERD (gastroesophageal reflux disease)     Lumbar post-laminectomy syndrome     followed by Dr. Carmen Rivera    Lumbar spondylosis     Meniere's disease 04/27/2017    Migraine 01/23/2023    Mixed hyperlipidemia 01/23/2023    Nephrolithiasis 06/29/2021    TITO on CPAP 09/13/2021    Osteoporosis        Past Surgical History:   Procedure Laterality Date    BLADDER SUSPENSION      CHOLECYSTECTOMY      HYSTERECTOMY      LUMBAR LAMINECTOMY N/A 06/16/2022    Procedure: L4-L5 Laminectomy;  Surgeon: Juan Luis Covington MD;  Location: [facility];  Service: Neurosurgery;  Laterality: N/A;    OOPHORECTOMY      SHOULDER SURGERY Left 07/24/2023       Social History  Ms.  reports that she quit smoking about 48 years ago. Her smoking use included cigarettes. She started smoking about 48 years ago. She has a 1 pack-year smoking history. She has been exposed to tobacco smoke. She has never used smokeless tobacco. She reports that she does not drink alcohol and does not use drugs.    Family History  Ms.'s family history includes Alcohol abuse in her brother, brother, and brother; Arthritis in her mother; COPD in her mother; Cancer in her brother, brother, and father; Glaucoma in her daughter; Hearing loss in her brother and mother; Heart disease in her brother and mother; Hypertension in her brother, father, and mother; Lung cancer in her father; Prostate cancer in her father; Stroke in her father and mother; Thyroid disease in her daughter and mother.    Medications and Allergies     Medications  Outpatient Medications Marked as Taking for the 1/23/25 encounter (Office Visit) with Ting Pryor FNP   Medication Sig Dispense Refill    acetaminophen (TYLENOL) 500 MG tablet Take 500 mg by mouth every 6 (six) hours as needed.      atorvastatin (LIPITOR)  40 MG tablet Take 1 tablet (40 mg total) by mouth every evening. 90 tablet 1    calcium-vitamin D3 (OS-AIDEE 500 + D3) 500 mg-5 mcg (200 unit) per tablet Take 1 tablet by mouth 2 (two) times daily with meals.      cetirizine (ZYRTEC) 10 MG tablet Take 1 tablet (10 mg total) by mouth once daily. 90 tablet 3    colestipoL (COLESTID) 1 gram Tab Take 1 tablet (1 g total) by mouth once daily. 90 tablet 1    cyanocobalamin 1,000 mcg/mL injection Inject 1ml IM weekly x 6 then every other week afterwards . Please dispense needles and syringes.      diazePAM (VALIUM) 2 MG tablet Take 1 tablet (2 mg total) by mouth once daily. 30 tablet 5    dicyclomine (BENTYL) 10 MG capsule Take 1 capsule (10 mg total) by mouth 3 (three) times daily. 270 capsule 1    EPINEPHrine (EPIPEN) 0.3 mg/0.3 mL AtIn Inject 0.3 mLs (0.3 mg total) into the muscle as needed (allergic reaction). 1 each 2    fluticasone propionate (FLONASE) 50 mcg/actuation nasal spray 2 sprays (100 mcg total) by Each Nostril route once daily. 16 g 3    folic acid (FOLVITE) 1 MG tablet Take 1 tablet (1 mg total) by mouth once daily. 90 tablet 1    gabapentin (NEURONTIN) 600 MG tablet Take 1 tablet (600 mg total) by mouth 2 (two) times daily. 180 tablet 1    HYDROcodone-acetaminophen (NORCO) 7.5-325 mg per tablet Take 1 tablet by mouth 2 (two) times daily as needed for Pain.      levothyroxine (SYNTHROID) 50 MCG tablet Take 1 tablet (50 mcg total) by mouth before breakfast. 90 tablet 1    loratadine (CLARITIN) 10 mg tablet Take 10 mg by mouth once daily.      methenamine (HIPREX) 1 gram Tab Take 1 tablet (1 g total) by mouth 2 (two) times daily. 180 tablet 3    methocarbamoL (ROBAXIN) 500 MG Tab Take 1 tablet (500 mg total) by mouth 3 (three) times daily. 270 tablet 1    montelukast (SINGULAIR) 10 mg tablet Take 1 tablet (10 mg total) by mouth every evening. 90 tablet 1    ondansetron (ZOFRAN-ODT) 4 MG TbDL Take 1 tablet (4 mg total) by mouth every 8 (eight) hours as needed  "(nausea). 90 tablet 0    paroxetine (PAXIL) 20 MG tablet Take 1 tablet (20 mg total) by mouth once daily. 90 tablet 1    polyethylene glycol (GLYCOLAX) 17 gram PwPk Take by mouth. Take 17 grams by mouth 2 times daily mixed with 8 ounces of water, juice, soda, tea, or coffee      potassium gluconate 595 mg (99 mg) Tab Take 595 mg by mouth.      promethazine (PHENERGAN) 25 MG tablet Take 1 tablet (25 mg total) by mouth every 4 (four) hours. 45 tablet 0    RABEprazole (ACIPHEX) 20 mg tablet Take by mouth 2 (two) times daily.      raloxifene (EVISTA) 60 mg tablet Take 1 tablet (60 mg total) by mouth once daily. 90 tablet 0    topiramate (TOPAMAX) 100 MG tablet Take 1 tablet (100 mg total) by mouth 2 (two) times daily. 180 tablet 1    triamcinolone acetonide 0.1% (KENALOG) 0.1 % ointment APPLY TO THE AFFECTED AREA(S) ON ARMS, LEGS AND trunk TWICE DAILY FOR ITCHING      triamterene-hydrochlorothiazide 37.5-25 mg (MAXZIDE-25) 37.5-25 mg per tablet Take 1 tablet by mouth once daily. 30 tablet 6    vitamin D (VITAMIN D3) 1000 units Tab Take 5,000 Units by mouth 2 (two) times a day.       Current Facility-Administered Medications for the 1/23/25 encounter (Office Visit) with Ting Pryor FNP   Medication Dose Route Frequency Provider Last Rate Last Admin    [COMPLETED] ketorolac injection 30 mg  30 mg Intramuscular 1 time in Clinic/HOD Ting Pryor FNP   30 mg at 01/23/25 1430       Allergies  Review of patient's allergies indicates:   Allergen Reactions    Adhesive tape-silicones     Aspirin     Celebrex [celecoxib]     Morphine      Makes insides feel like they are on fire.    Opioids - morphine analogues Other (See Comments)     "Burning/hot in chest"    Pcn [penicillins]     Shrimp     Ciprofloxacin      Pt reported that "it made her feel real bad".    Silicone Itching and Rash       Physical Examination     Vitals:    01/23/25 1402   BP: 114/74   BP Location: Left arm   Patient Position: Sitting   Pulse: 64   Resp: 20 " "  Temp: 98.1 °F (36.7 °C)   TempSrc: Oral   SpO2: 96%   Weight: 74.8 kg (165 lb)   Height: 5' 10.8" (1.798 m)      Physical Exam  Constitutional:       Appearance: Normal appearance.   HENT:      Head: Normocephalic.   Eyes:      Extraocular Movements: Extraocular movements intact.   Cardiovascular:      Rate and Rhythm: Normal rate and regular rhythm.      Pulses: Normal pulses.      Heart sounds: Normal heart sounds.   Pulmonary:      Effort: Pulmonary effort is normal.      Breath sounds: Normal breath sounds.   Musculoskeletal:      Cervical back: Normal range of motion.      Left hip: Tenderness present. No deformity, lacerations, bony tenderness or crepitus. Decreased range of motion. Decreased strength.        Legs:       Comments: Area to left buttock ttp; no erythema, warmth, edema noted   Skin:     General: Skin is warm and dry.      Capillary Refill: Capillary refill takes less than 2 seconds.   Neurological:      General: No focal deficit present.      Mental Status: She is alert and oriented to person, place, and time.   Psychiatric:         Mood and Affect: Mood normal.         Behavior: Behavior normal.          Assessment and Plan (including Health Maintenance)      Problem List  Smart Sets  Document Outside HM   :    Plan:     There are no Patient Instructions on file for this visit.       Health Maintenance Due   Topic Date Due    Shingles Vaccine (1 of 2) Never done    RSV Vaccine (Age 60+ and Pregnant patients) (1 - Risk 60-74 years 1-dose series) Never done    COVID-19 Vaccine (3 - Moderna risk series) 07/23/2021    TETANUS VACCINE  10/11/2021    Colorectal Cancer Screening  08/17/2024    Mammogram  01/24/2025       Problem List Items Addressed This Visit       Hemiplegia and hemiparesis following cerebral infarction affecting left non-dominant side    Stage 3b chronic kidney disease     Other Visit Diagnoses       Left buttock pain    -  Primary    Relevant Medications    ketorolac injection 30 " mg (Completed)          Assessment & Plan    IMPRESSION:  - Assessed patient's ongoing pain and swelling from recent fall  - Considered anti-inflammatory intervention to address persistent symptoms  - Evaluated recent x-ray results, which were normal  - Noted patient's current engagement with pain management and physical therapy  - Considered orthopedic referral but patient declined  - Observed elevated pulse rate upon standing, potentially requiring further cardiac evaluation    - Monitored ongoing pain and swelling on the left side following a fall, noting sleep disturbances due to pain requiring frequent position changes.  - Physical exam revealed tenderness to left buttock without visible redness or warmth.  - Previous x-ray results were normal.  - Planned to administer a higher dose of toradol injection for inflammation.  - Continued physical therapy twice weekly and advised using pillows for support in various settings.  - Will consider referral to orthopedics if symptoms persist.    STAGE 3B CHRONIC KIDNEY DISEASE:  - Noted recent hospitalization and ongoing tests with Dr. Cabello.  - Reviewed December lab results, including thyroid function.  - Acknowledged need for further testing, including scheduled nuclear medicine test and electrocardiogram.  - Deferred to Dr. Cabello for management of cardiac symptoms as she has an appt for tests next week then appt with him the next week.     HIP PAIN:  - Administered a higher dose of Toradol injection for pain and inflammation.    LOWER LIMB SWELLING:  - Monitored ongoing swelling in the left lower limb.  - The higher dose of Toradol injection administered for hip pain should also address this inflammation.    ELEVATED PULSE RATE:  - Monitored patient's report of elevated pulse rate upon standing, with an increase from 64 to 77 observed during the visit.  - Patient reported pulse rates up to 176 when measured at home.    HEADACHES:  - Monitored patient's report of  headaches associated with elevated pulse rate.    FALL RISK:  - Noted the patient's history of falling.    MEDICATIONS/SUPPLEMENTS:  - Explained that Toradol is an anti-inflammatory medication and not a pain medication that causes drowsiness.    FOLLOW UP:  - Await results of upcoming electrocardiogram and nuclear medicine test.        Left buttock pain  -     ketorolac injection 30 mg    Hemiplegia and hemiparesis following cerebral infarction affecting left non-dominant side    Stage 3b chronic kidney disease       Health Maintenance Topics with due status: Not Due       Topic Last Completion Date    DEXA Scan 06/26/2024    Lipid Panel 12/17/2024         Future Appointments   Date Time Provider Department Center   2/17/2025  1:20 PM [facility] MAMMO1 [facility] MAMMO [facility]   2/25/2025 10:00 AM AWV NURSE, [facility]   3/27/2025  2:20 PM Ting Pryor FNP [facility]   5/7/2025 10:40 AM Wyatt Hernandez, NP OB UROL [facility]        Follow up if symptoms worsen or fail to improve.    Health Maintenance Due   Topic Date Due    Shingles Vaccine (1 of 2) Never done    RSV Vaccine (Age 60+ and Pregnant patients) (1 - Risk 60-74 years 1-dose series) Never done    COVID-19 Vaccine (3 - Moderna risk series) 07/23/2021    TETANUS VACCINE  10/11/2021    Colorectal Cancer Screening  08/17/2024    Mammogram  01/24/2025        Signature:  CHLOE Lazo    Date of encounter: 1/23/25  This note was generated with the assistance of ambient listening technology. Verbal consent was obtained by the patient and accompanying visitor(s) for the recording of patient appointment to facilitate this note. I attest to having reviewed and edited the generated note for accuracy, though some syntax or spelling errors may persist. Please contact the author of this note for any clarification.

## 2025-01-27 DIAGNOSIS — K58.9 IRRITABLE BOWEL SYNDROME, UNSPECIFIED TYPE: ICD-10-CM

## 2025-01-27 RX ORDER — DICYCLOMINE HYDROCHLORIDE 10 MG/1
10 CAPSULE ORAL 3 TIMES DAILY
Qty: 270 CAPSULE | Refills: 1 | Status: SHIPPED | OUTPATIENT
Start: 2025-01-27

## 2025-02-17 ENCOUNTER — HOSPITAL ENCOUNTER (OUTPATIENT)
Dept: RADIOLOGY | Facility: HOSPITAL | Age: 70
Discharge: HOME | End: 2025-02-17
Attending: RADIOLOGY
Payer: MEDICARE

## 2025-02-17 DIAGNOSIS — Z12.31 VISIT FOR SCREENING MAMMOGRAM: ICD-10-CM

## 2025-02-17 PROCEDURE — 77067 SCR MAMMO BI INCL CAD: CPT | Mod: TC

## 2025-03-05 DIAGNOSIS — H81.03 MENIERE'S DISEASE (COCHLEAR HYDROPS), BILATERAL: ICD-10-CM

## 2025-03-05 RX ORDER — DIAZEPAM 2 MG/1
2 TABLET ORAL 2 TIMES DAILY
Qty: 180 TABLET | Refills: 2 | Status: SHIPPED | OUTPATIENT
Start: 2025-03-05 | End: 2025-11-30

## 2025-03-05 NOTE — TELEPHONE ENCOUNTER
----- Message from Lora sent at 3/4/2025 10:41 AM CST -----  Regarding: rx  Who Called: Kizzy NINA DeysideonVijayaill or New Rx:RefillRX Name and Strength:diazePAM (VALIUM) 2 MG tabletHow is the patient currently taking it? (ex. 1XDay):2xdayIs this a 30 day or 90 day RX:90Local or Mail Order:local List of preferred pharmacies on file (remove unneeded): [unfilled]If different Pharmacy is requested, enter Pharmacy information here including location and phone number: [facility]. - [place] - [address] [facility] [id number] [facility] [address] [place] [address]Dlguj: [phone] Fax: [phone]: Not open 24 hours Ordering Provider:TuckerPreferred Method of Contact: Phone CallPatient's Preferred Phone Number on File: [phone] Best Call Back Number, if different:Additional Information:    Attempted to call pt back: left a VM.  90 day RX sent to [facility] in [place].

## 2025-03-10 ENCOUNTER — OFFICE VISIT (OUTPATIENT)
Dept: FAMILY MEDICINE | Facility: CLINIC | Age: 70
End: 2025-03-10
Payer: MEDICARE

## 2025-03-10 VITALS
TEMPERATURE: 99 F | HEIGHT: 69 IN | SYSTOLIC BLOOD PRESSURE: 119 MMHG | WEIGHT: 173 LBS | BODY MASS INDEX: 25.62 KG/M2 | HEART RATE: 79 BPM | DIASTOLIC BLOOD PRESSURE: 75 MMHG | RESPIRATION RATE: 18 BRPM | OXYGEN SATURATION: 98 %

## 2025-03-10 DIAGNOSIS — M81.0 AGE-RELATED OSTEOPOROSIS WITHOUT CURRENT PATHOLOGICAL FRACTURE: ICD-10-CM

## 2025-03-10 DIAGNOSIS — H81.03 MENIERE'S DISEASE (COCHLEAR HYDROPS), BILATERAL: ICD-10-CM

## 2025-03-10 DIAGNOSIS — E78.2 MIXED HYPERLIPIDEMIA: ICD-10-CM

## 2025-03-10 DIAGNOSIS — T78.40XS ALLERGY, SEQUELA: ICD-10-CM

## 2025-03-10 DIAGNOSIS — R30.0 DYSURIA: ICD-10-CM

## 2025-03-10 DIAGNOSIS — E03.9 HYPOTHYROIDISM, UNSPECIFIED TYPE: ICD-10-CM

## 2025-03-10 DIAGNOSIS — Z87.440 HISTORY OF UTI: ICD-10-CM

## 2025-03-10 DIAGNOSIS — G89.4 CHRONIC PAIN SYNDROME: ICD-10-CM

## 2025-03-10 DIAGNOSIS — E53.8 FOLIC ACID DEFICIENCY: ICD-10-CM

## 2025-03-10 DIAGNOSIS — N30.01 ACUTE CYSTITIS WITH HEMATURIA: Primary | ICD-10-CM

## 2025-03-10 LAB
BILIRUB SERPL-MCNC: NEGATIVE MG/DL
BLOOD URINE, POC: ABNORMAL
CLARITY, UA: CLEAR
COLOR, UA: YELLOW
GLUCOSE UR QL STRIP: NEGATIVE
KETONES UR QL STRIP: NEGATIVE
LEUKOCYTE ESTERASE URINE, POC: ABNORMAL
NITRITE, POC UA: POSITIVE
PH, POC UA: 6.5
PROTEIN, POC: NEGATIVE
SPECIFIC GRAVITY, POC UA: 1.01
UROBILINOGEN, POC UA: ABNORMAL

## 2025-03-10 PROCEDURE — 99213 OFFICE O/P EST LOW 20 MIN: CPT | Mod: ,,, | Performed by: NURSE PRACTITIONER

## 2025-03-10 PROCEDURE — 87077 CULTURE AEROBIC IDENTIFY: CPT | Mod: ,,, | Performed by: CLINICAL MEDICAL LABORATORY

## 2025-03-10 PROCEDURE — 87086 URINE CULTURE/COLONY COUNT: CPT | Mod: ,,, | Performed by: CLINICAL MEDICAL LABORATORY

## 2025-03-10 PROCEDURE — 87186 SC STD MICRODIL/AGAR DIL: CPT | Mod: ,,, | Performed by: CLINICAL MEDICAL LABORATORY

## 2025-03-10 RX ORDER — LEVOFLOXACIN 500 MG/1
500 TABLET, FILM COATED ORAL DAILY
Qty: 5 TABLET | Refills: 0 | Status: SHIPPED | OUTPATIENT
Start: 2025-03-10 | End: 2025-03-10 | Stop reason: SDUPTHER

## 2025-03-10 RX ORDER — FOLIC ACID 1 MG/1
1 TABLET ORAL DAILY
Qty: 90 TABLET | Refills: 1 | Status: SHIPPED | OUTPATIENT
Start: 2025-03-10

## 2025-03-10 RX ORDER — LORATADINE 10 MG/1
10 TABLET ORAL DAILY
Qty: 90 TABLET | Refills: 1 | Status: SHIPPED | OUTPATIENT
Start: 2025-03-10

## 2025-03-10 RX ORDER — METHENAMINE HIPPURATE 1000 MG/1
1 TABLET ORAL 2 TIMES DAILY
Qty: 180 TABLET | Refills: 3 | Status: SHIPPED | OUTPATIENT
Start: 2025-03-10 | End: 2026-03-05

## 2025-03-10 RX ORDER — LEVOTHYROXINE SODIUM 50 UG/1
50 TABLET ORAL
Qty: 90 TABLET | Refills: 1 | Status: SHIPPED | OUTPATIENT
Start: 2025-03-10

## 2025-03-10 RX ORDER — METHOCARBAMOL 500 MG/1
500 TABLET, FILM COATED ORAL 3 TIMES DAILY
Qty: 270 TABLET | Refills: 1 | Status: SHIPPED | OUTPATIENT
Start: 2025-03-10

## 2025-03-10 RX ORDER — LEVOFLOXACIN 500 MG/1
500 TABLET, FILM COATED ORAL DAILY
Qty: 5 TABLET | Refills: 0 | Status: SHIPPED | OUTPATIENT
Start: 2025-03-10 | End: 2025-03-15

## 2025-03-10 RX ORDER — TRIAMTERENE/HYDROCHLOROTHIAZID 37.5-25 MG
1 TABLET ORAL DAILY
Qty: 30 TABLET | Refills: 6 | Status: SHIPPED | OUTPATIENT
Start: 2025-03-10 | End: 2025-10-06

## 2025-03-10 RX ORDER — GABAPENTIN 600 MG/1
600 TABLET ORAL 2 TIMES DAILY
Qty: 180 TABLET | Refills: 1 | Status: SHIPPED | OUTPATIENT
Start: 2025-03-10

## 2025-03-10 RX ORDER — RALOXIFENE HYDROCHLORIDE 60 MG/1
60 TABLET, FILM COATED ORAL DAILY
Qty: 90 TABLET | Refills: 1 | Status: SHIPPED | OUTPATIENT
Start: 2025-03-10

## 2025-03-10 RX ORDER — MONTELUKAST SODIUM 10 MG/1
10 TABLET ORAL NIGHTLY
Qty: 90 TABLET | Refills: 1 | Status: SHIPPED | OUTPATIENT
Start: 2025-03-10

## 2025-03-10 RX ORDER — TOPIRAMATE 100 MG/1
100 TABLET, FILM COATED ORAL 2 TIMES DAILY
Qty: 180 TABLET | Refills: 1 | Status: SHIPPED | OUTPATIENT
Start: 2025-03-10

## 2025-03-10 RX ORDER — ATORVASTATIN CALCIUM 40 MG/1
40 TABLET, FILM COATED ORAL NIGHTLY
Qty: 90 TABLET | Refills: 1 | Status: SHIPPED | OUTPATIENT
Start: 2025-03-10

## 2025-03-13 ENCOUNTER — RESULTS FOLLOW-UP (OUTPATIENT)
Dept: FAMILY MEDICINE | Facility: CLINIC | Age: 70
End: 2025-03-13

## 2025-03-13 LAB — UA COMPLETE W REFLEX CULTURE PNL UR: ABNORMAL

## 2025-03-18 NOTE — PROGRESS NOTES
CHLOE Lazo   [facility]  [address]  [place] [address]     PATIENT NAME: Kizzy Schofield  : 1955  DATE: 3/10/25  MRN: [id number]      Billing Provider: CHLOE Lazo  Level of Service:   Patient PCP Information       Provider PCP Type    CHLOE Lazo General            Reason for Visit / Chief Complaint: Dysuria (.Kizzy Schofield is a 69 y.o. female who presents to the clinic today  with reports of left kidney pain and dysuria. Also needs med refills) and left kidney pain   Health Maintenance Due   Topic Date Due    Shingles Vaccine (1 of 2) Never done    RSV Vaccine (Age 60+ and Pregnant patients) (1 - Risk 60-74 years 1-dose series) Never done    COVID-19 Vaccine (3 - Moderna risk series) 2021    TETANUS VACCINE  10/11/2021    Colorectal Cancer Screening  2024          Update PCP  Update Chief Complaint         History of Present Illness / Problem Focused Workflow     History of Present Illness    CHIEF COMPLAINT:  Patient presents today with urinary tract infection symptoms    URINARY SYMPTOMS:  She reports difficulty initiating urination with eventual voiding, severe dysuria, and urinary urgency with incontinence. She also reports diarrhea.    MEDICATION HISTORY:  She reports previous long-term use of Macrobid with diminishing effectiveness over time, noting current inconsistent response. She had successful treatment with Levaquin 500mg for 5 days during a previous hospital stay. She is prescribed Evista for bone health but has not been taking it due to misplacing the medication.    ALLERGIES:  She reports severe allergic reactions to mosquito bites characterized by significant swelling, localized fever sensation, and intense discomfort at bite sites.      ROS:  General: -fever, -chills, -fatigue, -weight gain, -weight loss  Eyes: -vision changes, -redness, -discharge  ENT: -ear pain, -nasal congestion, -sore throat  Cardiovascular: -chest pain,  -palpitations, -lower extremity edema  Respiratory: -cough, -shortness of breath  Gastrointestinal: -abdominal pain, -nausea, -vomiting, +diarrhea, -constipation, -blood in stool  Genitourinary: +dysuria, -hematuria, +frequency, +urgency  Musculoskeletal: -joint pain, -muscle pain  Skin: -rash, -lesion  Neurological: -headache, -dizziness, -numbness, -tingling  Psychiatric: -anxiety, -depression, -sleep difficulty  Allergic: +allergic reactions          Hemoglobin A1C   Date Value Ref Range Status   12/17/2024 5.6 4.5 - 6.2 % Final   01/29/2024 5.3 4.5 - 6.6 % Final     Comment:       Normal:               <5.7%  Pre-Diabetic:       5.7% to 6.4%  Diabetic:             >6.4%  Diabetic Goal:     <7%   06/07/2022 5.4 4.5 - 6.6 % Final     Comment:       Normal:               <5.7%  Pre-Diabetic:       5.7% to 6.4%  Diabetic:             >6.4%  Diabetic Goal:     <7%        CMP  Sodium   Date Value Ref Range Status   07/23/2024 139 136 - 145 mmol/L Final     Potassium   Date Value Ref Range Status   07/23/2024 4.4 3.5 - 5.1 mmol/L Final     Chloride   Date Value Ref Range Status   07/23/2024 107 98 - 107 mmol/L Final     CO2   Date Value Ref Range Status   07/23/2024 29 21 - 32 mmol/L Final     Glucose   Date Value Ref Range Status   07/23/2024 110 (H) 74 - 106 mg/dL Final     BUN   Date Value Ref Range Status   07/23/2024 13 7 - 18 mg/dL Final     Creatinine   Date Value Ref Range Status   07/23/2024 1.72 (H) 0.55 - 1.02 mg/dL Final     Calcium   Date Value Ref Range Status   07/23/2024 9.1 8.5 - 10.1 mg/dL Final     Total Protein   Date Value Ref Range Status   02/06/2024 6.9 6.4 - 8.2 g/dL Final     Albumin   Date Value Ref Range Status   02/06/2024 3.3 (L) 3.5 - 5.0 g/dL Final     Bilirubin, Total   Date Value Ref Range Status   02/06/2024 0.3 >0.0 - 1.2 mg/dL Final     Alk Phos   Date Value Ref Range Status   02/06/2024 80 55 - 142 U/L Final     AST   Date Value Ref Range Status   02/06/2024 19 15 - 37 U/L Final      ALT   Date Value Ref Range Status   02/06/2024 26 13 - 56 U/L Final     Anion Gap   Date Value Ref Range Status   07/23/2024 7 7 - 16 mmol/L Final     eGFR   Date Value Ref Range Status   07/23/2024 31 (L) >=60 mL/min/1.73m2 Final        Lab Results   Component Value Date    WBC 6.87 07/23/2024    RBC 4.63 07/23/2024    HGB 13.9 07/23/2024    HCT 44.1 07/23/2024    MCV 95.2 07/23/2024    MCH 30.0 07/23/2024    MCHC 31.5 (L) 07/23/2024    RDW 13.1 07/23/2024     07/23/2024    MPV 10.2 07/23/2024    LYMPH 39.9 07/23/2024    LYMPH 2.74 07/23/2024    MONO 8.0 (H) 07/23/2024    EOS 0.15 07/23/2024    BASO 0.07 07/23/2024    EOSINOPHIL 2.2 07/23/2024    BASOPHIL 1.0 07/23/2024        Lab Results   Component Value Date    CHOL 169 01/29/2024    CHOL 126 10/31/2022    CHOL 147 03/15/2022     Lab Results   Component Value Date    HDL 76 (H) 01/29/2024    HDL 67 (H) 10/31/2022    HDL 72 (H) 03/15/2022     Lab Results   Component Value Date    LDLCALC 73 01/29/2024    LDLCALC 39 10/31/2022    LDLCALC 49 03/15/2022     Lab Results   Component Value Date    TRIG 102 01/29/2024    TRIG 98 10/31/2022    TRIG 130 03/15/2022     Lab Results   Component Value Date    CHOLHDL 2.2 01/29/2024    CHOLHDL 1.9 10/31/2022    CHOLHDL 2.0 03/15/2022        Wt Readings from Last 3 Encounters:   03/10/25 1441 78.5 kg (173 lb)   01/23/25 1402 74.8 kg (165 lb)   01/02/25 1037 76.7 kg (169 lb)        BP Readings from Last 3 Encounters:   03/10/25 119/75   01/23/25 114/74   01/02/25 134/87        Review of Systems     Review of Systems       Medical / Social / Family History     Past Medical History:   Diagnosis Date    Arthritis     Cervical spondylosis     Chronic cystitis     Chronic pain syndrome     opioid dependent    Depression with anxiety     Essential (primary) hypertension     GERD (gastroesophageal reflux disease)     Lumbar post-laminectomy syndrome     followed by Dr. Carmen Rivera    Lumbar spondylosis     Meniere's disease  "04/27/2017    Migraine 01/23/2023    Mixed hyperlipidemia 01/23/2023    Nephrolithiasis 06/29/2021    TITO on CPAP 09/13/2021    Osteoporosis        Past Surgical History:   Procedure Laterality Date    BLADDER SUSPENSION      CHOLECYSTECTOMY      HYSTERECTOMY      LUMBAR LAMINECTOMY N/A 06/16/2022    Procedure: L4-L5 Laminectomy;  Surgeon: Juan Luis Covington MD;  Location: [facility];  Service: Neurosurgery;  Laterality: N/A;    OOPHORECTOMY      SHOULDER SURGERY Left 07/24/2023       Social History  Ms.  reports that she quit smoking about 48 years ago. Her smoking use included cigarettes. She started smoking about 49 years ago. She has a 1 pack-year smoking history. She has been exposed to tobacco smoke. She has never used smokeless tobacco. She reports that she does not drink alcohol and does not use drugs.    Family History  Ms.'s family history includes Alcohol abuse in her brother, brother, and brother; Arthritis in her mother; COPD in her mother; Cancer in her brother, brother, and father; Glaucoma in her daughter; Hearing loss in her brother and mother; Heart disease in her brother and mother; Hypertension in her brother, father, and mother; Lung cancer in her father; Prostate cancer in her father; Stroke in her father and mother; Thyroid disease in her daughter and mother.    Medications and Allergies     Medications  No outpatient medications have been marked as taking for the 3/10/25 encounter (Office Visit) with Ting Pryor FNP.       Allergies  Review of patient's allergies indicates:   Allergen Reactions    Adhesive tape-silicones     Aspirin     Celebrex [celecoxib]     Morphine      Makes insides feel like they are on fire.    Opioids - morphine analogues Other (See Comments)     "Burning/hot in chest"    Pcn [penicillins]     Shrimp     Ciprofloxacin      Pt reported that "it made her feel real bad".    Silicone Itching and Rash       Physical Examination     Vitals:    03/10/25 1441   BP: 119/75 " "  BP Location: Left arm   Patient Position: Sitting   Pulse: 79   Resp: 18   Temp: 99.1 °F (37.3 °C)   TempSrc: Oral   SpO2: 98%   Weight: 78.5 kg (173 lb)   Height: 5' 9" (1.753 m)      Physical Exam  Constitutional:       Appearance: Normal appearance.   HENT:      Head: Normocephalic.   Eyes:      Extraocular Movements: Extraocular movements intact.   Cardiovascular:      Rate and Rhythm: Normal rate and regular rhythm.      Pulses: Normal pulses.      Heart sounds: Normal heart sounds.   Pulmonary:      Effort: Pulmonary effort is normal.      Breath sounds: Normal breath sounds.   Musculoskeletal:      Comments: Amb with cane   Skin:     General: Skin is warm and dry.      Capillary Refill: Capillary refill takes less than 2 seconds.   Neurological:      General: No focal deficit present.      Mental Status: She is alert and oriented to person, place, and time.   Psychiatric:         Mood and Affect: Mood normal.         Behavior: Behavior normal.          Assessment and Plan (including Health Maintenance)      Problem List  Smart Sets  Document Outside HM   :    Plan:     There are no Patient Instructions on file for this visit.       Health Maintenance Due   Topic Date Due    Shingles Vaccine (1 of 2) Never done    RSV Vaccine (Age 60+ and Pregnant patients) (1 - Risk 60-74 years 1-dose series) Never done    COVID-19 Vaccine (3 - Moderna risk series) 07/23/2021    TETANUS VACCINE  10/11/2021    Colorectal Cancer Screening  08/17/2024       Problem List Items Addressed This Visit       Acute cystitis with hematuria - Primary    Relevant Medications    methenamine (HIPREX) 1 gram Tab    Other Relevant Orders    Urine Culture High Risk (Completed)    Age-related osteoporosis without current pathological fracture    Relevant Medications    raloxifene (EVISTA) 60 mg tablet    Allergies    Relevant Medications    montelukast (SINGULAIR) 10 mg tablet    loratadine (CLARITIN) 10 mg tablet    Chronic pain syndrome    " Relevant Medications    gabapentin (NEURONTIN) 600 MG tablet    methocarbamoL (ROBAXIN) 500 MG Tab    topiramate (TOPAMAX) 100 MG tablet    Dysuria    Relevant Orders    POCT URINALYSIS W/O SCOPE (Completed)    Folic acid deficiency    Relevant Medications    folic acid (FOLVITE) 1 MG tablet    History of UTI    Relevant Orders    POCT URINALYSIS W/O SCOPE (Completed)    Hypothyroidism    Relevant Medications    levothyroxine (SYNTHROID) 50 MCG tablet    Meniere's disease (cochlear hydrops), bilateral    Relevant Medications    triamterene-hydrochlorothiazide 37.5-25 mg (MAXZIDE-25) 37.5-25 mg per tablet    Mixed hyperlipidemia    Relevant Medications    atorvastatin (LIPITOR) 40 MG tablet     Assessment & Plan    IMPRESSION:  - Suspected UTI based on patient's symptoms  - Noted some fluid in ears but no redness or infection    URINARY TRACT INFECTION (UTI):  - Prescribed Levaquin 500mg for 5 days to treat the suspected UTI.  - Continued Hiprex (hippuric acid) daily for UTI prevention.  - Ordered urine culture to identify specific bacteria causing UTI.  - Instructed the patient to contact the office when urine culture results are available in a few days.  - Patient reports severe urinary symptoms including dysuria, pain, and incontinence.  - Acknowledged the severity of the infection based on the patient's symptoms.  - Will review culture results in 2 days to confirm the effectiveness of Levaquin.  - Patient reports urinary incontinence associated with urinary tract infection.  - Patient reports long-term use of antibiotics, specifically Macrobid and Hiprex.  - Prescribed Levaquin 500mg for 5 days to treat current urinary tract infection.    DIARRHEA:  - Patient reports having had severe diarrhea.    EAR ISSUES:  - Patient reports otalgia and hearing aid issues.  - Examined ears and noted fluid but no erythema.    SEVERE MOSQUITO BITE REACTIONS:  - Patient reports severe allergic reactions to mosquito bites, which  may be misinterpreted as wasp stings.  - Advised continued use of insect repellent sprays as a preventive measure.  - Advised the patient to continue using mosquito repellent sprays and creams as a preventative measure against severe mosquito bite reactions.  - Patient reports severe allergic reactions to mosquito bites.        Acute cystitis with hematuria  -     methenamine (HIPREX) 1 gram Tab; Take 1 tablet (1 g total) by mouth 2 (two) times daily.  Dispense: 180 tablet; Refill: 3  -     Urine Culture High Risk  -     Discontinue: levoFLOXacin (LEVAQUIN) 500 MG tablet; Take 1 tablet (500 mg total) by mouth once daily. for 5 days  Dispense: 5 tablet; Refill: 0  -     levoFLOXacin (LEVAQUIN) 500 MG tablet; Take 1 tablet (500 mg total) by mouth once daily. for 5 days  Dispense: 5 tablet; Refill: 0    History of UTI  -     POCT URINALYSIS W/O SCOPE    Dysuria  -     POCT URINALYSIS W/O SCOPE    Mixed hyperlipidemia  -     atorvastatin (LIPITOR) 40 MG tablet; Take 1 tablet (40 mg total) by mouth every evening.  Dispense: 90 tablet; Refill: 1    Allergy, sequela  -     montelukast (SINGULAIR) 10 mg tablet; Take 1 tablet (10 mg total) by mouth every evening.  Dispense: 90 tablet; Refill: 1  -     loratadine (CLARITIN) 10 mg tablet; Take 1 tablet (10 mg total) by mouth once daily.  Dispense: 90 tablet; Refill: 1    Chronic pain syndrome  -     gabapentin (NEURONTIN) 600 MG tablet; Take 1 tablet (600 mg total) by mouth 2 (two) times daily.  Dispense: 180 tablet; Refill: 1  -     methocarbamoL (ROBAXIN) 500 MG Tab; Take 1 tablet (500 mg total) by mouth 3 (three) times daily.  Dispense: 270 tablet; Refill: 1  -     topiramate (TOPAMAX) 100 MG tablet; Take 1 tablet (100 mg total) by mouth 2 (two) times daily.  Dispense: 180 tablet; Refill: 1    Hypothyroidism, unspecified type  -     levothyroxine (SYNTHROID) 50 MCG tablet; Take 1 tablet (50 mcg total) by mouth before breakfast.  Dispense: 90 tablet; Refill: 1    Meniere's  disease (cochlear hydrops), bilateral  -     triamterene-hydrochlorothiazide 37.5-25 mg (MAXZIDE-25) 37.5-25 mg per tablet; Take 1 tablet by mouth once daily.  Dispense: 30 tablet; Refill: 6    Folic acid deficiency  -     folic acid (FOLVITE) 1 MG tablet; Take 1 tablet (1 mg total) by mouth once daily.  Dispense: 90 tablet; Refill: 1    Age-related osteoporosis without current pathological fracture  -     raloxifene (EVISTA) 60 mg tablet; Take 1 tablet (60 mg total) by mouth once daily.  Dispense: 90 tablet; Refill: 1       Health Maintenance Topics with due status: Not Due       Topic Last Completion Date    DEXA Scan 06/26/2024    Lipid Panel 12/17/2024    Mammogram 02/17/2025         Future Appointments   Date Time Provider Department Center   5/7/2025 10:40 AM Wyatt Hernandez NP OB [facility]   6/9/2025 11:00 AM Ting Pryor FNP [facility]   4/6/2026  1:00 PM [facility] MAMMO[facility] M[facility]        Follow up in about 6 months (around 9/10/2025), or if symptoms worsen or fail to improve.    Health Maintenance Due   Topic Date Due    Shingles Vaccine (1 of 2) Never done    RSV Vaccine (Age 60+ and Pregnant patients) (1 - Risk 60-74 years 1-dose series) Never done    COVID-19 Vaccine (3 - Moderna risk series) 07/23/2021    TETANUS VACCINE  10/11/2021    Colorectal Cancer Screening  08/17/2024        Signature:  CHLOE Lazo    Date of encounter: 3/10/25  This note was generated with the assistance of ambient listening technology. Verbal consent was obtained by the patient and accompanying visitor(s) for the recording of patient appointment to facilitate this note. I attest to having reviewed and edited the generated note for accuracy, though some syntax or spelling errors may persist. Please contact the author of this note for any clarification.

## 2025-05-14 ENCOUNTER — OFFICE VISIT (OUTPATIENT)
Dept: FAMILY MEDICINE | Facility: CLINIC | Age: 70
End: 2025-05-14
Payer: MEDICARE

## 2025-05-14 VITALS
OXYGEN SATURATION: 98 % | DIASTOLIC BLOOD PRESSURE: 73 MMHG | WEIGHT: 174.81 LBS | TEMPERATURE: 97 F | HEART RATE: 68 BPM | HEIGHT: 69 IN | BODY MASS INDEX: 25.89 KG/M2 | RESPIRATION RATE: 18 BRPM | SYSTOLIC BLOOD PRESSURE: 130 MMHG

## 2025-05-14 DIAGNOSIS — Z87.440 HISTORY OF UTI: ICD-10-CM

## 2025-05-14 DIAGNOSIS — R82.71 ASYMPTOMATIC BACTERIURIA: ICD-10-CM

## 2025-05-14 DIAGNOSIS — M79.18 MYALGIA, MULTIPLE SITES: ICD-10-CM

## 2025-05-14 DIAGNOSIS — M25.50 PAIN IN JOINT, MULTIPLE SITES: Primary | ICD-10-CM

## 2025-05-14 LAB
ALBUMIN SERPL BCP-MCNC: 4.2 G/DL (ref 3.4–4.8)
ALBUMIN/GLOB SERPL: 1 {RATIO}
ALP SERPL-CCNC: 99 U/L (ref 40–150)
ALT SERPL W P-5'-P-CCNC: 13 U/L
ANION GAP SERPL CALCULATED.3IONS-SCNC: 16 MMOL/L (ref 7–16)
AST SERPL W P-5'-P-CCNC: 27 U/L (ref 11–45)
BILIRUB SERPL-MCNC: 0.4 MG/DL
BILIRUB SERPL-MCNC: NEGATIVE MG/DL
BLOOD URINE, POC: NEGATIVE
BUN SERPL-MCNC: 9 MG/DL (ref 10–20)
BUN/CREAT SERPL: 8 (ref 6–20)
CALCIUM SERPL-MCNC: 9.8 MG/DL (ref 8.4–10.2)
CHLORIDE SERPL-SCNC: 103 MMOL/L (ref 98–107)
CK SERPL-CCNC: 110 U/L (ref 29–168)
CLARITY, UA: CLEAR
CO2 SERPL-SCNC: 25 MMOL/L (ref 23–31)
COLOR, UA: YELLOW
CREAT SERPL-MCNC: 1.17 MG/DL (ref 0.55–1.02)
EGFR (NO RACE VARIABLE) (RUSH/TITUS): 50 ML/MIN/1.73M2
GLOBULIN SER-MCNC: 4.4 G/DL (ref 2–4)
GLUCOSE SERPL-MCNC: 83 MG/DL (ref 82–115)
GLUCOSE UR QL STRIP: NEGATIVE
KETONES UR QL STRIP: NEGATIVE
LEUKOCYTE ESTERASE URINE, POC: ABNORMAL
MAGNESIUM SERPL-MCNC: 2.4 MG/DL (ref 1.6–2.6)
NITRITE, POC UA: NEGATIVE
PH, POC UA: 7
POTASSIUM SERPL-SCNC: 4.7 MMOL/L (ref 3.5–5.1)
PROT SERPL-MCNC: 8.6 G/DL (ref 5.8–7.6)
PROTEIN, POC: NEGATIVE
SODIUM SERPL-SCNC: 139 MMOL/L (ref 136–145)
SPECIFIC GRAVITY, POC UA: 1.01
UROBILINOGEN, POC UA: ABNORMAL

## 2025-05-14 PROCEDURE — 96372 THER/PROPH/DIAG INJ SC/IM: CPT | Mod: ,,, | Performed by: NURSE PRACTITIONER

## 2025-05-14 PROCEDURE — 80053 COMPREHEN METABOLIC PANEL: CPT | Mod: ,,, | Performed by: CLINICAL MEDICAL LABORATORY

## 2025-05-14 PROCEDURE — 86430 RHEUMATOID FACTOR TEST QUAL: CPT | Mod: ,,, | Performed by: CLINICAL MEDICAL LABORATORY

## 2025-05-14 PROCEDURE — 99214 OFFICE O/P EST MOD 30 MIN: CPT | Mod: ,,, | Performed by: NURSE PRACTITIONER

## 2025-05-14 PROCEDURE — 83735 ASSAY OF MAGNESIUM: CPT | Mod: ,,, | Performed by: CLINICAL MEDICAL LABORATORY

## 2025-05-14 PROCEDURE — 82550 ASSAY OF CK (CPK): CPT | Mod: ,,, | Performed by: CLINICAL MEDICAL LABORATORY

## 2025-05-14 RX ORDER — RABEPRAZOLE SODIUM 20 MG/1
20 TABLET, DELAYED RELEASE ORAL 2 TIMES DAILY
COMMUNITY

## 2025-05-14 RX ORDER — RIZATRIPTAN BENZOATE 10 MG/1
10 TABLET, ORALLY DISINTEGRATING ORAL
COMMUNITY

## 2025-05-14 RX ORDER — NEEDLES, SAFETY 18GX1 1/2"
NEEDLE, DISPOSABLE MISCELLANEOUS
COMMUNITY
Start: 2025-03-27

## 2025-05-14 RX ORDER — KETOROLAC TROMETHAMINE 30 MG/ML
30 INJECTION, SOLUTION INTRAMUSCULAR; INTRAVENOUS
Status: COMPLETED | OUTPATIENT
Start: 2025-05-14 | End: 2025-05-14

## 2025-05-14 RX ADMIN — KETOROLAC TROMETHAMINE 30 MG: 30 INJECTION, SOLUTION INTRAMUSCULAR; INTRAVENOUS at 10:05

## 2025-05-14 NOTE — PATIENT INSTRUCTIONS
Please bring ALL medications bottles (from ALL providers) including over-the-counter medications to your next appointment !!!       Your care at home will depend on what the cause of your problem is. This may include:  Avoiding or stopping activities that cause you pain.  Soak in warm water or use heat, put a heating pad on the painful part for no more than 20 minutes at a time. Never go to sleep with a heating pad on as this can cause burns.  Place an ice pack or a bag of frozen peas wrapped in a towel over the painful part. Never put ice right on the skin. Do not leave the ice on more than 10 to 15 minutes at a time.  Exercises to stretch and make muscles stronger.  Methods to help you relax  Massage therapy  Pain relieving or anti-inflammatory drugs.

## 2025-05-14 NOTE — PROGRESS NOTES
[facility]    Laquita Valladares AGPCNP-BC [facility] [id number] [place] [address]  [phone]          PATIENT NAME: Kizzy Schofield  : 1955  DATE: 25  MRN: [id number]          Reason for Visit        Chief Complaint   Patient presents with    Muscle Pain     Pt reports she is having a lot of cramping all over her body and a general feeling of unwellness. Would like to get some labs to see if her potassium is low or if something else is going on.     Dizziness     Pt presents to clinic with dizziness that started yesterday and pain.       History of Present Illness   CHIEF COMPLAINT:  Patient presents today for muscle spasms and cramping.    MUSCULOSKELETAL:  She reports progressive muscle spasms and cramping in legs, arms, and hands, occurring both day and night. The cramping is severe enough to interrupt daily activities, requiring her to stop and manually stretch affected areas. She attempted potassium supplementation without relief. She has decreased sensation in her lower body. Nerve conduction tests 6-9 months ago were normal.    MEDICAL HISTORY:  She has arthritis with family history significant for mother with severe arthritis affecting fingers. She has a history of chronic urinary tract infections, urinary incontinence, and esophageal problems which have limited medication options for treating arthritis.    CURRENT MEDICATIONS:  She did not bring her medication bottles today.     ROS:  General: -fever, -chills, -fatigue, -weight gain, -weight loss  Eyes: -vision changes, -redness, -discharge  ENT: -ear pain, -nasal congestion, -sore throat  Cardiovascular: -chest pain, -palpitations, -lower extremity edema  Respiratory: -cough, -shortness of breath  Gastrointestinal: -abdominal pain, -nausea, -vomiting, -diarrhea, -constipation, -blood in stool  Genitourinary: -dysuria, -hematuria,  -frequency  Musculoskeletal: -joint pain, -muscle pain, +muscle spasms, +muscle cramps, +body aches  Skin: -rash, -lesion  Neurological: +headache, +dizziness, -numbness, -tingling  Psychiatric: -anxiety, -depression, -sleep difficulty  Female Genitourinary: +urinary incontinence          MEDICAL / SURGICAL / SOCIAL HISTORY     Past Medical History:   Diagnosis Date    Arthritis     Cervical spondylosis     Chronic cystitis     Chronic pain syndrome     opioid dependent    Depression with anxiety     Essential (primary) hypertension     GERD (gastroesophageal reflux disease)     Lumbar post-laminectomy syndrome     followed by Dr. Carmen Rivera    Lumbar spondylosis     Meniere's disease 2017    Migraine 2023    Mixed hyperlipidemia 2023    Nephrolithiasis 2021    TITO on CPAP 2021    Osteoporosis        Past Surgical History:   Procedure Laterality Date    BLADDER SUSPENSION      CHOLECYSTECTOMY      HYSTERECTOMY      LUMBAR LAMINECTOMY N/A 2022    Procedure: L4-L5 Laminectomy;  Surgeon: Juan Luis Covington MD;  Location: [facility];  Service: Neurosurgery;  Laterality: N/A;    OOPHORECTOMY      SHOULDER SURGERY Left 2023       Social History     Tobacco Use    Smoking status: Former     Current packs/day: 0.00     Average packs/day: 1 pack/day for 1 year (1.0 ttl pk-yrs)     Types: Cigarettes     Start date: 3/1/1976     Quit date: 1976     Years since quittin.9     Passive exposure: Past    Smokeless tobacco: Never    Tobacco comments:     70s   Substance Use Topics    Alcohol use: Never    Drug use: Never     I personally reviewed all past medical, surgical, and social.     MEDICATIONS / ALLERGIES / HM     Current Outpatient Medications   Medication Sig Dispense Refill    acetaminophen (TYLENOL) 500 MG tablet Take 500 mg by mouth every 6 (six) hours as needed.      atorvastatin (LIPITOR) 40 MG tablet Take 1 tablet (40 mg total) by mouth every evening. 90 tablet 1     calcium-vitamin D3 (OS-AIDEE 500 + D3) 500 mg-5 mcg (200 unit) per tablet Take 1 tablet by mouth 2 (two) times daily with meals.      colestipoL (COLESTID) 1 gram Tab Take 1 tablet (1 g total) by mouth once daily. 90 tablet 1    cyanocobalamin 1,000 mcg/mL injection Inject 1ml IM weekly x 6 then every other week afterwards . Please dispense needles and syringes.      diazePAM (VALIUM) 2 MG tablet Take 1 tablet (2 mg total) by mouth 2 (two) times a day. 180 tablet 2    dicyclomine (BENTYL) 10 MG capsule Take 1 capsule (10 mg total) by mouth 3 (three) times daily. 270 capsule 1    EPINEPHrine (EPIPEN) 0.3 mg/0.3 mL AtIn Inject 0.3 mLs (0.3 mg total) into the muscle as needed (allergic reaction). 1 each 2    fluticasone propionate (FLONASE) 50 mcg/actuation nasal spray 2 sprays (100 mcg total) by Each Nostril route once daily. 16 g 3    folic acid (FOLVITE) 1 MG tablet Take 1 tablet (1 mg total) by mouth once daily. 90 tablet 1    gabapentin (NEURONTIN) 600 MG tablet Take 1 tablet (600 mg total) by mouth 2 (two) times daily. 180 tablet 1    HYDROcodone-acetaminophen (NORCO) 7.5-325 mg per tablet Take 1 tablet by mouth 2 (two) times daily as needed for Pain.      levothyroxine (SYNTHROID) 50 MCG tablet Take 1 tablet (50 mcg total) by mouth before breakfast. 90 tablet 1    loratadine (CLARITIN) 10 mg tablet Take 1 tablet (10 mg total) by mouth once daily. 90 tablet 1    methenamine (HIPREX) 1 gram Tab Take 1 tablet (1 g total) by mouth 2 (two) times daily. 180 tablet 3    methocarbamoL (ROBAXIN) 500 MG Tab Take 1 tablet (500 mg total) by mouth 3 (three) times daily. 270 tablet 1    montelukast (SINGULAIR) 10 mg tablet Take 1 tablet (10 mg total) by mouth every evening. 90 tablet 1    ondansetron (ZOFRAN-ODT) 4 MG TbDL Take 1 tablet (4 mg total) by mouth every 8 (eight) hours as needed (nausea). 90 tablet 0    paroxetine (PAXIL) 20 MG tablet Take 1 tablet (20 mg total) by mouth once daily. 90 tablet 1    polyethylene  "glycol (GLYCOLAX) 17 gram PwPk Take by mouth. Take 17 grams by mouth 2 times daily mixed with 8 ounces of water, juice, soda, tea, or coffee      potassium gluconate 595 mg (99 mg) Tab Take 595 mg by mouth.      promethazine (PHENERGAN) 25 MG tablet Take 1 tablet (25 mg total) by mouth every 4 (four) hours. 45 tablet 0    RABEprazole (ACIPHEX) 20 mg tablet Take 20 mg by mouth 2 (two) times daily.      raloxifene (EVISTA) 60 mg tablet Take 1 tablet (60 mg total) by mouth once daily. 90 tablet 1    rizatriptan (MAXALT-MLT) 10 MG disintegrating tablet Take 10 mg by mouth as needed for Migraine.      syringe with needle (BD ECLIPSE LUER-AMERICA) 3 mL 25 x 5/8" Syrg Use with b12 injections      topiramate (TOPAMAX) 100 MG tablet Take 1 tablet (100 mg total) by mouth 2 (two) times daily. 180 tablet 1    triamcinolone acetonide 0.1% (KENALOG) 0.1 % ointment APPLY TO THE AFFECTED AREA(S) ON ARMS, LEGS AND trunk TWICE DAILY FOR ITCHING      triamterene-hydrochlorothiazide 37.5-25 mg (MAXZIDE-25) 37.5-25 mg per tablet Take 1 tablet by mouth once daily. 30 tablet 6    vitamin D (VITAMIN D3) 1000 units Tab Take 5,000 Units by mouth 2 (two) times a day.      naloxone (NARCAN) 4 mg/actuation Spry 1 spray by Nasal route. (Patient not taking: Reported on 5/14/2025)       No current facility-administered medications for this visit.       Review of patient's allergies indicates:   Allergen Reactions    Adhesive tape-silicones     Aspirin     Celebrex [celecoxib]     Morphine      Makes insides feel like they are on fire.    Opioids - morphine analogues Other (See Comments)     "Burning/hot in chest"    Pcn [penicillins]     Shrimp     Ciprofloxacin      Pt reported that "it made her feel real bad".    Silicone Itching and Rash       Immunization History   Administered Date(s) Administered    COVID-19, MRNA, LN-S, PF (MODERNA FULL 0.5 ML DOSE) 05/28/2021, 06/25/2021    Influenza 09/25/2023    Influenza - Trivalent - Fluad - Adjuvanted - " "PF (65 years and older 09/27/2024    Influenza - Trivalent - Fluzone High Dose - PF (65 years and older) 09/25/2021    Pneumococcal Conjugate - 13 Valent 08/06/2018    Pneumococcal Polysaccharide - 23 Valent 10/24/2019    Td (ADULT) 10/11/2011        Health Maintenance   Topic Date Due    Shingles Vaccine (1 of 2) Never done    RSV Vaccine (Age 60+ and Pregnant patients) (1 - Risk 60-74 years 1-dose series) Never done    COVID-19 Vaccine (3 - Moderna risk series) 07/23/2021    TETANUS VACCINE  10/11/2021    Colorectal Cancer Screening  08/17/2024    Mammogram  02/17/2026    DEXA Scan  06/26/2027    Lipid Panel  12/17/2029    Hepatitis C Screening  Completed    Influenza Vaccine  Completed    Pneumococcal Vaccines (Age 50+)  Completed        Physical Exam      Vital Signs  Temp: 97.4 °F (36.3 °C)  Temp Source: Oral  Pulse: 68  Resp: 18  SpO2: 98 %  BP: 130/73  Pain Score:   6  Pain Loc: Generalized  Height and Weight  Height: 5' 9" (175.3 cm)  Weight: 79.3 kg (174 lb 12.8 oz)  BSA (Calculated - sq m): 1.96 sq meters  BMI (Calculated): 25.8  Weight in (lb) to have BMI = 25: 168.9]    Physical Exam  Constitutional:       Appearance: She is well-groomed and overweight. She is ill-appearing.   HENT:      Head: Normocephalic and atraumatic.      Right Ear: External ear normal.      Left Ear: External ear normal.   Cardiovascular:      Rate and Rhythm: Normal rate and regular rhythm.      Pulses: Normal pulses.      Heart sounds: Normal heart sounds.   Pulmonary:      Effort: Pulmonary effort is normal.      Breath sounds: Normal breath sounds.   Abdominal:      Palpations: Abdomen is soft.      Tenderness: There is no abdominal tenderness.   Skin:     General: Skin is warm and dry.   Neurological:      Mental Status: She is alert and oriented to person, place, and time.      Gait: Gait abnormal.   Psychiatric:         Behavior: Behavior is cooperative.          Laboratory:    Lab Results   Component Value Date     " "(H) 07/23/2024     07/23/2024    K 4.4 07/23/2024     07/23/2024    CO2 29 07/23/2024    BUN 13 07/23/2024    CREATININE 1.72 (H) 07/23/2024    CALCIUM 9.1 07/23/2024    PROT 6.9 02/06/2024    ALBUMIN 3.3 (L) 02/06/2024    BILITOT 0.3 02/06/2024    ALKPHOS 80 02/06/2024    AST 19 02/06/2024    ALT 26 02/06/2024    ANIONGAP 7 07/23/2024    EGFRNONAA 41 (L) 06/07/2022       Lab Results   Component Value Date    WBC 6.87 07/23/2024    RBC 4.63 07/23/2024    HGB 13.9 07/23/2024    HCT 44.1 07/23/2024    MCV 95.2 07/23/2024    RDW 13.1 07/23/2024     07/23/2024        Lab Results   Component Value Date    CHOL 169 01/29/2024    TRIG 102 01/29/2024    HDL 76 (H) 01/29/2024    LDLCALC 73 01/29/2024       Lab Results   Component Value Date    TSH 3.790 (H) 07/23/2024       Lab Results   Component Value Date    HGBA1C 5.6 12/17/2024    ESTIMATEDAVG 114 12/17/2024        No results found for: "TFHCJIMI43"    No results found for: "QIFLHIWO24DB"    Point Of Care Testing:    WBC, UA   Date Value Ref Range Status   05/14/2025 small (A)  Final     Nitrite, UA   Date Value Ref Range Status   05/14/2025 negative  Final     Urobilinogen, UA   Date Value Ref Range Status   05/14/2025 0.2e.u./dl  Final     Protein, POC   Date Value Ref Range Status   05/14/2025 negative  Final     pH, UA   Date Value Ref Range Status   05/14/2025 7.0  Final     Spec Grav UA   Date Value Ref Range Status   05/14/2025 1.010  Final     Ketones, UA   Date Value Ref Range Status   05/14/2025 negative  Final     Bilirubin, POC   Date Value Ref Range Status   05/14/2025 negative  Final     Glucose, UA   Date Value Ref Range Status   05/14/2025 negative  Final       No results found for: "[id number]", "FLUAMOLEC", "FLUBMOLEC", "MOLSTREPAPOC"    Assessment/Plan     Pain in joint, multiple sites  -     Rheumatoid Factor Screen; Future; Expected date: 05/14/2025  -     ketorolac injection 30 mg    Myalgia, multiple sites  -     Comprehensive " Metabolic Panel; Future; Expected date: 05/14/2025  -     CK; Future; Expected date: 05/14/2025  -     Magnesium; Future; Expected date: 05/14/2025    History of UTI  -     POCT URINALYSIS W/O SCOPE, clear yellow small WBC, otherwise negative    Asymptomatic bacteriuria  -     POCT URINALYSIS W/O SCOPE, clear yellow small WBC, otherwise negative         Assessment & Plan      MUSCLE CRAMPS AND SPASMS:  - Patient reports widespread muscle spasms and leg cramping that have worsened over time, occurring day and night, affecting arms, hands, and legs.  - Currently on Gabapentin and Flexeril to 3 times daily for Chronic Pain and Muscle Spasm management.  - Ordered muscle enzyme test to evaluate further.  - Patient reports lack of sensation in the lower body, making it difficult to discern regular pain from new pain.  - Patient experiences pain in the lower back, sometimes unable to differentiate between chronic back pain and urinary tract back pain.  - Patient reports feeling dizzy and having a headache today.  - Ordered rheumatoid factor blood test to evaluate for possible rheumatoid contributing to aches and pains   - Patient has history of chronic urinary tract infections and experiences pain that may be related to these infections.  - Considering electrolyte imbalance as potential cause of muscle cramps and spasms.  - Ordered comprehensive electrolyte panel, including magnesium.    FOLLOW UP:  - Follow up with PCP as previously scheduled and/or sooner as needed.   - Will notify of lab results when available.          Future Appointments   Date Time Provider Department Center   6/9/2025 11:00 AM Ting Pryor FNP [facility]   4/6/2026  1:00 PM [facility]       Workup results were reviewed and all questions were answered. Diagnosis and treatment options were discussed and the patient  is amenable with the overall treatment plan. Verbal and written discharge instructions were  given including to return to clinic/ED with any acute worsening of symptoms or failure of symptoms to improve. The reasons for return to the clinic/ED were explained in lay terms. No further intervention is warranted at this time. The patient agrees with the plan, expresses understanding, is hemodynamically stable and in no acute distress.     All questions answered to desired level of satisfaction    This note was generated with the assistance of ambient listening technology. Verbal consent was obtained by the patient and accompanying visitor(s) for the recording of patient appointment to facilitate this note. I attest to having reviewed and edited the generated note for accuracy, though some syntax or spelling errors may persist. Please contact the author of this note for any clarification.             АНДРЕЙ Osuna-BC  [facility]

## 2025-05-15 ENCOUNTER — PATIENT MESSAGE (OUTPATIENT)
Dept: FAMILY MEDICINE | Facility: CLINIC | Age: 70
End: 2025-05-15
Payer: MEDICARE

## 2025-05-16 LAB — RHEUMATOID FACT SER NEPH-ACNC: NEGATIVE [IU]/ML

## 2025-06-02 ENCOUNTER — APPOINTMENT (OUTPATIENT)
Dept: RADIOLOGY | Facility: CLINIC | Age: 70
End: 2025-06-02
Attending: NURSE PRACTITIONER
Payer: MEDICARE

## 2025-06-02 ENCOUNTER — OFFICE VISIT (OUTPATIENT)
Dept: FAMILY MEDICINE | Facility: CLINIC | Age: 70
End: 2025-06-02
Payer: MEDICARE

## 2025-06-02 ENCOUNTER — RESULTS FOLLOW-UP (OUTPATIENT)
Dept: FAMILY MEDICINE | Facility: CLINIC | Age: 70
End: 2025-06-02

## 2025-06-02 VITALS
OXYGEN SATURATION: 97 % | HEIGHT: 69 IN | TEMPERATURE: 98 F | WEIGHT: 174 LBS | HEART RATE: 67 BPM | BODY MASS INDEX: 25.77 KG/M2 | SYSTOLIC BLOOD PRESSURE: 115 MMHG | DIASTOLIC BLOOD PRESSURE: 78 MMHG | RESPIRATION RATE: 19 BRPM

## 2025-06-02 DIAGNOSIS — S99.922A INJURY OF LEFT FOOT, INITIAL ENCOUNTER: Primary | ICD-10-CM

## 2025-06-02 DIAGNOSIS — N18.4 CHRONIC KIDNEY DISEASE, STAGE 4 (SEVERE): ICD-10-CM

## 2025-06-02 DIAGNOSIS — S99.922A INJURY OF LEFT FOOT, INITIAL ENCOUNTER: ICD-10-CM

## 2025-06-02 DIAGNOSIS — H81.03 MENIERE'S DISEASE (COCHLEAR HYDROPS), BILATERAL: ICD-10-CM

## 2025-06-02 DIAGNOSIS — F41.8 DEPRESSION WITH ANXIETY: ICD-10-CM

## 2025-06-02 PROBLEM — S22.22XA CLOSED FRACTURE OF BODY OF STERNUM: Status: RESOLVED | Noted: 2024-02-06 | Resolved: 2025-06-02

## 2025-06-02 PROBLEM — J04.0 ACUTE LARYNGITIS: Status: RESOLVED | Noted: 2024-03-25 | Resolved: 2025-06-02

## 2025-06-02 PROBLEM — Z20.822 EXPOSURE TO COVID-19 VIRUS: Status: RESOLVED | Noted: 2024-09-27 | Resolved: 2025-06-02

## 2025-06-02 PROBLEM — R30.0 DYSURIA: Status: RESOLVED | Noted: 2024-02-14 | Resolved: 2025-06-02

## 2025-06-02 PROBLEM — R05.9 COUGH: Status: RESOLVED | Noted: 2024-10-13 | Resolved: 2025-06-02

## 2025-06-02 PROBLEM — N30.01 ACUTE CYSTITIS WITH HEMATURIA: Status: RESOLVED | Noted: 2025-03-10 | Resolved: 2025-06-02

## 2025-06-02 PROCEDURE — 99213 OFFICE O/P EST LOW 20 MIN: CPT | Mod: ,,, | Performed by: NURSE PRACTITIONER

## 2025-06-02 PROCEDURE — 73630 X-RAY EXAM OF FOOT: CPT | Mod: TC,RHCUB,LT | Performed by: NURSE PRACTITIONER

## 2025-06-02 PROCEDURE — 73630 X-RAY EXAM OF FOOT: CPT | Mod: 26,LT,, | Performed by: RADIOLOGY

## 2025-06-02 RX ORDER — MECLIZINE HYDROCHLORIDE 25 MG/1
25 TABLET ORAL 3 TIMES DAILY PRN
Qty: 30 TABLET | Refills: 0 | Status: SHIPPED | OUTPATIENT
Start: 2025-06-02

## 2025-06-02 RX ORDER — PAROXETINE 20 MG/1
20 TABLET, FILM COATED ORAL DAILY
Qty: 7 TABLET | Refills: 0 | Status: SHIPPED | OUTPATIENT
Start: 2025-06-02

## 2025-06-09 PROBLEM — S99.922A INJURY OF LEFT FOOT: Status: ACTIVE | Noted: 2025-06-09
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
## 2025-06-13 ENCOUNTER — TELEPHONE (OUTPATIENT)
Dept: FAMILY MEDICINE | Facility: CLINIC | Age: 70
End: 2025-06-13
Payer: MEDICARE

## 2025-06-13 DIAGNOSIS — G89.4 CHRONIC PAIN SYNDROME: Primary | ICD-10-CM

## 2025-06-13 RX ORDER — AMITRIPTYLINE HYDROCHLORIDE 10 MG/1
10 TABLET, FILM COATED ORAL NIGHTLY PRN
Qty: 90 TABLET | Refills: 1 | Status: SHIPPED | OUTPATIENT
Start: 2025-06-13 | End: 2026-06-13

## 2025-06-13 NOTE — TELEPHONE ENCOUNTER
Patient requests call back about getting a prescription for AMITRIPTLYLINE     She said she had already called but hasn't heard anything

## 2025-06-13 NOTE — TELEPHONE ENCOUNTER
Please call pt and confirm the dose of this medication. This was discontinued in our system. Thanks!

## 2025-06-25 ENCOUNTER — OFFICE VISIT (OUTPATIENT)
Dept: FAMILY MEDICINE | Facility: CLINIC | Age: 70
End: 2025-06-25
Payer: MEDICARE

## 2025-06-25 ENCOUNTER — RESULTS FOLLOW-UP (OUTPATIENT)
Dept: FAMILY MEDICINE | Facility: CLINIC | Age: 70
End: 2025-06-25
Payer: MEDICARE

## 2025-06-25 VITALS
BODY MASS INDEX: 25.77 KG/M2 | WEIGHT: 174 LBS | OXYGEN SATURATION: 98 % | DIASTOLIC BLOOD PRESSURE: 77 MMHG | HEART RATE: 68 BPM | HEIGHT: 69 IN | TEMPERATURE: 98 F | RESPIRATION RATE: 18 BRPM | SYSTOLIC BLOOD PRESSURE: 122 MMHG

## 2025-06-25 DIAGNOSIS — E03.9 ACQUIRED HYPOTHYROIDISM: ICD-10-CM

## 2025-06-25 DIAGNOSIS — R82.90 ABNORMAL URINALYSIS: ICD-10-CM

## 2025-06-25 DIAGNOSIS — E55.9 VITAMIN D DEFICIENCY: ICD-10-CM

## 2025-06-25 DIAGNOSIS — R41.3 OTHER AMNESIA: ICD-10-CM

## 2025-06-25 DIAGNOSIS — G43.101 MIGRAINE WITH AURA AND WITH STATUS MIGRAINOSUS, NOT INTRACTABLE: ICD-10-CM

## 2025-06-25 DIAGNOSIS — N30.00 ACUTE CYSTITIS WITHOUT HEMATURIA: ICD-10-CM

## 2025-06-25 DIAGNOSIS — R41.3 MEMORY LOSS: Primary | ICD-10-CM

## 2025-06-25 DIAGNOSIS — G89.4 CHRONIC PAIN SYNDROME: ICD-10-CM

## 2025-06-25 DIAGNOSIS — E03.9 ACQUIRED HYPOTHYROIDISM: Primary | ICD-10-CM

## 2025-06-25 LAB
25(OH)D3 SERPL-MCNC: 90.3 NG/ML (ref 30–80)
ANION GAP SERPL CALCULATED.3IONS-SCNC: 13 MMOL/L (ref 7–16)
BASOPHILS # BLD AUTO: 0.05 K/UL (ref 0–0.2)
BASOPHILS NFR BLD AUTO: 0.6 % (ref 0–1)
BILIRUB SERPL-MCNC: NEGATIVE MG/DL
BLOOD URINE, POC: NEGATIVE
BUN SERPL-MCNC: 12 MG/DL (ref 10–20)
BUN/CREAT SERPL: 8 (ref 6–20)
CALCIUM SERPL-MCNC: 9.4 MG/DL (ref 8.4–10.2)
CHLORIDE SERPL-SCNC: 107 MMOL/L (ref 98–107)
CLARITY, UA: ABNORMAL
CO2 SERPL-SCNC: 27 MMOL/L (ref 23–31)
COLOR, UA: YELLOW
CREAT SERPL-MCNC: 1.57 MG/DL (ref 0.55–1.02)
DIFFERENTIAL METHOD BLD: ABNORMAL
EGFR (NO RACE VARIABLE) (RUSH/TITUS): 35 ML/MIN/1.73M2
EOSINOPHIL # BLD AUTO: 0.06 K/UL (ref 0–0.5)
EOSINOPHIL NFR BLD AUTO: 0.8 % (ref 1–4)
ERYTHROCYTE [DISTWIDTH] IN BLOOD BY AUTOMATED COUNT: 13.3 % (ref 11.5–14.5)
GLUCOSE SERPL-MCNC: 110 MG/DL (ref 82–115)
GLUCOSE UR QL STRIP: NEGATIVE
HCT VFR BLD AUTO: 45.5 % (ref 38–47)
HGB BLD-MCNC: 14 G/DL (ref 12–16)
IMM GRANULOCYTES # BLD AUTO: 0.02 K/UL (ref 0–0.04)
IMM GRANULOCYTES NFR BLD: 0.3 % (ref 0–0.4)
KETONES UR QL STRIP: NEGATIVE
LEUKOCYTE ESTERASE URINE, POC: ABNORMAL
LYMPHOCYTES # BLD AUTO: 2.53 K/UL (ref 1–4.8)
LYMPHOCYTES NFR BLD AUTO: 32.7 % (ref 27–41)
MCH RBC QN AUTO: 29.8 PG (ref 27–31)
MCHC RBC AUTO-ENTMCNC: 30.8 G/DL (ref 32–36)
MCV RBC AUTO: 96.8 FL (ref 80–96)
MONOCYTES # BLD AUTO: 0.41 K/UL (ref 0–0.8)
MONOCYTES NFR BLD AUTO: 5.3 % (ref 2–6)
MPC BLD CALC-MCNC: 10.2 FL (ref 9.4–12.4)
NEUTROPHILS # BLD AUTO: 4.67 K/UL (ref 1.8–7.7)
NEUTROPHILS NFR BLD AUTO: 60.3 % (ref 53–65)
NITRITE, POC UA: POSITIVE
NRBC # BLD AUTO: 0 X10E3/UL
NRBC, AUTO (.00): 0 %
PH, POC UA: 6
PLATELET # BLD AUTO: 313 K/UL (ref 150–400)
POTASSIUM SERPL-SCNC: 4.4 MMOL/L (ref 3.5–5.1)
PROTEIN, POC: NEGATIVE
RBC # BLD AUTO: 4.7 M/UL (ref 4.2–5.4)
SODIUM SERPL-SCNC: 143 MMOL/L (ref 136–145)
SPECIFIC GRAVITY, POC UA: 1.01
UROBILINOGEN, POC UA: ABNORMAL
VIT B12 SERPL-MCNC: 1446 PG/ML (ref 213–816)
WBC # BLD AUTO: 7.74 K/UL (ref 4.5–11)

## 2025-06-25 PROCEDURE — 84443 ASSAY THYROID STIM HORMONE: CPT | Mod: ,,, | Performed by: CLINICAL MEDICAL LABORATORY

## 2025-06-25 PROCEDURE — 99214 OFFICE O/P EST MOD 30 MIN: CPT | Mod: ,,, | Performed by: NURSE PRACTITIONER

## 2025-06-25 PROCEDURE — 82607 VITAMIN B-12: CPT | Mod: ,,, | Performed by: CLINICAL MEDICAL LABORATORY

## 2025-06-25 PROCEDURE — 85025 COMPLETE CBC W/AUTO DIFF WBC: CPT | Mod: ,,, | Performed by: CLINICAL MEDICAL LABORATORY

## 2025-06-25 PROCEDURE — 87086 URINE CULTURE/COLONY COUNT: CPT | Mod: ,,, | Performed by: CLINICAL MEDICAL LABORATORY

## 2025-06-25 PROCEDURE — 80048 BASIC METABOLIC PNL TOTAL CA: CPT | Mod: ,,, | Performed by: CLINICAL MEDICAL LABORATORY

## 2025-06-25 PROCEDURE — 96372 THER/PROPH/DIAG INJ SC/IM: CPT | Mod: ,,, | Performed by: NURSE PRACTITIONER

## 2025-06-25 PROCEDURE — 82306 VITAMIN D 25 HYDROXY: CPT | Mod: ,,, | Performed by: CLINICAL MEDICAL LABORATORY

## 2025-06-25 RX ORDER — SULFAMETHOXAZOLE AND TRIMETHOPRIM 800; 160 MG/1; MG/1
1 TABLET ORAL 2 TIMES DAILY
Qty: 14 TABLET | Refills: 0 | Status: SHIPPED | OUTPATIENT
Start: 2025-06-25 | End: 2025-07-02

## 2025-06-25 RX ORDER — AMITRIPTYLINE HYDROCHLORIDE 10 MG/1
10 TABLET, FILM COATED ORAL NIGHTLY PRN
Qty: 7 TABLET | Refills: 0 | Status: SHIPPED | OUTPATIENT
Start: 2025-06-25 | End: 2025-06-26 | Stop reason: SDUPTHER

## 2025-06-25 RX ORDER — KETOROLAC TROMETHAMINE 30 MG/ML
30 INJECTION, SOLUTION INTRAMUSCULAR; INTRAVENOUS
Status: COMPLETED | OUTPATIENT
Start: 2025-06-25 | End: 2025-06-25

## 2025-06-25 RX ADMIN — KETOROLAC TROMETHAMINE 30 MG: 30 INJECTION, SOLUTION INTRAMUSCULAR; INTRAVENOUS at 02:06
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
## 2025-06-26 DIAGNOSIS — G89.4 CHRONIC PAIN SYNDROME: ICD-10-CM

## 2025-06-26 LAB — TSH SERPL DL<=0.005 MIU/L-ACNC: 2.85 UIU/ML (ref 0.35–4.94)

## 2025-06-26 RX ORDER — AMITRIPTYLINE HYDROCHLORIDE 10 MG/1
10 TABLET, FILM COATED ORAL NIGHTLY PRN
Qty: 90 TABLET | Refills: 1 | Status: SHIPPED | OUTPATIENT
Start: 2025-06-26 | End: 2026-06-26

## 2025-06-26 NOTE — PROGRESS NOTES
I spoke to pt on the phone about her recent labs. I told her both vit b and vit d are elevated. PCP wants you to stop taking both vit d and vit b for now. She stated she would Also, drink plenty of water due to your kidney function has decreased. Pt vu to this.

## 2025-06-26 NOTE — PROGRESS NOTES
Please let pt know her vitamin b12 and vitamin d levels are both high. Is she taking these supplements? If so, she needs to stop these. Her kidney function (GFR) has also decreased which could also cause the elevated vitamin b12 level. I would just make sure she is drinking plenty of water and try not to take any anti inflammatories (nsaids). She sees Dr. Perea for kidneys. Her blood counts are good. We will let her know when her mri results are back. Thanks!

## 2025-06-27 ENCOUNTER — HOSPITAL ENCOUNTER (OUTPATIENT)
Dept: RADIOLOGY | Facility: HOSPITAL | Age: 70
Discharge: HOME | End: 2025-06-27
Attending: NURSE PRACTITIONER
Payer: MEDICARE

## 2025-06-27 DIAGNOSIS — R41.3 OTHER AMNESIA: ICD-10-CM

## 2025-06-27 LAB — UA COMPLETE W REFLEX CULTURE PNL UR: ABNORMAL

## 2025-06-27 NOTE — PROGRESS NOTES
Please let pt know her urine culture came back showing the bactrim is sensitive to the bacteria so this should treat her uti. Thanks!

## 2025-07-08 ENCOUNTER — APPOINTMENT (OUTPATIENT)
Dept: RADIOLOGY | Facility: CLINIC | Age: 70
End: 2025-07-08
Attending: NURSE PRACTITIONER
Payer: MEDICARE

## 2025-07-08 ENCOUNTER — OFFICE VISIT (OUTPATIENT)
Dept: FAMILY MEDICINE | Facility: CLINIC | Age: 70
End: 2025-07-08
Payer: MEDICARE

## 2025-07-08 VITALS
WEIGHT: 174.19 LBS | HEIGHT: 69 IN | TEMPERATURE: 98 F | SYSTOLIC BLOOD PRESSURE: 101 MMHG | DIASTOLIC BLOOD PRESSURE: 71 MMHG | BODY MASS INDEX: 25.8 KG/M2 | OXYGEN SATURATION: 95 % | RESPIRATION RATE: 19 BRPM | HEART RATE: 72 BPM

## 2025-07-08 DIAGNOSIS — E03.9 ACQUIRED HYPOTHYROIDISM: ICD-10-CM

## 2025-07-08 DIAGNOSIS — M54.9 LEFT-SIDED BACK PAIN, UNSPECIFIED BACK LOCATION, UNSPECIFIED CHRONICITY: Primary | ICD-10-CM

## 2025-07-08 DIAGNOSIS — G89.4 CHRONIC PAIN SYNDROME: ICD-10-CM

## 2025-07-08 DIAGNOSIS — M54.9 LEFT-SIDED BACK PAIN, UNSPECIFIED BACK LOCATION, UNSPECIFIED CHRONICITY: ICD-10-CM

## 2025-07-08 DIAGNOSIS — R23.2 HOT FLASHES: ICD-10-CM

## 2025-07-08 LAB
CORTIS SERPL-MCNC: 11 ΜG/DL
ESTRADIOL SERPL-MCNC: <24 PG/ML
FSH SERPL-ACNC: 93.6 MIU/ML
LH SERPL-ACNC: 28.8 MIU/ML
PROGEST SERPL-MCNC: <0.5 NG/ML
TSH SERPL DL<=0.005 MIU/L-ACNC: 4.25 UIU/ML (ref 0.35–4.94)

## 2025-07-08 PROCEDURE — 82671 ASSAY OF ESTROGENS: CPT | Mod: 90,,, | Performed by: CLINICAL MEDICAL LABORATORY

## 2025-07-08 PROCEDURE — 82670 ASSAY OF TOTAL ESTRADIOL: CPT | Mod: ,,, | Performed by: CLINICAL MEDICAL LABORATORY

## 2025-07-08 PROCEDURE — 72100 X-RAY EXAM L-S SPINE 2/3 VWS: CPT | Mod: 26,,, | Performed by: RADIOLOGY

## 2025-07-08 PROCEDURE — 82533 TOTAL CORTISOL: CPT | Mod: ,,, | Performed by: CLINICAL MEDICAL LABORATORY

## 2025-07-08 PROCEDURE — 83002 ASSAY OF GONADOTROPIN (LH): CPT | Mod: ,,, | Performed by: CLINICAL MEDICAL LABORATORY

## 2025-07-08 PROCEDURE — 83001 ASSAY OF GONADOTROPIN (FSH): CPT | Mod: ,,, | Performed by: CLINICAL MEDICAL LABORATORY

## 2025-07-08 PROCEDURE — 84144 ASSAY OF PROGESTERONE: CPT | Mod: ,,, | Performed by: CLINICAL MEDICAL LABORATORY

## 2025-07-08 PROCEDURE — 84443 ASSAY THYROID STIM HORMONE: CPT | Mod: ,,, | Performed by: CLINICAL MEDICAL LABORATORY

## 2025-07-08 PROCEDURE — 72100 X-RAY EXAM L-S SPINE 2/3 VWS: CPT | Mod: TC,RHCUB | Performed by: NURSE PRACTITIONER

## 2025-07-08 PROCEDURE — 99214 OFFICE O/P EST MOD 30 MIN: CPT | Mod: ,,, | Performed by: NURSE PRACTITIONER

## 2025-07-08 RX ORDER — AMITRIPTYLINE HYDROCHLORIDE 10 MG/1
10 TABLET, FILM COATED ORAL 2 TIMES DAILY
Qty: 180 TABLET | Refills: 1 | Status: SHIPPED | OUTPATIENT
Start: 2025-07-08

## 2025-07-09 ENCOUNTER — HOSPITAL ENCOUNTER (OUTPATIENT)
Dept: RADIOLOGY | Facility: HOSPITAL | Age: 70
Discharge: HOME | End: 2025-07-09
Attending: NURSE PRACTITIONER
Payer: MEDICARE

## 2025-07-09 DIAGNOSIS — R41.3 OTHER AMNESIA: ICD-10-CM

## 2025-07-09 DIAGNOSIS — R23.2 HOT FLASHES: Primary | ICD-10-CM

## 2025-07-09 PROCEDURE — 70551 MRI BRAIN STEM W/O DYE: CPT | Mod: 26,,, | Performed by: RADIOLOGY

## 2025-07-09 PROCEDURE — 70551 MRI BRAIN STEM W/O DYE: CPT | Mod: TC

## 2025-07-09 RX ORDER — PAROXETINE 30 MG/1
30 TABLET, FILM COATED ORAL EVERY MORNING
Qty: 90 TABLET | Refills: 1 | Status: SHIPPED | OUTPATIENT
Start: 2025-07-09 | End: 2025-07-10 | Stop reason: SDUPTHER

## 2025-07-10 DIAGNOSIS — R23.2 HOT FLASHES: ICD-10-CM

## 2025-07-10 RX ORDER — PAROXETINE 30 MG/1
30 TABLET, FILM COATED ORAL EVERY MORNING
Qty: 90 TABLET | Refills: 1 | Status: SHIPPED | OUTPATIENT
Start: 2025-07-10 | End: 2026-07-10

## 2025-07-10 NOTE — TELEPHONE ENCOUNTER
Pt wants the Paxil 30 mg sent to [facility].  Thank You!!    ----- Message from Teagan sent at 7/10/2025 10:01 AM CDT -----  Patient requests call back about a prescription    She wanted her refill for   paroxetine (PAXIL) 30 MG tablet   to be sent to     [facility] - [place], [place] - [address]  [address] [place] [address]  Phone: [phone] Fax: [phone]    [facility]

## 2025-07-13 PROBLEM — R23.2 HOT FLASHES: Status: ACTIVE | Noted: 2025-07-13

## 2025-07-13 PROBLEM — M54.9 LEFT-SIDED BACK PAIN: Status: ACTIVE | Noted: 2025-07-13

## 2025-07-13 LAB
ESTRADIOL SERPL-MCNC: <10 PG/ML
ESTRONE SERPL-MCNC: 32 PG/ML
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
## 2025-07-15 ENCOUNTER — TELEPHONE (OUTPATIENT)
Dept: FAMILY MEDICINE | Facility: CLINIC | Age: 70
End: 2025-07-15
Payer: MEDICARE

## 2025-07-15 NOTE — TELEPHONE ENCOUNTER
I TRIED TO CALL THIS PATIENT TO CLARIFY HOW SHE TAKES THIS MEDICINE. SHE DID NOT ANSWER THE PHONE. I LEFT A  FOR A CALL BACK.  Copied from CRM #[id number]. Topic: Medications - Pharmacy  >> Jul 15, 2025  2:25 PM Tisha Martniez wrote:  Who Called:ZACH ([facility])     Caller is requesting assistance/information from provider's office.          Preferred Method of Contact: Phone Call  Patient's Preferred Phone Number on File: [phone]   Best Call Back Number, if different:  Additional Information: Zach the RX Tech called needing clarification on the    amitriptyline (ELAVIL) 10 MG tablet the directions are unclear. The call back number is [phone].

## 2025-08-08 DIAGNOSIS — K58.9 IRRITABLE BOWEL SYNDROME, UNSPECIFIED TYPE: ICD-10-CM

## 2025-08-08 RX ORDER — DICYCLOMINE HYDROCHLORIDE 10 MG/1
10 CAPSULE ORAL 3 TIMES DAILY
Qty: 270 CAPSULE | Refills: 1 | Status: SHIPPED | OUTPATIENT
Start: 2025-08-08

## 2025-08-11 PROBLEM — N30.00 ACUTE CYSTITIS WITHOUT HEMATURIA: Status: RESOLVED | Noted: 2025-06-25 | Resolved: 2025-08-11

## 2025-08-18 ENCOUNTER — HOSPITAL ENCOUNTER (INPATIENT)
Facility: HOSPITAL | Age: 70
LOS: 10 days | Discharge: HOME HEALTH | DRG: 093 | End: 2025-08-28
Attending: FAMILY MEDICINE | Admitting: FAMILY MEDICINE
Payer: MEDICARE

## 2025-08-18 DIAGNOSIS — I69.354 HEMIPLEGIA AND HEMIPARESIS FOLLOWING CEREBRAL INFARCTION AFFECTING LEFT NON-DOMINANT SIDE: ICD-10-CM

## 2025-08-18 DIAGNOSIS — R26.89 DECREASED FUNCTIONAL MOBILITY: ICD-10-CM

## 2025-08-18 DIAGNOSIS — S32.599D CLOSED FRACTURE OF PUBIC RAMUS, UNSPECIFIED LATERALITY, WITH ROUTINE HEALING, SUBSEQUENT ENCOUNTER: Primary | ICD-10-CM

## 2025-08-18 PROBLEM — S32.599A CLOSED FRACTURE OF MULTIPLE PUBIC RAMI: Status: ACTIVE | Noted: 2025-08-18

## 2025-08-18 LAB
ALBUMIN SERPL BCP-MCNC: 3.2 G/DL (ref 3.4–4.8)
ALBUMIN/GLOB SERPL: 0.9 {RATIO}
ALP SERPL-CCNC: 61 U/L (ref 40–150)
ALT SERPL W P-5'-P-CCNC: 11 U/L
ANION GAP SERPL CALCULATED.3IONS-SCNC: 12 MMOL/L (ref 7–16)
AST SERPL W P-5'-P-CCNC: 24 U/L (ref 11–45)
BACTERIA #/AREA URNS HPF: ABNORMAL /HPF
BASOPHILS # BLD AUTO: 0.03 K/UL (ref 0–0.2)
BASOPHILS NFR BLD AUTO: 0.3 % (ref 0–1)
BILIRUB SERPL-MCNC: 0.4 MG/DL
BILIRUB UR QL STRIP: NEGATIVE
BUN SERPL-MCNC: 11 MG/DL (ref 10–20)
BUN/CREAT SERPL: 10 (ref 6–20)
CALCIUM SERPL-MCNC: 8.3 MG/DL (ref 8.4–10.2)
CHLORIDE SERPL-SCNC: 114 MMOL/L (ref 98–107)
CLARITY UR: ABNORMAL
CO2 SERPL-SCNC: 17 MMOL/L (ref 23–31)
COLOR UR: YELLOW
CREAT SERPL-MCNC: 1.15 MG/DL (ref 0.55–1.02)
DIFFERENTIAL METHOD BLD: ABNORMAL
EGFR (NO RACE VARIABLE) (RUSH/TITUS): 51 ML/MIN/1.73M2
EOSINOPHIL # BLD AUTO: 0.12 K/UL (ref 0–0.5)
EOSINOPHIL NFR BLD AUTO: 1.3 % (ref 1–4)
ERYTHROCYTE [DISTWIDTH] IN BLOOD BY AUTOMATED COUNT: 13 % (ref 11.5–14.5)
GLOBULIN SER-MCNC: 3.5 G/DL (ref 2–4)
GLUCOSE SERPL-MCNC: 94 MG/DL (ref 82–115)
GLUCOSE UR STRIP-MCNC: NEGATIVE MG/DL
HCT VFR BLD AUTO: 38.4 % (ref 38–47)
HGB BLD-MCNC: 11.8 G/DL (ref 12–16)
KETONES UR STRIP-SCNC: NEGATIVE MG/DL
LEUKOCYTE ESTERASE UR QL STRIP: ABNORMAL
LYMPHOCYTES # BLD AUTO: 1.78 K/UL (ref 1–4.8)
LYMPHOCYTES NFR BLD AUTO: 19.9 % (ref 27–41)
MCH RBC QN AUTO: 30.1 PG (ref 27–31)
MCHC RBC AUTO-ENTMCNC: 30.7 G/DL (ref 32–36)
MCV RBC AUTO: 98 FL (ref 80–96)
MONOCYTES # BLD AUTO: 0.52 K/UL (ref 0–0.8)
MONOCYTES NFR BLD AUTO: 5.8 % (ref 2–6)
MPC BLD CALC-MCNC: 9.8 FL (ref 9.4–12.4)
NEUTROPHILS # BLD AUTO: 6.51 K/UL (ref 1.8–7.7)
NEUTROPHILS NFR BLD AUTO: 72.7 % (ref 53–65)
NITRITE UR QL STRIP: NEGATIVE
PH UR STRIP: 7 PH UNITS
PLATELET # BLD AUTO: 235 K/UL (ref 150–400)
POTASSIUM SERPL-SCNC: 4 MMOL/L (ref 3.5–5.1)
PROT SERPL-MCNC: 6.7 G/DL (ref 5.8–7.6)
PROT UR QL STRIP: NEGATIVE
RBC # BLD AUTO: 3.92 M/UL (ref 4.2–5.4)
RBC # UR STRIP: NEGATIVE /UL
RBC #/AREA URNS HPF: ABNORMAL /HPF
SODIUM SERPL-SCNC: 139 MMOL/L (ref 136–145)
SP GR UR STRIP: 1.01
SQUAMOUS #/AREA URNS LPF: ABNORMAL /LPF
TRANS CELLS #/AREA URNS LPF: ABNORMAL /LPF
UROBILINOGEN UR STRIP-ACNC: 0.2 MG/DL
WBC # BLD AUTO: 8.96 K/UL (ref 4.5–11)
WBC #/AREA URNS HPF: ABNORMAL /HPF

## 2025-08-18 PROCEDURE — 36415 COLL VENOUS BLD VENIPUNCTURE: CPT | Performed by: NURSE PRACTITIONER

## 2025-08-18 PROCEDURE — 25000003 PHARM REV CODE 250: Performed by: NURSE PRACTITIONER

## 2025-08-18 PROCEDURE — 85025 COMPLETE CBC W/AUTO DIFF WBC: CPT | Performed by: NURSE PRACTITIONER

## 2025-08-18 PROCEDURE — 81003 URINALYSIS AUTO W/O SCOPE: CPT | Performed by: NURSE PRACTITIONER

## 2025-08-18 PROCEDURE — 11000004 HC SNF PRIVATE

## 2025-08-18 PROCEDURE — 94761 N-INVAS EAR/PLS OXIMETRY MLT: CPT

## 2025-08-18 PROCEDURE — 27000492 HC SLEEVE, SCD T/L

## 2025-08-18 PROCEDURE — 99900035 HC TECH TIME PER 15 MIN (STAT)

## 2025-08-18 PROCEDURE — 80053 COMPREHEN METABOLIC PANEL: CPT | Performed by: NURSE PRACTITIONER

## 2025-08-18 PROCEDURE — 94799 UNLISTED PULMONARY SVC/PX: CPT

## 2025-08-18 PROCEDURE — 87086 URINE CULTURE/COLONY COUNT: CPT | Performed by: NURSE PRACTITIONER

## 2025-08-18 RX ORDER — DICYCLOMINE HYDROCHLORIDE 10 MG/1
10 CAPSULE ORAL 3 TIMES DAILY
Status: DISCONTINUED | OUTPATIENT
Start: 2025-08-18 | End: 2025-08-28 | Stop reason: HOSPADM

## 2025-08-18 RX ORDER — AMOXICILLIN 250 MG
1 CAPSULE ORAL 2 TIMES DAILY
Status: DISCONTINUED | OUTPATIENT
Start: 2025-08-18 | End: 2025-08-21

## 2025-08-18 RX ORDER — PANTOPRAZOLE SODIUM 40 MG/1
40 TABLET, DELAYED RELEASE ORAL DAILY
Status: DISCONTINUED | OUTPATIENT
Start: 2025-08-19 | End: 2025-08-28 | Stop reason: HOSPADM

## 2025-08-18 RX ORDER — TRIAMTERENE AND HYDROCHLOROTHIAZIDE 37.5; 25 MG/1; MG/1
1 TABLET ORAL DAILY
Status: DISCONTINUED | OUTPATIENT
Start: 2025-08-19 | End: 2025-08-28 | Stop reason: HOSPADM

## 2025-08-18 RX ORDER — HYDROCODONE BITARTRATE AND ACETAMINOPHEN 7.5; 325 MG/1; MG/1
1 TABLET ORAL 3 TIMES DAILY PRN
Refills: 0 | Status: DISCONTINUED | OUTPATIENT
Start: 2025-08-18 | End: 2025-08-28 | Stop reason: HOSPADM

## 2025-08-18 RX ORDER — FLUTICASONE PROPIONATE 50 MCG
2 SPRAY, SUSPENSION (ML) NASAL DAILY PRN
Status: DISCONTINUED | OUTPATIENT
Start: 2025-08-18 | End: 2025-08-28 | Stop reason: HOSPADM

## 2025-08-18 RX ORDER — ENOXAPARIN SODIUM 100 MG/ML
40 INJECTION SUBCUTANEOUS EVERY 24 HOURS
Status: DISCONTINUED | OUTPATIENT
Start: 2025-08-18 | End: 2025-08-28 | Stop reason: HOSPADM

## 2025-08-18 RX ORDER — PROMETHAZINE HYDROCHLORIDE 25 MG/1
25 TABLET ORAL EVERY 6 HOURS PRN
Status: DISCONTINUED | OUTPATIENT
Start: 2025-08-18 | End: 2025-08-28 | Stop reason: HOSPADM

## 2025-08-18 RX ORDER — METHENAMINE HIPPURATE 1000 MG/1
1 TABLET ORAL 2 TIMES DAILY
Status: DISCONTINUED | OUTPATIENT
Start: 2025-08-18 | End: 2025-08-28 | Stop reason: HOSPADM

## 2025-08-18 RX ORDER — GABAPENTIN 300 MG/1
600 CAPSULE ORAL 3 TIMES DAILY
Status: DISCONTINUED | OUTPATIENT
Start: 2025-08-18 | End: 2025-08-28 | Stop reason: HOSPADM

## 2025-08-18 RX ORDER — MULTIVITAMIN
1 TABLET ORAL 2 TIMES DAILY WITH MEALS
Status: DISCONTINUED | OUTPATIENT
Start: 2025-08-18 | End: 2025-08-28 | Stop reason: HOSPADM

## 2025-08-18 RX ORDER — ATORVASTATIN CALCIUM 40 MG/1
40 TABLET, FILM COATED ORAL NIGHTLY
Status: DISCONTINUED | OUTPATIENT
Start: 2025-08-18 | End: 2025-08-28 | Stop reason: HOSPADM

## 2025-08-18 RX ORDER — CALCIUM CARBONATE 200(500)MG
500 TABLET,CHEWABLE ORAL 2 TIMES DAILY PRN
Status: DISCONTINUED | OUTPATIENT
Start: 2025-08-18 | End: 2025-08-28 | Stop reason: HOSPADM

## 2025-08-18 RX ORDER — TALC
6 POWDER (GRAM) TOPICAL NIGHTLY PRN
Status: DISCONTINUED | OUTPATIENT
Start: 2025-08-18 | End: 2025-08-28 | Stop reason: HOSPADM

## 2025-08-18 RX ORDER — CETIRIZINE HYDROCHLORIDE 10 MG/1
10 TABLET ORAL DAILY
Status: DISCONTINUED | OUTPATIENT
Start: 2025-08-19 | End: 2025-08-28 | Stop reason: HOSPADM

## 2025-08-18 RX ORDER — METHOCARBAMOL 500 MG/1
500 TABLET, FILM COATED ORAL 3 TIMES DAILY
Status: DISCONTINUED | OUTPATIENT
Start: 2025-08-18 | End: 2025-08-28 | Stop reason: HOSPADM

## 2025-08-18 RX ORDER — LEVOTHYROXINE SODIUM 50 UG/1
50 TABLET ORAL
Status: DISCONTINUED | OUTPATIENT
Start: 2025-08-19 | End: 2025-08-28 | Stop reason: HOSPADM

## 2025-08-18 RX ORDER — FOLIC ACID 1 MG/1
1 TABLET ORAL DAILY
Status: DISCONTINUED | OUTPATIENT
Start: 2025-08-19 | End: 2025-08-28 | Stop reason: HOSPADM

## 2025-08-18 RX ORDER — DIAZEPAM 2 MG/1
2 TABLET ORAL 2 TIMES DAILY
Status: DISCONTINUED | OUTPATIENT
Start: 2025-08-18 | End: 2025-08-28 | Stop reason: HOSPADM

## 2025-08-18 RX ORDER — ONDANSETRON 4 MG/1
4 TABLET, ORALLY DISINTEGRATING ORAL EVERY 8 HOURS PRN
Status: DISCONTINUED | OUTPATIENT
Start: 2025-08-18 | End: 2025-08-28 | Stop reason: HOSPADM

## 2025-08-18 RX ORDER — COLESTIPOL HYDROCHLORIDE 1 G/1
1 TABLET ORAL DAILY
Status: DISCONTINUED | OUTPATIENT
Start: 2025-08-19 | End: 2025-08-20

## 2025-08-18 RX ORDER — ACETAMINOPHEN 325 MG/1
650 TABLET ORAL EVERY 6 HOURS PRN
Status: DISCONTINUED | OUTPATIENT
Start: 2025-08-18 | End: 2025-08-28 | Stop reason: HOSPADM

## 2025-08-18 RX ORDER — AMITRIPTYLINE HYDROCHLORIDE 10 MG/1
10 TABLET, FILM COATED ORAL NIGHTLY
Status: DISCONTINUED | OUTPATIENT
Start: 2025-08-18 | End: 2025-08-28 | Stop reason: HOSPADM

## 2025-08-18 RX ORDER — CETIRIZINE HYDROCHLORIDE 10 MG/1
10 TABLET ORAL DAILY
COMMUNITY
End: 2025-09-02

## 2025-08-18 RX ORDER — MONTELUKAST SODIUM 10 MG/1
10 TABLET ORAL NIGHTLY
Status: DISCONTINUED | OUTPATIENT
Start: 2025-08-18 | End: 2025-08-28 | Stop reason: HOSPADM

## 2025-08-18 RX ORDER — POLYETHYLENE GLYCOL 3350 17 G/17G
17 POWDER, FOR SOLUTION ORAL DAILY PRN
Status: DISCONTINUED | OUTPATIENT
Start: 2025-08-18 | End: 2025-08-28 | Stop reason: HOSPADM

## 2025-08-18 RX ORDER — CHOLECALCIFEROL (VITAMIN D3) 25 MCG
2000 TABLET ORAL DAILY
Status: DISCONTINUED | OUTPATIENT
Start: 2025-08-19 | End: 2025-08-28 | Stop reason: HOSPADM

## 2025-08-18 RX ADMIN — AMITRIPTYLINE HYDROCHLORIDE 10 MG: 10 TABLET, FILM COATED ORAL at 08:08

## 2025-08-18 RX ADMIN — METHOCARBAMOL 500 MG: 500 TABLET ORAL at 08:08

## 2025-08-18 RX ADMIN — MONTELUKAST SODIUM 10 MG: 10 TABLET, COATED ORAL at 08:08

## 2025-08-18 RX ADMIN — GABAPENTIN 600 MG: 300 CAPSULE ORAL at 08:08

## 2025-08-18 RX ADMIN — ATORVASTATIN CALCIUM 40 MG: 40 TABLET, FILM COATED ORAL at 08:08

## 2025-08-18 RX ADMIN — DICYCLOMINE HYDROCHLORIDE 10 MG: 10 CAPSULE ORAL at 08:08

## 2025-08-18 RX ADMIN — Medication 1 TABLET: at 08:08

## 2025-08-18 RX ADMIN — HYDROCODONE BITARTRATE AND ACETAMINOPHEN 1 TABLET: 7.5; 325 TABLET ORAL at 10:08

## 2025-08-18 RX ADMIN — METHENAMINE HIPPURATE 1 G: 1000 TABLET ORAL at 08:08

## 2025-08-18 RX ADMIN — SENNOSIDES, DOCUSATE SODIUM 1 TABLET: 50; 8.6 TABLET, FILM COATED ORAL at 08:08

## 2025-08-18 RX ADMIN — DIAZEPAM 2 MG: 2 TABLET ORAL at 08:08

## 2025-08-19 ENCOUNTER — PATIENT OUTREACH (OUTPATIENT)
Facility: HOSPITAL | Age: 70
End: 2025-08-19
Payer: MEDICARE

## 2025-08-19 PROCEDURE — 27000958

## 2025-08-19 PROCEDURE — 27000982 HC MATTRESS, MATRIX LAL RENTAL

## 2025-08-19 PROCEDURE — 63600175 PHARM REV CODE 636 W HCPCS: Performed by: NURSE PRACTITIONER

## 2025-08-19 PROCEDURE — 97165 OT EVAL LOW COMPLEX 30 MIN: CPT

## 2025-08-19 PROCEDURE — 94761 N-INVAS EAR/PLS OXIMETRY MLT: CPT

## 2025-08-19 PROCEDURE — 97162 PT EVAL MOD COMPLEX 30 MIN: CPT

## 2025-08-19 PROCEDURE — 25000003 PHARM REV CODE 250: Performed by: NURSE PRACTITIONER

## 2025-08-19 PROCEDURE — 11000004 HC SNF PRIVATE

## 2025-08-19 PROCEDURE — 99305 1ST NF CARE MODERATE MDM 35: CPT | Mod: AI,,, | Performed by: FAMILY MEDICINE

## 2025-08-19 RX ADMIN — AMITRIPTYLINE HYDROCHLORIDE 10 MG: 10 TABLET, FILM COATED ORAL at 08:08

## 2025-08-19 RX ADMIN — DIAZEPAM 2 MG: 2 TABLET ORAL at 08:08

## 2025-08-19 RX ADMIN — METHENAMINE HIPPURATE 1 G: 1000 TABLET ORAL at 08:08

## 2025-08-19 RX ADMIN — CETIRIZINE HYDROCHLORIDE 10 MG: 10 TABLET, FILM COATED ORAL at 09:08

## 2025-08-19 RX ADMIN — FOLIC ACID 1 MG: 1 TABLET ORAL at 09:08

## 2025-08-19 RX ADMIN — HYDROCODONE BITARTRATE AND ACETAMINOPHEN 1 TABLET: 7.5; 325 TABLET ORAL at 08:08

## 2025-08-19 RX ADMIN — MONTELUKAST SODIUM 10 MG: 10 TABLET, COATED ORAL at 08:08

## 2025-08-19 RX ADMIN — TRIAMTERENE AND HYDROCHLOROTHIAZIDE 1 TABLET: 37.5; 25 TABLET ORAL at 09:08

## 2025-08-19 RX ADMIN — Medication 1 TABLET: at 09:08

## 2025-08-19 RX ADMIN — PANTOPRAZOLE SODIUM 40 MG: 40 TABLET, DELAYED RELEASE ORAL at 09:08

## 2025-08-19 RX ADMIN — LEVOTHYROXINE SODIUM 50 MCG: 0.05 TABLET ORAL at 05:08

## 2025-08-19 RX ADMIN — GABAPENTIN 600 MG: 300 CAPSULE ORAL at 09:08

## 2025-08-19 RX ADMIN — METHENAMINE HIPPURATE 1 G: 1000 TABLET ORAL at 09:08

## 2025-08-19 RX ADMIN — ENOXAPARIN SODIUM 40 MG: 40 INJECTION SUBCUTANEOUS at 05:08

## 2025-08-19 RX ADMIN — ATORVASTATIN CALCIUM 40 MG: 40 TABLET, FILM COATED ORAL at 08:08

## 2025-08-19 RX ADMIN — DICYCLOMINE HYDROCHLORIDE 10 MG: 10 CAPSULE ORAL at 03:08

## 2025-08-19 RX ADMIN — DICYCLOMINE HYDROCHLORIDE 10 MG: 10 CAPSULE ORAL at 09:08

## 2025-08-19 RX ADMIN — METHOCARBAMOL 500 MG: 500 TABLET ORAL at 08:08

## 2025-08-19 RX ADMIN — METHOCARBAMOL 500 MG: 500 TABLET ORAL at 09:08

## 2025-08-19 RX ADMIN — Medication 2000 UNITS: at 09:08

## 2025-08-19 RX ADMIN — GABAPENTIN 600 MG: 300 CAPSULE ORAL at 03:08

## 2025-08-19 RX ADMIN — DICYCLOMINE HYDROCHLORIDE 10 MG: 10 CAPSULE ORAL at 08:08

## 2025-08-19 RX ADMIN — METHOCARBAMOL 500 MG: 500 TABLET ORAL at 03:08

## 2025-08-19 RX ADMIN — DIAZEPAM 2 MG: 2 TABLET ORAL at 09:08

## 2025-08-19 RX ADMIN — Medication 1 TABLET: at 05:08

## 2025-08-19 RX ADMIN — GABAPENTIN 600 MG: 300 CAPSULE ORAL at 08:08

## 2025-08-19 RX ADMIN — COLESTIPOL HYDROCHLORIDE 1 G: 1 TABLET ORAL at 08:08

## 2025-08-20 PROCEDURE — 11000004 HC SNF PRIVATE

## 2025-08-20 PROCEDURE — 25000003 PHARM REV CODE 250: Performed by: NURSE PRACTITIONER

## 2025-08-20 PROCEDURE — 97110 THERAPEUTIC EXERCISES: CPT

## 2025-08-20 PROCEDURE — 63600175 PHARM REV CODE 636 W HCPCS: Performed by: NURSE PRACTITIONER

## 2025-08-20 PROCEDURE — 97530 THERAPEUTIC ACTIVITIES: CPT

## 2025-08-20 PROCEDURE — 27000982 HC MATTRESS, MATRIX LAL RENTAL

## 2025-08-20 PROCEDURE — 25000003 PHARM REV CODE 250: Performed by: FAMILY MEDICINE

## 2025-08-20 PROCEDURE — 94761 N-INVAS EAR/PLS OXIMETRY MLT: CPT

## 2025-08-20 PROCEDURE — 27000958

## 2025-08-20 PROCEDURE — 97116 GAIT TRAINING THERAPY: CPT

## 2025-08-20 RX ORDER — RALOXIFENE HYDROCHLORIDE 60 MG/1
60 TABLET, FILM COATED ORAL DAILY
Status: DISCONTINUED | OUTPATIENT
Start: 2025-08-20 | End: 2025-08-20

## 2025-08-20 RX ORDER — MUPIROCIN 20 MG/G
OINTMENT TOPICAL 2 TIMES DAILY
Status: COMPLETED | OUTPATIENT
Start: 2025-08-20 | End: 2025-08-24

## 2025-08-20 RX ORDER — COLESTIPOL HYDROCHLORIDE 1 G/1
2 TABLET ORAL 2 TIMES DAILY PRN
Status: DISCONTINUED | OUTPATIENT
Start: 2025-08-20 | End: 2025-08-28 | Stop reason: HOSPADM

## 2025-08-20 RX ORDER — RALOXIFENE HYDROCHLORIDE 60 MG/1
60 TABLET, FILM COATED ORAL NIGHTLY
Status: DISCONTINUED | OUTPATIENT
Start: 2025-08-20 | End: 2025-08-28 | Stop reason: HOSPADM

## 2025-08-20 RX ORDER — RALOXIFENE HYDROCHLORIDE 60 MG/1
60 TABLET, FILM COATED ORAL NIGHTLY
Status: DISCONTINUED | OUTPATIENT
Start: 2025-08-21 | End: 2025-08-20

## 2025-08-20 RX ADMIN — RALOXIFENE HYDROCHLORIDE 60 MG: 60 TABLET, FILM COATED ORAL at 08:08

## 2025-08-20 RX ADMIN — DICYCLOMINE HYDROCHLORIDE 10 MG: 10 CAPSULE ORAL at 03:08

## 2025-08-20 RX ADMIN — DIAZEPAM 2 MG: 2 TABLET ORAL at 09:08

## 2025-08-20 RX ADMIN — SENNOSIDES, DOCUSATE SODIUM 1 TABLET: 50; 8.6 TABLET, FILM COATED ORAL at 08:08

## 2025-08-20 RX ADMIN — Medication 2000 UNITS: at 08:08

## 2025-08-20 RX ADMIN — Medication 1 TABLET: at 05:08

## 2025-08-20 RX ADMIN — HYDROCODONE BITARTRATE AND ACETAMINOPHEN 1 TABLET: 7.5; 325 TABLET ORAL at 08:08

## 2025-08-20 RX ADMIN — TRIAMTERENE AND HYDROCHLOROTHIAZIDE 1 TABLET: 37.5; 25 TABLET ORAL at 08:08

## 2025-08-20 RX ADMIN — GABAPENTIN 600 MG: 300 CAPSULE ORAL at 08:08

## 2025-08-20 RX ADMIN — AMITRIPTYLINE HYDROCHLORIDE 10 MG: 10 TABLET, FILM COATED ORAL at 09:08

## 2025-08-20 RX ADMIN — METHOCARBAMOL 500 MG: 500 TABLET ORAL at 03:08

## 2025-08-20 RX ADMIN — ENOXAPARIN SODIUM 40 MG: 40 INJECTION SUBCUTANEOUS at 05:08

## 2025-08-20 RX ADMIN — METHOCARBAMOL 500 MG: 500 TABLET ORAL at 09:08

## 2025-08-20 RX ADMIN — ATORVASTATIN CALCIUM 40 MG: 40 TABLET, FILM COATED ORAL at 09:08

## 2025-08-20 RX ADMIN — MUPIROCIN: 20 OINTMENT TOPICAL at 09:08

## 2025-08-20 RX ADMIN — DICYCLOMINE HYDROCHLORIDE 10 MG: 10 CAPSULE ORAL at 08:08

## 2025-08-20 RX ADMIN — LEVOTHYROXINE SODIUM 50 MCG: 0.05 TABLET ORAL at 05:08

## 2025-08-20 RX ADMIN — MUPIROCIN: 20 OINTMENT TOPICAL at 11:08

## 2025-08-20 RX ADMIN — FOLIC ACID 1 MG: 1 TABLET ORAL at 08:08

## 2025-08-20 RX ADMIN — GABAPENTIN 600 MG: 300 CAPSULE ORAL at 03:08

## 2025-08-20 RX ADMIN — Medication 1 TABLET: at 08:08

## 2025-08-20 RX ADMIN — MONTELUKAST SODIUM 10 MG: 10 TABLET, COATED ORAL at 09:08

## 2025-08-20 RX ADMIN — CETIRIZINE HYDROCHLORIDE 10 MG: 10 TABLET, FILM COATED ORAL at 08:08

## 2025-08-20 RX ADMIN — METHENAMINE HIPPURATE 1 G: 1000 TABLET ORAL at 08:08

## 2025-08-20 RX ADMIN — METHOCARBAMOL 500 MG: 500 TABLET ORAL at 08:08

## 2025-08-20 RX ADMIN — PANTOPRAZOLE SODIUM 40 MG: 40 TABLET, DELAYED RELEASE ORAL at 08:08

## 2025-08-20 RX ADMIN — METHENAMINE HIPPURATE 1 G: 1000 TABLET ORAL at 09:08

## 2025-08-20 RX ADMIN — DIAZEPAM 2 MG: 2 TABLET ORAL at 08:08

## 2025-08-20 RX ADMIN — GABAPENTIN 600 MG: 300 CAPSULE ORAL at 09:08

## 2025-08-21 LAB — UA COMPLETE W REFLEX CULTURE PNL UR: NORMAL

## 2025-08-21 PROCEDURE — 97530 THERAPEUTIC ACTIVITIES: CPT | Mod: CO

## 2025-08-21 PROCEDURE — 63600175 PHARM REV CODE 636 W HCPCS: Performed by: NURSE PRACTITIONER

## 2025-08-21 PROCEDURE — 97110 THERAPEUTIC EXERCISES: CPT | Mod: CO

## 2025-08-21 PROCEDURE — 25000003 PHARM REV CODE 250: Performed by: NURSE PRACTITIONER

## 2025-08-21 PROCEDURE — 25000003 PHARM REV CODE 250: Performed by: FAMILY MEDICINE

## 2025-08-21 PROCEDURE — 27000982 HC MATTRESS, MATRIX LAL RENTAL

## 2025-08-21 PROCEDURE — 97116 GAIT TRAINING THERAPY: CPT | Mod: CQ

## 2025-08-21 PROCEDURE — 97110 THERAPEUTIC EXERCISES: CPT | Mod: CQ

## 2025-08-21 PROCEDURE — 94761 N-INVAS EAR/PLS OXIMETRY MLT: CPT

## 2025-08-21 PROCEDURE — 27000958

## 2025-08-21 PROCEDURE — 11000004 HC SNF PRIVATE

## 2025-08-21 RX ADMIN — METHOCARBAMOL 500 MG: 500 TABLET ORAL at 09:08

## 2025-08-21 RX ADMIN — CETIRIZINE HYDROCHLORIDE 10 MG: 10 TABLET, FILM COATED ORAL at 09:08

## 2025-08-21 RX ADMIN — METHENAMINE HIPPURATE 1 G: 1000 TABLET ORAL at 09:08

## 2025-08-21 RX ADMIN — DIAZEPAM 2 MG: 2 TABLET ORAL at 09:08

## 2025-08-21 RX ADMIN — MUPIROCIN: 20 OINTMENT TOPICAL at 09:08

## 2025-08-21 RX ADMIN — DICYCLOMINE HYDROCHLORIDE 10 MG: 10 CAPSULE ORAL at 09:08

## 2025-08-21 RX ADMIN — MONTELUKAST SODIUM 10 MG: 10 TABLET, COATED ORAL at 09:08

## 2025-08-21 RX ADMIN — GABAPENTIN 600 MG: 300 CAPSULE ORAL at 09:08

## 2025-08-21 RX ADMIN — LEVOTHYROXINE SODIUM 50 MCG: 0.05 TABLET ORAL at 05:08

## 2025-08-21 RX ADMIN — FOLIC ACID 1 MG: 1 TABLET ORAL at 09:08

## 2025-08-21 RX ADMIN — PANTOPRAZOLE SODIUM 40 MG: 40 TABLET, DELAYED RELEASE ORAL at 09:08

## 2025-08-21 RX ADMIN — METHOCARBAMOL 500 MG: 500 TABLET ORAL at 03:08

## 2025-08-21 RX ADMIN — RALOXIFENE HYDROCHLORIDE 60 MG: 60 TABLET, FILM COATED ORAL at 09:08

## 2025-08-21 RX ADMIN — DICYCLOMINE HYDROCHLORIDE 10 MG: 10 CAPSULE ORAL at 03:08

## 2025-08-21 RX ADMIN — ATORVASTATIN CALCIUM 40 MG: 40 TABLET, FILM COATED ORAL at 09:08

## 2025-08-21 RX ADMIN — Medication 1 TABLET: at 05:08

## 2025-08-21 RX ADMIN — HYDROCODONE BITARTRATE AND ACETAMINOPHEN 1 TABLET: 7.5; 325 TABLET ORAL at 09:08

## 2025-08-21 RX ADMIN — Medication 2000 UNITS: at 09:08

## 2025-08-21 RX ADMIN — TRIAMTERENE AND HYDROCHLOROTHIAZIDE 1 TABLET: 37.5; 25 TABLET ORAL at 09:08

## 2025-08-21 RX ADMIN — ENOXAPARIN SODIUM 40 MG: 40 INJECTION SUBCUTANEOUS at 05:08

## 2025-08-21 RX ADMIN — Medication 1 TABLET: at 08:08

## 2025-08-21 RX ADMIN — GABAPENTIN 600 MG: 300 CAPSULE ORAL at 03:08

## 2025-08-21 RX ADMIN — AMITRIPTYLINE HYDROCHLORIDE 10 MG: 10 TABLET, FILM COATED ORAL at 09:08

## 2025-08-22 PROCEDURE — 25000003 PHARM REV CODE 250: Performed by: FAMILY MEDICINE

## 2025-08-22 PROCEDURE — 63600175 PHARM REV CODE 636 W HCPCS: Performed by: NURSE PRACTITIONER

## 2025-08-22 PROCEDURE — 97116 GAIT TRAINING THERAPY: CPT | Mod: CQ

## 2025-08-22 PROCEDURE — 97110 THERAPEUTIC EXERCISES: CPT

## 2025-08-22 PROCEDURE — 97112 NEUROMUSCULAR REEDUCATION: CPT

## 2025-08-22 PROCEDURE — 27000958

## 2025-08-22 PROCEDURE — 97110 THERAPEUTIC EXERCISES: CPT | Mod: CQ

## 2025-08-22 PROCEDURE — 11000004 HC SNF PRIVATE

## 2025-08-22 PROCEDURE — 27000982 HC MATTRESS, MATRIX LAL RENTAL

## 2025-08-22 PROCEDURE — 25000003 PHARM REV CODE 250: Performed by: NURSE PRACTITIONER

## 2025-08-22 PROCEDURE — 94761 N-INVAS EAR/PLS OXIMETRY MLT: CPT

## 2025-08-22 PROCEDURE — 97530 THERAPEUTIC ACTIVITIES: CPT

## 2025-08-22 RX ORDER — TOPIRAMATE 25 MG/1
100 TABLET, FILM COATED ORAL 2 TIMES DAILY
Status: DISCONTINUED | OUTPATIENT
Start: 2025-08-22 | End: 2025-08-28 | Stop reason: HOSPADM

## 2025-08-22 RX ADMIN — TOPIRAMATE 100 MG: 25 TABLET, FILM COATED ORAL at 08:08

## 2025-08-22 RX ADMIN — ENOXAPARIN SODIUM 40 MG: 40 INJECTION SUBCUTANEOUS at 05:08

## 2025-08-22 RX ADMIN — CETIRIZINE HYDROCHLORIDE 10 MG: 10 TABLET, FILM COATED ORAL at 08:08

## 2025-08-22 RX ADMIN — GABAPENTIN 600 MG: 300 CAPSULE ORAL at 08:08

## 2025-08-22 RX ADMIN — TOPIRAMATE 100 MG: 25 TABLET, FILM COATED ORAL at 11:08

## 2025-08-22 RX ADMIN — LEVOTHYROXINE SODIUM 50 MCG: 0.05 TABLET ORAL at 05:08

## 2025-08-22 RX ADMIN — METHENAMINE HIPPURATE 1 G: 1000 TABLET ORAL at 08:08

## 2025-08-22 RX ADMIN — RALOXIFENE HYDROCHLORIDE 60 MG: 60 TABLET, FILM COATED ORAL at 09:08

## 2025-08-22 RX ADMIN — PANTOPRAZOLE SODIUM 40 MG: 40 TABLET, DELAYED RELEASE ORAL at 08:08

## 2025-08-22 RX ADMIN — Medication 1 TABLET: at 07:08

## 2025-08-22 RX ADMIN — DICYCLOMINE HYDROCHLORIDE 10 MG: 10 CAPSULE ORAL at 08:08

## 2025-08-22 RX ADMIN — TRIAMTERENE AND HYDROCHLOROTHIAZIDE 1 TABLET: 37.5; 25 TABLET ORAL at 08:08

## 2025-08-22 RX ADMIN — FOLIC ACID 1 MG: 1 TABLET ORAL at 08:08

## 2025-08-22 RX ADMIN — ATORVASTATIN CALCIUM 40 MG: 40 TABLET, FILM COATED ORAL at 08:08

## 2025-08-22 RX ADMIN — HYDROCODONE BITARTRATE AND ACETAMINOPHEN 1 TABLET: 7.5; 325 TABLET ORAL at 08:08

## 2025-08-22 RX ADMIN — MUPIROCIN: 20 OINTMENT TOPICAL at 08:08

## 2025-08-22 RX ADMIN — Medication 2000 UNITS: at 08:08

## 2025-08-22 RX ADMIN — METHOCARBAMOL 500 MG: 500 TABLET ORAL at 08:08

## 2025-08-22 RX ADMIN — MONTELUKAST SODIUM 10 MG: 10 TABLET, COATED ORAL at 08:08

## 2025-08-22 RX ADMIN — HYDROCODONE BITARTRATE AND ACETAMINOPHEN 1 TABLET: 7.5; 325 TABLET ORAL at 03:08

## 2025-08-22 RX ADMIN — ACETAMINOPHEN 650 MG: 325 TABLET ORAL at 11:08

## 2025-08-22 RX ADMIN — AMITRIPTYLINE HYDROCHLORIDE 10 MG: 10 TABLET, FILM COATED ORAL at 08:08

## 2025-08-22 RX ADMIN — Medication 1 TABLET: at 05:08

## 2025-08-22 RX ADMIN — DIAZEPAM 2 MG: 2 TABLET ORAL at 08:08

## 2025-08-22 RX ADMIN — PAROXETINE 30 MG: 10 TABLET, FILM COATED ORAL at 08:08

## 2025-08-22 RX ADMIN — DICYCLOMINE HYDROCHLORIDE 10 MG: 10 CAPSULE ORAL at 03:08

## 2025-08-22 RX ADMIN — METHOCARBAMOL 500 MG: 500 TABLET ORAL at 03:08

## 2025-08-22 RX ADMIN — GABAPENTIN 600 MG: 300 CAPSULE ORAL at 03:08

## 2025-08-23 PROCEDURE — 25000003 PHARM REV CODE 250: Performed by: NURSE PRACTITIONER

## 2025-08-23 PROCEDURE — 94761 N-INVAS EAR/PLS OXIMETRY MLT: CPT

## 2025-08-23 PROCEDURE — 27000958

## 2025-08-23 PROCEDURE — 27000982 HC MATTRESS, MATRIX LAL RENTAL

## 2025-08-23 PROCEDURE — 63600175 PHARM REV CODE 636 W HCPCS: Performed by: NURSE PRACTITIONER

## 2025-08-23 PROCEDURE — 25000003 PHARM REV CODE 250: Performed by: FAMILY MEDICINE

## 2025-08-23 PROCEDURE — 11000004 HC SNF PRIVATE

## 2025-08-23 RX ADMIN — DIAZEPAM 2 MG: 2 TABLET ORAL at 08:08

## 2025-08-23 RX ADMIN — FOLIC ACID 1 MG: 1 TABLET ORAL at 08:08

## 2025-08-23 RX ADMIN — GABAPENTIN 600 MG: 300 CAPSULE ORAL at 09:08

## 2025-08-23 RX ADMIN — METHOCARBAMOL 500 MG: 500 TABLET ORAL at 02:08

## 2025-08-23 RX ADMIN — METHOCARBAMOL 500 MG: 500 TABLET ORAL at 08:08

## 2025-08-23 RX ADMIN — GABAPENTIN 600 MG: 300 CAPSULE ORAL at 02:08

## 2025-08-23 RX ADMIN — Medication 1 TABLET: at 07:08

## 2025-08-23 RX ADMIN — HYDROCODONE BITARTRATE AND ACETAMINOPHEN 1 TABLET: 7.5; 325 TABLET ORAL at 09:08

## 2025-08-23 RX ADMIN — DICYCLOMINE HYDROCHLORIDE 10 MG: 10 CAPSULE ORAL at 08:08

## 2025-08-23 RX ADMIN — PANTOPRAZOLE SODIUM 40 MG: 40 TABLET, DELAYED RELEASE ORAL at 08:08

## 2025-08-23 RX ADMIN — TRIAMTERENE AND HYDROCHLOROTHIAZIDE 1 TABLET: 37.5; 25 TABLET ORAL at 08:08

## 2025-08-23 RX ADMIN — ATORVASTATIN CALCIUM 40 MG: 40 TABLET, FILM COATED ORAL at 09:08

## 2025-08-23 RX ADMIN — TOPIRAMATE 100 MG: 25 TABLET, FILM COATED ORAL at 08:08

## 2025-08-23 RX ADMIN — DICYCLOMINE HYDROCHLORIDE 10 MG: 10 CAPSULE ORAL at 02:08

## 2025-08-23 RX ADMIN — AMITRIPTYLINE HYDROCHLORIDE 10 MG: 10 TABLET, FILM COATED ORAL at 09:08

## 2025-08-23 RX ADMIN — MUPIROCIN: 20 OINTMENT TOPICAL at 09:08

## 2025-08-23 RX ADMIN — ENOXAPARIN SODIUM 40 MG: 40 INJECTION SUBCUTANEOUS at 05:08

## 2025-08-23 RX ADMIN — METHENAMINE HIPPURATE 1 G: 1000 TABLET ORAL at 08:08

## 2025-08-23 RX ADMIN — DICYCLOMINE HYDROCHLORIDE 10 MG: 10 CAPSULE ORAL at 09:08

## 2025-08-23 RX ADMIN — METHOCARBAMOL 500 MG: 500 TABLET ORAL at 09:08

## 2025-08-23 RX ADMIN — METHENAMINE HIPPURATE 1 G: 1000 TABLET ORAL at 09:08

## 2025-08-23 RX ADMIN — MONTELUKAST SODIUM 10 MG: 10 TABLET, COATED ORAL at 09:08

## 2025-08-23 RX ADMIN — Medication 1 TABLET: at 05:08

## 2025-08-23 RX ADMIN — DIAZEPAM 2 MG: 2 TABLET ORAL at 09:08

## 2025-08-23 RX ADMIN — TOPIRAMATE 100 MG: 25 TABLET, FILM COATED ORAL at 09:08

## 2025-08-23 RX ADMIN — RALOXIFENE HYDROCHLORIDE 60 MG: 60 TABLET, FILM COATED ORAL at 09:08

## 2025-08-23 RX ADMIN — CETIRIZINE HYDROCHLORIDE 10 MG: 10 TABLET, FILM COATED ORAL at 08:08

## 2025-08-23 RX ADMIN — Medication 2000 UNITS: at 08:08

## 2025-08-23 RX ADMIN — PAROXETINE 30 MG: 10 TABLET, FILM COATED ORAL at 09:08

## 2025-08-23 RX ADMIN — ACETAMINOPHEN 650 MG: 325 TABLET ORAL at 02:08

## 2025-08-23 RX ADMIN — ACETAMINOPHEN 650 MG: 325 TABLET ORAL at 05:08

## 2025-08-23 RX ADMIN — GABAPENTIN 600 MG: 300 CAPSULE ORAL at 08:08

## 2025-08-23 RX ADMIN — LEVOTHYROXINE SODIUM 50 MCG: 0.05 TABLET ORAL at 05:08

## 2025-08-24 PROCEDURE — 94761 N-INVAS EAR/PLS OXIMETRY MLT: CPT

## 2025-08-24 PROCEDURE — 25000003 PHARM REV CODE 250: Performed by: FAMILY MEDICINE

## 2025-08-24 PROCEDURE — 27000958

## 2025-08-24 PROCEDURE — 25000003 PHARM REV CODE 250: Performed by: NURSE PRACTITIONER

## 2025-08-24 PROCEDURE — 27000982 HC MATTRESS, MATRIX LAL RENTAL

## 2025-08-24 PROCEDURE — 63600175 PHARM REV CODE 636 W HCPCS: Performed by: NURSE PRACTITIONER

## 2025-08-24 PROCEDURE — 11000004 HC SNF PRIVATE

## 2025-08-24 RX ADMIN — CETIRIZINE HYDROCHLORIDE 10 MG: 10 TABLET, FILM COATED ORAL at 09:08

## 2025-08-24 RX ADMIN — METHOCARBAMOL 500 MG: 500 TABLET ORAL at 08:08

## 2025-08-24 RX ADMIN — MUPIROCIN: 20 OINTMENT TOPICAL at 09:08

## 2025-08-24 RX ADMIN — DIAZEPAM 2 MG: 2 TABLET ORAL at 08:08

## 2025-08-24 RX ADMIN — METHOCARBAMOL 500 MG: 500 TABLET ORAL at 09:08

## 2025-08-24 RX ADMIN — FOLIC ACID 1 MG: 1 TABLET ORAL at 09:08

## 2025-08-24 RX ADMIN — RALOXIFENE HYDROCHLORIDE 60 MG: 60 TABLET, FILM COATED ORAL at 08:08

## 2025-08-24 RX ADMIN — GABAPENTIN 600 MG: 300 CAPSULE ORAL at 02:08

## 2025-08-24 RX ADMIN — MONTELUKAST SODIUM 10 MG: 10 TABLET, COATED ORAL at 08:08

## 2025-08-24 RX ADMIN — HYDROCODONE BITARTRATE AND ACETAMINOPHEN 1 TABLET: 7.5; 325 TABLET ORAL at 02:08

## 2025-08-24 RX ADMIN — PAROXETINE 30 MG: 10 TABLET, FILM COATED ORAL at 08:08

## 2025-08-24 RX ADMIN — DICYCLOMINE HYDROCHLORIDE 10 MG: 10 CAPSULE ORAL at 09:08

## 2025-08-24 RX ADMIN — HYDROCODONE BITARTRATE AND ACETAMINOPHEN 1 TABLET: 7.5; 325 TABLET ORAL at 10:08

## 2025-08-24 RX ADMIN — TOPIRAMATE 100 MG: 25 TABLET, FILM COATED ORAL at 08:08

## 2025-08-24 RX ADMIN — GABAPENTIN 600 MG: 300 CAPSULE ORAL at 09:08

## 2025-08-24 RX ADMIN — DICYCLOMINE HYDROCHLORIDE 10 MG: 10 CAPSULE ORAL at 08:08

## 2025-08-24 RX ADMIN — METHOCARBAMOL 500 MG: 500 TABLET ORAL at 02:08

## 2025-08-24 RX ADMIN — Medication 2000 UNITS: at 09:08

## 2025-08-24 RX ADMIN — PANTOPRAZOLE SODIUM 40 MG: 40 TABLET, DELAYED RELEASE ORAL at 09:08

## 2025-08-24 RX ADMIN — TRIAMTERENE AND HYDROCHLOROTHIAZIDE 1 TABLET: 37.5; 25 TABLET ORAL at 09:08

## 2025-08-24 RX ADMIN — ACETAMINOPHEN 650 MG: 325 TABLET ORAL at 08:08

## 2025-08-24 RX ADMIN — GABAPENTIN 600 MG: 300 CAPSULE ORAL at 08:08

## 2025-08-24 RX ADMIN — METHENAMINE HIPPURATE 1 G: 1000 TABLET ORAL at 09:08

## 2025-08-24 RX ADMIN — Medication 1 TABLET: at 05:08

## 2025-08-24 RX ADMIN — Medication 1 TABLET: at 07:08

## 2025-08-24 RX ADMIN — LEVOTHYROXINE SODIUM 50 MCG: 0.05 TABLET ORAL at 05:08

## 2025-08-24 RX ADMIN — ENOXAPARIN SODIUM 40 MG: 40 INJECTION SUBCUTANEOUS at 05:08

## 2025-08-24 RX ADMIN — DICYCLOMINE HYDROCHLORIDE 10 MG: 10 CAPSULE ORAL at 02:08

## 2025-08-24 RX ADMIN — DIAZEPAM 2 MG: 2 TABLET ORAL at 09:08

## 2025-08-24 RX ADMIN — TOPIRAMATE 100 MG: 25 TABLET, FILM COATED ORAL at 09:08

## 2025-08-24 RX ADMIN — ATORVASTATIN CALCIUM 40 MG: 40 TABLET, FILM COATED ORAL at 08:08

## 2025-08-24 RX ADMIN — ACETAMINOPHEN 650 MG: 325 TABLET ORAL at 06:08

## 2025-08-24 RX ADMIN — METHENAMINE HIPPURATE 1 G: 1000 TABLET ORAL at 08:08

## 2025-08-24 RX ADMIN — AMITRIPTYLINE HYDROCHLORIDE 10 MG: 10 TABLET, FILM COATED ORAL at 08:08

## 2025-08-25 LAB
ALBUMIN SERPL BCP-MCNC: 3.3 G/DL (ref 3.4–4.8)
ALBUMIN/GLOB SERPL: 0.9 {RATIO}
ALP SERPL-CCNC: 78 U/L (ref 40–150)
ALT SERPL W P-5'-P-CCNC: 12 U/L
ANION GAP SERPL CALCULATED.3IONS-SCNC: 13 MMOL/L (ref 7–16)
AST SERPL W P-5'-P-CCNC: 23 U/L (ref 11–45)
BASOPHILS # BLD AUTO: 0.04 K/UL (ref 0–0.2)
BASOPHILS NFR BLD AUTO: 0.8 % (ref 0–1)
BILIRUB SERPL-MCNC: 0.5 MG/DL
BUN SERPL-MCNC: 17 MG/DL (ref 10–20)
BUN/CREAT SERPL: 14 (ref 6–20)
CALCIUM SERPL-MCNC: 9.2 MG/DL (ref 8.4–10.2)
CHLORIDE SERPL-SCNC: 101 MMOL/L (ref 98–107)
CO2 SERPL-SCNC: 29 MMOL/L (ref 23–31)
CREAT SERPL-MCNC: 1.18 MG/DL (ref 0.55–1.02)
DIFFERENTIAL METHOD BLD: ABNORMAL
EGFR (NO RACE VARIABLE) (RUSH/TITUS): 49 ML/MIN/1.73M2
EOSINOPHIL # BLD AUTO: 0.12 K/UL (ref 0–0.5)
EOSINOPHIL NFR BLD AUTO: 2.3 % (ref 1–4)
ERYTHROCYTE [DISTWIDTH] IN BLOOD BY AUTOMATED COUNT: 12.8 % (ref 11.5–14.5)
GLOBULIN SER-MCNC: 3.5 G/DL (ref 2–4)
GLUCOSE SERPL-MCNC: 92 MG/DL (ref 82–115)
HCT VFR BLD AUTO: 40.4 % (ref 38–47)
HGB BLD-MCNC: 12.4 G/DL (ref 12–16)
LYMPHOCYTES # BLD AUTO: 2.21 K/UL (ref 1–4.8)
LYMPHOCYTES NFR BLD AUTO: 41.9 % (ref 27–41)
MCH RBC QN AUTO: 29.9 PG (ref 27–31)
MCHC RBC AUTO-ENTMCNC: 30.7 G/DL (ref 32–36)
MCV RBC AUTO: 97.3 FL (ref 80–96)
MONOCYTES # BLD AUTO: 0.41 K/UL (ref 0–0.8)
MONOCYTES NFR BLD AUTO: 7.8 % (ref 2–6)
MPC BLD CALC-MCNC: 9.6 FL (ref 9.4–12.4)
NEUTROPHILS # BLD AUTO: 2.49 K/UL (ref 1.8–7.7)
NEUTROPHILS NFR BLD AUTO: 47.2 % (ref 53–65)
PLATELET # BLD AUTO: 289 K/UL (ref 150–400)
POTASSIUM SERPL-SCNC: 3.7 MMOL/L (ref 3.5–5.1)
PROT SERPL-MCNC: 6.8 G/DL (ref 5.8–7.6)
RBC # BLD AUTO: 4.15 M/UL (ref 4.2–5.4)
SODIUM SERPL-SCNC: 139 MMOL/L (ref 136–145)
WBC # BLD AUTO: 5.27 K/UL (ref 4.5–11)

## 2025-08-25 PROCEDURE — 80053 COMPREHEN METABOLIC PANEL: CPT | Performed by: NURSE PRACTITIONER

## 2025-08-25 PROCEDURE — 99308 SBSQ NF CARE LOW MDM 20: CPT | Mod: ,,, | Performed by: FAMILY MEDICINE

## 2025-08-25 PROCEDURE — 85025 COMPLETE CBC W/AUTO DIFF WBC: CPT | Performed by: NURSE PRACTITIONER

## 2025-08-25 PROCEDURE — 25000003 PHARM REV CODE 250: Performed by: NURSE PRACTITIONER

## 2025-08-25 PROCEDURE — 27000982 HC MATTRESS, MATRIX LAL RENTAL

## 2025-08-25 PROCEDURE — 11000004 HC SNF PRIVATE

## 2025-08-25 PROCEDURE — 94761 N-INVAS EAR/PLS OXIMETRY MLT: CPT

## 2025-08-25 PROCEDURE — 97116 GAIT TRAINING THERAPY: CPT

## 2025-08-25 PROCEDURE — 25000003 PHARM REV CODE 250: Performed by: FAMILY MEDICINE

## 2025-08-25 PROCEDURE — 97110 THERAPEUTIC EXERCISES: CPT

## 2025-08-25 PROCEDURE — 27000958

## 2025-08-25 PROCEDURE — 63600175 PHARM REV CODE 636 W HCPCS: Performed by: NURSE PRACTITIONER

## 2025-08-25 PROCEDURE — 97530 THERAPEUTIC ACTIVITIES: CPT

## 2025-08-25 RX ADMIN — Medication 1 TABLET: at 05:08

## 2025-08-25 RX ADMIN — ACETAMINOPHEN 650 MG: 325 TABLET ORAL at 08:08

## 2025-08-25 RX ADMIN — METHOCARBAMOL 500 MG: 500 TABLET ORAL at 08:08

## 2025-08-25 RX ADMIN — ENOXAPARIN SODIUM 40 MG: 40 INJECTION SUBCUTANEOUS at 05:08

## 2025-08-25 RX ADMIN — DICYCLOMINE HYDROCHLORIDE 10 MG: 10 CAPSULE ORAL at 08:08

## 2025-08-25 RX ADMIN — DIAZEPAM 2 MG: 2 TABLET ORAL at 08:08

## 2025-08-25 RX ADMIN — CETIRIZINE HYDROCHLORIDE 10 MG: 10 TABLET, FILM COATED ORAL at 08:08

## 2025-08-25 RX ADMIN — GABAPENTIN 600 MG: 300 CAPSULE ORAL at 03:08

## 2025-08-25 RX ADMIN — METHENAMINE HIPPURATE 1 G: 1000 TABLET ORAL at 08:08

## 2025-08-25 RX ADMIN — MONTELUKAST SODIUM 10 MG: 10 TABLET, COATED ORAL at 08:08

## 2025-08-25 RX ADMIN — TOPIRAMATE 100 MG: 25 TABLET, FILM COATED ORAL at 08:08

## 2025-08-25 RX ADMIN — FOLIC ACID 1 MG: 1 TABLET ORAL at 08:08

## 2025-08-25 RX ADMIN — DICYCLOMINE HYDROCHLORIDE 10 MG: 10 CAPSULE ORAL at 03:08

## 2025-08-25 RX ADMIN — AMITRIPTYLINE HYDROCHLORIDE 10 MG: 10 TABLET, FILM COATED ORAL at 08:08

## 2025-08-25 RX ADMIN — GABAPENTIN 600 MG: 300 CAPSULE ORAL at 08:08

## 2025-08-25 RX ADMIN — TRIAMTERENE AND HYDROCHLOROTHIAZIDE 1 TABLET: 37.5; 25 TABLET ORAL at 08:08

## 2025-08-25 RX ADMIN — Medication 1 TABLET: at 08:08

## 2025-08-25 RX ADMIN — LEVOTHYROXINE SODIUM 50 MCG: 0.05 TABLET ORAL at 05:08

## 2025-08-25 RX ADMIN — PAROXETINE 30 MG: 10 TABLET, FILM COATED ORAL at 08:08

## 2025-08-25 RX ADMIN — PANTOPRAZOLE SODIUM 40 MG: 40 TABLET, DELAYED RELEASE ORAL at 08:08

## 2025-08-25 RX ADMIN — HYDROCODONE BITARTRATE AND ACETAMINOPHEN 1 TABLET: 7.5; 325 TABLET ORAL at 08:08

## 2025-08-25 RX ADMIN — Medication 2000 UNITS: at 08:08

## 2025-08-25 RX ADMIN — METHOCARBAMOL 500 MG: 500 TABLET ORAL at 03:08

## 2025-08-25 RX ADMIN — ATORVASTATIN CALCIUM 40 MG: 40 TABLET, FILM COATED ORAL at 08:08

## 2025-08-25 RX ADMIN — RALOXIFENE HYDROCHLORIDE 60 MG: 60 TABLET, FILM COATED ORAL at 08:08

## 2025-08-26 PROCEDURE — 97110 THERAPEUTIC EXERCISES: CPT | Mod: CQ

## 2025-08-26 PROCEDURE — 27000982 HC MATTRESS, MATRIX LAL RENTAL

## 2025-08-26 PROCEDURE — 27000958

## 2025-08-26 PROCEDURE — 25000003 PHARM REV CODE 250: Performed by: FAMILY MEDICINE

## 2025-08-26 PROCEDURE — 94761 N-INVAS EAR/PLS OXIMETRY MLT: CPT

## 2025-08-26 PROCEDURE — 97535 SELF CARE MNGMENT TRAINING: CPT | Mod: CO

## 2025-08-26 PROCEDURE — 97110 THERAPEUTIC EXERCISES: CPT | Mod: CO

## 2025-08-26 PROCEDURE — 97116 GAIT TRAINING THERAPY: CPT | Mod: CQ

## 2025-08-26 PROCEDURE — 63600175 PHARM REV CODE 636 W HCPCS: Performed by: NURSE PRACTITIONER

## 2025-08-26 PROCEDURE — 25000003 PHARM REV CODE 250: Performed by: NURSE PRACTITIONER

## 2025-08-26 PROCEDURE — 97530 THERAPEUTIC ACTIVITIES: CPT | Mod: CO

## 2025-08-26 PROCEDURE — 11000004 HC SNF PRIVATE

## 2025-08-26 RX ADMIN — METHOCARBAMOL 500 MG: 500 TABLET ORAL at 08:08

## 2025-08-26 RX ADMIN — LEVOTHYROXINE SODIUM 50 MCG: 0.05 TABLET ORAL at 05:08

## 2025-08-26 RX ADMIN — RALOXIFENE HYDROCHLORIDE 60 MG: 60 TABLET, FILM COATED ORAL at 08:08

## 2025-08-26 RX ADMIN — ATORVASTATIN CALCIUM 40 MG: 40 TABLET, FILM COATED ORAL at 08:08

## 2025-08-26 RX ADMIN — GABAPENTIN 600 MG: 300 CAPSULE ORAL at 08:08

## 2025-08-26 RX ADMIN — DIAZEPAM 2 MG: 2 TABLET ORAL at 08:08

## 2025-08-26 RX ADMIN — AMITRIPTYLINE HYDROCHLORIDE 10 MG: 10 TABLET, FILM COATED ORAL at 08:08

## 2025-08-26 RX ADMIN — DICYCLOMINE HYDROCHLORIDE 10 MG: 10 CAPSULE ORAL at 08:08

## 2025-08-26 RX ADMIN — CETIRIZINE HYDROCHLORIDE 10 MG: 10 TABLET, FILM COATED ORAL at 08:08

## 2025-08-26 RX ADMIN — MONTELUKAST SODIUM 10 MG: 10 TABLET, COATED ORAL at 08:08

## 2025-08-26 RX ADMIN — METHOCARBAMOL 500 MG: 500 TABLET ORAL at 03:08

## 2025-08-26 RX ADMIN — HYDROCODONE BITARTRATE AND ACETAMINOPHEN 1 TABLET: 7.5; 325 TABLET ORAL at 08:08

## 2025-08-26 RX ADMIN — TOPIRAMATE 100 MG: 25 TABLET, FILM COATED ORAL at 08:08

## 2025-08-26 RX ADMIN — FOLIC ACID 1 MG: 1 TABLET ORAL at 09:08

## 2025-08-26 RX ADMIN — GABAPENTIN 600 MG: 300 CAPSULE ORAL at 03:08

## 2025-08-26 RX ADMIN — DICYCLOMINE HYDROCHLORIDE 10 MG: 10 CAPSULE ORAL at 03:08

## 2025-08-26 RX ADMIN — METHENAMINE HIPPURATE 1 G: 1000 TABLET ORAL at 08:08

## 2025-08-26 RX ADMIN — ENOXAPARIN SODIUM 40 MG: 40 INJECTION SUBCUTANEOUS at 05:08

## 2025-08-26 RX ADMIN — ACETAMINOPHEN 650 MG: 325 TABLET ORAL at 11:08

## 2025-08-26 RX ADMIN — PANTOPRAZOLE SODIUM 40 MG: 40 TABLET, DELAYED RELEASE ORAL at 08:08

## 2025-08-26 RX ADMIN — TRIAMTERENE AND HYDROCHLOROTHIAZIDE 1 TABLET: 37.5; 25 TABLET ORAL at 08:08

## 2025-08-26 RX ADMIN — Medication 1 TABLET: at 05:08

## 2025-08-26 RX ADMIN — Medication 2000 UNITS: at 08:08

## 2025-08-26 RX ADMIN — Medication 1 TABLET: at 08:08

## 2025-08-26 RX ADMIN — PAROXETINE 30 MG: 10 TABLET, FILM COATED ORAL at 08:08

## 2025-08-27 PROCEDURE — 63600175 PHARM REV CODE 636 W HCPCS: Performed by: NURSE PRACTITIONER

## 2025-08-27 PROCEDURE — 97530 THERAPEUTIC ACTIVITIES: CPT | Mod: CO

## 2025-08-27 PROCEDURE — 25000003 PHARM REV CODE 250: Performed by: NURSE PRACTITIONER

## 2025-08-27 PROCEDURE — 11000004 HC SNF PRIVATE

## 2025-08-27 PROCEDURE — 27000982 HC MATTRESS, MATRIX LAL RENTAL

## 2025-08-27 PROCEDURE — 94761 N-INVAS EAR/PLS OXIMETRY MLT: CPT

## 2025-08-27 PROCEDURE — 27000958

## 2025-08-27 PROCEDURE — 25000003 PHARM REV CODE 250: Performed by: FAMILY MEDICINE

## 2025-08-27 PROCEDURE — 97110 THERAPEUTIC EXERCISES: CPT

## 2025-08-27 PROCEDURE — 97110 THERAPEUTIC EXERCISES: CPT | Mod: CO

## 2025-08-27 PROCEDURE — 97116 GAIT TRAINING THERAPY: CPT

## 2025-08-27 RX ADMIN — Medication 1 TABLET: at 05:08

## 2025-08-27 RX ADMIN — METHOCARBAMOL 500 MG: 500 TABLET ORAL at 03:08

## 2025-08-27 RX ADMIN — Medication 2000 UNITS: at 09:08

## 2025-08-27 RX ADMIN — DIAZEPAM 2 MG: 2 TABLET ORAL at 09:08

## 2025-08-27 RX ADMIN — Medication 1 TABLET: at 07:08

## 2025-08-27 RX ADMIN — TOPIRAMATE 100 MG: 25 TABLET, FILM COATED ORAL at 09:08

## 2025-08-27 RX ADMIN — DICYCLOMINE HYDROCHLORIDE 10 MG: 10 CAPSULE ORAL at 08:08

## 2025-08-27 RX ADMIN — METHOCARBAMOL 500 MG: 500 TABLET ORAL at 08:08

## 2025-08-27 RX ADMIN — METHOCARBAMOL 500 MG: 500 TABLET ORAL at 09:08

## 2025-08-27 RX ADMIN — DICYCLOMINE HYDROCHLORIDE 10 MG: 10 CAPSULE ORAL at 09:08

## 2025-08-27 RX ADMIN — TRIAMTERENE AND HYDROCHLOROTHIAZIDE 1 TABLET: 37.5; 25 TABLET ORAL at 09:08

## 2025-08-27 RX ADMIN — GABAPENTIN 600 MG: 300 CAPSULE ORAL at 09:08

## 2025-08-27 RX ADMIN — PANTOPRAZOLE SODIUM 40 MG: 40 TABLET, DELAYED RELEASE ORAL at 09:08

## 2025-08-27 RX ADMIN — PAROXETINE 30 MG: 10 TABLET, FILM COATED ORAL at 08:08

## 2025-08-27 RX ADMIN — TOPIRAMATE 100 MG: 25 TABLET, FILM COATED ORAL at 08:08

## 2025-08-27 RX ADMIN — DICYCLOMINE HYDROCHLORIDE 10 MG: 10 CAPSULE ORAL at 03:08

## 2025-08-27 RX ADMIN — MONTELUKAST SODIUM 10 MG: 10 TABLET, COATED ORAL at 08:08

## 2025-08-27 RX ADMIN — GABAPENTIN 600 MG: 300 CAPSULE ORAL at 03:08

## 2025-08-27 RX ADMIN — METHENAMINE HIPPURATE 1 G: 1000 TABLET ORAL at 08:08

## 2025-08-27 RX ADMIN — AMITRIPTYLINE HYDROCHLORIDE 10 MG: 10 TABLET, FILM COATED ORAL at 08:08

## 2025-08-27 RX ADMIN — ENOXAPARIN SODIUM 40 MG: 40 INJECTION SUBCUTANEOUS at 05:08

## 2025-08-27 RX ADMIN — FOLIC ACID 1 MG: 1 TABLET ORAL at 09:08

## 2025-08-27 RX ADMIN — GABAPENTIN 600 MG: 300 CAPSULE ORAL at 08:08

## 2025-08-27 RX ADMIN — DIAZEPAM 2 MG: 2 TABLET ORAL at 08:08

## 2025-08-27 RX ADMIN — METHENAMINE HIPPURATE 1 G: 1000 TABLET ORAL at 09:08

## 2025-08-27 RX ADMIN — CETIRIZINE HYDROCHLORIDE 10 MG: 10 TABLET, FILM COATED ORAL at 09:08

## 2025-08-27 RX ADMIN — ATORVASTATIN CALCIUM 40 MG: 40 TABLET, FILM COATED ORAL at 08:08

## 2025-08-27 RX ADMIN — LEVOTHYROXINE SODIUM 50 MCG: 0.05 TABLET ORAL at 05:08

## 2025-08-27 RX ADMIN — ACETAMINOPHEN 650 MG: 325 TABLET ORAL at 11:08

## 2025-08-27 RX ADMIN — HYDROCODONE BITARTRATE AND ACETAMINOPHEN 1 TABLET: 7.5; 325 TABLET ORAL at 08:08

## 2025-08-27 RX ADMIN — RALOXIFENE HYDROCHLORIDE 60 MG: 60 TABLET, FILM COATED ORAL at 08:08

## 2025-08-28 VITALS
RESPIRATION RATE: 18 BRPM | TEMPERATURE: 98 F | HEART RATE: 76 BPM | SYSTOLIC BLOOD PRESSURE: 106 MMHG | OXYGEN SATURATION: 93 % | DIASTOLIC BLOOD PRESSURE: 63 MMHG | WEIGHT: 172.38 LBS | HEIGHT: 69 IN | BODY MASS INDEX: 25.53 KG/M2

## 2025-08-28 PROBLEM — R25.2 LEG CRAMPS: Status: ACTIVE | Noted: 2025-08-28

## 2025-08-28 PROCEDURE — 27000958

## 2025-08-28 PROCEDURE — 25000003 PHARM REV CODE 250: Performed by: NURSE PRACTITIONER

## 2025-08-28 PROCEDURE — 99316 NF DSCHRG MGMT 30 MIN+: CPT | Mod: ,,, | Performed by: FAMILY MEDICINE

## 2025-08-28 PROCEDURE — 27000982 HC MATTRESS, MATRIX LAL RENTAL

## 2025-08-28 PROCEDURE — 94761 N-INVAS EAR/PLS OXIMETRY MLT: CPT

## 2025-08-28 RX ORDER — QUININE SULFATE 324 MG/1
324 CAPSULE ORAL NIGHTLY
Qty: 30 CAPSULE | Refills: 5 | Status: SHIPPED | OUTPATIENT
Start: 2025-08-28

## 2025-08-28 RX ORDER — AMITRIPTYLINE HYDROCHLORIDE 10 MG/1
10 TABLET, FILM COATED ORAL NIGHTLY
Qty: 30 TABLET | Refills: 11 | Status: SHIPPED | OUTPATIENT
Start: 2025-08-28 | End: 2026-08-28

## 2025-08-28 RX ADMIN — LEVOTHYROXINE SODIUM 50 MCG: 0.05 TABLET ORAL at 06:08

## 2025-08-28 RX ADMIN — PANTOPRAZOLE SODIUM 40 MG: 40 TABLET, DELAYED RELEASE ORAL at 08:08

## 2025-08-28 RX ADMIN — TRIAMTERENE AND HYDROCHLOROTHIAZIDE 1 TABLET: 37.5; 25 TABLET ORAL at 08:08

## 2025-08-28 RX ADMIN — DICYCLOMINE HYDROCHLORIDE 10 MG: 10 CAPSULE ORAL at 08:08

## 2025-08-28 RX ADMIN — METHENAMINE HIPPURATE 1 G: 1000 TABLET ORAL at 08:08

## 2025-08-28 RX ADMIN — METHOCARBAMOL 500 MG: 500 TABLET ORAL at 08:08

## 2025-08-28 RX ADMIN — Medication 2000 UNITS: at 08:08

## 2025-08-28 RX ADMIN — FOLIC ACID 1 MG: 1 TABLET ORAL at 08:08

## 2025-08-28 RX ADMIN — Medication 1 TABLET: at 07:08

## 2025-08-28 RX ADMIN — GABAPENTIN 600 MG: 300 CAPSULE ORAL at 08:08

## 2025-08-28 RX ADMIN — TOPIRAMATE 100 MG: 25 TABLET, FILM COATED ORAL at 08:08

## 2025-08-28 RX ADMIN — CETIRIZINE HYDROCHLORIDE 10 MG: 10 TABLET, FILM COATED ORAL at 08:08

## 2025-08-28 RX ADMIN — DIAZEPAM 2 MG: 2 TABLET ORAL at 08:08

## 2025-08-29 ENCOUNTER — TELEPHONE (OUTPATIENT)
Dept: FAMILY MEDICINE | Facility: CLINIC | Age: 70
End: 2025-08-29
Payer: MEDICARE

## 2025-08-29 ENCOUNTER — PATIENT OUTREACH (OUTPATIENT)
Dept: ADMINISTRATIVE | Facility: CLINIC | Age: 70
End: 2025-08-29
Payer: MEDICARE

## 2025-09-02 ENCOUNTER — OFFICE VISIT (OUTPATIENT)
Dept: FAMILY MEDICINE | Facility: CLINIC | Age: 70
End: 2025-09-02
Payer: MEDICARE

## 2025-09-02 VITALS
RESPIRATION RATE: 17 BRPM | DIASTOLIC BLOOD PRESSURE: 76 MMHG | SYSTOLIC BLOOD PRESSURE: 117 MMHG | OXYGEN SATURATION: 95 % | TEMPERATURE: 99 F | WEIGHT: 170 LBS | BODY MASS INDEX: 25.18 KG/M2 | HEART RATE: 71 BPM | HEIGHT: 69 IN

## 2025-09-02 DIAGNOSIS — E03.9 HYPOTHYROIDISM, UNSPECIFIED TYPE: ICD-10-CM

## 2025-09-02 DIAGNOSIS — Z09 HOSPITAL DISCHARGE FOLLOW-UP: Primary | ICD-10-CM

## 2025-09-02 DIAGNOSIS — H81.03 MENIERE'S DISEASE (COCHLEAR HYDROPS), BILATERAL: ICD-10-CM

## 2025-09-02 DIAGNOSIS — R29.898 WEAKNESS OF BOTH UPPER EXTREMITIES: ICD-10-CM

## 2025-09-02 DIAGNOSIS — I69.354 HEMIPLEGIA AND HEMIPARESIS FOLLOWING CEREBRAL INFARCTION AFFECTING LEFT NON-DOMINANT SIDE: ICD-10-CM

## 2025-09-02 DIAGNOSIS — R05.1 ACUTE COUGH: ICD-10-CM

## 2025-09-02 DIAGNOSIS — M81.0 AGE-RELATED OSTEOPOROSIS WITHOUT CURRENT PATHOLOGICAL FRACTURE: ICD-10-CM

## 2025-09-02 DIAGNOSIS — T78.40XS ALLERGIC REACTION, SEQUELA: ICD-10-CM

## 2025-09-02 DIAGNOSIS — K58.9 IRRITABLE BOWEL SYNDROME, UNSPECIFIED TYPE: ICD-10-CM

## 2025-09-02 DIAGNOSIS — R26.89 DECREASED FUNCTIONAL MOBILITY: ICD-10-CM

## 2025-09-02 DIAGNOSIS — M47.814 THORACIC SPONDYLOSIS: ICD-10-CM

## 2025-09-02 DIAGNOSIS — G89.4 CHRONIC PAIN SYNDROME: ICD-10-CM

## 2025-09-02 DIAGNOSIS — E53.8 FOLIC ACID DEFICIENCY: ICD-10-CM

## 2025-09-02 DIAGNOSIS — T78.40XS ALLERGY, SEQUELA: ICD-10-CM

## 2025-09-02 DIAGNOSIS — M96.1 POSTLAMINECTOMY SYNDROME, LUMBAR REGION: ICD-10-CM

## 2025-09-02 DIAGNOSIS — M51.379 DEGENERATION OF INTERVERTEBRAL DISC OF LUMBOSACRAL REGION, UNSPECIFIED WHETHER PAIN PRESENT: ICD-10-CM

## 2025-09-02 RX ORDER — RABEPRAZOLE SODIUM 20 MG/1
20 TABLET, DELAYED RELEASE ORAL 2 TIMES DAILY
Qty: 180 TABLET | Refills: 1 | Status: CANCELLED | OUTPATIENT
Start: 2025-09-02

## 2025-09-02 RX ORDER — HYDROCODONE BITARTRATE AND ACETAMINOPHEN 7.5; 325 MG/1; MG/1
1 TABLET ORAL 3 TIMES DAILY PRN
Refills: 0 | Status: CANCELLED | OUTPATIENT
Start: 2025-09-02

## 2025-09-02 RX ORDER — EPINEPHRINE 0.3 MG/.3ML
1 INJECTION SUBCUTANEOUS
Qty: 1 EACH | Refills: 2 | Status: CANCELLED | OUTPATIENT
Start: 2025-09-02

## 2025-09-02 RX ORDER — FOLIC ACID 1 MG/1
1 TABLET ORAL DAILY
Qty: 90 TABLET | Refills: 1 | Status: SHIPPED | OUTPATIENT
Start: 2025-09-02

## 2025-09-02 RX ORDER — MONTELUKAST SODIUM 10 MG/1
10 TABLET ORAL NIGHTLY
Qty: 90 TABLET | Refills: 1 | Status: SHIPPED | OUTPATIENT
Start: 2025-09-02

## 2025-09-02 RX ORDER — LORATADINE 10 MG/1
10 TABLET ORAL DAILY
Qty: 90 TABLET | Refills: 1 | Status: SHIPPED | OUTPATIENT
Start: 2025-09-02

## 2025-09-02 RX ORDER — COLESTIPOL HYDROCHLORIDE 1 G/1
1 TABLET ORAL DAILY
Qty: 90 TABLET | Refills: 1 | Status: SHIPPED | OUTPATIENT
Start: 2025-09-02

## 2025-09-02 RX ORDER — NALOXONE HYDROCHLORIDE 4 MG/.1ML
1 SPRAY NASAL ONCE
Qty: 1 EACH | Refills: 0 | Status: CANCELLED | OUTPATIENT
Start: 2025-09-02 | End: 2025-09-02

## 2025-09-02 RX ORDER — METHOCARBAMOL 500 MG/1
500 TABLET, FILM COATED ORAL 3 TIMES DAILY
Qty: 270 TABLET | Refills: 1 | Status: SHIPPED | OUTPATIENT
Start: 2025-09-02

## 2025-09-02 RX ORDER — CETIRIZINE HYDROCHLORIDE 10 MG/1
10 TABLET ORAL DAILY
Qty: 90 TABLET | Refills: 1 | Status: CANCELLED | OUTPATIENT
Start: 2025-09-02

## 2025-09-02 RX ORDER — GABAPENTIN 600 MG/1
600 TABLET ORAL 2 TIMES DAILY
Qty: 180 TABLET | Refills: 1 | Status: SHIPPED | OUTPATIENT
Start: 2025-09-02

## 2025-09-02 RX ORDER — LEVOTHYROXINE SODIUM 50 UG/1
50 TABLET ORAL
Qty: 90 TABLET | Refills: 1 | Status: SHIPPED | OUTPATIENT
Start: 2025-09-02

## 2025-09-02 RX ORDER — BENZONATATE 100 MG/1
100 CAPSULE ORAL 3 TIMES DAILY PRN
Qty: 30 CAPSULE | Refills: 0 | Status: SHIPPED | OUTPATIENT
Start: 2025-09-02 | End: 2025-09-12

## 2025-09-02 RX ORDER — RALOXIFENE HYDROCHLORIDE 60 MG/1
60 TABLET, FILM COATED ORAL DAILY
Qty: 90 TABLET | Refills: 1 | Status: SHIPPED | OUTPATIENT
Start: 2025-09-02

## 2025-09-04 ENCOUNTER — TELEPHONE (OUTPATIENT)
Dept: FAMILY MEDICINE | Facility: CLINIC | Age: 70
End: 2025-09-04
Payer: MEDICARE

## (undated) DEVICE — SUTURE STRATAFIX VIOLET CT-1 45CM

## (undated) DEVICE — PRINEO SKIN CLOSURE DERMABOND 22CM

## (undated) DEVICE — NEEDLE SPINAL 20G X 3 1/2IN

## (undated) DEVICE — NEEDLE SPINAL 18G X 3 1/2IN

## (undated) DEVICE — GLOVE SURGICAL PROTEXIS PI SIZE 7

## (undated) DEVICE — SPONGE XRAY DETECT 4X4IN PK/10

## (undated) DEVICE — SPONGE GAUZE 4X4 12 PLY STL AMD 10/TRAY

## (undated) DEVICE — POSITIONER HEADREST FOAM 11 X 10 X 6

## (undated) DEVICE — BLADE CARBON SURG SS SZ 15 STERILE

## (undated) DEVICE — Device

## (undated) DEVICE — DRAPE SURGICAL 3/4 SHEET 56 X 77" STERILE"

## (undated) DEVICE — SUTURE VICRYL 1 CT-1 UD BR 27IN

## (undated) DEVICE — BAG-A-JET FLUID DISPENSER SYSTEM

## (undated) DEVICE — COVER LIGHT HANDLE FLEX PK/2

## (undated) DEVICE — SYRINGE 10-12CC LURE -LOK TIP

## (undated) DEVICE — SUTURE STRATAFIX PGA 2-0 26CM

## (undated) DEVICE — PENCIL HANDSWITCHING ROCKER SW

## (undated) DEVICE — GLOVE SURGICAL PROTEXIS PI SIZE 6.5

## (undated) DEVICE — SUTURE BONE WAX (W31G) 2.5 GM

## (undated) DEVICE — FOAM POSITIONER ARM SURGICAL

## (undated) DEVICE — DRAPE FLUORO C ARM 36X30IN

## (undated) DEVICE — PACK TIBURON UNIVERSAL

## (undated) DEVICE — VALLEYLAB BIPOLAR FORCEPS CORD 12FT

## (undated) DEVICE — DRAPE STERI SURGICAL TOWEL 1000

## (undated) DEVICE — COUNTER SHARPS FOAM BLOCK MAGNET 20-40

## (undated) DEVICE — BLADE SURG SS SZ 10

## (undated) DEVICE — SOL IRRIGATION SALINE 0.9% 1000ML BOTTLE

## (undated) DEVICE — APPLICATOR CHLORAPREP HI-LITE TINTED ORANGE 26ML

## (undated) DEVICE — SUTURE STRATAFIX CP-2 24CM X 24CX

## (undated) DEVICE — GLOVE SURGICAL PROTEXIS PI SIZE 8.5

## (undated) DEVICE — DRAPE COVER C-ARM C-ARMOR 42IN X 74IN DISP STERILE

## (undated) DEVICE — SYRINGE 20CC LL PLASTIC  BX/48

## (undated) DEVICE — FLOSEAL MATRIX HEMOSTATIC 10ML

## (undated) DEVICE — BUR ELITE PRECISION NEURO 3.0

## (undated) DEVICE — FORCEP BAYONET BIPOLAR 10.5 DISP

## (undated) DEVICE — GLOVE SURGICAL PROTEXIS PI SIZE 6

## (undated) DEVICE — TUBING SUCTION 5MM STERILE 3/16IN X 12FT

## (undated) DEVICE — SYRINGE ASEPTO IRRIGATION BULB 60CC LF DISP

## (undated) DEVICE — TOWEL OR STERILE BLUE 4/PK 20PK/CS

## (undated) DEVICE — FLEXIBLE YANKAUER SUCTION CATH

## (undated) DEVICE — MARKER SURGICAL PEN & RULER STERILE

## (undated) DEVICE — FILM IOBAN ANTIMICROBL 35X35CM